# Patient Record
Sex: FEMALE | Race: BLACK OR AFRICAN AMERICAN | NOT HISPANIC OR LATINO | Employment: OTHER | URBAN - METROPOLITAN AREA
[De-identification: names, ages, dates, MRNs, and addresses within clinical notes are randomized per-mention and may not be internally consistent; named-entity substitution may affect disease eponyms.]

---

## 2017-01-28 ENCOUNTER — HOSPITAL ENCOUNTER (EMERGENCY)
Facility: HOSPITAL | Age: 57
Discharge: HOME | End: 2017-01-28
Attending: EMERGENCY MEDICINE
Payer: MEDICARE

## 2017-01-28 VITALS
WEIGHT: 170 LBS | OXYGEN SATURATION: 96 % | RESPIRATION RATE: 20 BRPM | SYSTOLIC BLOOD PRESSURE: 126 MMHG | BODY MASS INDEX: 28.32 KG/M2 | HEIGHT: 65 IN | DIASTOLIC BLOOD PRESSURE: 71 MMHG | TEMPERATURE: 98 F | HEART RATE: 108 BPM

## 2017-01-28 DIAGNOSIS — K59.00 CONSTIPATION: ICD-10-CM

## 2017-01-28 PROCEDURE — 25000003 PHARM REV CODE 250: Performed by: NURSE PRACTITIONER

## 2017-01-28 PROCEDURE — 99284 EMERGENCY DEPT VISIT MOD MDM: CPT | Mod: ,,, | Performed by: EMERGENCY MEDICINE

## 2017-01-28 PROCEDURE — 99283 EMERGENCY DEPT VISIT LOW MDM: CPT

## 2017-01-28 RX ORDER — SYRING-NEEDL,DISP,INSUL,0.3 ML 29 G X1/2"
300 SYRINGE, EMPTY DISPOSABLE MISCELLANEOUS
Status: COMPLETED | OUTPATIENT
Start: 2017-01-28 | End: 2017-01-28

## 2017-01-28 RX ORDER — PSEUDOEPHEDRINE/ACETAMINOPHEN 30MG-500MG
100 TABLET ORAL
Status: COMPLETED | OUTPATIENT
Start: 2017-01-28 | End: 2017-01-28

## 2017-01-28 RX ORDER — HYDROCODONE BITARTRATE AND ACETAMINOPHEN 5; 325 MG/1; MG/1
1 TABLET ORAL
Status: COMPLETED | OUTPATIENT
Start: 2017-01-28 | End: 2017-01-28

## 2017-01-28 RX ADMIN — MAGESIUM CITRATE 300 ML: 1.75 LIQUID ORAL at 10:01

## 2017-01-28 RX ADMIN — HYDROCODONE BITARTRATE AND ACETAMINOPHEN 1 TABLET: 5; 325 TABLET ORAL at 10:01

## 2017-01-28 RX ADMIN — SODIUM CHLORIDE 500 ML: 0.9 INJECTION, SOLUTION INTRAVENOUS at 10:01

## 2017-01-28 RX ADMIN — Medication 100 ML: at 10:01

## 2017-01-28 NOTE — ED AVS SNAPSHOT
"          [facility]  [address]  [facility] [address]               Wilfredo Vazquez   2017  9:34 PM   ED    Description:  Female : 1960   Department:  [facility]           Your Care was Coordinated By:     Provider Role From To    Efrem Mendoza MD Attending Provider 17 --    Savannah Kumar NP Nurse Practitioner 17 --      Reason for Visit     Constipation           Diagnoses this Visit        Comments    Constipation           ED Disposition     None           To Do List           Follow-up Information     Follow up with [facility]. Schedule an appointment as soon as possible for a visit in 2 days.    Specialty:  Internal Medicine    Why:  Return to the ER for any changes, worsening or concerns. Use over the counter Miralax per package instructions if needed for constipation.    Contact information:    [address] [facility] [facility] [address] [phone]    Additional information:    [facility]      [facility]     [facility] Care [facility] -  Assistance  Registered nurses in the [facility] provide clinical advisement, health education, appointment booking, and other advisory services.  Call for this free service at [phone].             Medications           Message regarding Medications     Verify the changes and/or additions to your medication regime listed below are the same as discussed with your clinician today.  If any of these changes or additions are incorrect, please notify your healthcare provider.        These medications were administered today        Dose Freq    glycerin 99.5% topical solution 100 mL 100 mL ED 1 Time    Sig: Place 100 mLs rectally ED 1 Time.    Class: Normal    Route: Rectal    Linked Group 1:  "And" Linked Group Details      magnesium citrate solution 300 mL 300 mL ED 1 Time    Sig: " "Place 300 mLs rectally ED 1 Time.    Class: Normal    Route: Rectal    Linked Group 1:  "And" Linked Group Details      sodium chloride 0.9% bolus 500 mL 500 mL ED 1 Time    Sig: Place 500 mLs rectally ED 1 Time.    Class: Normal    Route: Rectal    Linked Group 1:  "And" Linked Group Details      hydrocodone-acetaminophen 5-325mg per tablet 1 tablet 1 tablet ED 1 Time    Sig: Take 1 tablet by mouth ED 1 Time.    Class: Normal    Route: Oral    Cosign for Ordering: Accepted by Efrem Mendoza MD on 1/28/2017 10:47 PM           Verify that the below list of medications is an accurate representation of the medications you are currently taking.  If none reported, the list may be blank. If incorrect, please contact your healthcare provider. Carry this list with you in case of emergency.           Current Medications     lisinopril (PRINIVIL,ZESTRIL) 5 MG tablet Take 5 mg by mouth once daily.    acyclovir (ZOVIRAX) 800 MG Tab Take 1 tablet (800 mg total) by mouth 5 (five) times daily.    hydrocodone-acetaminophen 5-325mg (NORCO) 5-325 mg per tablet Take 1 tablet by mouth every 4 (four) hours as needed for Pain.           Clinical Reference Information           Your Vitals Were     BP Pulse Temp Resp Height Weight    126/71 (BP Location: Right arm, Patient Position: Sitting) 108 98.2 °F (36.8 °C) (Oral) 20 5' 5" (1.651 m) 77.1 kg (170 lb)    SpO2 BMI             96% 28.29 kg/m2         Allergies as of 1/28/2017     No Known Allergies      Immunizations Administered on Date of Encounter - 1/28/2017     None      ED Micro, Lab, POCT     None      ED Imaging Orders     Start Ordered       Status Ordering Provider    01/28/17 2155 01/28/17 2154  X-Ray Abdomen Flat And Erect  1 time imaging      Final result         Discharge Instructions         Constipation (Adult)  Constipation means that you have bowel movements that are less frequent than usual. Stools often become very hard and difficult to pass.  Constipation is " very common. At some point in life it affects almost everyone. Since everyone's bowel habits are different, what is constipation to one person may not be to another. Your healthcare provider may do tests to diagnose constipation. It depends on what he or she finds when evaluating you.    Symptoms of constipation include:  · Abdominal pain  · Bloating  · Vomiting  · Painful bowel movements  · Itching, swelling, bleeding, or pain around the anus  Causes  Constipation can have many causes. These include:  · Diet low in fiber  · Too much dairy  · Not drinking enough liquids  · Lack of exercise or physical activity. This is especially true for older adults.  · Changes in lifestyle or daily routine, including pregnancy, aging, work, and travel  · Frequent use or misuse of laxatives  · Ignoring the urge to have a bowel movement or delaying it until later  · Medicines, such as certain prescription pain medicines, iron supplements, antacids, certain antidepressants, and calcium supplements  · Diseases like irritable bowel syndrome, bowel obstructions, stroke, diabetes, thyroid disease, Parkinson disease, hemorrhoids, and colon cancer  Complications  Potential complications of constipation can include:  · Hemorrhoids  · Rectal bleeding from hemorrhoids or anal fissures (skin tears)  · Hernias  · Dependency on laxatives  · Chronic constipation  · Fecal impaction  · Bowel obstruction or perforation  Home care  All treatment should be done after talking with your healthcare provider. This is especially true if you have another medical problems, are taking prescription medicines, or are an older adult. Treatment most often involves lifestyle changes. You may also need medicines. Your healthcare provider will tell you which will work best for you. Follow the advice below to help avoid this problem in the future.  Lifestyle changes  These lifestyle changes can help prevent constipation:  · Diet. Eat a high-fiber diet, with fresh  fruit and vegetables, and reduce dairy intake, meats, and processed foods  · Fluids. It's important to get enough fluids each day. Drink plenty of water when you eat more fiber. If you are on diet that limits the amount of fluid you can have, talk about this with your healthcare provider.  · Regular exercise. Check with your healthcare provider first.  Medications  Take any medicines as directed. Some laxatives are safe to use only every now and then. Others can be taken on a regular basis. Talk with your doctor or pharmacist if you have questions.  Prescription pain medicines can cause constipation. If you are taking this kind of medicine, ask your healthcare provider if you should also take a stool softener.  Medicines you may take to treat constipation include:  · Fiber supplements  · Stool softeners  · Laxatives  · Enemas  · Rectal suppositories  Follow-up care  Follow up with your healthcare provider if symptoms don't get better in the next few days. You may need to have more tests or see a specialist.  Call 911  Call 911 if any of these occur:  · Trouble breathing  · Stiff, rigid abdomen that is severely painful to touch  · Confusion  · Fainting or loss of consciousness  · Rapid heart rate  · Chest pain  When to seek medical advice  Call your healthcare provider right away if any of these occur:  · Fever over 100.4°F (38°C)  · Failure to resume normal bowel movements  · Pain in your abdomen or back gets worse  · Nausea or vomiting  · Swelling in your abdomen  · Blood in the stool  · Black, tarry stool  · Involuntary weight loss  · Weakness  © 5945-4891 [employer]. [address] [place] [address], [place] [address]. All rights reserved. This information is not intended as a substitute for professional medical care. Always follow your healthcare professional's instructions.          [facility] Sign-Up     Activating your [facility] account is as easy as 1-2-3!     1) Visit [email or web address], select Sign Up Now,  enter this activation code and your date of birth, then select Next.  [email or web address]  Expires: 3/14/2017 11:34 PM      2) Create a username and password to use when you visit [facility] in the future and select a security question in case you lose your password and select Next.    3) Enter your e-mail address and click Sign Up!    Additional Information  If you have questions, please e-mail [email or web address] or call [phone] to talk to our [facility] staff. Remember, [facility] is NOT to be used for urgent needs. For medical emergencies, dial 911.         Smoking Cessation     If you would like to quit smoking:   You may be eligible for free services if you are a [place] resident and started smoking cigarettes before September 1, 1988.  Call the Smoking Cessation Trust (SCT) toll free at (786) 160-2577 or (198) 087-7028.   Call 7-916-QUIT-NOW if you do not meet the above criteria.             [facility] complies with applicable Federal civil rights laws and does not discriminate on the basis of race, color, national origin, age, disability, or sex.        Language Assistance Services     ATTENTION: Language assistance services are available, free of charge. Please call [phone].      ATENCIÓN: Si habla español, tiene a william disposición servicios gratuitos de asistencia lingüística. Llame al [phone].     CHÚ Ý: N?u b?n nói Ti?ng Vi?t, có các d?ch v? h? tr? ngôn ng? mi?n phí dành cho b?n. G?i s? [phone].

## 2017-01-29 NOTE — ED PROVIDER NOTES
Encounter Date: 1/28/2017    SCRIBE #1 NOTE: I, Toyin Kirk, am scribing for, and in the presence of,  Dr. Mendoza. I have scribed the following portions of the note - the APC attestation. Other sections scribed: Xray interpretation.       History     Chief Complaint   Patient presents with    Constipation     Patient reports that symptoms started a week ago.      Review of patient's allergies indicates:  No Known Allergies  HPI Comments: This is a 57 y.o. Female with a past medical history significant for reflux, emphysema and hypertension who presents emergency department today complaining of constipation and abdominal pain.  She reports that her last bowel movement was one week ago.  Abdominal pain began 3 days ago.  It is generalized, constant and cramping.  She states that she is passing gas.  She denies vomiting.  She has occasional nausea.  Denies diarrhea.  She has a history of chronic constipation.  She has had Nissen fundoplication, appendectomy and hysterectomy in the past. she reports that she is taking Dulcolax, stool softeners and enemas without production of a bowel movement.  No other problems or concerns voiced at this time.      The history is provided by the patient.     Past Medical History   Diagnosis Date    Emphysema lung     Hypertension     Shingles      No past medical history pertinent negatives.  Past Surgical History   Procedure Laterality Date    Hysterectomy      Appendectomy      Abdominal surgery      Laparoscopic nissen fundoplication       History reviewed. No pertinent family history.  Social History   Substance Use Topics    Smoking status: Current Some Day Smoker    Smokeless tobacco: None    Alcohol use Yes     Review of Systems   Constitutional: Negative for chills and fever.   HENT: Negative for congestion and sinus pressure.    Eyes: Negative for visual disturbance.   Respiratory: Negative for cough, chest tightness and shortness of breath.    Cardiovascular:  Negative for chest pain.   Gastrointestinal: Positive for abdominal pain.  Positive for constipation.  Negative for diarrhea.  Negative for vomiting.  Positive for nausea.  Negative for rectal bleeding.    Genitourinary: Negative for flank pain. Negative for dysuria.  Musculoskeletal: Negative for arthralgias and myalgias.   Skin: Negative for rash and wound.   Neurological: Negative for dizziness. Negative for weakness and numbness.   Psychiatric/Behavioral: Negative for confusion.       Physical Exam   Initial Vitals   BP Pulse Resp Temp SpO2   01/28/17 2052 01/28/17 2052 01/28/17 2052 01/28/17 2052 01/28/17 2052   126/71 108 20 98.2 °F (36.8 °C) 96 %     Physical Exam   Nursing note and vitals reviewed.  Constitutional: Patient appears well-developed and well-nourished. Not diaphoretic. No distress.   HENT:   Head: Normocephalic and atraumatic.   Eyes: Conjunctivae are normal. No scleral icterus.   Neck: Normal range of motion. Neck supple.   Cardiovascular: Normal rate, regular rhythm and normal heart sounds.   Pulmonary/Chest: Breath sounds normal. No respiratory distress.  Abdominal: The abdomen is distended.  Normal bowel sounds all 4 quadrants.  There is vague generalized tenderness to palpation without rebound or guarding.    Musculoskeletal: Normal range of motion. No edema or tenderness.   Neurological: Alert and oriented to person, place, and time. Normal strength. No sensory deficit.   Skin: Skin is warm and dry. No rash noted. No erythema. No pallor.   Psychiatric: Normal mood and affect. Thought content normal.     ED Course   Procedures  Labs Reviewed - No data to display       X-Rays:   Independently Interpreted Readings:   Abdomen:   Flat and Erect of Abdomen - Multiple scattered air fluid levels noted on direct image. Also with air projected in rectum consistent with constipation.      Medical Decision Making:   History:   Old Medical Records: I decided to obtain old medical records.  Initial  Assessment:   57-year-old female with a  history of chronic constipation presents with constipation for one week and generalized abdominal cramping for 3 days.  She is afebrile, nontoxic and in no acute distress.  She has generalized abdominal tenderness without rebound or guarding.  Abdominal x-rays are consistent with constipation.  Brown bomber enema produced a large bowel movement per patient.  She states that she feels much better.  On reassessment, her abdomen is benign to palpation.  Discharged home in stable condition with instructions for PCP follow up.  Return precautions at length.  She should take over-the-counter MiraLAX per package instructions if needed.    Independently Interpreted Test(s):   I have ordered and independently interpreted X-rays - see prior notes.  Clinical Tests:   Radiological Study: Ordered and Reviewed            Scribe Attestation:   Scribe #1: I performed the above scribed service and the documentation accurately describes the services I performed. I attest to the accuracy of the note.    Attending Attestation:     Physician Attestation Statement for NP/PA:   I discussed this assessment and plan of this patient with the NP/PA, but I did not personally examine the patient. The face to face encounter was performed by the NP/PA.    Other NP/PA Attestation Additions:    History of Present Illness: 58 yo woman with hx of HTN presents for evaluation of constipation for a weeks' duration.         Physician Attestation for Scribe:  Physician Attestation Statement for Scribe #1: I, Dr. Mendoza, reviewed documentation, as scribed by Toyin Kirk in my presence, and it is both accurate and complete.                 ED Course     Clinical Impression:   The encounter diagnosis was Constipation.          Savannah Kumar NP  01/29/17 0039

## 2017-01-29 NOTE — DISCHARGE INSTRUCTIONS
Constipation (Adult)  Constipation means that you have bowel movements that are less frequent than usual. Stools often become very hard and difficult to pass.  Constipation is very common. At some point in life it affects almost everyone. Since everyone's bowel habits are different, what is constipation to one person may not be to another. Your healthcare provider may do tests to diagnose constipation. It depends on what he or she finds when evaluating you.    Symptoms of constipation include:  · Abdominal pain  · Bloating  · Vomiting  · Painful bowel movements  · Itching, swelling, bleeding, or pain around the anus  Causes  Constipation can have many causes. These include:  · Diet low in fiber  · Too much dairy  · Not drinking enough liquids  · Lack of exercise or physical activity. This is especially true for older adults.  · Changes in lifestyle or daily routine, including pregnancy, aging, work, and travel  · Frequent use or misuse of laxatives  · Ignoring the urge to have a bowel movement or delaying it until later  · Medicines, such as certain prescription pain medicines, iron supplements, antacids, certain antidepressants, and calcium supplements  · Diseases like irritable bowel syndrome, bowel obstructions, stroke, diabetes, thyroid disease, Parkinson disease, hemorrhoids, and colon cancer  Complications  Potential complications of constipation can include:  · Hemorrhoids  · Rectal bleeding from hemorrhoids or anal fissures (skin tears)  · Hernias  · Dependency on laxatives  · Chronic constipation  · Fecal impaction  · Bowel obstruction or perforation  Home care  All treatment should be done after talking with your healthcare provider. This is especially true if you have another medical problems, are taking prescription medicines, or are an older adult. Treatment most often involves lifestyle changes. You may also need medicines. Your healthcare provider will tell you which will work best for you. Follow  the advice below to help avoid this problem in the future.  Lifestyle changes  These lifestyle changes can help prevent constipation:  · Diet. Eat a high-fiber diet, with fresh fruit and vegetables, and reduce dairy intake, meats, and processed foods  · Fluids. It's important to get enough fluids each day. Drink plenty of water when you eat more fiber. If you are on diet that limits the amount of fluid you can have, talk about this with your healthcare provider.  · Regular exercise. Check with your healthcare provider first.  Medications  Take any medicines as directed. Some laxatives are safe to use only every now and then. Others can be taken on a regular basis. Talk with your doctor or pharmacist if you have questions.  Prescription pain medicines can cause constipation. If you are taking this kind of medicine, ask your healthcare provider if you should also take a stool softener.  Medicines you may take to treat constipation include:  · Fiber supplements  · Stool softeners  · Laxatives  · Enemas  · Rectal suppositories  Follow-up care  Follow up with your healthcare provider if symptoms don't get better in the next few days. You may need to have more tests or see a specialist.  Call 911  Call 911 if any of these occur:  · Trouble breathing  · Stiff, rigid abdomen that is severely painful to touch  · Confusion  · Fainting or loss of consciousness  · Rapid heart rate  · Chest pain  When to seek medical advice  Call your healthcare provider right away if any of these occur:  · Fever over 100.4°F (38°C)  · Failure to resume normal bowel movements  · Pain in your abdomen or back gets worse  · Nausea or vomiting  · Swelling in your abdomen  · Blood in the stool  · Black, tarry stool  · Involuntary weight loss  · Weakness  © 6902-6937 [employer]. [address] [place] [address], [place] [address]. All rights reserved. This information is not intended as a substitute for professional medical care. Always follow  your healthcare professional's instructions.

## 2017-01-29 NOTE — ED TRIAGE NOTES
"Wilfredo Vazquez, a 57 y.o. female presents to the ED c/o constipation. Pt reports last BM was 7 days ago. Pt reports taking enema with minimal relief. Pt states she is also having abdominal pain "that goes into my legs" +nausea.      Chief Complaint   Patient presents with    Constipation     Patient reports that symptoms started a week ago.      Review of patient's allergies indicates:  No Known Allergies  Past Medical History   Diagnosis Date    Emphysema lung     Hypertension     Shingles       "

## 2018-01-24 PROBLEM — R10.9 ABDOMINAL PAIN: Status: ACTIVE | Noted: 2018-01-24

## 2018-07-13 ENCOUNTER — OFFICE VISIT (OUTPATIENT)
Dept: PRIMARY CARE CLINIC | Facility: CLINIC | Age: 58
End: 2018-07-13
Payer: MEDICARE

## 2018-07-13 VITALS
BODY MASS INDEX: 35.3 KG/M2 | RESPIRATION RATE: 18 BRPM | WEIGHT: 211.88 LBS | SYSTOLIC BLOOD PRESSURE: 111 MMHG | HEART RATE: 84 BPM | HEIGHT: 65 IN | DIASTOLIC BLOOD PRESSURE: 75 MMHG | TEMPERATURE: 98 F | OXYGEN SATURATION: 97 %

## 2018-07-13 DIAGNOSIS — I10 HYPERTENSION, UNSPECIFIED TYPE: Primary | ICD-10-CM

## 2018-07-13 DIAGNOSIS — M54.50 ACUTE BILATERAL LOW BACK PAIN WITHOUT SCIATICA: ICD-10-CM

## 2018-07-13 DIAGNOSIS — K59.00 CONSTIPATION, UNSPECIFIED CONSTIPATION TYPE: ICD-10-CM

## 2018-07-13 DIAGNOSIS — J98.01 BRONCHOSPASM: ICD-10-CM

## 2018-07-13 DIAGNOSIS — E66.9 OBESITY, UNSPECIFIED CLASSIFICATION, UNSPECIFIED OBESITY TYPE, UNSPECIFIED WHETHER SERIOUS COMORBIDITY PRESENT: ICD-10-CM

## 2018-07-13 PROCEDURE — 99214 OFFICE O/P EST MOD 30 MIN: CPT | Mod: PBBFAC,PN | Performed by: FAMILY MEDICINE

## 2018-07-13 PROCEDURE — 99213 OFFICE O/P EST LOW 20 MIN: CPT | Mod: S$GLB,,, | Performed by: FAMILY MEDICINE

## 2018-07-13 PROCEDURE — 99999 PR PBB SHADOW E&M-EST. PATIENT-LVL IV: CPT | Mod: PBBFAC,,, | Performed by: FAMILY MEDICINE

## 2018-07-13 RX ORDER — CYCLOBENZAPRINE HCL 10 MG
10 TABLET ORAL 3 TIMES DAILY PRN
Qty: 30 TABLET | Refills: 5 | Status: SHIPPED | OUTPATIENT
Start: 2018-07-13 | End: 2018-07-23

## 2018-07-13 RX ORDER — HYDROCODONE BITARTRATE AND ACETAMINOPHEN 5; 325 MG/1; MG/1
1 TABLET ORAL EVERY 6 HOURS PRN
Qty: 30 TABLET | Refills: 0 | Status: SHIPPED | OUTPATIENT
Start: 2018-07-13 | End: 2018-07-18 | Stop reason: SDUPTHER

## 2018-07-13 RX ORDER — IBUPROFEN 600 MG/1
600 TABLET ORAL 3 TIMES DAILY
Qty: 60 TABLET | Refills: 5 | Status: SHIPPED | OUTPATIENT
Start: 2018-07-13 | End: 2018-09-19 | Stop reason: SDUPTHER

## 2018-07-13 NOTE — PROGRESS NOTES
Subjective:       Patient ID: Wilfredo Vazquez is a 58 y.o. female.    Chief Complaint: Fall (pt. fell 7/4/18) and Back Pain (lower back pain since she fell)    HPI:  58-year-old female fell on July 4th--patient went to sit down as she would to set the chair was not there--patient hit the ground was outside.  Thinks may have hit her tailbone on the door frame and then landed on her butt.  Patient went to the emergency room--no x-rays were taken.  Patient came back on Saturday because nothing was done on 07/04/2018  pulled her pants down did not see any bruising so no x-rays were done told she was okay.  Given 8 Norco still to take as needed and follow up with primary care       Pain is in the upper gluteal cleft radiating across the buttocks bilaterally, hurts to sit--especially sores arises from the sitting position.  Has to walk with a limp Heart to go up and down steps--hard to squat.       History of gout in her foot wants no lupus or rheumatoid--no fracture--does get some cramping of the hands thinks possibly arthritis in the hands    ROS:  Skin: no psoriasis, eczema, skin cancer  HEENT: No headache, ocular pain, blurred vision, diplopia, epistaxis, hoarseness change in voice, thyroid trouble  Lung: No pneumonia, +asthma, no Tb, wheezing, SOB, no smoking  Heart: No chest pain, ankle edema, palpitations, MI, patti murmur,+ hypertension,no hyperlipidemia  Abdomen: No nausea, vomiting, diarrhea, +constipation,no ulcers, hepatitis, gallbladder disease, melena, hematochezia, hematemesis  : no UTI, renal disease, stones  GYN last menstrual period 1984 had a hysterectomy--due for mammogram  MS: no fractures, O/A, lupus, rheumatoid, gout  Neuro: No dizziness, LOC, seizures   No diabetes, no anemia, no anxiety, no depression     Objective:   Physical Exam:  General: Well nourished, well developed, moderate discomfort secondary to buttock pain +overweight  Skin: No lesions  HEENT: Eyes PERRLA, EOM intact, nose  patent, throat non-erythematous   NECK: Supple, no bruits, No JVD, no nodes  Lungs: Clear, no rales, rhonchi, wheezing  Heart: Regular rate and rhythm, no murmurs, gallops, or rubs  Abdomen: flat, bowel sounds positive, no tenderness, or organomegaly  MS:  Tenderness in the lumbar spine L1-S1 bilaterally in especially sore in the upper gluteal cleft area with deep palpation pain with anterior flexion 10° extension 10° lateral flexion rotation 10°, some crepitus of the knees bilaterally with flexion and extension but no pain able to squat and rise but some pain in the back in the but  Neuro: Alert, CN intact, oriented X 3  Extremities: No cyanosis, clubbing, or edema         Assessment:       1. Hypertension, unspecified type    2. Acute bilateral low back pain without sciatica    3. Obesity, unspecified classification, unspecified obesity type, unspecified whether serious comorbidity present    4. Bronchospasm    5. Constipation, unspecified constipation type        Plan:       Hypertension, unspecified type  -     CBC auto differential; Future; Expected date: 07/13/2018  -     Comprehensive metabolic panel; Future; Expected date: 07/13/2018  -     Lipid panel; Future; Expected date: 07/13/2018  -     POCT EKG 12-LEAD (NOT FOR [facility] USE)  -     POCT occult blood stool  -     X-Ray Chest PA And Lateral; Future; Expected date: 07/13/2018  -     POCT urine dipstick without microscope  -     T4, free; Future; Expected date: 07/13/2018  -     TSH; Future; Expected date: 07/13/2018    Acute bilateral low back pain without sciatica  -     X-Ray Lumbar Spine Complete 5 View; Future; Expected date: 07/13/2018  -     X-Ray Sacrum And Coccyx; Future; Expected date: 07/13/2018  -     Sedimentation rate; Future; Expected date: 07/13/2018    Obesity, unspecified classification, unspecified obesity type, unspecified whether serious comorbidity present  -     T4, free; Future; Expected date: 07/13/2018  -     TSH; Future;  Expected date: 07/13/2018    Bronchospasm    Constipation, unspecified constipation type  -     T4, free; Future; Expected date: 07/13/2018  -     TSH; Future; Expected date: 07/13/2018    Other orders  -     HYDROcodone-acetaminophen (NORCO) 5-325 mg per tablet; Take 1 tablet by mouth every 6 (six) hours as needed.  Dispense: 30 tablet; Refill: 0  -     ibuprofen (ADVIL,MOTRIN) 600 MG tablet; Take 1 tablet (600 mg total) by mouth 3 (three) times daily.  Dispense: 60 tablet; Refill: 5  -     cyclobenzaprine (FLEXERIL) 10 MG tablet; Take 1 tablet (10 mg total) by mouth 3 (three) times daily as needed.  Dispense: 30 tablet; Refill: 5      x-ray lumbar spine and coccyx due to recent fall and persistent pain  Norco/ibuprofen/Flexeril  Moist heat , theragesic , range-of-motion exercise  Needs routine lab CBCs CMP lipids T4 TSH UA chest x-ray EKG  History of hypertension be sure low-sodium diet needs exercise and decrease weight  Has asthma he has inhalers  Return in 4 weeks re-evaluate I cannot follow-up syndrome will be on vacation

## 2018-07-18 ENCOUNTER — OFFICE VISIT (OUTPATIENT)
Dept: PRIMARY CARE CLINIC | Facility: CLINIC | Age: 58
End: 2018-07-18
Payer: MEDICARE

## 2018-07-18 VITALS
SYSTOLIC BLOOD PRESSURE: 133 MMHG | HEART RATE: 67 BPM | WEIGHT: 209 LBS | TEMPERATURE: 98 F | HEIGHT: 65 IN | OXYGEN SATURATION: 99 % | BODY MASS INDEX: 34.82 KG/M2 | RESPIRATION RATE: 18 BRPM | DIASTOLIC BLOOD PRESSURE: 80 MMHG

## 2018-07-18 DIAGNOSIS — M54.50 ACUTE BILATERAL LOW BACK PAIN WITHOUT SCIATICA: Primary | ICD-10-CM

## 2018-07-18 DIAGNOSIS — E66.9 OBESITY, UNSPECIFIED CLASSIFICATION, UNSPECIFIED OBESITY TYPE, UNSPECIFIED WHETHER SERIOUS COMORBIDITY PRESENT: ICD-10-CM

## 2018-07-18 DIAGNOSIS — I10 HYPERTENSION, UNSPECIFIED TYPE: ICD-10-CM

## 2018-07-18 PROCEDURE — 3008F BODY MASS INDEX DOCD: CPT | Mod: CPTII,S$GLB,, | Performed by: FAMILY MEDICINE

## 2018-07-18 PROCEDURE — 99213 OFFICE O/P EST LOW 20 MIN: CPT | Mod: S$GLB,,, | Performed by: FAMILY MEDICINE

## 2018-07-18 PROCEDURE — 99999 PR PBB SHADOW E&M-EST. PATIENT-LVL III: CPT | Mod: PBBFAC,,, | Performed by: FAMILY MEDICINE

## 2018-07-18 RX ORDER — HYDROCODONE BITARTRATE AND ACETAMINOPHEN 5; 325 MG/1; MG/1
1 TABLET ORAL EVERY 6 HOURS PRN
Qty: 30 TABLET | Refills: 0 | Status: SHIPPED | OUTPATIENT
Start: 2018-07-18 | End: 2018-09-19

## 2018-07-18 NOTE — PROGRESS NOTES
Subjective:       Patient ID: Wilfredo Vazquez is a 58 y.o. female.    Chief Complaint: Results    HPI:  58-year-old female--fell July 4th--CC history of fall below.  Pain mainly in the upper gluteal cleft and across the upper buttocks.  X-ray show mild flexion of the coccyx but no fracture, x-ray lumbar spine shows mild to moderate lumbar spondylosis with some narrowing of the upper lumbar disc spaces chest x-ray within normal limits.  Using heating pad soaking in Epsom salt.  On pain medication, NSAIDs, muscle relaxers.      History of present illness 07/13/2018 58-year-old female fell on July 4th--patient went to sit down as she would to set the chair was not there--patient hit the ground was outside.  Thinks may have hit her tailbone on the door frame and then landed on her butt.  Patient went to the emergency room--no x-rays were taken.  Patient came back on Saturday because nothing was done on 07/04/2018  pulled her pants down did not see any bruising so no x-rays were done told she was okay.  Given 8 Norco still to take as needed and follow up with primary care       Pain is in the upper gluteal cleft radiating across the buttocks bilaterally, hurts to sit--especially sores arises from the sitting position.  Has to walk with a limp Heart to go up and down steps--hard to squat.       History of gout in her foot wants no lupus or rheumatoid--no fracture--does get some cramping of the hands thinks possibly arthritis in the hands    ROS:  Skin: no psoriasis, eczema, skin cancer  HEENT: No headache, ocular pain, blurred vision, diplopia, epistaxis, hoarseness change in voice, thyroid trouble  Lung: No pneumonia, +asthma, no Tb, wheezing, SOB, no smoking  Heart: No chest pain, ankle edema, palpitations, MI, patti murmur,+ hypertension,no hyperlipidemia  Abdomen: No nausea, vomiting, diarrhea, +constipation,no ulcers, hepatitis, gallbladder disease, melena, hematochezia, hematemesis  : no UTI, renal  disease, stones  GYN last menstrual period 1984 had a hysterectomy--due for mammogram  MS:  Slight flexion of the coccyx--mild to moderate lumbar spondylosis with slight narrowing of the disc spaces--still with significant pain in the gluteal cleft and upper buttock area.  Patient is able walk with some discomfort but main pain is as patient sits or goes to stand from a sitting position.  Neuro: No dizziness, LOC, seizures   No diabetes, no anemia, no anxiety, no depression     Objective:   Physical Exam:  General: Well nourished, well developed, moderate discomfort secondary to buttock pain +overweight  Skin: No lesions  HEENT: Eyes PERRLA, EOM intact, nose patent, throat non-erythematous   NECK: Supple, no bruits, No JVD, no nodes  Lungs: Clear, no rales, rhonchi, wheezing  Heart: Regular rate and rhythm, no murmurs, gallops, or rubs  Abdomen: flat, bowel sounds positive, no tenderness, or organomegaly  MS:  Tenderness in the lumbar spine L1-S1 bilaterally in especially sore in the upper gluteal cleft area with deep palpation pain with anterior flexion 10° extension 10° lateral flexion rotation 10°, some crepitus of the knees bilaterally with flexion and extension but no pain able to squat and rise but some pain in the back---no change in physical exam at this time from prior exam of 07/13/2018  Neuro: Alert, CN intact, oriented X 3  Extremities: No cyanosis, clubbing, or edema         Assessment:       1. Acute bilateral low back pain without sciatica    2. Obesity, unspecified classification, unspecified obesity type, unspecified whether serious comorbidity present    3. Hypertension, unspecified type        Plan:       Acute bilateral low back pain without sciatica    Obesity, unspecified classification, unspecified obesity type, unspecified whether serious comorbidity present    Hypertension, unspecified type      x-ray lumbar spine shows mild to moderate lumbar spondylosis with some narrowing of the disc  spaces, x-ray of the coccyx shows mild deviation chest x-ray normal  Patient did lab work yesterday results are pending  Norco/ibuprofen/Flexeril--given refill of Norco but will pre dated for 720 18 so able to be filled  Moist heat , theragesic , range-of-motion exercise  History of hypertension be sure low-sodium diet needs exercise and decrease weight--blood pressure now stable  Has asthma he has inhalers  Return in 3 weeks--I will be on vacation until then--usually takes 4-6 weeks for coccygeal fractures to heal--the coccyx was not fractured but was deviated presenting pain in the area of the sacrococcygeal junction should be well in 4-6 weeks if not will refer to orthopedisttf

## 2018-09-19 ENCOUNTER — OFFICE VISIT (OUTPATIENT)
Dept: PRIMARY CARE CLINIC | Facility: CLINIC | Age: 58
End: 2018-09-19
Payer: MEDICARE

## 2018-09-19 VITALS
OXYGEN SATURATION: 100 % | RESPIRATION RATE: 18 BRPM | SYSTOLIC BLOOD PRESSURE: 129 MMHG | HEART RATE: 68 BPM | WEIGHT: 203 LBS | BODY MASS INDEX: 35.97 KG/M2 | HEIGHT: 63 IN | DIASTOLIC BLOOD PRESSURE: 83 MMHG

## 2018-09-19 DIAGNOSIS — Z12.31 VISIT FOR SCREENING MAMMOGRAM: ICD-10-CM

## 2018-09-19 DIAGNOSIS — E66.9 OBESITY, UNSPECIFIED CLASSIFICATION, UNSPECIFIED OBESITY TYPE, UNSPECIFIED WHETHER SERIOUS COMORBIDITY PRESENT: ICD-10-CM

## 2018-09-19 DIAGNOSIS — M25.571 ACUTE RIGHT ANKLE PAIN: Primary | ICD-10-CM

## 2018-09-19 DIAGNOSIS — I10 HYPERTENSION, UNSPECIFIED TYPE: ICD-10-CM

## 2018-09-19 DIAGNOSIS — M54.50 ACUTE BILATERAL LOW BACK PAIN WITHOUT SCIATICA: ICD-10-CM

## 2018-09-19 DIAGNOSIS — E66.9 OBESITY (BMI 35.0-39.9 WITHOUT COMORBIDITY): ICD-10-CM

## 2018-09-19 DIAGNOSIS — K59.00 CONSTIPATION, UNSPECIFIED CONSTIPATION TYPE: ICD-10-CM

## 2018-09-19 PROCEDURE — 99213 OFFICE O/P EST LOW 20 MIN: CPT | Mod: PBBFAC,PN,25 | Performed by: FAMILY MEDICINE

## 2018-09-19 PROCEDURE — 3008F BODY MASS INDEX DOCD: CPT | Mod: CPTII,S$PBB,, | Performed by: FAMILY MEDICINE

## 2018-09-19 PROCEDURE — 99999 PR PBB SHADOW E&M-EST. PATIENT-LVL III: CPT | Mod: PBBFAC,,, | Performed by: FAMILY MEDICINE

## 2018-09-19 PROCEDURE — 96372 THER/PROPH/DIAG INJ SC/IM: CPT | Mod: PBBFAC,PN

## 2018-09-19 PROCEDURE — 99213 OFFICE O/P EST LOW 20 MIN: CPT | Mod: S$PBB,,, | Performed by: FAMILY MEDICINE

## 2018-09-19 RX ORDER — IBUPROFEN 600 MG/1
600 TABLET ORAL 3 TIMES DAILY
Qty: 60 TABLET | Refills: 5 | Status: SHIPPED | OUTPATIENT
Start: 2018-09-19 | End: 2018-11-03

## 2018-09-19 RX ORDER — HYDROCODONE BITARTRATE AND ACETAMINOPHEN 5; 325 MG/1; MG/1
1 TABLET ORAL EVERY 6 HOURS PRN
Qty: 30 TABLET | Refills: 0 | Status: SHIPPED | OUTPATIENT
Start: 2018-09-19 | End: 2018-11-03

## 2018-09-19 RX ORDER — METHYLPREDNISOLONE 4 MG/1
TABLET ORAL
Qty: 1 PACKAGE | Refills: 0 | Status: SHIPPED | OUTPATIENT
Start: 2018-09-19 | End: 2018-11-03

## 2018-09-19 RX ORDER — BETAMETHASONE SODIUM PHOSPHATE AND BETAMETHASONE ACETATE 3; 3 MG/ML; MG/ML
12 INJECTION, SUSPENSION INTRA-ARTICULAR; INTRALESIONAL; INTRAMUSCULAR; SOFT TISSUE
Status: COMPLETED | OUTPATIENT
Start: 2018-09-19 | End: 2018-09-19

## 2018-09-19 RX ADMIN — BETAMETHASONE SODIUM PHOSPHATE AND BETAMETHASONE ACETATE 12 MG: 3; 3 INJECTION, SUSPENSION INTRA-ARTICULAR; INTRALESIONAL; INTRAMUSCULAR at 09:09

## 2018-09-19 NOTE — PROGRESS NOTES
Subjective:       Patient ID: Wilfredo Vazquez is a 58 y.o. female.    Chief Complaint: Hospital Follow Up (Gout )    HPI:  58-year-old female went to the emergency room Sunday--3 days ago--for severe pain in the right foot 1st MTP anterior ankle and lateral malleolus areas--was diagnosed with gout.  Given injection and prednisone but no pain medication.  Patient states swelling went down a lot, but still having pain. X-ray was taken right foot was within normal limits.  Had lab in July no renal impairment.  Patient has not had a uric acid level.      Office visit 07/18/2018 58-year-old female--fell July 4th--CC history of fall below.  Pain mainly in the upper gluteal cleft and across the upper buttocks.  X-ray show mild flexion of the coccyx but no fracture, x-ray lumbar spine shows mild to moderate lumbar spondylosis with some narrowing of the upper lumbar disc spaces chest x-ray within normal limits.  Using heating pad soaking in Epsom salt.  On pain medication, NSAIDs, muscle relaxers.      ROS:  Skin: no psoriasis, eczema, skin cancer  HEENT: No headache, ocular pain, blurred vision, diplopia, epistaxis, hoarseness change in voice, thyroid trouble  Lung: No pneumonia, +asthma, no Tb, wheezing, SOB, no smoking  Heart: No chest pain, ankle edema, palpitations, MI, patti murmur,+ hypertension,no hyperlipidemia  Abdomen: No nausea, vomiting, diarrhea, +constipation-doing okay with dulcolx,no ulcers, hepatitis, gallbladder disease, melena, hematochezia, hematemesis  : no UTI, renal disease, stones  GYN last menstrual period 1984 had a hysterectomy--due for mammogram  MS:  Slight flexion of the coccyx--mild to moderate lumbar spondylosis with slight narrowing of the disc spaces----patient states coccyx doing much better.  See history of present illness swelling of the right foot  Neuro: No dizziness, LOC, seizures   No diabetes, no anemia, no anxiety, no depression     Objective:   Physical Exam:  General:  Well nourished, well developed, moderate discomfort secondary to buttock pain +overweight  Skin: No lesions  HEENT: Eyes PERRLA, EOM intact, nose patent, throat non-erythematous   NECK: Supple, no bruits, No JVD, no nodes  Lungs: Clear, no rales, rhonchi, wheezing  Heart: Regular rate and rhythm, no murmurs, gallops, or rubs  Abdomen: flat, bowel sounds positive, no tenderness, or organomegaly  MS:  Tenderness right foot especially anterior ankle and lateral malleolus also some pain in the right 1st MTP but mainly in the ankle area--pain with flexion extension inversion eversion but mild--minimal swelling now states was much more swollen in the emergency room before given injection and prednisone   Neuro: Alert, CN intact, oriented X 3  Extremities: No cyanosis, clubbing, or edema         Assessment:       1. Acute right ankle pain    2. Hypertension, unspecified type    3. Obesity, unspecified classification, unspecified obesity type, unspecified whether serious comorbidity present    4. Constipation, unspecified constipation type    5. Acute bilateral low back pain without sciatica    6. Obesity (BMI 35.0-39.9 without comorbidity)        Plan:       Acute right ankle pain    Hypertension, unspecified type    Obesity, unspecified classification, unspecified obesity type, unspecified whether serious comorbidity present    Constipation, unspecified constipation type    Acute bilateral low back pain without sciatica    Obesity (BMI 35.0-39.9 without comorbidity)       Patient seen in the emergency room was given probenemid with cholchicine, pred 5 pills  Celestone 2 cc now-- medrol dose pack then iburpofen 600 q 6 hrds If still sorte will try indocin   Uric acid level now if uric acid neg and pain stays ??to podiatrist   Moist heat , theragesic , range-of-motion exercise  History of hypertension be sure low-sodium diet needs exercise and decrease weight--blood pressure now stable  Has asthma she has  inhaler  Mammogram  Flu shot of patient if pt desires

## 2019-03-20 DIAGNOSIS — Z12.11 COLON CANCER SCREENING: ICD-10-CM

## 2019-05-13 PROBLEM — R13.10 DYSPHAGIA: Status: ACTIVE | Noted: 2019-05-13

## 2019-05-22 PROBLEM — K92.1 BLOOD IN THE STOOL: Status: ACTIVE | Noted: 2019-05-22

## 2020-12-10 DIAGNOSIS — K21.00 REFLUX ESOPHAGITIS: ICD-10-CM

## 2020-12-10 RX ORDER — LISINOPRIL 5 MG/1
5 TABLET ORAL DAILY
Qty: 30 TABLET | Refills: 2 | Status: SHIPPED | OUTPATIENT
Start: 2020-12-10 | End: 2021-05-03

## 2020-12-10 RX ORDER — LISINOPRIL 5 MG/1
TABLET ORAL
Qty: 30 TABLET | Refills: 2 | Status: SHIPPED | OUTPATIENT
Start: 2020-12-10 | End: 2020-12-10 | Stop reason: SDUPTHER

## 2020-12-10 NOTE — TELEPHONE ENCOUNTER
Call patient tell has not been seen since September 2018--needs to come in fasting get CBCs CMP lipids T4 TSH stool guaiac UA chest x-ray EKG is physical--needs to see me--OK 3 months refills give time to come and get seen get additional refills no more refills without being seen and getting lab

## 2020-12-10 NOTE — TELEPHONE ENCOUNTER
----- Message from Cornelia Mcnamara sent at 12/10/2020 10:34 AM CST -----  Contact: [phone]  Requesting an RX refill or new RX.  Is this a refill or new RX: New   RX name and strength: lisinopril (PRINIVIL,ZESTRIL) 5 MG tablet  Is this a 30 day or 90 day RX: 90  Pharmacy name and phone # (copy/paste from chart):  [facility] - [place], [place] - [address] [phone] (Phone)  [phone] (Fax)  Comments:

## 2020-12-21 ENCOUNTER — OFFICE VISIT (OUTPATIENT)
Dept: PRIMARY CARE CLINIC | Facility: CLINIC | Age: 60
End: 2020-12-21
Payer: MEDICARE

## 2020-12-21 VITALS
WEIGHT: 196.75 LBS | OXYGEN SATURATION: 98 % | RESPIRATION RATE: 18 BRPM | HEIGHT: 63 IN | SYSTOLIC BLOOD PRESSURE: 112 MMHG | HEART RATE: 91 BPM | DIASTOLIC BLOOD PRESSURE: 80 MMHG | BODY MASS INDEX: 34.86 KG/M2

## 2020-12-21 DIAGNOSIS — M79.671 BILATERAL FOOT PAIN: ICD-10-CM

## 2020-12-21 DIAGNOSIS — I10 HYPERTENSION, UNSPECIFIED TYPE: ICD-10-CM

## 2020-12-21 DIAGNOSIS — M79.641 BILATERAL HAND PAIN: ICD-10-CM

## 2020-12-21 DIAGNOSIS — R13.10 DYSPHAGIA, UNSPECIFIED TYPE: Primary | ICD-10-CM

## 2020-12-21 DIAGNOSIS — Z11.59 NEED FOR HEPATITIS C SCREENING TEST: ICD-10-CM

## 2020-12-21 DIAGNOSIS — M79.672 BILATERAL FOOT PAIN: ICD-10-CM

## 2020-12-21 DIAGNOSIS — Z87.19 HISTORY OF GASTROESOPHAGEAL REFLUX (GERD): ICD-10-CM

## 2020-12-21 DIAGNOSIS — Z11.4 ENCOUNTER FOR SCREENING FOR HIV: ICD-10-CM

## 2020-12-21 DIAGNOSIS — Z98.890 HISTORY OF FUNDOPLICATION: ICD-10-CM

## 2020-12-21 DIAGNOSIS — M79.642 BILATERAL HAND PAIN: ICD-10-CM

## 2020-12-21 DIAGNOSIS — Z86.010 HISTORY OF COLONIC POLYPS: ICD-10-CM

## 2020-12-21 DIAGNOSIS — E66.9 OBESITY (BMI 35.0-39.9 WITHOUT COMORBIDITY): ICD-10-CM

## 2020-12-21 DIAGNOSIS — J44.9 CHRONIC OBSTRUCTIVE PULMONARY DISEASE, UNSPECIFIED COPD TYPE: ICD-10-CM

## 2020-12-21 PROBLEM — Z86.0100 HISTORY OF COLONIC POLYPS: Status: ACTIVE | Noted: 2020-12-21

## 2020-12-21 PROCEDURE — 99999 PR PBB SHADOW E&M-EST. PATIENT-LVL III: CPT | Mod: PBBFAC,,, | Performed by: FAMILY MEDICINE

## 2020-12-21 PROCEDURE — 99999 PR PBB SHADOW E&M-EST. PATIENT-LVL III: ICD-10-PCS | Mod: PBBFAC,,, | Performed by: FAMILY MEDICINE

## 2020-12-21 PROCEDURE — 99499 UNLISTED E&M SERVICE: CPT | Mod: S$GLB,,, | Performed by: FAMILY MEDICINE

## 2020-12-21 PROCEDURE — 99214 PR OFFICE/OUTPT VISIT, EST, LEVL IV, 30-39 MIN: ICD-10-PCS | Mod: 25,S$GLB,, | Performed by: FAMILY MEDICINE

## 2020-12-21 PROCEDURE — 3074F PR MOST RECENT SYSTOLIC BLOOD PRESSURE < 130 MM HG: ICD-10-PCS | Mod: CPTII,S$GLB,, | Performed by: FAMILY MEDICINE

## 2020-12-21 PROCEDURE — 3074F SYST BP LT 130 MM HG: CPT | Mod: CPTII,S$GLB,, | Performed by: FAMILY MEDICINE

## 2020-12-21 PROCEDURE — 96372 PR INJECTION,THERAP/PROPH/DIAG2ST, IM OR SUBCUT: ICD-10-PCS | Mod: S$GLB,,, | Performed by: FAMILY MEDICINE

## 2020-12-21 PROCEDURE — 3008F PR BODY MASS INDEX (BMI) DOCUMENTED: ICD-10-PCS | Mod: CPTII,S$GLB,, | Performed by: FAMILY MEDICINE

## 2020-12-21 PROCEDURE — 99499 RISK ADDL DX/OHS AUDIT: ICD-10-PCS | Mod: S$GLB,,, | Performed by: FAMILY MEDICINE

## 2020-12-21 PROCEDURE — 99214 OFFICE O/P EST MOD 30 MIN: CPT | Mod: 25,S$GLB,, | Performed by: FAMILY MEDICINE

## 2020-12-21 PROCEDURE — 96372 THER/PROPH/DIAG INJ SC/IM: CPT | Mod: S$GLB,,, | Performed by: FAMILY MEDICINE

## 2020-12-21 PROCEDURE — 3079F PR MOST RECENT DIASTOLIC BLOOD PRESSURE 80-89 MM HG: ICD-10-PCS | Mod: CPTII,S$GLB,, | Performed by: FAMILY MEDICINE

## 2020-12-21 PROCEDURE — 3079F DIAST BP 80-89 MM HG: CPT | Mod: CPTII,S$GLB,, | Performed by: FAMILY MEDICINE

## 2020-12-21 PROCEDURE — 3008F BODY MASS INDEX DOCD: CPT | Mod: CPTII,S$GLB,, | Performed by: FAMILY MEDICINE

## 2020-12-21 RX ORDER — METHYLPREDNISOLONE 4 MG/1
TABLET ORAL
Qty: 1 PACKAGE | Refills: 0 | Status: SHIPPED | OUTPATIENT
Start: 2020-12-21 | End: 2021-04-12

## 2020-12-21 RX ORDER — TRAMADOL HYDROCHLORIDE 50 MG/1
50 TABLET ORAL EVERY 6 HOURS PRN
Qty: 30 TABLET | Refills: 0 | Status: SHIPPED | OUTPATIENT
Start: 2020-12-21 | End: 2020-12-31

## 2020-12-21 RX ORDER — TRIAMCINOLONE ACETONIDE 40 MG/ML
40 INJECTION, SUSPENSION INTRA-ARTICULAR; INTRAMUSCULAR ONCE
Status: DISCONTINUED | OUTPATIENT
Start: 2020-12-21 | End: 2020-12-21

## 2020-12-21 RX ORDER — METHYLPREDNISOLONE 4 MG/1
TABLET ORAL
Qty: 1 PACKAGE | Refills: 0 | Status: SHIPPED | OUTPATIENT
Start: 2020-12-21 | End: 2020-12-21 | Stop reason: SDUPTHER

## 2020-12-21 RX ORDER — TRIAMCINOLONE ACETONIDE 40 MG/ML
40 INJECTION, SUSPENSION INTRA-ARTICULAR; INTRAMUSCULAR ONCE
Status: COMPLETED | OUTPATIENT
Start: 2020-12-21 | End: 2020-12-21

## 2020-12-21 RX ORDER — DICLOFENAC SODIUM 75 MG/1
TABLET, DELAYED RELEASE ORAL
Qty: 60 TABLET | Refills: 5 | Status: SHIPPED | OUTPATIENT
Start: 2020-12-21 | End: 2021-04-12

## 2020-12-21 RX ORDER — QUETIAPINE FUMARATE 100 MG/1
100 TABLET, FILM COATED ORAL
COMMUNITY
End: 2021-07-22

## 2020-12-21 RX ORDER — DICLOFENAC SODIUM 75 MG/1
TABLET, DELAYED RELEASE ORAL
Qty: 60 TABLET | Refills: 5 | Status: SHIPPED | OUTPATIENT
Start: 2020-12-21 | End: 2020-12-21 | Stop reason: SDUPTHER

## 2020-12-21 RX ORDER — OMEPRAZOLE 40 MG/1
40 CAPSULE, DELAYED RELEASE ORAL DAILY
Qty: 30 CAPSULE | Refills: 5 | Status: SHIPPED | OUTPATIENT
Start: 2020-12-21 | End: 2021-07-22 | Stop reason: SDUPTHER

## 2020-12-21 RX ADMIN — TRIAMCINOLONE ACETONIDE 40 MG: 40 INJECTION, SUSPENSION INTRA-ARTICULAR; INTRAMUSCULAR at 03:12

## 2020-12-21 NOTE — PROGRESS NOTES
Subjective:       Patient ID: Wilfredo Vazquez is a 60 y.o. female.    Chief Complaint: Hand Pain, Leg Pain, and Dysphagia    HPI:  60-year-old female in for--difficulty swallowing saw Dr. Quinteros in the past for esophageal dilatation x2--hard even swallow water--has to eat small pieces of food.  Patient had surgery for acid reflux--a fundoplication 2013--problems since then.       Hand and leg pain---states hands and legs gets stiff--occasionally cramps.  Occasionally can open cold drink with her hands they hurts so much--no lupus rheumatoid gout no fractures---no trauma no excessive activities on omeprazole for stomach was on NSAIDs in the past    ROS:  Skin: no psoriasis, eczema, skin cancer  HEENT: No headache, ocular pain, blurred vision, diplopia, epistaxis, hoarseness change in voice, thyroid trouble  Lung: No pneumonia, asthma, Tb, wheezing, SOB, no smoking  Heart: No chest pain, ankle edema, palpitations, MI, patti murmur, +hypertension,no  hyperlipidemia no stent bypass arrhythmia  Abdomen: No nausea, vomiting, diarrhea, constipation, ulcers, hepatitis, gallbladder disease, melena, hematochezia, hematemesis +dysphagia history of fundoplication for GERD history of esophageal dilatation x2Dr Aldair  : no UTI, renal disease, stones  MS: no fractures, O/A, lupus, rheumatoid, gout  Neuro: No dizziness, LOC, seizures   No diabetes, no anemia, no anxiety, no depression   Single--2 children--disable secondary to a stomach--lives with daughter and grandson    Objective:   Physical Exam:  General: Well nourished, well developed, no acute distress  Skin: No lesions  HEENT: Eyes PERRLA, EOM intact, nose patent, throat non-erythematous   NECK: Supple, no bruits, No JVD, no nodes  Lungs: Clear, no rales, rhonchi, wheezing  Heart: Regular rate and rhythm, no murmurs, gallops, or rubs  Abdomen: flat, bowel sounds positive, no tenderness, or organomegaly  MS: Range of motion and muscle strength intact--complaining of pain in  the hands feet but overall range of motion muscle in computer in be intact  Neuro: Alert, CN intact, oriented X 3  Extremities: No cyanosis, clubbing, or edema         Assessment:       1. Dysphagia, unspecified type    2. Bilateral hand pain    3. Bilateral foot pain    4. Obesity (BMI 35.0-39.9 without comorbidity)    5. Hypertension, unspecified type    6. Chronic obstructive pulmonary disease, unspecified COPD type    7. History of fundoplication    8. History of gastroesophageal reflux (GERD)    9. History of colonic polyps    10. Encounter for screening for HIV    11. Need for hepatitis C screening test        Plan:       Dysphagia, unspecified type  -     Ambulatory referral/consult to Gastroenterology; Future; Expected date: 12/28/2020    Bilateral hand pain    Bilateral foot pain    Obesity (BMI 35.0-39.9 without comorbidity)    Hypertension, unspecified type  -     CBC Auto Differential; Future; Expected date: 12/21/2020  -     Comprehensive Metabolic Panel; Future; Expected date: 12/21/2020  -     Fecal Immunochemical Test (iFOBT); Future; Expected date: 12/21/2020  -     Lipid Panel; Future; Expected date: 12/21/2020    Chronic obstructive pulmonary disease, unspecified COPD type    History of fundoplication    History of gastroesophageal reflux (GERD)    History of colonic polyps    Encounter for screening for HIV  -     HIV 1/2 Ag/Ab (4th Gen); Future; Expected date: 12/21/2020    Need for hepatitis C screening test  -     Hepatitis C Antibody; Future; Expected date: 12/21/2020    Other orders  -     triamcinolone acetonide injection 40 mg  -     Discontinue: methylPREDNISolone (MEDROL DOSEPACK) 4 mg tablet; use as directed  Dispense: 1 Package; Refill: 0  -     Discontinue: diclofenac (VOLTAREN) 75 MG EC tablet; One p.o. b.i.d. take only with omeprazole do not take any other NSAIDs okay to take with Tylenol  Dispense: 60 tablet; Refill: 5  -     triamcinolone acetonide injection 40 mg  -      methylPREDNISolone (MEDROL DOSEPACK) 4 mg tablet; use as directed  Dispense: 1 Package; Refill: 0  -     diclofenac (VOLTAREN) 75 MG EC tablet; One p.o. b.i.d. take only with omeprazole do not take any other NSAIDs okay to take with Tylenol  Dispense: 60 tablet; Refill: 5  -     omeprazole (PRILOSEC) 40 MG capsule; Take 1 capsule (40 mg total) by mouth once daily.  Dispense: 30 capsule; Refill: 5  -     traMADoL (ULTRAM) 50 mg tablet; Take 1 tablet (50 mg total) by mouth every 6 (six) hours as needed.  Dispense: 30 tablet; Refill: 0        History dysphagia--difficulty swallowing water has to eat small amounts of food--history esophageal dilatationD x2 genoveva Quinteros in the pastii had GERD with a subsequent fundoplication now with dysphagia--take omeprazole  Hand pain bilaterally foot pain bilaterally---Kenalog/Medrol/Ultram---can take Voltaren 75 b.i.d. as long as stays on omeprazole and no other NSAIDs taken at the same time should have cleared with Dr. Chong prior to taking Voltaren  Lab CBCs CMP lipid stool guaiac due in January had lab in July  Maintenance hepatitis C HIV shingles mammogram flu

## 2021-02-09 PROBLEM — Z01.818 PRE-OP TESTING: Status: ACTIVE | Noted: 2021-02-09

## 2021-02-22 ENCOUNTER — PATIENT OUTREACH (OUTPATIENT)
Dept: ADMINISTRATIVE | Facility: HOSPITAL | Age: 61
End: 2021-02-22

## 2021-07-22 ENCOUNTER — OFFICE VISIT (OUTPATIENT)
Dept: PRIMARY CARE CLINIC | Facility: CLINIC | Age: 61
End: 2021-07-22
Payer: MEDICARE

## 2021-07-22 VITALS
BODY MASS INDEX: 37.39 KG/M2 | HEIGHT: 63 IN | SYSTOLIC BLOOD PRESSURE: 136 MMHG | WEIGHT: 211 LBS | HEART RATE: 98 BPM | RESPIRATION RATE: 18 BRPM | TEMPERATURE: 99 F | OXYGEN SATURATION: 98 % | DIASTOLIC BLOOD PRESSURE: 80 MMHG

## 2021-07-22 DIAGNOSIS — Z12.31 ENCOUNTER FOR SCREENING MAMMOGRAM FOR MALIGNANT NEOPLASM OF BREAST: ICD-10-CM

## 2021-07-22 DIAGNOSIS — Z87.19 HISTORY OF GASTROESOPHAGEAL REFLUX (GERD): ICD-10-CM

## 2021-07-22 DIAGNOSIS — J40 BRONCHITIS: Primary | ICD-10-CM

## 2021-07-22 DIAGNOSIS — J32.9 SINUSITIS, UNSPECIFIED CHRONICITY, UNSPECIFIED LOCATION: ICD-10-CM

## 2021-07-22 DIAGNOSIS — I10 HYPERTENSION, UNSPECIFIED TYPE: ICD-10-CM

## 2021-07-22 DIAGNOSIS — Z11.4 ENCOUNTER FOR SCREENING FOR HIV: ICD-10-CM

## 2021-07-22 DIAGNOSIS — J44.9 CHRONIC OBSTRUCTIVE PULMONARY DISEASE, UNSPECIFIED COPD TYPE: ICD-10-CM

## 2021-07-22 DIAGNOSIS — Z12.31 VISIT FOR SCREENING MAMMOGRAM: ICD-10-CM

## 2021-07-22 DIAGNOSIS — E66.9 OBESITY (BMI 35.0-39.9 WITHOUT COMORBIDITY): ICD-10-CM

## 2021-07-22 DIAGNOSIS — J98.01 BRONCHOSPASM: ICD-10-CM

## 2021-07-22 DIAGNOSIS — R13.10 DYSPHAGIA, UNSPECIFIED TYPE: ICD-10-CM

## 2021-07-22 DIAGNOSIS — Z11.59 NEED FOR HEPATITIS C SCREENING TEST: ICD-10-CM

## 2021-07-22 PROCEDURE — 99999 PR PBB SHADOW E&M-EST. PATIENT-LVL IV: CPT | Mod: PBBFAC,,, | Performed by: FAMILY MEDICINE

## 2021-07-22 PROCEDURE — 3075F SYST BP GE 130 - 139MM HG: CPT | Mod: CPTII,S$GLB,, | Performed by: FAMILY MEDICINE

## 2021-07-22 PROCEDURE — 3079F PR MOST RECENT DIASTOLIC BLOOD PRESSURE 80-89 MM HG: ICD-10-PCS | Mod: CPTII,S$GLB,, | Performed by: FAMILY MEDICINE

## 2021-07-22 PROCEDURE — 1126F PR PAIN SEVERITY QUANTIFIED, NO PAIN PRESENT: ICD-10-PCS | Mod: CPTII,S$GLB,, | Performed by: FAMILY MEDICINE

## 2021-07-22 PROCEDURE — 99214 PR OFFICE/OUTPT VISIT, EST, LEVL IV, 30-39 MIN: ICD-10-PCS | Mod: S$GLB,,, | Performed by: FAMILY MEDICINE

## 2021-07-22 PROCEDURE — 3075F PR MOST RECENT SYSTOLIC BLOOD PRESS GE 130-139MM HG: ICD-10-PCS | Mod: CPTII,S$GLB,, | Performed by: FAMILY MEDICINE

## 2021-07-22 PROCEDURE — 1126F AMNT PAIN NOTED NONE PRSNT: CPT | Mod: CPTII,S$GLB,, | Performed by: FAMILY MEDICINE

## 2021-07-22 PROCEDURE — 99214 OFFICE O/P EST MOD 30 MIN: CPT | Mod: S$GLB,,, | Performed by: FAMILY MEDICINE

## 2021-07-22 PROCEDURE — 99499 RISK ADDL DX/OHS AUDIT: ICD-10-PCS | Mod: S$GLB,,, | Performed by: FAMILY MEDICINE

## 2021-07-22 PROCEDURE — 3008F BODY MASS INDEX DOCD: CPT | Mod: CPTII,S$GLB,, | Performed by: FAMILY MEDICINE

## 2021-07-22 PROCEDURE — 99999 PR PBB SHADOW E&M-EST. PATIENT-LVL IV: ICD-10-PCS | Mod: PBBFAC,,, | Performed by: FAMILY MEDICINE

## 2021-07-22 PROCEDURE — 99499 UNLISTED E&M SERVICE: CPT | Mod: S$GLB,,, | Performed by: FAMILY MEDICINE

## 2021-07-22 PROCEDURE — 3008F PR BODY MASS INDEX (BMI) DOCUMENTED: ICD-10-PCS | Mod: CPTII,S$GLB,, | Performed by: FAMILY MEDICINE

## 2021-07-22 PROCEDURE — 3079F DIAST BP 80-89 MM HG: CPT | Mod: CPTII,S$GLB,, | Performed by: FAMILY MEDICINE

## 2021-07-22 RX ORDER — CEFDINIR 300 MG/1
300 CAPSULE ORAL 2 TIMES DAILY
Qty: 20 CAPSULE | Refills: 0 | Status: SHIPPED | OUTPATIENT
Start: 2021-07-22 | End: 2021-08-01

## 2021-07-22 RX ORDER — TRAMADOL HYDROCHLORIDE 50 MG/1
50 TABLET ORAL EVERY 6 HOURS PRN
Qty: 30 TABLET | Refills: 0 | Status: SHIPPED | OUTPATIENT
Start: 2021-07-22 | End: 2021-08-01

## 2021-07-22 RX ORDER — OMEPRAZOLE 40 MG/1
40 CAPSULE, DELAYED RELEASE ORAL DAILY
Qty: 30 CAPSULE | Refills: 5 | Status: SHIPPED | OUTPATIENT
Start: 2021-07-22 | End: 2021-11-15

## 2021-07-22 RX ORDER — OXYCODONE AND ACETAMINOPHEN 10; 325 MG/1; MG/1
1 TABLET ORAL 3 TIMES DAILY PRN
COMMUNITY
Start: 2021-07-20 | End: 2022-03-03

## 2021-07-22 RX ORDER — METHYLPREDNISOLONE 4 MG/1
TABLET ORAL
Qty: 1 PACKAGE | Refills: 0 | Status: SHIPPED | OUTPATIENT
Start: 2021-07-22 | End: 2021-08-17 | Stop reason: SDUPTHER

## 2021-07-22 RX ORDER — DICLOFENAC SODIUM 75 MG/1
TABLET, DELAYED RELEASE ORAL
Qty: 60 TABLET | Refills: 2 | Status: SHIPPED | OUTPATIENT
Start: 2021-07-22 | End: 2021-10-20

## 2021-07-22 RX ORDER — ALBUTEROL SULFATE 90 UG/1
1-2 AEROSOL, METERED RESPIRATORY (INHALATION) EVERY 6 HOURS PRN
Qty: 18 G | Refills: 0 | Status: SHIPPED | OUTPATIENT
Start: 2021-07-22 | End: 2021-11-15 | Stop reason: SDUPTHER

## 2021-07-22 RX ORDER — QUETIAPINE FUMARATE 200 MG/1
200 TABLET, FILM COATED ORAL NIGHTLY
COMMUNITY
Start: 2021-05-26 | End: 2023-11-14

## 2021-07-22 RX ORDER — PROMETHAZINE HYDROCHLORIDE AND CODEINE PHOSPHATE 6.25; 1 MG/5ML; MG/5ML
5 SOLUTION ORAL EVERY 6 HOURS PRN
Qty: 180 ML | Refills: 0 | Status: SHIPPED | OUTPATIENT
Start: 2021-07-22 | End: 2021-08-17 | Stop reason: SDUPTHER

## 2021-07-28 ENCOUNTER — HOSPITAL ENCOUNTER (OUTPATIENT)
Dept: RADIOLOGY | Facility: HOSPITAL | Age: 61
Discharge: HOME | End: 2021-07-28
Attending: FAMILY MEDICINE
Payer: MEDICARE

## 2021-07-28 VITALS — BODY MASS INDEX: 37.21 KG/M2 | WEIGHT: 210 LBS | HEIGHT: 63 IN

## 2021-07-28 DIAGNOSIS — Z12.31 ENCOUNTER FOR SCREENING MAMMOGRAM FOR MALIGNANT NEOPLASM OF BREAST: ICD-10-CM

## 2021-07-28 PROCEDURE — 77067 SCR MAMMO BI INCL CAD: CPT | Mod: 26,,, | Performed by: RADIOLOGY

## 2021-07-28 PROCEDURE — 77067 MAMMO DIGITAL SCREENING BILAT WITH TOMO: ICD-10-PCS | Mod: 26,,, | Performed by: RADIOLOGY

## 2021-07-28 PROCEDURE — 77067 SCR MAMMO BI INCL CAD: CPT | Mod: TC

## 2021-07-28 PROCEDURE — 77063 BREAST TOMOSYNTHESIS BI: CPT | Mod: 26,,, | Performed by: RADIOLOGY

## 2021-07-28 PROCEDURE — 77063 MAMMO DIGITAL SCREENING BILAT WITH TOMO: ICD-10-PCS | Mod: 26,,, | Performed by: RADIOLOGY

## 2021-08-17 ENCOUNTER — OFFICE VISIT (OUTPATIENT)
Dept: PRIMARY CARE CLINIC | Facility: CLINIC | Age: 61
End: 2021-08-17
Payer: MEDICARE

## 2021-08-17 VITALS
OXYGEN SATURATION: 97 % | TEMPERATURE: 98 F | SYSTOLIC BLOOD PRESSURE: 138 MMHG | WEIGHT: 210.19 LBS | HEIGHT: 63 IN | BODY MASS INDEX: 37.24 KG/M2 | HEART RATE: 87 BPM | DIASTOLIC BLOOD PRESSURE: 84 MMHG

## 2021-08-17 DIAGNOSIS — Z87.19 HISTORY OF GASTROESOPHAGEAL REFLUX (GERD): ICD-10-CM

## 2021-08-17 DIAGNOSIS — J32.9 SINUSITIS, UNSPECIFIED CHRONICITY, UNSPECIFIED LOCATION: ICD-10-CM

## 2021-08-17 DIAGNOSIS — R13.10 DYSPHAGIA, UNSPECIFIED TYPE: ICD-10-CM

## 2021-08-17 DIAGNOSIS — Z86.010 HISTORY OF COLONIC POLYPS: ICD-10-CM

## 2021-08-17 DIAGNOSIS — E66.9 OBESITY (BMI 35.0-39.9 WITHOUT COMORBIDITY): Primary | ICD-10-CM

## 2021-08-17 DIAGNOSIS — Z98.890 HISTORY OF FUNDOPLICATION: ICD-10-CM

## 2021-08-17 DIAGNOSIS — M25.562 ACUTE PAIN OF LEFT KNEE: ICD-10-CM

## 2021-08-17 DIAGNOSIS — K59.00 CONSTIPATION, UNSPECIFIED CONSTIPATION TYPE: ICD-10-CM

## 2021-08-17 PROBLEM — Z01.818 PRE-OP TESTING: Status: RESOLVED | Noted: 2021-02-09 | Resolved: 2021-08-17

## 2021-08-17 PROCEDURE — 99999 PR PBB SHADOW E&M-EST. PATIENT-LVL III: ICD-10-PCS | Mod: PBBFAC,,, | Performed by: FAMILY MEDICINE

## 2021-08-17 PROCEDURE — 96372 THER/PROPH/DIAG INJ SC/IM: CPT | Mod: S$GLB,,, | Performed by: FAMILY MEDICINE

## 2021-08-17 PROCEDURE — 1125F PR PAIN SEVERITY QUANTIFIED, PAIN PRESENT: ICD-10-PCS | Mod: CPTII,S$GLB,, | Performed by: FAMILY MEDICINE

## 2021-08-17 PROCEDURE — 1125F AMNT PAIN NOTED PAIN PRSNT: CPT | Mod: CPTII,S$GLB,, | Performed by: FAMILY MEDICINE

## 2021-08-17 PROCEDURE — 99214 PR OFFICE/OUTPT VISIT, EST, LEVL IV, 30-39 MIN: ICD-10-PCS | Mod: 25,S$GLB,, | Performed by: FAMILY MEDICINE

## 2021-08-17 PROCEDURE — 99214 OFFICE O/P EST MOD 30 MIN: CPT | Mod: 25,S$GLB,, | Performed by: FAMILY MEDICINE

## 2021-08-17 PROCEDURE — 3075F SYST BP GE 130 - 139MM HG: CPT | Mod: CPTII,S$GLB,, | Performed by: FAMILY MEDICINE

## 2021-08-17 PROCEDURE — 3008F BODY MASS INDEX DOCD: CPT | Mod: CPTII,S$GLB,, | Performed by: FAMILY MEDICINE

## 2021-08-17 PROCEDURE — 99999 PR PBB SHADOW E&M-EST. PATIENT-LVL III: CPT | Mod: PBBFAC,,, | Performed by: FAMILY MEDICINE

## 2021-08-17 PROCEDURE — 3008F PR BODY MASS INDEX (BMI) DOCUMENTED: ICD-10-PCS | Mod: CPTII,S$GLB,, | Performed by: FAMILY MEDICINE

## 2021-08-17 PROCEDURE — 96372 PR INJECTION,THERAP/PROPH/DIAG2ST, IM OR SUBCUT: ICD-10-PCS | Mod: S$GLB,,, | Performed by: FAMILY MEDICINE

## 2021-08-17 PROCEDURE — 3079F PR MOST RECENT DIASTOLIC BLOOD PRESSURE 80-89 MM HG: ICD-10-PCS | Mod: CPTII,S$GLB,, | Performed by: FAMILY MEDICINE

## 2021-08-17 PROCEDURE — 3075F PR MOST RECENT SYSTOLIC BLOOD PRESS GE 130-139MM HG: ICD-10-PCS | Mod: CPTII,S$GLB,, | Performed by: FAMILY MEDICINE

## 2021-08-17 PROCEDURE — 3079F DIAST BP 80-89 MM HG: CPT | Mod: CPTII,S$GLB,, | Performed by: FAMILY MEDICINE

## 2021-08-17 RX ORDER — TRAMADOL HYDROCHLORIDE 50 MG/1
50 TABLET ORAL EVERY 6 HOURS PRN
Qty: 30 TABLET | Refills: 0 | Status: SHIPPED | OUTPATIENT
Start: 2021-08-17 | End: 2021-08-27

## 2021-08-17 RX ORDER — METHYLPREDNISOLONE 4 MG/1
TABLET ORAL
Qty: 1 PACKAGE | Refills: 0 | Status: SHIPPED | OUTPATIENT
Start: 2021-08-17 | End: 2021-11-15

## 2021-08-17 RX ORDER — VARENICLINE TARTRATE 1 MG/1
TABLET, FILM COATED ORAL
COMMUNITY
Start: 2021-06-25 | End: 2022-05-10

## 2021-08-17 RX ORDER — PROMETHAZINE HYDROCHLORIDE AND CODEINE PHOSPHATE 6.25; 1 MG/5ML; MG/5ML
5 SOLUTION ORAL EVERY 6 HOURS PRN
Qty: 180 ML | Refills: 0 | Status: SHIPPED | OUTPATIENT
Start: 2021-08-17 | End: 2021-11-15

## 2021-08-17 RX ORDER — AZITHROMYCIN 250 MG/1
TABLET, FILM COATED ORAL
Qty: 6 TABLET | Refills: 0 | Status: SHIPPED | OUTPATIENT
Start: 2021-08-17 | End: 2021-08-21

## 2021-08-17 RX ORDER — TRIAMCINOLONE ACETONIDE 40 MG/ML
40 INJECTION, SUSPENSION INTRA-ARTICULAR; INTRAMUSCULAR ONCE
Status: COMPLETED | OUTPATIENT
Start: 2021-08-17 | End: 2021-08-17

## 2021-08-17 RX ADMIN — TRIAMCINOLONE ACETONIDE 40 MG: 40 INJECTION, SUSPENSION INTRA-ARTICULAR; INTRAMUSCULAR at 03:08

## 2021-08-24 ENCOUNTER — TELEPHONE (OUTPATIENT)
Dept: PRIMARY CARE CLINIC | Facility: CLINIC | Age: 61
End: 2021-08-24

## 2021-11-15 ENCOUNTER — OFFICE VISIT (OUTPATIENT)
Dept: PRIMARY CARE CLINIC | Facility: CLINIC | Age: 61
End: 2021-11-15
Payer: MEDICARE

## 2021-11-15 VITALS
RESPIRATION RATE: 18 BRPM | SYSTOLIC BLOOD PRESSURE: 130 MMHG | HEIGHT: 63 IN | DIASTOLIC BLOOD PRESSURE: 80 MMHG | BODY MASS INDEX: 35.91 KG/M2 | OXYGEN SATURATION: 95 % | HEART RATE: 104 BPM | WEIGHT: 202.69 LBS

## 2021-11-15 DIAGNOSIS — J44.9 CHRONIC OBSTRUCTIVE PULMONARY DISEASE, UNSPECIFIED COPD TYPE: ICD-10-CM

## 2021-11-15 DIAGNOSIS — Z87.19 HISTORY OF GASTROESOPHAGEAL REFLUX (GERD): ICD-10-CM

## 2021-11-15 DIAGNOSIS — I10 HYPERTENSION, UNSPECIFIED TYPE: ICD-10-CM

## 2021-11-15 DIAGNOSIS — E66.9 OBESITY (BMI 35.0-39.9 WITHOUT COMORBIDITY): ICD-10-CM

## 2021-11-15 DIAGNOSIS — K59.00 CONSTIPATION, UNSPECIFIED CONSTIPATION TYPE: ICD-10-CM

## 2021-11-15 DIAGNOSIS — Z98.890 HISTORY OF FUNDOPLICATION: ICD-10-CM

## 2021-11-15 DIAGNOSIS — J98.01 BRONCHOSPASM: Primary | ICD-10-CM

## 2021-11-15 DIAGNOSIS — R13.10 DYSPHAGIA, UNSPECIFIED TYPE: ICD-10-CM

## 2021-11-15 PROCEDURE — 1159F MED LIST DOCD IN RCRD: CPT | Mod: CPTII,S$GLB,, | Performed by: FAMILY MEDICINE

## 2021-11-15 PROCEDURE — 4010F ACE/ARB THERAPY RXD/TAKEN: CPT | Mod: CPTII,S$GLB,, | Performed by: FAMILY MEDICINE

## 2021-11-15 PROCEDURE — 3079F PR MOST RECENT DIASTOLIC BLOOD PRESSURE 80-89 MM HG: ICD-10-PCS | Mod: CPTII,S$GLB,, | Performed by: FAMILY MEDICINE

## 2021-11-15 PROCEDURE — 3075F PR MOST RECENT SYSTOLIC BLOOD PRESS GE 130-139MM HG: ICD-10-PCS | Mod: CPTII,S$GLB,, | Performed by: FAMILY MEDICINE

## 2021-11-15 PROCEDURE — 4010F PR ACE/ARB THEARPY RXD/TAKEN: ICD-10-PCS | Mod: CPTII,S$GLB,, | Performed by: FAMILY MEDICINE

## 2021-11-15 PROCEDURE — 3079F DIAST BP 80-89 MM HG: CPT | Mod: CPTII,S$GLB,, | Performed by: FAMILY MEDICINE

## 2021-11-15 PROCEDURE — 99999 PR PBB SHADOW E&M-EST. PATIENT-LVL IV: ICD-10-PCS | Mod: PBBFAC,,, | Performed by: FAMILY MEDICINE

## 2021-11-15 PROCEDURE — 3075F SYST BP GE 130 - 139MM HG: CPT | Mod: CPTII,S$GLB,, | Performed by: FAMILY MEDICINE

## 2021-11-15 PROCEDURE — 1159F PR MEDICATION LIST DOCUMENTED IN MEDICAL RECORD: ICD-10-PCS | Mod: CPTII,S$GLB,, | Performed by: FAMILY MEDICINE

## 2021-11-15 PROCEDURE — 96372 PR INJECTION,THERAP/PROPH/DIAG2ST, IM OR SUBCUT: ICD-10-PCS | Mod: S$GLB,,, | Performed by: FAMILY MEDICINE

## 2021-11-15 PROCEDURE — 99999 PR PBB SHADOW E&M-EST. PATIENT-LVL IV: CPT | Mod: PBBFAC,,, | Performed by: FAMILY MEDICINE

## 2021-11-15 PROCEDURE — 96372 THER/PROPH/DIAG INJ SC/IM: CPT | Mod: S$GLB,,, | Performed by: FAMILY MEDICINE

## 2021-11-15 PROCEDURE — 3008F BODY MASS INDEX DOCD: CPT | Mod: CPTII,S$GLB,, | Performed by: FAMILY MEDICINE

## 2021-11-15 PROCEDURE — 99214 PR OFFICE/OUTPT VISIT, EST, LEVL IV, 30-39 MIN: ICD-10-PCS | Mod: 25,S$GLB,, | Performed by: FAMILY MEDICINE

## 2021-11-15 PROCEDURE — 3008F PR BODY MASS INDEX (BMI) DOCUMENTED: ICD-10-PCS | Mod: CPTII,S$GLB,, | Performed by: FAMILY MEDICINE

## 2021-11-15 PROCEDURE — 99214 OFFICE O/P EST MOD 30 MIN: CPT | Mod: 25,S$GLB,, | Performed by: FAMILY MEDICINE

## 2021-11-15 RX ORDER — BUDESONIDE AND FORMOTEROL FUMARATE DIHYDRATE 160; 4.5 UG/1; UG/1
2 AEROSOL RESPIRATORY (INHALATION) EVERY 12 HOURS
Qty: 10.2 G | Refills: 5 | Status: SHIPPED | OUTPATIENT
Start: 2021-11-15 | End: 2021-12-01 | Stop reason: ALTCHOICE

## 2021-11-15 RX ORDER — METHYLPREDNISOLONE 4 MG/1
TABLET ORAL
Qty: 1 EACH | Refills: 0 | Status: SHIPPED | OUTPATIENT
Start: 2021-11-15 | End: 2021-12-03 | Stop reason: ALTCHOICE

## 2021-11-15 RX ORDER — ALBUTEROL SULFATE 90 UG/1
1-2 AEROSOL, METERED RESPIRATORY (INHALATION) EVERY 6 HOURS PRN
Qty: 18 G | Refills: 0 | Status: ON HOLD | OUTPATIENT
Start: 2021-11-15 | End: 2022-04-26 | Stop reason: SDUPTHER

## 2021-11-15 RX ORDER — TRIAMCINOLONE ACETONIDE 40 MG/ML
40 INJECTION, SUSPENSION INTRA-ARTICULAR; INTRAMUSCULAR ONCE
Status: COMPLETED | OUTPATIENT
Start: 2021-11-15 | End: 2021-11-15

## 2021-11-15 RX ORDER — CEFDINIR 300 MG/1
300 CAPSULE ORAL 2 TIMES DAILY
Qty: 20 CAPSULE | Refills: 0 | Status: SHIPPED | OUTPATIENT
Start: 2021-11-15 | End: 2021-11-25

## 2021-11-15 RX ADMIN — TRIAMCINOLONE ACETONIDE 40 MG: 40 INJECTION, SUSPENSION INTRA-ARTICULAR; INTRAMUSCULAR at 04:11

## 2021-11-16 ENCOUNTER — TELEPHONE (OUTPATIENT)
Dept: PRIMARY CARE CLINIC | Facility: CLINIC | Age: 61
End: 2021-11-16
Payer: MEDICAID

## 2021-11-17 ENCOUNTER — TELEPHONE (OUTPATIENT)
Dept: PRIMARY CARE CLINIC | Facility: CLINIC | Age: 61
End: 2021-11-17
Payer: MEDICAID

## 2021-11-17 RX ORDER — BENZONATATE 200 MG/1
200 CAPSULE ORAL 3 TIMES DAILY PRN
Qty: 30 CAPSULE | Refills: 5 | Status: SHIPPED | OUTPATIENT
Start: 2021-11-17 | End: 2021-11-27

## 2021-11-30 DIAGNOSIS — K21.00 REFLUX ESOPHAGITIS: ICD-10-CM

## 2021-11-30 RX ORDER — LISINOPRIL 5 MG/1
TABLET ORAL
Qty: 90 TABLET | Refills: 3 | Status: ON HOLD | OUTPATIENT
Start: 2021-11-30 | End: 2022-04-26 | Stop reason: SDUPTHER

## 2021-12-01 ENCOUNTER — OFFICE VISIT (OUTPATIENT)
Dept: PULMONOLOGY | Facility: CLINIC | Age: 61
End: 2021-12-01
Payer: MEDICARE

## 2021-12-01 VITALS
HEIGHT: 63 IN | HEART RATE: 101 BPM | BODY MASS INDEX: 36.23 KG/M2 | WEIGHT: 204.5 LBS | DIASTOLIC BLOOD PRESSURE: 82 MMHG | OXYGEN SATURATION: 97 % | SYSTOLIC BLOOD PRESSURE: 139 MMHG

## 2021-12-01 DIAGNOSIS — J44.9 CHRONIC OBSTRUCTIVE PULMONARY DISEASE, UNSPECIFIED COPD TYPE: ICD-10-CM

## 2021-12-01 DIAGNOSIS — E66.9 OBESITY (BMI 35.0-39.9 WITHOUT COMORBIDITY): ICD-10-CM

## 2021-12-01 DIAGNOSIS — F17.210 CIGARETTE NICOTINE DEPENDENCE WITHOUT COMPLICATION: Primary | ICD-10-CM

## 2021-12-01 PROCEDURE — 99204 OFFICE O/P NEW MOD 45 MIN: CPT | Mod: S$GLB,,, | Performed by: NURSE PRACTITIONER

## 2021-12-01 PROCEDURE — 4010F ACE/ARB THERAPY RXD/TAKEN: CPT | Mod: CPTII,S$GLB,, | Performed by: NURSE PRACTITIONER

## 2021-12-01 PROCEDURE — 99999 PR PBB SHADOW E&M-EST. PATIENT-LVL IV: CPT | Mod: PBBFAC,,, | Performed by: NURSE PRACTITIONER

## 2021-12-01 PROCEDURE — 4010F PR ACE/ARB THEARPY RXD/TAKEN: ICD-10-PCS | Mod: CPTII,S$GLB,, | Performed by: NURSE PRACTITIONER

## 2021-12-01 PROCEDURE — 99204 PR OFFICE/OUTPT VISIT, NEW, LEVL IV, 45-59 MIN: ICD-10-PCS | Mod: S$GLB,,, | Performed by: NURSE PRACTITIONER

## 2021-12-01 PROCEDURE — 99499 RISK ADDL DX/OHS AUDIT: ICD-10-PCS | Mod: S$GLB,,, | Performed by: NURSE PRACTITIONER

## 2021-12-01 PROCEDURE — 99499 UNLISTED E&M SERVICE: CPT | Mod: S$GLB,,, | Performed by: NURSE PRACTITIONER

## 2021-12-01 PROCEDURE — 99999 PR PBB SHADOW E&M-EST. PATIENT-LVL IV: ICD-10-PCS | Mod: PBBFAC,,, | Performed by: NURSE PRACTITIONER

## 2021-12-01 RX ORDER — OMEPRAZOLE 40 MG/1
40 CAPSULE, DELAYED RELEASE ORAL DAILY
COMMUNITY
Start: 2021-11-19 | End: 2022-01-24

## 2021-12-01 RX ORDER — PREDNISONE 20 MG/1
20-40 TABLET ORAL DAILY PRN
COMMUNITY
Start: 2021-11-22 | End: 2022-01-03

## 2021-12-01 RX ORDER — DICLOFENAC SODIUM 75 MG/1
75 TABLET, DELAYED RELEASE ORAL 2 TIMES DAILY
COMMUNITY
Start: 2021-11-19 | End: 2022-03-28 | Stop reason: ALTCHOICE

## 2021-12-01 RX ORDER — BUDESONIDE, GLYCOPYRROLATE, AND FORMOTEROL FUMARATE 160; 9; 4.8 UG/1; UG/1; UG/1
2 AEROSOL, METERED RESPIRATORY (INHALATION) 2 TIMES DAILY
Qty: 10.7 G | Refills: 11 | Status: SHIPPED | OUTPATIENT
Start: 2021-12-01 | End: 2022-06-24 | Stop reason: SDUPTHER

## 2021-12-30 ENCOUNTER — TELEPHONE (OUTPATIENT)
Dept: PRIMARY CARE CLINIC | Facility: CLINIC | Age: 61
End: 2021-12-30
Payer: MEDICAID

## 2022-01-03 ENCOUNTER — OFFICE VISIT (OUTPATIENT)
Dept: PRIMARY CARE CLINIC | Facility: CLINIC | Age: 62
End: 2022-01-03
Payer: MEDICARE

## 2022-01-03 VITALS
SYSTOLIC BLOOD PRESSURE: 142 MMHG | HEIGHT: 63 IN | OXYGEN SATURATION: 97 % | RESPIRATION RATE: 20 BRPM | WEIGHT: 206 LBS | BODY MASS INDEX: 36.5 KG/M2 | DIASTOLIC BLOOD PRESSURE: 96 MMHG | HEART RATE: 81 BPM

## 2022-01-03 DIAGNOSIS — J44.9 CHRONIC OBSTRUCTIVE PULMONARY DISEASE, UNSPECIFIED COPD TYPE: ICD-10-CM

## 2022-01-03 DIAGNOSIS — J98.01 BRONCHOSPASM: ICD-10-CM

## 2022-01-03 DIAGNOSIS — E66.9 OBESITY, UNSPECIFIED CLASSIFICATION, UNSPECIFIED OBESITY TYPE, UNSPECIFIED WHETHER SERIOUS COMORBIDITY PRESENT: ICD-10-CM

## 2022-01-03 DIAGNOSIS — J32.9 SINUSITIS, UNSPECIFIED CHRONICITY, UNSPECIFIED LOCATION: Primary | ICD-10-CM

## 2022-01-03 DIAGNOSIS — I10 HYPERTENSION, UNSPECIFIED TYPE: ICD-10-CM

## 2022-01-03 DIAGNOSIS — Z87.19 HISTORY OF GASTROESOPHAGEAL REFLUX (GERD): ICD-10-CM

## 2022-01-03 PROCEDURE — 3080F DIAST BP >= 90 MM HG: CPT | Mod: CPTII,S$GLB,, | Performed by: FAMILY MEDICINE

## 2022-01-03 PROCEDURE — 3008F PR BODY MASS INDEX (BMI) DOCUMENTED: ICD-10-PCS | Mod: CPTII,S$GLB,, | Performed by: FAMILY MEDICINE

## 2022-01-03 PROCEDURE — 99214 OFFICE O/P EST MOD 30 MIN: CPT | Mod: S$GLB,,, | Performed by: FAMILY MEDICINE

## 2022-01-03 PROCEDURE — 3080F PR MOST RECENT DIASTOLIC BLOOD PRESSURE >= 90 MM HG: ICD-10-PCS | Mod: CPTII,S$GLB,, | Performed by: FAMILY MEDICINE

## 2022-01-03 PROCEDURE — 3008F BODY MASS INDEX DOCD: CPT | Mod: CPTII,S$GLB,, | Performed by: FAMILY MEDICINE

## 2022-01-03 PROCEDURE — 3077F SYST BP >= 140 MM HG: CPT | Mod: CPTII,S$GLB,, | Performed by: FAMILY MEDICINE

## 2022-01-03 PROCEDURE — 99214 PR OFFICE/OUTPT VISIT, EST, LEVL IV, 30-39 MIN: ICD-10-PCS | Mod: S$GLB,,, | Performed by: FAMILY MEDICINE

## 2022-01-03 PROCEDURE — 1159F PR MEDICATION LIST DOCUMENTED IN MEDICAL RECORD: ICD-10-PCS | Mod: CPTII,S$GLB,, | Performed by: FAMILY MEDICINE

## 2022-01-03 PROCEDURE — U0002: ICD-10-PCS | Mod: QW,S$GLB,, | Performed by: FAMILY MEDICINE

## 2022-01-03 PROCEDURE — 3077F PR MOST RECENT SYSTOLIC BLOOD PRESSURE >= 140 MM HG: ICD-10-PCS | Mod: CPTII,S$GLB,, | Performed by: FAMILY MEDICINE

## 2022-01-03 PROCEDURE — 1159F MED LIST DOCD IN RCRD: CPT | Mod: CPTII,S$GLB,, | Performed by: FAMILY MEDICINE

## 2022-01-03 PROCEDURE — 99999 PR PBB SHADOW E&M-EST. PATIENT-LVL III: CPT | Mod: PBBFAC,,, | Performed by: FAMILY MEDICINE

## 2022-01-03 PROCEDURE — U0002 COVID-19 LAB TEST NON-CDC: HCPCS | Mod: QW,S$GLB,, | Performed by: FAMILY MEDICINE

## 2022-01-03 PROCEDURE — 99999 PR PBB SHADOW E&M-EST. PATIENT-LVL III: ICD-10-PCS | Mod: PBBFAC,,, | Performed by: FAMILY MEDICINE

## 2022-01-03 RX ORDER — PREDNISONE 5 MG/1
TABLET ORAL
Qty: 20 TABLET | Refills: 0 | Status: SHIPPED | OUTPATIENT
Start: 2022-01-03 | End: 2022-03-03

## 2022-01-03 RX ORDER — DIPHENOXYLATE HYDROCHLORIDE AND ATROPINE SULFATE 2.5; .025 MG/1; MG/1
TABLET ORAL
Qty: 20 TABLET | Refills: 0 | Status: SHIPPED | OUTPATIENT
Start: 2022-01-03 | End: 2022-03-03

## 2022-01-03 RX ORDER — PROMETHAZINE HYDROCHLORIDE AND CODEINE PHOSPHATE 6.25; 1 MG/5ML; MG/5ML
5 SOLUTION ORAL EVERY 6 HOURS PRN
Qty: 180 ML | Refills: 0 | Status: SHIPPED | OUTPATIENT
Start: 2022-01-03 | End: 2022-05-02

## 2022-01-03 RX ORDER — AZITHROMYCIN 250 MG/1
TABLET, FILM COATED ORAL
Qty: 6 TABLET | Refills: 0 | Status: SHIPPED | OUTPATIENT
Start: 2022-01-03 | End: 2022-01-07

## 2022-01-03 NOTE — PROGRESS NOTES
Subjective:       Patient ID: Wilfredo Vazquez is a 61 y.o. female.    Chief Complaint: Cough, Generalized Body Aches, Diarrhea, and Shortness of Breath  HPI--61-year-old BF-- cold x 2 weeks-- + fever on and off --+ runny stuffy nose--+ sore th--HA -frontal area --+ cough --+ phlegm. No P,A,TB + COPD  wheezing has inhalers andnneb machine . + nausea, + dirrhea no vomiting -- 3 loose BM todayb     ROS:  Skin: no psoriasis, eczema, skin cancer-  HEENT: no headache,  No ocular pain, blurred vision, diplopia, epistaxis, hoarseness change in voice, thyroid trouble  Lung: No pneumonia, +asthma, no Tb, +_wheezing,+ SOB, no smoking + COPD   Heart: No chest pain, ankle edema, palpitations, MI, patti murmur, +hypertension,no  hyperlipidemia no stent bypass arrhythmia  Abdomen: + nausea,no  vomiting, +diarrhea, no  constipation, ulcers, hepatitis, gallbladder disease, melena, hematochezia, hematemesis +dysphagia history of fundoplication for GERD history of esophageal dilatation x2Dr Beary doing better  : no UTI, renal disease, stones  GYN hyst   MS: no fractures, O/A, lupus, rheumatoid, gout--left knee pain patient thinks may be gout in her knee  Neuro: No dizziness, LOC, seizures   No diabetes, no anemia, no anxiety, no depression   Single--2 children--disable secondary to a stomach--lives with daughter and grandson    Objective:   Physical Exam:  General: Well nourished, well developed, no acute distress +obesity  Skin: No lesions  HEENT: Eyes PERRLA, EOM intact, nose patent, throat 1/4 erythematous ears TMs clear--c/o fullnes right ear but looks OK   NECK: Supple, no bruits, No JVD, no nodes  Lungs: Clear, no rales, rhonchi, wheezing +coarse cough  Heart: Regular rate and rhythm, no murmur s, gallops, or rubs  Abdomen: flat, bowel sounds positive, no tenderness, or organomegaly  MS muscle strength range of motion intact at this time  Neuro: Alert, CN intact, oriented X 3  Extremities: No cyanosis, clubbing, or edema          Assessment:       1. Sinusitis, unspecified chronicity, unspecified location    2. Bronchospasm    3. Chronic obstructive pulmonary disease, unspecified COPD type    4. Hypertension, unspecified type    5. Obesity, unspecified classification, unspecified obesity type, unspecified whether serious comorbidity present    6. History of gastroesophageal reflux (GERD)        Plan:       Sinusitis, unspecified chronicity, unspecified location  -     POCT COVID-19 Rapid Screening    Bronchospasm  -     POCT COVID-19 Rapid Screening    Chronic obstructive pulmonary disease, unspecified COPD type  -     POCT COVID-19 Rapid Screening    Hypertension, unspecified type    Obesity, unspecified classification, unspecified obesity type, unspecified whether serious comorbidity present    History of gastroesophageal reflux (GERD)    Other orders  -     azithromycin (Z-CELESTE) 250 MG tablet; 2 tabs by mouth day 1, then 1 tab by mouth daily x 4 days  Dispense: 6 tablet; Refill: 0  -     promethazine-codeine 6.25-10 mg/5 ml (PHENERGAN WITH CODEINE) 6.25-10 mg/5 mL syrup; Take 5 mLs by mouth every 6 (six) hours as needed for Cough.  Dispense: 180 mL; Refill: 0  -     predniSONE (DELTASONE) 5 MG tablet; 4 po qd x 2, 3 po qd x2, 2 po qd x2, 1 po qd x2  Dispense: 20 tablet; Refill: 0  -     diphenoxylate-atropine 2.5-0.025 mg (LOMOTIL) 2.5-0.025 mg per tablet; 1 after each loose BM up to 4 per day  Dispense: 20 tablet; Refill: 0        Asthma--bronchospasm---Zithromax/pred taper/phenergan with codiene/ albuterol symbicort   Diarrhea liquid diet Lomotil  Other medical issues  History dysphagia--difficulty swallowing water has to eat small amounts of food--history esophageal dilatationD x2 byr Aldair in the past--- had GERD with a subsequent fundoplication now with dysphagia--take omeprazole  Lab CBCs CMP lipid stool guaiac T4 TSH--needs lab 2 -3 months seen ER lab done will wait 6 mo redo lab   Maintenance flu shot

## 2022-01-03 NOTE — LETTER
January 3, 2022      [facility] [address], [facility] [address]  [place] [address]  Phone: [phone]  Fax: [phone]       Patient: Wilfredo Vazquez   YOB: 1960  Date of Visit: 01/03/2022    To Whom It May Concern:    Abhinav Vazquez  was at [facility] on 01/03/2022. The patient may return to work/school on 1/10/2022 with no restrictions. If you have any questions or concerns, or if I can be of further assistance, please do not hesitate to contact me.    Sincerely,    Magy Jon MA

## 2022-01-04 LAB
CTP QC/QA: YES
SARS-COV-2 RDRP RESP QL NAA+PROBE: POSITIVE

## 2022-01-07 NOTE — PROGRESS NOTES
Patient, Wilfredo Vazquez (MRN #[id number]), presented with a recorded BMI of 36.49 kg/m^2 and a documented comorbidity(s):  - Hypertension  to which the severe obesity is a contributing factor. This is consistent with the definition of severe obesity (BMI 35.0-39.9) with comorbidity (ICD-10 E66.01, Z68.35). The patient's severe obesity was monitored, evaluated, addressed and/or treated. This addendum to the medical record is made on 01/06/2022.

## 2022-02-23 ENCOUNTER — TELEPHONE (OUTPATIENT)
Dept: PRIMARY CARE CLINIC | Facility: CLINIC | Age: 62
End: 2022-02-23
Payer: MEDICAID

## 2022-02-24 ENCOUNTER — PATIENT OUTREACH (OUTPATIENT)
Dept: ADMINISTRATIVE | Facility: OTHER | Age: 62
End: 2022-02-24
Payer: MEDICAID

## 2022-03-02 ENCOUNTER — OFFICE VISIT (OUTPATIENT)
Dept: PULMONOLOGY | Facility: CLINIC | Age: 62
End: 2022-03-02
Payer: MEDICARE

## 2022-03-02 VITALS
HEART RATE: 69 BPM | DIASTOLIC BLOOD PRESSURE: 79 MMHG | BODY MASS INDEX: 36.78 KG/M2 | SYSTOLIC BLOOD PRESSURE: 165 MMHG | WEIGHT: 207.56 LBS | HEIGHT: 63 IN | OXYGEN SATURATION: 99 %

## 2022-03-02 DIAGNOSIS — J44.9 CHRONIC OBSTRUCTIVE PULMONARY DISEASE, UNSPECIFIED COPD TYPE: Primary | ICD-10-CM

## 2022-03-02 DIAGNOSIS — E66.9 OBESITY (BMI 35.0-39.9 WITHOUT COMORBIDITY): ICD-10-CM

## 2022-03-02 DIAGNOSIS — Z87.891 PERSONAL HISTORY OF NICOTINE DEPENDENCE: ICD-10-CM

## 2022-03-02 DIAGNOSIS — F17.210 CIGARETTE NICOTINE DEPENDENCE WITHOUT COMPLICATION: ICD-10-CM

## 2022-03-02 PROCEDURE — 4010F PR ACE/ARB THEARPY RXD/TAKEN: ICD-10-PCS | Mod: CPTII,S$GLB,, | Performed by: NURSE PRACTITIONER

## 2022-03-02 PROCEDURE — 3008F PR BODY MASS INDEX (BMI) DOCUMENTED: ICD-10-PCS | Mod: CPTII,S$GLB,, | Performed by: NURSE PRACTITIONER

## 2022-03-02 PROCEDURE — 1159F MED LIST DOCD IN RCRD: CPT | Mod: CPTII,S$GLB,, | Performed by: NURSE PRACTITIONER

## 2022-03-02 PROCEDURE — 4010F ACE/ARB THERAPY RXD/TAKEN: CPT | Mod: CPTII,S$GLB,, | Performed by: NURSE PRACTITIONER

## 2022-03-02 PROCEDURE — 3078F DIAST BP <80 MM HG: CPT | Mod: CPTII,S$GLB,, | Performed by: NURSE PRACTITIONER

## 2022-03-02 PROCEDURE — 99499 RISK ADDL DX/OHS AUDIT: ICD-10-PCS | Mod: S$GLB,,, | Performed by: NURSE PRACTITIONER

## 2022-03-02 PROCEDURE — 1160F RVW MEDS BY RX/DR IN RCRD: CPT | Mod: CPTII,S$GLB,, | Performed by: NURSE PRACTITIONER

## 2022-03-02 PROCEDURE — 99999 PR PBB SHADOW E&M-EST. PATIENT-LVL V: ICD-10-PCS | Mod: PBBFAC,,, | Performed by: NURSE PRACTITIONER

## 2022-03-02 PROCEDURE — 3008F BODY MASS INDEX DOCD: CPT | Mod: CPTII,S$GLB,, | Performed by: NURSE PRACTITIONER

## 2022-03-02 PROCEDURE — 99214 OFFICE O/P EST MOD 30 MIN: CPT | Mod: S$GLB,,, | Performed by: NURSE PRACTITIONER

## 2022-03-02 PROCEDURE — 1159F PR MEDICATION LIST DOCUMENTED IN MEDICAL RECORD: ICD-10-PCS | Mod: CPTII,S$GLB,, | Performed by: NURSE PRACTITIONER

## 2022-03-02 PROCEDURE — 1160F PR REVIEW ALL MEDS BY PRESCRIBER/CLIN PHARMACIST DOCUMENTED: ICD-10-PCS | Mod: CPTII,S$GLB,, | Performed by: NURSE PRACTITIONER

## 2022-03-02 PROCEDURE — 3078F PR MOST RECENT DIASTOLIC BLOOD PRESSURE < 80 MM HG: ICD-10-PCS | Mod: CPTII,S$GLB,, | Performed by: NURSE PRACTITIONER

## 2022-03-02 PROCEDURE — 99999 PR PBB SHADOW E&M-EST. PATIENT-LVL V: CPT | Mod: PBBFAC,,, | Performed by: NURSE PRACTITIONER

## 2022-03-02 PROCEDURE — 99499 UNLISTED E&M SERVICE: CPT | Mod: S$GLB,,, | Performed by: NURSE PRACTITIONER

## 2022-03-02 PROCEDURE — 99214 PR OFFICE/OUTPT VISIT, EST, LEVL IV, 30-39 MIN: ICD-10-PCS | Mod: S$GLB,,, | Performed by: NURSE PRACTITIONER

## 2022-03-02 PROCEDURE — 3077F SYST BP >= 140 MM HG: CPT | Mod: CPTII,S$GLB,, | Performed by: NURSE PRACTITIONER

## 2022-03-02 PROCEDURE — 3077F PR MOST RECENT SYSTOLIC BLOOD PRESSURE >= 140 MM HG: ICD-10-PCS | Mod: CPTII,S$GLB,, | Performed by: NURSE PRACTITIONER

## 2022-03-02 RX ORDER — INFLUENZA A VIRUS A/VICTORIA/2570/2019 IVR-215 (H1N1) ANTIGEN (PROPIOLACTONE INACTIVATED), INFLUENZA A VIRUS A/CAMBODIA/E0826360/2020 IVR-224 (H3N2) ANTIGEN (PROPIOLACTONE INACTIVATED), INFLUENZA B VIRUS B/VICTORIA/705/2018 BVR-11 ANTIGEN (PROPIOLACTONE INACTIVATED), INFLUENZA B VIRUS B/PHUKET/3073/2013 BVR-1B ANTIGEN (PROPIOLACTONE INACTIVATED) 15; 15; 15; 15 UG/.5ML; UG/.5ML; UG/.5ML; UG/.5ML
INJECTION, SUSPENSION INTRAMUSCULAR
COMMUNITY
Start: 2021-12-13 | End: 2022-03-03

## 2022-03-02 RX ORDER — PREDNISONE 20 MG/1
20-40 TABLET ORAL DAILY PRN
Status: ON HOLD | COMMUNITY
Start: 2022-02-28 | End: 2022-04-26 | Stop reason: HOSPADM

## 2022-03-02 RX ORDER — ZOSTER VACCINE RECOMBINANT, ADJUVANTED 50 MCG/0.5
KIT INTRAMUSCULAR
COMMUNITY
Start: 2021-12-13 | End: 2022-03-03

## 2022-03-02 NOTE — PROGRESS NOTES
Subjective:       Patient ID: Wilfredo Vazquez is a 62 y.o. female.    Chief Complaint: COPD    COPD  Associated symptoms include coughing. Pertinent negatives include no chest pain, chills, congestion, fever, joint swelling or rash.      Ms. Vazquez is a 62 y/o F presenting to pulmonary clinic for COPD and shortness of breath.     Patient  experiences chronic mMRC 4 symptoms of dyspnea. Notes to have mild chronic cough.  Explains had 7 COPD exacerbations in the last one year; 2 hospitalizations for respiratory problems.   Tells has taken ICS/LABA in the past and albuterol. Finding with is using albuterol multiple times a day.     She is a current smoker. Reports has been smoking since her early 20's.   At most, she reports was smoking 1 pack a day. Now smoking 3 cigarettes a day.   Discloses was able to quit smoking for 1 yr in 1998.   She is interested in the smoking cessation program.     Interval Hx 3/2/2022-     Ms. Vazquez presents to pulmonary clinic for COPD follow up.  Since last visitation, patient obtain pulmonary function test.  Reviewed pulmonary function test showing severe obstruction.  Patient discloses she remains smoking several cigarettes a day.     Patient  experiences chronic mMRC 4 symptoms of dyspnea. Notes to have mild chronic cough.  Since starting Breztri -  she reports 0 exacerbations or hospitalizations.    Today, she is interested in obtaining low-dose CT screening.  This was a shared discussion.  Additionally, patient is interested in pulmonary rehab.      Additional Pulmonary History:   Occupational/Environmental Exposures: Unaware.   Exposure to Animals/Pets: Denies   Travel History: Denies   History of exposures to TB: Denies   Family History of Lung Cancer: Denies   Childhood history of Lung Disease: Denies     Reviewed allergies and medications.  Reviewed medical and surgical history.  Reviewed social and family history.     Review of Systems   Constitutional: Negative for fever, chills,  "weight loss and night sweats.   HENT: Negative for postnasal drip, rhinorrhea, trouble swallowing and congestion.    Respiratory: Positive for cough, sputum production (Reports occassional thick white mucus. ), dyspnea on extertion and use of rescue inhaler. Negative for hemoptysis, choking, chest tightness, wheezing and asthma nighttime symptoms.    Cardiovascular: Negative for chest pain and leg swelling.   Musculoskeletal: Negative for joint swelling.   Skin: Negative for rash.   Gastrointestinal: Negative for acid reflux.   Neurological: Negative for dizziness and light-headedness.   Hematological: Negative for adenopathy.   Psychiatric/Behavioral: Negative for sleep disturbance.         Objective:      Vitals:    03/02/22 1449   BP: (!) 165/79   BP Location: Right arm   Patient Position: Sitting   BP Method: Large (Automatic)   Pulse: 69   SpO2: 99%   Weight: 94.2 kg (207 lb 9 oz)   Height: 5' 3" (1.6 m)      Physical Exam   Constitutional: She is oriented to person, place, and time. She appears well-developed and well-nourished. No distress. She is obese.   HENT:   Head: Normocephalic.   Cardiovascular: Normal rate, regular rhythm and normal heart sounds.   Pulmonary/Chest: Normal expansion and effort normal. No respiratory distress. She has decreased breath sounds. She has no wheezes. She has no rhonchi.   Abdominal: Soft. Bowel sounds are normal. There is no abdominal tenderness.   Musculoskeletal:         General: No edema. Normal range of motion.      Cervical back: Normal range of motion and neck supple.   Lymphadenopathy: No supraclavicular adenopathy is present.     She has no cervical adenopathy.   Neurological: She is alert and oriented to person, place, and time. Gait normal.   Skin: Skin is warm and dry. Nails show no clubbing.   Psychiatric: She has a normal mood and affect. Her behavior is normal.   Vitals reviewed.    Personal Diagnostic Review  PFTs        X-Ray Chest 1 View  Narrative: " "EXAMINATION:  XR CHEST 1 VIEW    CLINICAL HISTORY:  Provided history is "sob;  ".    TECHNIQUE:  One view of the chest.    COMPARISON:  04/12/2021.    FINDINGS:  Cardiac wires overlie the chest.  Cardiomediastinal silhouette is not enlarged.  Pulmonary emphysema, most pronounced in the upper lung zones.  Coarsened bibasilar interstitial markings similar to prior study.  No large focal consolidation.  No sizable pleural effusion.  No pneumothorax.  Impression: Pulmonary emphysema.  No acute process identified on this single view.    Electronically signed by: Kenrick Zuniga MD  Date:    10/04/2021  Time:    08:15       CTA 7/1/2020-  FINDINGS:  Pulmonary arteries are well enhanced.  No filling defects to indicate pulmonary embolism.     Thoracic soft tissues: Unremarkable.     Aorta: Left-sided aortic arch.  No aneurysm and no significant atherosclerosis     Heart: Normal size. No effusion.     Moderate diffuse emphysematous changes of the lungs most prominent in the upper lobes.     Mary/Mediastinum: No pathologic nadya enlargement.     Airways: Patent.     Lungs/Pleura: Clear lungs. No pleural effusion or thickening.     Esophagus: Unremarkable.     Upper Abdomen: No abnormality of the partially imaged upper abdomen.     Bones: No acute fracture. No suspicious lytic or sclerotic lesions.     Impression:     1. No evidence of pulmonary embolism.  2. Emphysematous changes of the lungs.  Assessment:       1. Chronic obstructive pulmonary disease, unspecified COPD type    2. Personal history of nicotine dependence    3. Cigarette nicotine dependence without complication    4. Obesity (BMI 35.0-39.9 without comorbidity)        Outpatient Encounter Medications as of 3/2/2022   Medication Sig Dispense Refill    AFLURIA QD 2021-22,3YR UP,,PF, 60 mcg (15 mcg x 4)/0.5 mL Syrg       albuterol (ACCUNEB) 0.63 mg/3 mL Nebu Take 0.63 mg by nebulization every 6 (six) hours as needed. Rescue      albuterol (PROVENTIL/VENTOLIN " HFA) 90 mcg/actuation inhaler Inhale 1-2 puffs into the lungs every 6 (six) hours as needed for Wheezing or Shortness of Breath. Rescue 18 g 0    budesonide-glycopyr-formoterol (BREZTRI AEROSPHERE) 160-9-4.8 mcg/actuation HFAA Inhale 2 puffs into the lungs 2 (two) times daily. 10.7 g 11    diclofenac (VOLTAREN) 75 MG EC tablet Take 75 mg by mouth 2 (two) times daily. Takes PRN      lisinopriL (PRINIVIL,ZESTRIL) 5 MG tablet TAKE ONE TABLET BY MOUTH EVERY DAY 90 tablet 3    omeprazole (PRILOSEC) 40 MG capsule TAKE ONE CAPSULE BY MOUTH EVERY DAY 30 capsule 5    predniSONE (DELTASONE) 20 MG tablet Take 20-40 mg by mouth daily as needed.      promethazine-codeine 6.25-10 mg/5 ml (PHENERGAN WITH CODEINE) 6.25-10 mg/5 mL syrup Take 5 mLs by mouth every 6 (six) hours as needed for Cough. 180 mL 0    QUEtiapine (SEROQUEL) 200 MG Tab Take 200 mg by mouth nightly.      SHINGRIX, PF, 50 mcg/0.5 mL injection       CHANTIX CONTINUING MONTH BOX 1 mg Tab       diphenoxylate-atropine 2.5-0.025 mg (LOMOTIL) 2.5-0.025 mg per tablet 1 after each loose BM up to 4 per day (Patient not taking: Reported on 3/2/2022) 20 tablet 0    oxyCODONE-acetaminophen (PERCOCET)  mg per tablet Take 1 tablet by mouth 3 (three) times daily as needed.      predniSONE (DELTASONE) 5 MG tablet 4 po qd x 2, 3 po qd x2, 2 po qd x2, 1 po qd x2 (Patient not taking: Reported on 3/2/2022) 20 tablet 0     Facility-Administered Encounter Medications as of 3/2/2022   Medication Dose Route Frequency Provider Last Rate Last Admin    lactated ringers infusion   Intravenous Continuous Sim Quinteros MD   Stopped at 12/14/21 0846    sodium chloride 0.9% flush 10 mL  10 mL Intravenous PRN Sim Quinteros MD         Orders Placed This Encounter   Procedures    CT Chest Lung Screening Low Dose     Standing Status:   Future     Standing Expiration Date:   3/2/2023     Order Specific Question:   Is there documentation of shared decision making for this  lung screening exam?     Answer:   Yes     Order Specific Question:   Is the patient a current smoker?     Answer:   Yes     Order Specific Question:   Is the patient a current or former smoker who quit within the past 15 years?     Answer:   Yes     Order Specific Question:   Is the patient between the age 50-80 years old?     Answer:   Yes     Order Specific Question:   Has the patient smoked 20 or more packs of cigarettes/year?     Answer:   Yes     Order Specific Question:   Does the patient show any signs or symptoms of lung cancer?     Answer:   No     Order Specific Question:   Is this the first (baseline) CT or an annual exam?     Answer:   Baseline [1]     Order Specific Question:   May the Radiologist modify the order per protocol to meet the clinical needs of the patient?     Answer:   Yes     Order Specific Question:   Is this a low dose screening chest CT?     Answer:   Yes     Order Specific Question:   Is this a low dose screening chest CT?     Answer:   Yes    Ambulatory referral/consult to Pulmonary Rehab     Standing Status:   Future     Standing Expiration Date:   4/2/2023     Referral Priority:   Routine     Referral Type:   Consultation     Referral Reason:   Specialty Services Required     Requested Specialty:   Pulmonary Disease     Number of Visits Requested:   1       Plan:         Problem List Items Addressed This Visit        Pulmonary    COPD (chronic obstructive pulmonary disease) - Primary    Current Assessment & Plan     Stable  Personally reviewed pulmonary function test with patient in office.  Discussed PFT suggestive of severe obstruction and moderately decreased DLCO.   Emphysema noted on CT.   Has significant smoking hx.         Plan:  - Continue Breztri . Discussed to rinse mouth after use.   - Continue albuterol as needed.   - Encouraged smoking cessation.   - patient interested in low-dose CT screening.  This was share discussion  -patient interested in pulmonary rehab.      "      Relevant Orders    Ambulatory referral/consult to Pulmonary Rehab       Endocrine    Obesity (BMI 35.0-39.9 without comorbidity)    Current Assessment & Plan     Body mass index is 36.77 kg/m².    Discussed with pt at length about the need to work on diet and weight loss.  Have offered suggestions on approaches for this problem.  Also discussed the need to start a regular exercise program.  We discussed at length the importance of regular exercise and working at getting to a healthier weight.  All questions answered.  Pt voices understanding of these principles and hopefully will take action.                Other    Cigarette nicotine dependence without complication    Current Assessment & Plan     Cigarette Counseling     Currently smoking      I have counseled pt for 3-5 minutes regarding cigarette cessation.  This has included the need to stop smoking as well as strategies, including but not limited to "cold turkey", CHANTIX (including risks and benefits of elizabet drug), nicotine replacement and WELLBUTRIN.             Personal history of nicotine dependence    Current Assessment & Plan     Low Dose Screening CT Chest    Cigarettes - > 20 pack /yr   Still smoking - YES     I have discussed with pt about using a screening CT of the chest due to history of cigarette smoking.  We have discussed the possible findings and possible actions as a result of these findings.  The pt would like to proceed.             Relevant Orders    CT Chest Lung Screening Low Dose         Follow up in 6 months or sooner if needed.     This note is dictated on M*Modal word recognition program.  There are word recognition mistakes that are occasionally missed on review.       "

## 2022-03-02 NOTE — PATIENT INSTRUCTIONS
Please stop smoking.   Continue with Breztri. Rinse mouth after use.   Continue with albuterol as rescue inhaler  Please obtain low-dose CT screening  Referral to pulmonary rehab has been placed  Follow-up in 6 months or sooner needed

## 2022-03-02 NOTE — ASSESSMENT & PLAN NOTE
"Cigarette Counseling     Currently smoking      I have counseled pt for 3-5 minutes regarding cigarette cessation.  This has included the need to stop smoking as well as strategies, including but not limited to "cold turkey", CHANTIX (including risks and benefits of elizabet drug), nicotine replacement and WELLBUTRIN.    "

## 2022-03-02 NOTE — ASSESSMENT & PLAN NOTE
Body mass index is 36.77 kg/m².    Discussed with pt at length about the need to work on diet and weight loss.  Have offered suggestions on approaches for this problem.  Also discussed the need to start a regular exercise program.  We discussed at length the importance of regular exercise and working at getting to a healthier weight.  All questions answered.  Pt voices understanding of these principles and hopefully will take action.

## 2022-03-02 NOTE — ASSESSMENT & PLAN NOTE
Stable  Personally reviewed pulmonary function test with patient in office.  Discussed PFT suggestive of severe obstruction and moderately decreased DLCO.   Emphysema noted on CT.   Has significant smoking hx.         Plan:  - Continue Breztri . Discussed to rinse mouth after use.   - Continue albuterol as needed.   - Encouraged smoking cessation.   - patient interested in low-dose CT screening.  This was share discussion  -patient interested in pulmonary rehab.

## 2022-03-02 NOTE — ASSESSMENT & PLAN NOTE
Low Dose Screening CT Chest    Cigarettes - > 20 pack /yr   Still smoking - YES     I have discussed with pt about using a screening CT of the chest due to history of cigarette smoking.  We have discussed the possible findings and possible actions as a result of these findings.  The pt would like to proceed.

## 2022-03-03 ENCOUNTER — OFFICE VISIT (OUTPATIENT)
Dept: PRIMARY CARE CLINIC | Facility: CLINIC | Age: 62
End: 2022-03-03
Payer: MEDICARE

## 2022-03-03 ENCOUNTER — TELEPHONE (OUTPATIENT)
Dept: PRIMARY CARE CLINIC | Facility: CLINIC | Age: 62
End: 2022-03-03
Payer: MEDICAID

## 2022-03-03 VITALS
OXYGEN SATURATION: 95 % | HEART RATE: 79 BPM | SYSTOLIC BLOOD PRESSURE: 126 MMHG | HEIGHT: 63 IN | DIASTOLIC BLOOD PRESSURE: 84 MMHG | RESPIRATION RATE: 18 BRPM | WEIGHT: 209.44 LBS | BODY MASS INDEX: 37.11 KG/M2

## 2022-03-03 DIAGNOSIS — G62.9 NEUROPATHY: ICD-10-CM

## 2022-03-03 DIAGNOSIS — R25.2 HAND CRAMPS: ICD-10-CM

## 2022-03-03 DIAGNOSIS — T14.8XXA BRUISING: ICD-10-CM

## 2022-03-03 DIAGNOSIS — J44.9 CHRONIC OBSTRUCTIVE PULMONARY DISEASE, UNSPECIFIED COPD TYPE: ICD-10-CM

## 2022-03-03 DIAGNOSIS — J98.01 BRONCHOSPASM: ICD-10-CM

## 2022-03-03 DIAGNOSIS — E66.01 MORBID (SEVERE) OBESITY DUE TO EXCESS CALORIES: ICD-10-CM

## 2022-03-03 DIAGNOSIS — I10 HYPERTENSION, UNSPECIFIED TYPE: ICD-10-CM

## 2022-03-03 DIAGNOSIS — R13.10 DYSPHAGIA, UNSPECIFIED TYPE: ICD-10-CM

## 2022-03-03 DIAGNOSIS — F31.9 BIPOLAR AFFECTIVE DISORDER, REMISSION STATUS UNSPECIFIED: Primary | ICD-10-CM

## 2022-03-03 PROCEDURE — 99214 OFFICE O/P EST MOD 30 MIN: CPT | Mod: S$GLB,,, | Performed by: FAMILY MEDICINE

## 2022-03-03 PROCEDURE — 3074F PR MOST RECENT SYSTOLIC BLOOD PRESSURE < 130 MM HG: ICD-10-PCS | Mod: CPTII,S$GLB,, | Performed by: FAMILY MEDICINE

## 2022-03-03 PROCEDURE — 4010F PR ACE/ARB THEARPY RXD/TAKEN: ICD-10-PCS | Mod: CPTII,S$GLB,, | Performed by: FAMILY MEDICINE

## 2022-03-03 PROCEDURE — 99499 RISK ADDL DX/OHS AUDIT: ICD-10-PCS | Mod: S$GLB,,, | Performed by: FAMILY MEDICINE

## 2022-03-03 PROCEDURE — 3008F BODY MASS INDEX DOCD: CPT | Mod: CPTII,S$GLB,, | Performed by: FAMILY MEDICINE

## 2022-03-03 PROCEDURE — 99999 PR PBB SHADOW E&M-EST. PATIENT-LVL III: ICD-10-PCS | Mod: PBBFAC,,, | Performed by: FAMILY MEDICINE

## 2022-03-03 PROCEDURE — 3079F PR MOST RECENT DIASTOLIC BLOOD PRESSURE 80-89 MM HG: ICD-10-PCS | Mod: CPTII,S$GLB,, | Performed by: FAMILY MEDICINE

## 2022-03-03 PROCEDURE — 1159F PR MEDICATION LIST DOCUMENTED IN MEDICAL RECORD: ICD-10-PCS | Mod: CPTII,S$GLB,, | Performed by: FAMILY MEDICINE

## 2022-03-03 PROCEDURE — 3079F DIAST BP 80-89 MM HG: CPT | Mod: CPTII,S$GLB,, | Performed by: FAMILY MEDICINE

## 2022-03-03 PROCEDURE — 99999 PR PBB SHADOW E&M-EST. PATIENT-LVL III: CPT | Mod: PBBFAC,,, | Performed by: FAMILY MEDICINE

## 2022-03-03 PROCEDURE — 99214 PR OFFICE/OUTPT VISIT, EST, LEVL IV, 30-39 MIN: ICD-10-PCS | Mod: S$GLB,,, | Performed by: FAMILY MEDICINE

## 2022-03-03 PROCEDURE — 4010F ACE/ARB THERAPY RXD/TAKEN: CPT | Mod: CPTII,S$GLB,, | Performed by: FAMILY MEDICINE

## 2022-03-03 PROCEDURE — 3008F PR BODY MASS INDEX (BMI) DOCUMENTED: ICD-10-PCS | Mod: CPTII,S$GLB,, | Performed by: FAMILY MEDICINE

## 2022-03-03 PROCEDURE — 3074F SYST BP LT 130 MM HG: CPT | Mod: CPTII,S$GLB,, | Performed by: FAMILY MEDICINE

## 2022-03-03 PROCEDURE — 99499 UNLISTED E&M SERVICE: CPT | Mod: S$GLB,,, | Performed by: FAMILY MEDICINE

## 2022-03-03 PROCEDURE — 1159F MED LIST DOCD IN RCRD: CPT | Mod: CPTII,S$GLB,, | Performed by: FAMILY MEDICINE

## 2022-03-03 RX ORDER — PROMETHAZINE HYDROCHLORIDE AND CODEINE PHOSPHATE 6.25; 1 MG/5ML; MG/5ML
5 SOLUTION ORAL EVERY 6 HOURS PRN
Qty: 180 ML | Refills: 0 | Status: CANCELLED | OUTPATIENT
Start: 2022-03-03

## 2022-03-03 RX ORDER — CYCLOBENZAPRINE HCL 10 MG
10 TABLET ORAL 3 TIMES DAILY PRN
Qty: 30 TABLET | Refills: 5 | Status: SHIPPED | OUTPATIENT
Start: 2022-03-03 | End: 2022-03-13

## 2022-03-03 RX ORDER — PREGABALIN 50 MG/1
CAPSULE ORAL
Qty: 90 CAPSULE | Refills: 5 | Status: SHIPPED | OUTPATIENT
Start: 2022-03-03 | End: 2022-05-10

## 2022-03-03 RX ORDER — TRAMADOL HYDROCHLORIDE 50 MG/1
50 TABLET ORAL EVERY 6 HOURS PRN
Qty: 30 TABLET | Refills: 0 | Status: SHIPPED | OUTPATIENT
Start: 2022-03-03 | End: 2022-03-13

## 2022-03-03 RX ORDER — CODEINE PHOSPHATE AND GUAIFENESIN 10; 100 MG/5ML; MG/5ML
5 SOLUTION ORAL EVERY 6 HOURS PRN
Qty: 180 ML | Refills: 0 | Status: SHIPPED | OUTPATIENT
Start: 2022-03-03 | End: 2022-06-07

## 2022-03-03 NOTE — PROGRESS NOTES
Subjective:       Patient ID: Wilfredo Vazquez is a 62 y.o. female.    Chief Complaint: hand cramps  HPI--61 yo BF-- patient complaining hand cramping--started yesterday--hurt from hands to the shoulder--pain arms and hands--hold on arthritis medication--was on gabapentin with no relief--  Complaining of a cold states needs cough medicines refill  Bruising different parts of the body not on blood thinners or prednisone 20 mg -- on as needed     ROS:  Skin: no psoriasis, eczema, skin cancer-  HEENT: no headache,  No ocular pain, blurred vision, diplopia, epistaxis, hoarseness change in voice, thyroid trouble  Lung: No pneumonia, +asthma, no Tb, +_wheezing,+ SOB, no smoking + COPD   Heart: No chest pain, ankle edema, palpitations, MI, patti murmur, +hypertension,no  hyperlipidemia no stent bypass arrhythmia  Abdomen: no nausea,no  Vomiting,no diarrhea, no  constipation, ulcers, hepatitis, gallbladder disease, melena, hematochezia, hematemesis +dysphagia history of fundoplication for GERD history of esophageal dilatation x2Dr Beary doing better--swallowing much better  : no UTI, renal disease, stones  GYN hyst   MS: no fractures, O/A, lupus, rheumatoid, gout--pain in hands an arm  Neuro: No dizziness, LOC, seizures   No diabetes, no anemia, no anxiety, no depression   Single--2 children--disable secondary to a stomach--lives with daughter and grandson    Objective:   Physical Exam:  General: Well nourished, well developed, no acute distress +obesity  Skin: No lesions  HEENT: Eyes PERRLA, EOM intact, nose patent, throat 1/4 erythematous ears TMs clear--c/o fullnes right ear but looks OK   NECK: Supple, no bruits, No JVD, no nodes  Lungs: Clear, no rales, rhonchi, wheezing +coarse cough  Heart: Regular rate and rhythm, no murmur s, gallops, or rubs  Abdomen: flat, bowel sounds positive, no tenderness, or organomegaly  MS muscle strength range of motion intact at this time  Neuro: Alert, CN intact, oriented X  3  Extremities: No cyanosis, clubbing, or edema         Assessment:       1. Bipolar affective disorder, remission status unspecified    2. Bruising    3. Morbid (severe) obesity due to excess calories    4. Hand cramps    5. Neuropathy    6. Chronic obstructive pulmonary disease, unspecified COPD type    7. Bronchospasm    8. Hypertension, unspecified type    9. Dysphagia, unspecified type        Plan:       Bipolar affective disorder, remission status unspecified  -     CBC Auto Differential; Future; Expected date: 03/03/2022  -     Comprehensive Metabolic Panel; Future; Expected date: 03/03/2022    Bruising  -     Protime-INR; Future; Expected date: 03/03/2022    Morbid (severe) obesity due to excess calories    Hand cramps    Neuropathy    Chronic obstructive pulmonary disease, unspecified COPD type    Bronchospasm    Hypertension, unspecified type    Dysphagia, unspecified type    Other orders  -     traMADoL (ULTRAM) 50 mg tablet; Take 1 tablet (50 mg total) by mouth every 6 (six) hours as needed.  Dispense: 30 tablet; Refill: 0  -     guaiFENesin-codeine 100-10 mg/5 ml (TUSSI-ORGANIDIN NR)  mg/5 mL syrup; Take 5 mLs by mouth every 6 (six) hours as needed.  Dispense: 180 mL; Refill: 0  -     cyclobenzaprine (FLEXERIL) 10 MG tablet; Take 1 tablet (10 mg total) by mouth 3 (three) times daily as needed.  Dispense: 30 tablet; Refill: 5  -     pregabalin (LYRICA) 50 MG capsule; Start with 1 p.o. b.i.d. for neuropathy if no relief can go to t.i.d.  Dispense: 90 capsule; Refill: 5        Multiple bruises--CBCs CMP--INR Pt  Hand cramps--neuropathy--Ultram--Lyrica--Flexeril--NSAID  Other medical issues  Asthma--bronchospasm---Zithromax/pred taper/phenergan with codiene/ albuterol symbicort   Diarrhea liquid diet Lomotil  History dysphagia--difficulty swallowing water has to eat small amounts of food--history esophageal dilatationD x2 genoveva Quinteros in the past--- had GERD with a subsequent fundoplication now with  dysphagia--take omeprazole  Lab CBCs CMP lipid stool guaiac T4 TSH-done in October redo in May  Maintenance shingles flu COVID

## 2022-03-03 NOTE — TELEPHONE ENCOUNTER
----- Message from Leandro Zaidi sent at 3/3/2022 12:34 PM CST -----  Contact: self [phone]  Medicaid pt requesting a call back for same day appt stated breaking out all over body and have arm cramps.    Please call and advise

## 2022-03-03 NOTE — TELEPHONE ENCOUNTER
Called and spoke with patient and notified her that she can come around 2:30 3:00 for appointment.  Patient verbalized understanding

## 2022-04-04 ENCOUNTER — TELEPHONE (OUTPATIENT)
Dept: PRIMARY CARE CLINIC | Facility: CLINIC | Age: 62
End: 2022-04-04
Payer: MEDICAID

## 2022-04-04 ENCOUNTER — OFFICE VISIT (OUTPATIENT)
Dept: PRIMARY CARE CLINIC | Facility: CLINIC | Age: 62
End: 2022-04-04
Payer: MEDICARE

## 2022-04-04 VITALS
HEIGHT: 63 IN | DIASTOLIC BLOOD PRESSURE: 81 MMHG | BODY MASS INDEX: 35.76 KG/M2 | WEIGHT: 201.81 LBS | TEMPERATURE: 99 F | SYSTOLIC BLOOD PRESSURE: 121 MMHG | RESPIRATION RATE: 18 BRPM | HEART RATE: 102 BPM | OXYGEN SATURATION: 96 %

## 2022-04-04 DIAGNOSIS — M20.012 MALLET FINGER OF LEFT HAND: Primary | ICD-10-CM

## 2022-04-04 PROCEDURE — 1159F PR MEDICATION LIST DOCUMENTED IN MEDICAL RECORD: ICD-10-PCS | Mod: CPTII,S$GLB,, | Performed by: FAMILY MEDICINE

## 2022-04-04 PROCEDURE — 99999 PR PBB SHADOW E&M-EST. PATIENT-LVL IV: CPT | Mod: PBBFAC,,, | Performed by: FAMILY MEDICINE

## 2022-04-04 PROCEDURE — 3008F BODY MASS INDEX DOCD: CPT | Mod: CPTII,S$GLB,, | Performed by: FAMILY MEDICINE

## 2022-04-04 PROCEDURE — 99214 PR OFFICE/OUTPT VISIT, EST, LEVL IV, 30-39 MIN: ICD-10-PCS | Mod: S$GLB,,, | Performed by: FAMILY MEDICINE

## 2022-04-04 PROCEDURE — 3074F PR MOST RECENT SYSTOLIC BLOOD PRESSURE < 130 MM HG: ICD-10-PCS | Mod: CPTII,S$GLB,, | Performed by: FAMILY MEDICINE

## 2022-04-04 PROCEDURE — 4010F PR ACE/ARB THEARPY RXD/TAKEN: ICD-10-PCS | Mod: CPTII,S$GLB,, | Performed by: FAMILY MEDICINE

## 2022-04-04 PROCEDURE — 1159F MED LIST DOCD IN RCRD: CPT | Mod: CPTII,S$GLB,, | Performed by: FAMILY MEDICINE

## 2022-04-04 PROCEDURE — 3008F PR BODY MASS INDEX (BMI) DOCUMENTED: ICD-10-PCS | Mod: CPTII,S$GLB,, | Performed by: FAMILY MEDICINE

## 2022-04-04 PROCEDURE — 3074F SYST BP LT 130 MM HG: CPT | Mod: CPTII,S$GLB,, | Performed by: FAMILY MEDICINE

## 2022-04-04 PROCEDURE — 3079F DIAST BP 80-89 MM HG: CPT | Mod: CPTII,S$GLB,, | Performed by: FAMILY MEDICINE

## 2022-04-04 PROCEDURE — 3079F PR MOST RECENT DIASTOLIC BLOOD PRESSURE 80-89 MM HG: ICD-10-PCS | Mod: CPTII,S$GLB,, | Performed by: FAMILY MEDICINE

## 2022-04-04 PROCEDURE — 99214 OFFICE O/P EST MOD 30 MIN: CPT | Mod: S$GLB,,, | Performed by: FAMILY MEDICINE

## 2022-04-04 PROCEDURE — 4010F ACE/ARB THERAPY RXD/TAKEN: CPT | Mod: CPTII,S$GLB,, | Performed by: FAMILY MEDICINE

## 2022-04-04 PROCEDURE — 99999 PR PBB SHADOW E&M-EST. PATIENT-LVL IV: ICD-10-PCS | Mod: PBBFAC,,, | Performed by: FAMILY MEDICINE

## 2022-04-04 NOTE — TELEPHONE ENCOUNTER
Returned patients call. Patient received stitches 7 days ago and called to day requesting for an appt to get them removed. I informed patient that Dr. Calderon did not have any appts but Dr. Benson did. Patient took appt at 2:30. Dr. Benson approved for the same day appt to be schedule don his schedule.

## 2022-04-04 NOTE — PROGRESS NOTES
"Subjective:       Patient ID: Wilfredo Vazquez is a 62 y.o. female.    Chief Complaint: Suture / Staple Removal    Here for follow up from ER for left thumb laceration sharpening a knife at home. Given Td in the ER. 7 stitches made and needing removal. Was given a thumb splint but not using. Pain from bumping her thumb into things.     Review of Systems    Objective:      Vitals:    04/04/22 1443   BP: 121/81   BP Location: Left arm   Patient Position: Sitting   BP Method: Medium (Manual)   Pulse: 102   Resp: 18   Temp: 98.6 °F (37 °C)   TempSrc: Oral   SpO2: 96%   Weight: 91.6 kg (201 lb 13.3 oz)   Height: 5' 3" (1.6 m)     Physical Exam  Vitals and nursing note reviewed.   Constitutional:       Appearance: She is well-developed. She is obese.   HENT:      Head: Normocephalic and atraumatic.      Nose: Nose normal.   Eyes:      Conjunctiva/sclera: Conjunctivae normal.   Pulmonary:      Effort: Pulmonary effort is normal. No respiratory distress.   Abdominal:      General: There is no distension.   Skin:     Comments: Left thumb lacteration at DIP. 7 sutures in place. No drainage. Minimal swelling. No erythema.   Inability to extend thumb at DIP.   Neurological:      Mental Status: She is alert.      Cranial Nerves: No cranial nerve deficit.             Lab Results   Component Value Date     10/04/2021    K 3.9 10/04/2021     10/04/2021    CO2 24 10/04/2021    BUN 15 10/04/2021    CREATININE 1.0 10/04/2021    ANIONGAP 10 10/04/2021     No results found for: HGBA1C  Lab Results   Component Value Date    BNP 16 10/04/2021    BNP 25 04/12/2021    BNP 23 02/10/2020       Lab Results   Component Value Date    WBC 9.60 10/04/2021    HGB 12.4 10/04/2021    HCT 37.8 10/04/2021     10/04/2021    GRAN 5.9 10/04/2021    GRAN 61.7 10/04/2021     Lab Results   Component Value Date    CHOL 191 08/17/2021    HDL 58 08/17/2021    LDLCALC 114.0 08/17/2021    TRIG 95 08/17/2021          Current Outpatient Medications: "     albuterol (ACCUNEB) 0.63 mg/3 mL Nebu, Take 0.63 mg by nebulization every 6 (six) hours as needed. Rescue, Disp: , Rfl:     albuterol (PROVENTIL/VENTOLIN HFA) 90 mcg/actuation inhaler, Inhale 1-2 puffs into the lungs every 6 (six) hours as needed for Wheezing or Shortness of Breath. Rescue, Disp: 18 g, Rfl: 0    budesonide-glycopyr-formoterol (BREZTRI AEROSPHERE) 160-9-4.8 mcg/actuation HFAA, Inhale 2 puffs into the lungs 2 (two) times daily., Disp: 10.7 g, Rfl: 11    guaiFENesin-codeine 100-10 mg/5 ml (TUSSI-ORGANIDIN NR)  mg/5 mL syrup, Take 5 mLs by mouth every 6 (six) hours as needed., Disp: 180 mL, Rfl: 0    ibuprofen (ADVIL,MOTRIN) 800 MG tablet, Take 1 tablet (800 mg total) by mouth 3 (three) times daily. for 10 days, Disp: 30 tablet, Rfl: 0    lisinopriL (PRINIVIL,ZESTRIL) 5 MG tablet, TAKE ONE TABLET BY MOUTH EVERY DAY, Disp: 90 tablet, Rfl: 3    mupirocin (BACTROBAN) 2 % ointment, Apply topically 3 (three) times daily., Disp: 30 g, Rfl: 0    omeprazole (PRILOSEC) 40 MG capsule, TAKE ONE CAPSULE BY MOUTH EVERY DAY, Disp: 30 capsule, Rfl: 5    predniSONE (DELTASONE) 20 MG tablet, Take 20-40 mg by mouth daily as needed., Disp: , Rfl:     pregabalin (LYRICA) 50 MG capsule, Start with 1 p.o. b.i.d. for neuropathy if no relief can go to t.i.d., Disp: 90 capsule, Rfl: 5    promethazine-codeine 6.25-10 mg/5 ml (PHENERGAN WITH CODEINE) 6.25-10 mg/5 mL syrup, Take 5 mLs by mouth every 6 (six) hours as needed for Cough., Disp: 180 mL, Rfl: 0    CHANTIX CONTINUING MONTH BOX 1 mg Tab, , Disp: , Rfl:     QUEtiapine (SEROQUEL) 200 MG Tab, Take 200 mg by mouth nightly., Disp: , Rfl:   No current facility-administered medications for this visit.    Facility-Administered Medications Ordered in Other Visits:     lactated ringers infusion, , Intravenous, Continuous, Sim Quinteros MD, Stopped at 12/14/21 0846    sodium chloride 0.9% flush 10 mL, 10 mL, Intravenous, PRN, Sim Quinteros MD         Assessment:       1. Mallet finger of left hand           Plan:       Mallet finger of left hand  -     Ambulatory referral/consult to Hand Surgery; Future; Expected date: 04/11/2022     Suspect left thumb tendon injury s/p laceration. Splint placed after suture removal. Inability to extend at DIP. Referral to hand specialist for review.

## 2022-04-04 NOTE — TELEPHONE ENCOUNTER
----- Message from Jessica Reyna sent at 4/4/2022 11:33 AM CDT -----  Regarding: Stitches removal  Contact: patient [phone]  Patient had stiches put in 7 days ago and is requesting an appointment today to have them removed.  No other physician is available.  Patients insurance is medicaid.

## 2022-04-21 NOTE — ED NOTES
Adult Physical Assessment  LOC: Wilfredo Vazquez, 57 y.o. female verified via two identifiers.  The patient is awake, alert, oriented and speaking appropriately at this time.  APPEARANCE: Patient resting comfortably and appears to be in no acute distress at this time. Patient is clean and well groomed, patient's clothing is properly fastened.  SKIN:The skin is warm and dry, color consistent with ethnicity, patient has normal skin turgor and moist mucus membranes, skin intact, no breakdown or brusing noted.  MUSCULOSKELETAL: Patient moving all extremities well, no obvious swelling or deformities noted.  RESPIRATORY: Airway is open and patent, respirations are spontaneous, patient has a normal effort and rate, no accessory muscle use noted.  CARDIAC: Patient has a normal rate and rhythm, no periphreal edema noted in any extremity, capillary refill < 3 seconds in all extremities  ABDOMEN: Soft, tender to palpation, abdominal distention noted. Bowel sounds present in all four quadrants. Pt reports constipation x one week. Pt reports passing gas frequently.  NEUROLOGIC: Eyes open spontaneously, behavior appropriate to situation, follows commands, facial expression symmetrical, bilateral hand grasp equal and even, purposeful motor response noted, normal sensation in all extremities when touched with a finger.    96.8

## 2022-04-25 PROBLEM — J96.01 ACUTE HYPOXEMIC RESPIRATORY FAILURE: Status: ACTIVE | Noted: 2022-04-25

## 2022-04-26 ENCOUNTER — TELEPHONE (OUTPATIENT)
Dept: PULMONOLOGY | Facility: CLINIC | Age: 62
End: 2022-04-26

## 2022-04-26 ENCOUNTER — HOSPITAL ENCOUNTER (INPATIENT)
Facility: HOSPITAL | Age: 62
LOS: 1 days | Discharge: HOME | DRG: 190 | End: 2022-04-29
Attending: EMERGENCY MEDICINE | Admitting: INTERNAL MEDICINE
Payer: MEDICARE

## 2022-04-26 DIAGNOSIS — J44.1 COPD EXACERBATION: Primary | ICD-10-CM

## 2022-04-26 DIAGNOSIS — R06.02 SOB (SHORTNESS OF BREATH): ICD-10-CM

## 2022-04-26 DIAGNOSIS — R07.9 CHEST PAIN: ICD-10-CM

## 2022-04-26 DIAGNOSIS — R06.02 SHORTNESS OF BREATH: ICD-10-CM

## 2022-04-26 DIAGNOSIS — R79.89 ELEVATED TROPONIN: ICD-10-CM

## 2022-04-26 DIAGNOSIS — R07.89 CHEST DISCOMFORT: ICD-10-CM

## 2022-04-26 DIAGNOSIS — Z03.89 RULED OUT FOR MYOCARDIAL INFARCTION: ICD-10-CM

## 2022-04-26 PROBLEM — J96.21 ACUTE ON CHRONIC RESPIRATORY FAILURE WITH HYPOXIA AND HYPERCAPNIA: Status: ACTIVE | Noted: 2022-04-26

## 2022-04-26 PROBLEM — J96.01 ACUTE HYPOXEMIC RESPIRATORY FAILURE: Status: RESOLVED | Noted: 2022-04-25 | Resolved: 2022-04-26

## 2022-04-26 PROBLEM — J96.22 ACUTE ON CHRONIC RESPIRATORY FAILURE WITH HYPERCAPNIA: Status: ACTIVE | Noted: 2022-04-26

## 2022-04-26 PROBLEM — R10.9 ABDOMINAL PAIN: Status: RESOLVED | Noted: 2018-01-24 | Resolved: 2022-04-26

## 2022-04-26 LAB
ALBUMIN SERPL BCP-MCNC: 3.5 G/DL (ref 3.5–5.2)
ALLENS TEST: ABNORMAL
ALP SERPL-CCNC: 78 U/L (ref 55–135)
ALT SERPL W/O P-5'-P-CCNC: 19 U/L (ref 10–44)
ANION GAP SERPL CALC-SCNC: 14 MMOL/L (ref 8–16)
AST SERPL-CCNC: 13 U/L (ref 10–40)
BASOPHILS # BLD AUTO: 0.02 K/UL (ref 0–0.2)
BASOPHILS NFR BLD: 0.2 % (ref 0–1.9)
BILIRUB SERPL-MCNC: 0.2 MG/DL (ref 0.1–1)
BNP SERPL-MCNC: 28 PG/ML (ref 0–99)
BUN SERPL-MCNC: 24 MG/DL (ref 6–20)
CALCIUM SERPL-MCNC: 9.5 MG/DL (ref 8.7–10.5)
CHLORIDE SERPL-SCNC: 102 MMOL/L (ref 95–110)
CO2 SERPL-SCNC: 26 MMOL/L (ref 23–29)
CREAT SERPL-MCNC: 1 MG/DL (ref 0.5–1.4)
DELSYS: ABNORMAL
DIFFERENTIAL METHOD: ABNORMAL
EOSINOPHIL # BLD AUTO: 0 K/UL (ref 0–0.5)
EOSINOPHIL NFR BLD: 0 % (ref 0–8)
ERYTHROCYTE [DISTWIDTH] IN BLOOD BY AUTOMATED COUNT: 18.6 % (ref 11.5–14.5)
EST. GFR  (AFRICAN AMERICAN): >60 ML/MIN/1.73 M^2
EST. GFR  (NON AFRICAN AMERICAN): >60 ML/MIN/1.73 M^2
GLUCOSE SERPL-MCNC: 123 MG/DL (ref 70–110)
HCO3 UR-SCNC: 35.2 MMOL/L (ref 24–28)
HCT VFR BLD AUTO: 37.9 % (ref 37–48.5)
HGB BLD-MCNC: 11.8 G/DL (ref 12–16)
IMM GRANULOCYTES # BLD AUTO: 0.21 K/UL (ref 0–0.04)
IMM GRANULOCYTES NFR BLD AUTO: 1.6 % (ref 0–0.5)
LYMPHOCYTES # BLD AUTO: 0.4 K/UL (ref 1–4.8)
LYMPHOCYTES NFR BLD: 3.3 % (ref 18–48)
MCH RBC QN AUTO: 28.7 PG (ref 27–31)
MCHC RBC AUTO-ENTMCNC: 31.1 G/DL (ref 32–36)
MCV RBC AUTO: 92 FL (ref 82–98)
MONOCYTES # BLD AUTO: 1.1 K/UL (ref 0.3–1)
MONOCYTES NFR BLD: 8 % (ref 4–15)
NEUTROPHILS # BLD AUTO: 11.5 K/UL (ref 1.8–7.7)
NEUTROPHILS NFR BLD: 86.9 % (ref 38–73)
NRBC BLD-RTO: 0 /100 WBC
PCO2 BLDA: 53.3 MMHG (ref 35–45)
PH SMN: 7.43 [PH] (ref 7.35–7.45)
PLATELET # BLD AUTO: 315 K/UL (ref 150–450)
PMV BLD AUTO: 11.7 FL (ref 9.2–12.9)
PO2 BLDA: 42 MMHG (ref 40–60)
POC BE: 11 MMOL/L
POC SATURATED O2: 77 % (ref 95–100)
POC TCO2: 37 MMOL/L (ref 24–29)
POTASSIUM SERPL-SCNC: 3.7 MMOL/L (ref 3.5–5.1)
PROT SERPL-MCNC: 6.6 G/DL (ref 6–8.4)
RBC # BLD AUTO: 4.11 M/UL (ref 4–5.4)
SAMPLE: ABNORMAL
SITE: ABNORMAL
SODIUM SERPL-SCNC: 142 MMOL/L (ref 136–145)
TROPONIN I SERPL DL<=0.01 NG/ML-MCNC: <0.006 NG/ML (ref 0–0.03)
WBC # BLD AUTO: 13.16 K/UL (ref 3.9–12.7)

## 2022-04-26 PROCEDURE — 93010 EKG 12-LEAD: ICD-10-PCS | Mod: ,,, | Performed by: INTERNAL MEDICINE

## 2022-04-26 PROCEDURE — 93005 ELECTROCARDIOGRAM TRACING: CPT

## 2022-04-26 PROCEDURE — 99220 PR INITIAL OBSERVATION CARE,LEVL III: ICD-10-PCS | Mod: ,,, | Performed by: HOSPITALIST

## 2022-04-26 PROCEDURE — 94640 AIRWAY INHALATION TREATMENT: CPT | Mod: XB

## 2022-04-26 PROCEDURE — 85025 COMPLETE CBC W/AUTO DIFF WBC: CPT | Mod: 91 | Performed by: EMERGENCY MEDICINE

## 2022-04-26 PROCEDURE — 96374 THER/PROPH/DIAG INJ IV PUSH: CPT

## 2022-04-26 PROCEDURE — 96375 TX/PRO/DX INJ NEW DRUG ADDON: CPT

## 2022-04-26 PROCEDURE — 99900035 HC TECH TIME PER 15 MIN (STAT)

## 2022-04-26 PROCEDURE — 84484 ASSAY OF TROPONIN QUANT: CPT | Performed by: EMERGENCY MEDICINE

## 2022-04-26 PROCEDURE — 99220 PR INITIAL OBSERVATION CARE,LEVL III: CPT | Mod: ,,, | Performed by: HOSPITALIST

## 2022-04-26 PROCEDURE — G0378 HOSPITAL OBSERVATION PER HR: HCPCS

## 2022-04-26 PROCEDURE — 86803 HEPATITIS C AB TEST: CPT | Performed by: EMERGENCY MEDICINE

## 2022-04-26 PROCEDURE — 83880 ASSAY OF NATRIURETIC PEPTIDE: CPT | Performed by: EMERGENCY MEDICINE

## 2022-04-26 PROCEDURE — 80053 COMPREHEN METABOLIC PANEL: CPT | Mod: 91 | Performed by: EMERGENCY MEDICINE

## 2022-04-26 PROCEDURE — 99284 PR EMERGENCY DEPT VISIT,LEVEL IV: ICD-10-PCS | Mod: ,,, | Performed by: EMERGENCY MEDICINE

## 2022-04-26 PROCEDURE — 94761 N-INVAS EAR/PLS OXIMETRY MLT: CPT

## 2022-04-26 PROCEDURE — 25000242 PHARM REV CODE 250 ALT 637 W/ HCPCS: Performed by: EMERGENCY MEDICINE

## 2022-04-26 PROCEDURE — 82803 BLOOD GASES ANY COMBINATION: CPT

## 2022-04-26 PROCEDURE — 99285 EMERGENCY DEPT VISIT HI MDM: CPT | Mod: 25

## 2022-04-26 PROCEDURE — 99284 EMERGENCY DEPT VISIT MOD MDM: CPT | Mod: ,,, | Performed by: EMERGENCY MEDICINE

## 2022-04-26 PROCEDURE — 93010 ELECTROCARDIOGRAM REPORT: CPT | Mod: ,,, | Performed by: INTERNAL MEDICINE

## 2022-04-26 PROCEDURE — 87389 HIV-1 AG W/HIV-1&-2 AB AG IA: CPT | Performed by: EMERGENCY MEDICINE

## 2022-04-26 PROCEDURE — 63600175 PHARM REV CODE 636 W HCPCS: Performed by: EMERGENCY MEDICINE

## 2022-04-26 RX ORDER — ONDANSETRON 2 MG/ML
8 INJECTION INTRAMUSCULAR; INTRAVENOUS EVERY 6 HOURS PRN
Status: DISCONTINUED | OUTPATIENT
Start: 2022-04-26 | End: 2022-04-29 | Stop reason: HOSPADM

## 2022-04-26 RX ORDER — PREGABALIN 75 MG/1
75 CAPSULE ORAL 2 TIMES DAILY
Status: DISCONTINUED | OUTPATIENT
Start: 2022-04-26 | End: 2022-04-26

## 2022-04-26 RX ORDER — FLUTICASONE FUROATE AND VILANTEROL 100; 25 UG/1; UG/1
1 POWDER RESPIRATORY (INHALATION)
Status: COMPLETED | OUTPATIENT
Start: 2022-04-26 | End: 2022-04-27

## 2022-04-26 RX ORDER — TALC
6 POWDER (GRAM) TOPICAL NIGHTLY PRN
Status: DISCONTINUED | OUTPATIENT
Start: 2022-04-26 | End: 2022-04-29 | Stop reason: HOSPADM

## 2022-04-26 RX ORDER — METHYLPREDNISOLONE SOD SUCC 125 MG
125 VIAL (EA) INJECTION
Status: COMPLETED | OUTPATIENT
Start: 2022-04-26 | End: 2022-04-26

## 2022-04-26 RX ORDER — SODIUM CHLORIDE 0.9 % (FLUSH) 0.9 %
5 SYRINGE (ML) INJECTION
Status: DISCONTINUED | OUTPATIENT
Start: 2022-04-26 | End: 2022-04-29 | Stop reason: HOSPADM

## 2022-04-26 RX ORDER — QUETIAPINE FUMARATE 200 MG/1
200 TABLET, FILM COATED ORAL NIGHTLY
Status: DISCONTINUED | OUTPATIENT
Start: 2022-04-26 | End: 2022-04-29 | Stop reason: HOSPADM

## 2022-04-26 RX ORDER — LISINOPRIL 5 MG/1
5 TABLET ORAL DAILY
Status: DISCONTINUED | OUTPATIENT
Start: 2022-04-27 | End: 2022-04-29 | Stop reason: HOSPADM

## 2022-04-26 RX ORDER — ACETAMINOPHEN 325 MG/1
650 TABLET ORAL EVERY 4 HOURS PRN
Status: DISCONTINUED | OUTPATIENT
Start: 2022-04-26 | End: 2022-04-29 | Stop reason: HOSPADM

## 2022-04-26 RX ORDER — POLYETHYLENE GLYCOL 3350 17 G/17G
17 POWDER, FOR SOLUTION ORAL DAILY PRN
Status: DISCONTINUED | OUTPATIENT
Start: 2022-04-26 | End: 2022-04-29 | Stop reason: HOSPADM

## 2022-04-26 RX ORDER — FLUTICASONE FUROATE AND VILANTEROL 100; 25 UG/1; UG/1
1 POWDER RESPIRATORY (INHALATION) DAILY
Status: DISCONTINUED | OUTPATIENT
Start: 2022-04-27 | End: 2022-04-29 | Stop reason: HOSPADM

## 2022-04-26 RX ORDER — PANTOPRAZOLE SODIUM 40 MG/1
40 TABLET, DELAYED RELEASE ORAL DAILY
Status: DISCONTINUED | OUTPATIENT
Start: 2022-04-27 | End: 2022-04-29 | Stop reason: HOSPADM

## 2022-04-26 RX ORDER — IPRATROPIUM BROMIDE AND ALBUTEROL SULFATE 2.5; .5 MG/3ML; MG/3ML
3 SOLUTION RESPIRATORY (INHALATION)
Status: DISCONTINUED | OUTPATIENT
Start: 2022-04-27 | End: 2022-04-29 | Stop reason: HOSPADM

## 2022-04-26 RX ORDER — PREDNISONE 20 MG/1
40 TABLET ORAL DAILY
Status: DISCONTINUED | OUTPATIENT
Start: 2022-04-27 | End: 2022-04-27

## 2022-04-26 RX ORDER — ENOXAPARIN SODIUM 100 MG/ML
40 INJECTION SUBCUTANEOUS EVERY 24 HOURS
Status: DISCONTINUED | OUTPATIENT
Start: 2022-04-26 | End: 2022-04-29 | Stop reason: HOSPADM

## 2022-04-26 RX ORDER — ACETAMINOPHEN 500 MG
1000 TABLET ORAL EVERY 8 HOURS PRN
Status: DISCONTINUED | OUTPATIENT
Start: 2022-04-26 | End: 2022-04-29 | Stop reason: HOSPADM

## 2022-04-26 RX ORDER — IPRATROPIUM BROMIDE AND ALBUTEROL SULFATE 2.5; .5 MG/3ML; MG/3ML
3 SOLUTION RESPIRATORY (INHALATION)
Status: COMPLETED | OUTPATIENT
Start: 2022-04-26 | End: 2022-04-26

## 2022-04-26 RX ADMIN — IPRATROPIUM BROMIDE AND ALBUTEROL SULFATE 3 ML: 2.5; .5 SOLUTION RESPIRATORY (INHALATION) at 09:04

## 2022-04-26 RX ADMIN — METHYLPREDNISOLONE SODIUM SUCCINATE 125 MG: 125 INJECTION, POWDER, FOR SOLUTION INTRAMUSCULAR; INTRAVENOUS at 08:04

## 2022-04-26 NOTE — TELEPHONE ENCOUNTER
----- Message from Melonie Rollins sent at 4/26/2022  1:13 PM CDT -----  Regarding: Appointment  Contact: [phone]  Calling in regards to scheduling an appointment for COPD as soon as possible in the afternoon. Please call to schedule.

## 2022-04-26 NOTE — TELEPHONE ENCOUNTER
Informed patient Demetria Kruger NP is no longer with [facility].  Scheduled appointment in pulmonary clinic for COPD with Dr. ESTEBAN Parrish on 8/17/22 @ 3:00, also placed on cancel list.  Patient would also like me to reach out to [facility] Pulmonary department to ask if there is a sooner appointment there, will forward this message to CALI Whaley.

## 2022-04-27 ENCOUNTER — PATIENT OUTREACH (OUTPATIENT)
Dept: ADMINISTRATIVE | Facility: CLINIC | Age: 62
End: 2022-04-27
Payer: MEDICAID

## 2022-04-27 PROBLEM — D64.9 ANEMIA: Status: ACTIVE | Noted: 2022-04-27

## 2022-04-27 PROBLEM — R73.9 HYPERGLYCEMIA: Status: ACTIVE | Noted: 2022-04-27

## 2022-04-27 LAB
ALBUMIN SERPL BCP-MCNC: 3.1 G/DL (ref 3.5–5.2)
ALP SERPL-CCNC: 63 U/L (ref 55–135)
ALT SERPL W/O P-5'-P-CCNC: 21 U/L (ref 10–44)
ANION GAP SERPL CALC-SCNC: 11 MMOL/L (ref 8–16)
AST SERPL-CCNC: 32 U/L (ref 10–40)
BASOPHILS # BLD AUTO: 0.01 K/UL (ref 0–0.2)
BASOPHILS NFR BLD: 0.1 % (ref 0–1.9)
BILIRUB SERPL-MCNC: 0.3 MG/DL (ref 0.1–1)
BUN SERPL-MCNC: 30 MG/DL (ref 6–20)
CALCIUM SERPL-MCNC: 9.1 MG/DL (ref 8.7–10.5)
CHLORIDE SERPL-SCNC: 103 MMOL/L (ref 95–110)
CO2 SERPL-SCNC: 25 MMOL/L (ref 23–29)
CREAT SERPL-MCNC: 0.8 MG/DL (ref 0.5–1.4)
D DIMER PPP IA.FEU-MCNC: 0.78 MG/L FEU
DIFFERENTIAL METHOD: ABNORMAL
EOSINOPHIL # BLD AUTO: 0 K/UL (ref 0–0.5)
EOSINOPHIL NFR BLD: 0 % (ref 0–8)
ERYTHROCYTE [DISTWIDTH] IN BLOOD BY AUTOMATED COUNT: 16.8 % (ref 11.5–14.5)
EST. GFR  (AFRICAN AMERICAN): >60 ML/MIN/1.73 M^2
EST. GFR  (NON AFRICAN AMERICAN): >60 ML/MIN/1.73 M^2
GLUCOSE SERPL-MCNC: 119 MG/DL (ref 70–110)
HCT VFR BLD AUTO: 35.5 % (ref 37–48.5)
HGB BLD-MCNC: 11.4 G/DL (ref 12–16)
IMM GRANULOCYTES # BLD AUTO: 0.22 K/UL (ref 0–0.04)
IMM GRANULOCYTES NFR BLD AUTO: 2.2 % (ref 0–0.5)
LYMPHOCYTES # BLD AUTO: 0.4 K/UL (ref 1–4.8)
LYMPHOCYTES NFR BLD: 3.6 % (ref 18–48)
MAGNESIUM SERPL-MCNC: 2.3 MG/DL (ref 1.6–2.6)
MCH RBC QN AUTO: 28.7 PG (ref 27–31)
MCHC RBC AUTO-ENTMCNC: 32.1 G/DL (ref 32–36)
MCV RBC AUTO: 89 FL (ref 82–98)
MONOCYTES # BLD AUTO: 0.3 K/UL (ref 0.3–1)
MONOCYTES NFR BLD: 2.8 % (ref 4–15)
NEUTROPHILS # BLD AUTO: 9.3 K/UL (ref 1.8–7.7)
NEUTROPHILS NFR BLD: 91.3 % (ref 38–73)
NRBC BLD-RTO: 0 /100 WBC
PHOSPHATE SERPL-MCNC: 2.6 MG/DL (ref 2.7–4.5)
PLATELET # BLD AUTO: 262 K/UL (ref 150–450)
PMV BLD AUTO: 10.4 FL (ref 9.2–12.9)
POTASSIUM SERPL-SCNC: 4.7 MMOL/L (ref 3.5–5.1)
PROT SERPL-MCNC: 6 G/DL (ref 6–8.4)
RBC # BLD AUTO: 3.97 M/UL (ref 4–5.4)
SODIUM SERPL-SCNC: 139 MMOL/L (ref 136–145)
TROPONIN I SERPL DL<=0.01 NG/ML-MCNC: 0.01 NG/ML (ref 0–0.03)
TROPONIN I SERPL DL<=0.01 NG/ML-MCNC: 0.77 NG/ML (ref 0–0.03)
WBC # BLD AUTO: 10.18 K/UL (ref 3.9–12.7)

## 2022-04-27 PROCEDURE — 93010 ELECTROCARDIOGRAM REPORT: CPT | Mod: ,,, | Performed by: INTERNAL MEDICINE

## 2022-04-27 PROCEDURE — 94761 N-INVAS EAR/PLS OXIMETRY MLT: CPT

## 2022-04-27 PROCEDURE — 36415 COLL VENOUS BLD VENIPUNCTURE: CPT | Performed by: HOSPITALIST

## 2022-04-27 PROCEDURE — 84100 ASSAY OF PHOSPHORUS: CPT | Performed by: HOSPITALIST

## 2022-04-27 PROCEDURE — G0378 HOSPITAL OBSERVATION PER HR: HCPCS

## 2022-04-27 PROCEDURE — 83735 ASSAY OF MAGNESIUM: CPT | Performed by: HOSPITALIST

## 2022-04-27 PROCEDURE — 85379 FIBRIN DEGRADATION QUANT: CPT | Performed by: HOSPITALIST

## 2022-04-27 PROCEDURE — 94640 AIRWAY INHALATION TREATMENT: CPT | Mod: XB

## 2022-04-27 PROCEDURE — 84484 ASSAY OF TROPONIN QUANT: CPT | Performed by: HOSPITALIST

## 2022-04-27 PROCEDURE — 25000003 PHARM REV CODE 250: Performed by: HOSPITALIST

## 2022-04-27 PROCEDURE — 85025 COMPLETE CBC W/AUTO DIFF WBC: CPT | Performed by: INTERNAL MEDICINE

## 2022-04-27 PROCEDURE — 96365 THER/PROPH/DIAG IV INF INIT: CPT

## 2022-04-27 PROCEDURE — 80053 COMPREHEN METABOLIC PANEL: CPT | Performed by: HOSPITALIST

## 2022-04-27 PROCEDURE — 93005 ELECTROCARDIOGRAM TRACING: CPT

## 2022-04-27 PROCEDURE — 63600175 PHARM REV CODE 636 W HCPCS: Performed by: PHYSICIAN ASSISTANT

## 2022-04-27 PROCEDURE — 36415 COLL VENOUS BLD VENIPUNCTURE: CPT | Performed by: INTERNAL MEDICINE

## 2022-04-27 PROCEDURE — 27000221 HC OXYGEN, UP TO 24 HOURS

## 2022-04-27 PROCEDURE — 93010 EKG 12-LEAD: ICD-10-PCS | Mod: ,,, | Performed by: INTERNAL MEDICINE

## 2022-04-27 PROCEDURE — 94799 UNLISTED PULMONARY SVC/PX: CPT

## 2022-04-27 PROCEDURE — 94760 N-INVAS EAR/PLS OXIMETRY 1: CPT

## 2022-04-27 PROCEDURE — 96372 THER/PROPH/DIAG INJ SC/IM: CPT | Performed by: HOSPITALIST

## 2022-04-27 PROCEDURE — 96366 THER/PROPH/DIAG IV INF ADDON: CPT

## 2022-04-27 PROCEDURE — 99226 PR SUBSEQUENT OBSERVATION CARE,LEVEL III: CPT | Mod: ,,, | Performed by: PHYSICIAN ASSISTANT

## 2022-04-27 PROCEDURE — 25000242 PHARM REV CODE 250 ALT 637 W/ HCPCS: Performed by: PHYSICIAN ASSISTANT

## 2022-04-27 PROCEDURE — 25000003 PHARM REV CODE 250: Performed by: PHYSICIAN ASSISTANT

## 2022-04-27 PROCEDURE — 84484 ASSAY OF TROPONIN QUANT: CPT | Mod: 91 | Performed by: PHYSICIAN ASSISTANT

## 2022-04-27 PROCEDURE — 63600175 PHARM REV CODE 636 W HCPCS: Performed by: HOSPITALIST

## 2022-04-27 PROCEDURE — 25000242 PHARM REV CODE 250 ALT 637 W/ HCPCS: Performed by: HOSPITALIST

## 2022-04-27 PROCEDURE — 99226 PR SUBSEQUENT OBSERVATION CARE,LEVEL III: ICD-10-PCS | Mod: ,,, | Performed by: PHYSICIAN ASSISTANT

## 2022-04-27 PROCEDURE — 36415 COLL VENOUS BLD VENIPUNCTURE: CPT | Performed by: PHYSICIAN ASSISTANT

## 2022-04-27 PROCEDURE — 99900035 HC TECH TIME PER 15 MIN (STAT)

## 2022-04-27 RX ORDER — KETOROLAC TROMETHAMINE 10 MG/1
10 TABLET, FILM COATED ORAL ONCE
Status: COMPLETED | OUTPATIENT
Start: 2022-04-27 | End: 2022-04-27

## 2022-04-27 RX ORDER — MAGNESIUM SULFATE HEPTAHYDRATE 40 MG/ML
2 INJECTION, SOLUTION INTRAVENOUS ONCE
Status: COMPLETED | OUTPATIENT
Start: 2022-04-27 | End: 2022-04-27

## 2022-04-27 RX ORDER — ATORVASTATIN CALCIUM 40 MG/1
40 TABLET, FILM COATED ORAL DAILY
Status: DISCONTINUED | OUTPATIENT
Start: 2022-04-27 | End: 2022-04-29 | Stop reason: HOSPADM

## 2022-04-27 RX ORDER — BUTALBITAL, ACETAMINOPHEN AND CAFFEINE 50; 325; 40 MG/1; MG/1; MG/1
1 TABLET ORAL EVERY 4 HOURS PRN
Status: DISCONTINUED | OUTPATIENT
Start: 2022-04-27 | End: 2022-04-29 | Stop reason: HOSPADM

## 2022-04-27 RX ORDER — ASPIRIN 81 MG/1
81 TABLET ORAL DAILY
Status: DISCONTINUED | OUTPATIENT
Start: 2022-04-27 | End: 2022-04-29 | Stop reason: HOSPADM

## 2022-04-27 RX ORDER — FLUTICASONE PROPIONATE 50 MCG
2 SPRAY, SUSPENSION (ML) NASAL DAILY
Status: DISCONTINUED | OUTPATIENT
Start: 2022-04-27 | End: 2022-04-29 | Stop reason: HOSPADM

## 2022-04-27 RX ADMIN — FLUTICASONE PROPIONATE 100 MCG: 50 SPRAY, METERED NASAL at 05:04

## 2022-04-27 RX ADMIN — MAGNESIUM SULFATE 2 G: 2 INJECTION INTRAVENOUS at 10:04

## 2022-04-27 RX ADMIN — PREDNISONE 60 MG: 50 TABLET ORAL at 08:04

## 2022-04-27 RX ADMIN — FLUTICASONE FUROATE AND VILANTEROL TRIFENATATE 1 PUFF: 100; 25 POWDER RESPIRATORY (INHALATION) at 01:04

## 2022-04-27 RX ADMIN — ENOXAPARIN SODIUM 40 MG: 100 INJECTION SUBCUTANEOUS at 01:04

## 2022-04-27 RX ADMIN — ENOXAPARIN SODIUM 40 MG: 100 INJECTION SUBCUTANEOUS at 05:04

## 2022-04-27 RX ADMIN — BUTALBITAL, ACETAMINOPHEN, AND CAFFEINE 1 TABLET: 50; 325; 40 TABLET ORAL at 09:04

## 2022-04-27 RX ADMIN — QUETIAPINE FUMARATE 200 MG: 200 TABLET ORAL at 09:04

## 2022-04-27 RX ADMIN — ACETAMINOPHEN 1000 MG: 500 TABLET ORAL at 10:04

## 2022-04-27 RX ADMIN — IPRATROPIUM BROMIDE AND ALBUTEROL SULFATE 3 ML: 2.5; .5 SOLUTION RESPIRATORY (INHALATION) at 08:04

## 2022-04-27 RX ADMIN — KETOROLAC TROMETHAMINE 10 MG: 10 TABLET, FILM COATED ORAL at 10:04

## 2022-04-27 RX ADMIN — ATORVASTATIN CALCIUM 40 MG: 40 TABLET, FILM COATED ORAL at 07:04

## 2022-04-27 RX ADMIN — QUETIAPINE FUMARATE 200 MG: 200 TABLET ORAL at 01:04

## 2022-04-27 RX ADMIN — PANTOPRAZOLE SODIUM 40 MG: 40 TABLET, DELAYED RELEASE ORAL at 08:04

## 2022-04-27 RX ADMIN — ASPIRIN 81 MG: 81 TABLET, COATED ORAL at 07:04

## 2022-04-27 RX ADMIN — BUTALBITAL, ACETAMINOPHEN, AND CAFFEINE 1 TABLET: 50; 325; 40 TABLET ORAL at 03:04

## 2022-04-27 RX ADMIN — TIOTROPIUM BROMIDE INHALATION SPRAY 2 PUFF: 3.12 SPRAY, METERED RESPIRATORY (INHALATION) at 04:04

## 2022-04-27 RX ADMIN — IPRATROPIUM BROMIDE AND ALBUTEROL SULFATE 3 ML: 2.5; .5 SOLUTION RESPIRATORY (INHALATION) at 03:04

## 2022-04-27 RX ADMIN — FLUTICASONE FUROATE AND VILANTEROL TRIFENATATE 1 PUFF: 100; 25 POWDER RESPIRATORY (INHALATION) at 08:04

## 2022-04-27 RX ADMIN — ACETAMINOPHEN 1000 MG: 500 TABLET ORAL at 01:04

## 2022-04-27 RX ADMIN — LISINOPRIL 5 MG: 5 TABLET ORAL at 08:04

## 2022-04-27 NOTE — SUBJECTIVE & OBJECTIVE
"Interval History: Patient maintaining sats ORA. This morning she says she still does not feel any better, she has mild accessory muscle usage. Adding IS and increasing prednisone to 60mg daily. She also complains of a HA, toradol x1 and IV mg ordered    Review of Systems   Constitutional:  Negative for chills, fatigue and fever.   Eyes:  Negative for photophobia and visual disturbance.   Respiratory:  Positive for cough, chest tightness, shortness of breath and wheezing.    Cardiovascular:  Positive for chest pain ("tight" "heavy"). Negative for palpitations and leg swelling.   Gastrointestinal:  Negative for nausea.   Endocrine: Negative for cold intolerance and heat intolerance.   Genitourinary:  Negative for dysuria and frequency.   Skin:  Negative for color change and wound.   Neurological:  Positive for headaches. Negative for syncope, weakness and light-headedness.   Psychiatric/Behavioral:  Negative for agitation and confusion.    Objective:     Vital Signs (Most Recent):  Temp: 97 °F (36.1 °C) (04/27/22 0824)  Pulse: 101 (04/27/22 0824)  Resp: 18 (04/27/22 0824)  BP: (!) 155/80 (04/27/22 0824)  SpO2: 97 % (04/27/22 0824)   Vital Signs (24h Range):  Temp:  [97 °F (36.1 °C)-98.1 °F (36.7 °C)] 97 °F (36.1 °C)  Pulse:  [] 101  Resp:  [16-25] 18  SpO2:  [94 %-100 %] 97 %  BP: (155-193)/(75-91) 155/80     Weight: 91.2 kg (201 lb 1 oz)  Body mass index is 35.62 kg/m².  No intake or output data in the 24 hours ending 04/27/22 1023   Physical Exam  Vitals reviewed.   Constitutional:       Appearance: Normal appearance. She is well-developed. She is obese. She is not toxic-appearing.   HENT:      Head: Normocephalic and atraumatic.   Cardiovascular:      Rate and Rhythm: Normal rate and regular rhythm.      Heart sounds: No murmur heard.  Pulmonary:      Effort: Pulmonary effort is normal. No respiratory distress (Increased work of breathing while speaking).      Breath sounds: Rhonchi present. No wheezing. "   Abdominal:      General: Abdomen is flat. There is no distension.      Palpations: Abdomen is soft.   Musculoskeletal:      Cervical back: Normal range of motion and neck supple.      Right lower leg: No edema.      Left lower leg: No edema.   Skin:     General: Skin is warm and dry.   Neurological:      General: No focal deficit present.      Mental Status: She is alert.   Psychiatric:         Mood and Affect: Mood normal.         Behavior: Behavior normal.       Significant Labs: All pertinent labs within the past 24 hours have been reviewed.    Significant Imaging: I have reviewed all pertinent imaging results/findings within the past 24 hours.

## 2022-04-27 NOTE — HOSPITAL COURSE
Patient admitted for COPD exacerbation. Maintaining sats ORA, but dyspneic. CXR without acute abnormalities. VBG reassuring. Given IV solumedrol, started on prednisone and Spiriva added to regimen. Troponin 0.765, repeat x2 flat. No complaints of CP or concerning signs of ACS. Likely due to reversible cardiac ischemia caused by severe COPD exacerbation. D-dimer 0.78, CTA from recent hospitalization negative for PE. Bilateral lower extremity US negative for DVT. Echo without evidence of CHF or WMA. Shortness of breath significantly improving, but not resolved. Continues to maintain sats on room air. Educated patient that she will continue to experience SOB due to her condition. Stable for discharge. Referral sent to COPD clinic and [facility] due to risk of readmission. Prescriptions delivered to beside. Return precautions given and patient verbalized understanding.

## 2022-04-27 NOTE — H&P
"[facility] - Emergency Dept  [facility] Medicine  History & Physical    Patient Name: Wilfredo Vazquez  MRN: [id number]  Patient Class: OP- Observation  Admission Date: 4/26/2022  Attending Physician: Jocelyne Carrillo MD   Primary Care Provider: Marcio Calderon MD         Patient information was obtained from patient, past medical records and ER records.     Subjective:     Principal Problem:Acute on chronic respiratory failure with hypoxia and hypercapnia    Chief Complaint:   Chief Complaint   Patient presents with    Shortness of Breath     Pt reports SOB x1 week with chest pain and dizziness. Pt d/c couple days ago from [facility] for same sx. Pt states they gave her "some oxygen and breathing tx."         HPI: Ms. Wilfredo Vazquez is a 62 y.o. female with chronic respiratory failure 2/2 COPD, who presents to the ER for evaluation of shortness of breath.  She was just admitted to [facility] from 4/23-4/26 for a COPD exacerbation.  She was started on antibiotics, Prednisone, and scheduled breathing treatments.  CTA was negative for a PE, pleural effusion, or consolidation.  During her admission, her condition improved.  She was able to be weaned off oxygen, and didn't qualify for home oxygen after passing a 6 minute walk test.  She was discharged home with 3 days of Prednisone.  Once she arrived home, she felt like her breathing worsened.  She endorses a persistent cough productive of green sputum.  She feels short winded even at rest.  She reports she has a home breathing machine, but does not know if its CPAP or BIPAP.  She has an Albuterol inhaler, but no controller medications.  She has had some chest pain, but denies any fever or chills, leg swelling.    Upon arrival to the ER, vitals were temp 98F, , /79.  CXR was negative.  VBG showed pH 7.42, CO2 53.  She was continue a breathing treatment and IV Solumedrol.  As she was still visibly dyspneic when speaking, she was admitted to Hospital " Medicine for further management.      Past Medical History:   Diagnosis Date    Abdominal pain 2018    Emphysema lung     GERD (gastroesophageal reflux disease) 2018    Hypertension     Shingles        Past Surgical History:   Procedure Laterality Date    APPENDECTOMY      COLONOSCOPY N/A 5/22/2019    Procedure: COLONOSCOPY;  Surgeon: Sim Quinteros MD;  Location: [facility];  Service: Endoscopy;  Laterality: N/A;    COLONOSCOPY W/ POLYPECTOMY      ESOPHAGEAL DILATION N/A 5/13/2019    Procedure: DILATION, ESOPHAGUS;  Surgeon: Sim Quinteros MD;  Location: [facility] ENDO;  Service: Endoscopy;  Laterality: N/A;    ESOPHAGEAL DILATION N/A 2/9/2021    Procedure: DILATION, ESOPHAGUS;  Surgeon: Sim Quinteros MD;  Location: [facility] ENDO;  Service: Endoscopy;  Laterality: N/A;    ESOPHAGEAL DILATION N/A 12/14/2021    Procedure: DILATION, ESOPHAGUS;  Surgeon: Sim Quinteros MD;  Location: [facility];  Service: Endoscopy;  Laterality: N/A;    ESOPHAGOGASTRODUODENOSCOPY N/A 5/13/2019    Procedure: EGD (ESOPHAGOGASTRODUODENOSCOPY);  Surgeon: Sim Quinteros MD;  Location: [facility];  Service: Endoscopy;  Laterality: N/A;    ESOPHAGOGASTRODUODENOSCOPY N/A 2/9/2021    Procedure: EGD (ESOPHAGOGASTRODUODENOSCOPY);  Surgeon: Sim Quinteros MD;  Location: [facility];  Service: Endoscopy;  Laterality: N/A;    ESOPHAGOGASTRODUODENOSCOPY N/A 12/14/2021    Procedure: EGD (ESOPHAGOGASTRODUODENOSCOPY);  Surgeon: Sim Quinteros MD;  Location: [facility];  Service: Endoscopy;  Laterality: N/A;    HYSTERECTOMY      LAPAROSCOPIC NISSEN FUNDOPLICATION      UPPER GASTROINTESTINAL ENDOSCOPY N/A 01/24/2018       Review of patient's allergies indicates:  No Known Allergies    Current Facility-Administered Medications on File Prior to Encounter   Medication    lactated ringers infusion    sodium chloride 0.9% flush 10 mL    [DISCONTINUED] acetaminophen tablet 650 mg    [DISCONTINUED] acetaminophen tablet 650 mg    [DISCONTINUED]  albuterol-ipratropium 2.5 mg-0.5 mg/3 mL nebulizer solution 3 mL    [DISCONTINUED] albuterol-ipratropium 2.5 mg-0.5 mg/3 mL nebulizer solution 3 mL    [DISCONTINUED] azithromycin 500 mg in dextrose 5 % 250 mL IVPB (ready to mix system)    [DISCONTINUED] bisacodyL suppository 10 mg    [DISCONTINUED] enoxaparin injection 40 mg    [DISCONTINUED] fluticasone propionate 50 mcg/actuation nasal spray 100 mcg    [DISCONTINUED] guaiFENesin 100 mg/5 ml syrup 200 mg    [DISCONTINUED] HYDROcodone-acetaminophen  mg per tablet 1 tablet    [DISCONTINUED] lisinopriL tablet 5 mg    [DISCONTINUED] loperamide capsule 2 mg    [DISCONTINUED] melatonin tablet 6 mg    [DISCONTINUED] methylPREDNISolone sodium succinate injection 80 mg    [DISCONTINUED] mineral oil enema 1 enema    [DISCONTINUED] morphine injection 2 mg    [DISCONTINUED] ondansetron injection 4 mg    [DISCONTINUED] pantoprazole EC tablet 40 mg    [DISCONTINUED] pregabalin capsule 75 mg    [DISCONTINUED] QUEtiapine tablet 200 mg    [DISCONTINUED] sodium chloride 0.9% flush 10 mL    [DISCONTINUED] sodium chloride 0.9% flush 10 mL     Current Outpatient Medications on File Prior to Encounter   Medication Sig    albuterol (ACCUNEB) 0.63 mg/3 mL Nebu Take 3 mLs (0.63 mg total) by nebulization every 6 (six) hours as needed. Rescue    albuterol (PROVENTIL/VENTOLIN HFA) 90 mcg/actuation inhaler Inhale 1-2 puffs into the lungs every 6 (six) hours as needed for Wheezing or Shortness of Breath. Rescue    budesonide-glycopyr-formoterol (BREZTRI AEROSPHERE) 160-9-4.8 mcg/actuation HFAA Inhale 2 puffs into the lungs 2 (two) times daily.    CHANTIX CONTINUING MONTH BOX 1 mg Tab     guaiFENesin-codeine 100-10 mg/5 ml (TUSSI-ORGANIDIN NR)  mg/5 mL syrup Take 5 mLs by mouth every 6 (six) hours as needed.    HYDROcodone-acetaminophen (NORCO) 5-325 mg per tablet Take 1 tablet by mouth every 8 (eight) hours as needed for Pain.    lisinopriL  (PRINIVIL,ZESTRIL) 5 MG tablet Take 1 tablet (5 mg total) by mouth once daily.    mupirocin (BACTROBAN) 2 % ointment Apply topically 3 (three) times daily.    omeprazole (PRILOSEC) 40 MG capsule TAKE ONE CAPSULE BY MOUTH EVERY DAY    predniSONE (DELTASONE) 20 MG tablet Take 2 tablets (40 mg total) by mouth once daily. for 3 days    pregabalin (LYRICA) 50 MG capsule Start with 1 p.o. b.i.d. for neuropathy if no relief can go to t.i.d.    promethazine-codeine 6.25-10 mg/5 ml (PHENERGAN WITH CODEINE) 6.25-10 mg/5 mL syrup Take 5 mLs by mouth every 6 (six) hours as needed for Cough.    QUEtiapine (SEROQUEL) 200 MG Tab Take 200 mg by mouth nightly.    [DISCONTINUED] albuterol (ACCUNEB) 0.63 mg/3 mL Nebu Take 0.63 mg by nebulization every 6 (six) hours as needed. Rescue    [DISCONTINUED] albuterol (PROVENTIL/VENTOLIN HFA) 90 mcg/actuation inhaler Inhale 1-2 puffs into the lungs every 6 (six) hours as needed for Wheezing or Shortness of Breath. Rescue    [DISCONTINUED] lisinopriL (PRINIVIL,ZESTRIL) 5 MG tablet TAKE ONE TABLET BY MOUTH EVERY DAY    [DISCONTINUED] predniSONE (DELTASONE) 20 MG tablet Take 20-40 mg by mouth daily as needed.     Family History       Family history is unknown by patient.          Tobacco Use    Smoking status: Former Smoker     Types: Cigarettes     Quit date: 2017     Years since quittin.3    Smokeless tobacco: Never Used   Substance and Sexual Activity    Alcohol use: No    Drug use: No    Sexual activity: Not on file     Review of Systems   Constitutional:  Negative for chills, fatigue and fever.   HENT:  Negative for sore throat and trouble swallowing.    Eyes:  Negative for photophobia and visual disturbance.   Respiratory:  Positive for cough, shortness of breath and wheezing.    Cardiovascular:  Positive for chest pain. Negative for palpitations and leg swelling.   Gastrointestinal:  Negative for abdominal pain, constipation, diarrhea, nausea and vomiting.   Endocrine:  Negative for cold intolerance and heat intolerance.   Genitourinary:  Negative for dysuria and frequency.   Musculoskeletal:  Negative for arthralgias and myalgias.   Skin:  Negative for rash and wound.   Neurological:  Negative for dizziness, syncope, weakness and light-headedness.   Psychiatric/Behavioral:  Negative for confusion and hallucinations.    All other systems reviewed and are negative.  Objective:     Vital Signs (Most Recent):  Temp: 98 °F (36.7 °C) (04/26/22 1942)  Pulse: 87 (04/26/22 2130)  Resp: 20 (04/26/22 2130)  BP: (!) 161/86 (04/26/22 2130)  SpO2: 100 % (04/26/22 2130)   Vital Signs (24h Range):  Temp:  [95.8 °F (35.4 °C)-98.4 °F (36.9 °C)] 98 °F (36.7 °C)  Pulse:  [] 87  Resp:  [17-25] 20  SpO2:  [95 %-100 %] 100 %  BP: (127-193)/(74-91) 161/86     Weight: 91.2 kg (201 lb)  Body mass index is 35.61 kg/m².    Physical Exam  Vitals reviewed.   Constitutional:       Appearance: Normal appearance. She is well-developed. She is obese.   HENT:      Head: Normocephalic and atraumatic.   Eyes:      General: No scleral icterus.     Pupils: Pupils are equal, round, and reactive to light.   Cardiovascular:      Rate and Rhythm: Normal rate and regular rhythm.      Heart sounds: No murmur heard.  Pulmonary:      Effort: Pulmonary effort is normal. No respiratory distress (Increased work of breathing while speaking).      Breath sounds: Rhonchi present. No wheezing.   Abdominal:      General: Bowel sounds are normal. There is no distension.      Palpations: Abdomen is soft.      Tenderness: There is no abdominal tenderness.   Musculoskeletal:         General: Normal range of motion.      Cervical back: Normal range of motion and neck supple.   Skin:     General: Skin is warm and dry.   Neurological:      Mental Status: She is alert.   Psychiatric:         Behavior: Behavior normal.         CRANIAL NERVES     CN III, IV, VI   Pupils are equal, round, and reactive to light.     Significant Labs: All  pertinent labs within the past 24 hours have been reviewed.  ABGs:   Recent Labs   Lab 04/26/22  2100   PH 7.428   PCO2 53.3*   HCO3 35.2*   POCSATURATED 77*   BE 11   PO2 42     CBC:   Recent Labs   Lab 04/25/22  0303 04/26/22  0318 04/26/22  2055   WBC 10.28 12.66 13.16*   HGB 11.3* 10.9* 11.8*   HCT 35.9* 35.1* 37.9    262 315     CMP:   Recent Labs   Lab 04/25/22 0303 04/26/22 0318 04/26/22 2055    141 142   K 3.7 3.8 3.7    105 102   CO2 25 25 26   * 84 123*   BUN 24* 28* 24*   CREATININE 0.9 0.8 1.0   CALCIUM 8.7 8.6* 9.5   PROT 6.0 6.1 6.6   ALBUMIN 3.2* 3.3* 3.5   BILITOT 0.3 0.2 0.2   ALKPHOS 61 72 78   AST 8* 10 13   ALT 16 18 19   ANIONGAP 13 11 14   EGFRNONAA >60.0 >60.0 >60.0       Significant Imaging: I have reviewed all pertinent imaging results/findings within the past 24 hours.  CXR: I have reviewed all pertinent results/findings within the past 24 hours and my personal findings are:  No edema or consolidation    Assessment/Plan:     * Acute on chronic respiratory failure with hypoxia and hypercapnia  · Satting well on room air, but visibly dyspneic when speaking  · Just had a 3 day admission at [facility] for a COPD exacerbation, treated with scheduled breathing treatments, Prednisone, and antibiotics x 3 days  · Solumedrol 125mg IV x 1 with Prednisone 40mg PO daily to complete a 5 day course  · Duonebs q6h while awake and prn with IS  · Guaifenesin and Codeine cough syrup prn cough  · Start Breo for controller medication  · Start BIPAP 15/5 qHS  · Check D dimer with morning labs to evaluate for PE given persistent dyspnea.  CTA 4/23 was negative for PE, but she has been admitted to the hospital since that time.  Can consider LE US as well    Recent Labs   Lab 04/26/22  2100   PH 7.428   PCO2 53.3*   PO2 42   HCO3 35.2*   POCSATURATED 77*   BE 11           COPD (chronic obstructive pulmonary disease)  · As above      Bipolar affective disorder, remission status  unspecified  · Chronic and stable  · Continue Seroquel 200mg PO qHS      History of gastroesophageal reflux (GERD)  · Chronic and stable  · Continue PPI      Obesity (BMI 35.0-39.9 without comorbidity)  · Body mass index is 35.61 kg/m².   · Morbid obesity complicates all aspects of disease management from diagnostic modalities to treatment.   · Weight loss encouraged and health benefits explained to patient.         Hypertension  · Chronic and stable  · Continue Lisinopril 5mg PO daily        VTE Risk Mitigation (From admission, onward)         Ordered     enoxaparin injection 40 mg  Daily         04/26/22 2204     IP VTE HIGH RISK PATIENT  Once         04/26/22 2204                   Rubi Feliciano MD  Department of Hospital Medicine   [facility] - Emergency Dept

## 2022-04-27 NOTE — PROGRESS NOTES
[facility] - Telemetry Stepdown ([place] [address])  [facility] Medicine  Progress Note    Patient Name: Wilfredo Vazquez  MRN: [id number]  Patient Class: OP- Observation   Admission Date: 4/26/2022  Length of Stay: 0 days  Attending Physician: Jocelyne Carrillo MD  Primary Care Provider: Marcio Calderon MD        Subjective:     Principal Problem:Acute on chronic respiratory failure with hypoxia and hypercapnia        HPI:  Ms. Wilfredo Vazquez is a 62 y.o. female with chronic respiratory failure 2/2 COPD, who presents to the ER for evaluation of shortness of breath.  She was just admitted to [facility] from 4/23-4/26 for a COPD exacerbation.  She was started on antibiotics, Prednisone, and scheduled breathing treatments.  CTA was negative for a PE, pleural effusion, or consolidation.  During her admission, her condition improved.  She was able to be weaned off oxygen, and didn't qualify for home oxygen after passing a 6 minute walk test.  She was discharged home with 3 days of Prednisone.  Once she arrived home, she felt like her breathing worsened.  She endorses a persistent cough productive of green sputum.  She feels short winded even at rest.  She reports she has a home breathing machine, but does not know if its CPAP or BIPAP.  She has an Albuterol inhaler, but no controller medications.  She has had some chest pain, but denies any fever or chills, leg swelling.    Upon arrival to the ER, vitals were temp 98F, , /79.  CXR was negative.  VBG showed pH 7.42, CO2 53.  She was continue a breathing treatment and IV Solumedrol.  As she was still visibly dyspneic when speaking, she was admitted to Hospital Medicine for further management.      Overview/Hospital Course:  Patient admitted for COPD exacerbation. Maintaining sats ORA, but dyspneic. CXR without acute abnormalities. VBG reassuring. Given IV solumedrol, started on prednisone and Breo added to regimen. Continue to monitor, COPD clinic at  "dc      Interval History: Patient maintaining sats ORA. This morning she says she still does not feel any better, she has mild accessory muscle usage. Adding IS and increasing prednisone to 60mg daily. She also complains of a HA, toradol x1 and IV mg ordered    Review of Systems   Constitutional:  Negative for chills, fatigue and fever.   Eyes:  Negative for photophobia and visual disturbance.   Respiratory:  Positive for cough, chest tightness, shortness of breath and wheezing.    Cardiovascular:  Positive for chest pain ("tight" "heavy"). Negative for palpitations and leg swelling.   Gastrointestinal:  Negative for nausea.   Endocrine: Negative for cold intolerance and heat intolerance.   Genitourinary:  Negative for dysuria and frequency.   Skin:  Negative for color change and wound.   Neurological:  Positive for headaches. Negative for syncope, weakness and light-headedness.   Psychiatric/Behavioral:  Negative for agitation and confusion.    Objective:     Vital Signs (Most Recent):  Temp: 97 °F (36.1 °C) (04/27/22 0824)  Pulse: 101 (04/27/22 0824)  Resp: 18 (04/27/22 0824)  BP: (!) 155/80 (04/27/22 0824)  SpO2: 97 % (04/27/22 0824)   Vital Signs (24h Range):  Temp:  [97 °F (36.1 °C)-98.1 °F (36.7 °C)] 97 °F (36.1 °C)  Pulse:  [] 101  Resp:  [16-25] 18  SpO2:  [94 %-100 %] 97 %  BP: (155-193)/(75-91) 155/80     Weight: 91.2 kg (201 lb 1 oz)  Body mass index is 35.62 kg/m².  No intake or output data in the 24 hours ending 04/27/22 1023   Physical Exam  Vitals reviewed.   Constitutional:       Appearance: Normal appearance. She is well-developed. She is obese. She is not toxic-appearing.   HENT:      Head: Normocephalic and atraumatic.   Cardiovascular:      Rate and Rhythm: Normal rate and regular rhythm.      Heart sounds: No murmur heard.  Pulmonary:      Effort: Pulmonary effort is normal. No respiratory distress (Increased work of breathing while speaking).      Breath sounds: Rhonchi present. No " wheezing.   Abdominal:      General: Abdomen is flat. There is no distension.      Palpations: Abdomen is soft.   Musculoskeletal:      Cervical back: Normal range of motion and neck supple.      Right lower leg: No edema.      Left lower leg: No edema.   Skin:     General: Skin is warm and dry.   Neurological:      General: No focal deficit present.      Mental Status: She is alert.   Psychiatric:         Mood and Affect: Mood normal.         Behavior: Behavior normal.       Significant Labs: All pertinent labs within the past 24 hours have been reviewed.    Significant Imaging: I have reviewed all pertinent imaging results/findings within the past 24 hours.      Assessment/Plan:      * Acute on chronic respiratory failure with hypoxia and hypercapnia  · Satting well on room air, but visibly dyspneic when speaking  · Just had a 3 day admission at [facility] for a COPD exacerbation, treated with scheduled breathing treatments, Prednisone, and antibiotics x 3 days  · Solumedrol 125mg IV x 1 with Prednisone 60mg PO daily to complete a 5 day course  · Duonebs q6h while awake and prn with IS  · Guaifenesin and Codeine cough syrup prn cough  · Start Breo for controller medication  · Start BIPAP 15/5 qHS  · Lower suspicion for PE given CTA 4/23 was negative for PE and she is maintaining sats ORA    Recent Labs   Lab 04/26/22  2100   PH 7.428   PCO2 53.3*   PO2 42   HCO3 35.2*   POCSATURATED 77*   BE 11           Bipolar affective disorder, remission status unspecified  · Chronic and stable  · Continue Seroquel 200mg PO qHS      History of gastroesophageal reflux (GERD)  · Chronic and stable  · Continue PPI      COPD (chronic obstructive pulmonary disease)  · As above      Obesity (BMI 35.0-39.9 without comorbidity)  · Body mass index is 35.61 kg/m².   · Morbid obesity complicates all aspects of disease management from diagnostic modalities to treatment.   · Weight loss encouraged and health benefits explained to  patient.         Hypertension  · Chronic and stable  · Continue Lisinopril 5mg PO daily        VTE Risk Mitigation (From admission, onward)         Ordered     enoxaparin injection 40 mg  Daily         04/26/22 2204     IP VTE HIGH RISK PATIENT  Once         04/26/22 2204                Discharge Planning   NETTIE: 4/28/2022     Code Status: Full Code   Is the patient medically ready for discharge?: No    Reason for patient still in hospital (select all that apply): Patient trending condition                     Farhana Parrish PA-C  Department of Hospital Medicine   [facility] - Telemetry Stepdown ([place]-)

## 2022-04-27 NOTE — SUBJECTIVE & OBJECTIVE
Past Medical History:   Diagnosis Date    Abdominal pain 2018    Emphysema lung     GERD (gastroesophageal reflux disease) 2018    Hypertension     Shingles        Past Surgical History:   Procedure Laterality Date    APPENDECTOMY      COLONOSCOPY N/A 5/22/2019    Procedure: COLONOSCOPY;  Surgeon: Sim Quinteros MD;  Location: [facility];  Service: Endoscopy;  Laterality: N/A;    COLONOSCOPY W/ POLYPECTOMY      ESOPHAGEAL DILATION N/A 5/13/2019    Procedure: DILATION, ESOPHAGUS;  Surgeon: Sim Quinteros MD;  Location: [facility]note 3] ENDO;  Service: Endoscopy;  Laterality: N/A;    ESOPHAGEAL DILATION N/A 2/9/2021    Procedure: DILATION, ESOPHAGUS;  Surgeon: Sim Quinteros MD;  Location: [facility][note 3] ENDO;  Service: Endoscopy;  Laterality: N/A;    ESOPHAGEAL DILATION N/A 12/14/2021    Procedure: DILATION, ESOPHAGUS;  Surgeon: Sim Quinteros MD;  Location: [facility][note 3] ENDO;  Service: Endoscopy;  Laterality: N/A;    ESOPHAGOGASTRODUODENOSCOPY N/A 5/13/2019    Procedure: EGD (ESOPHAGOGASTRODUODENOSCOPY);  Surgeon: Sim Quinteros MD;  Location: [facility];  Service: Endoscopy;  Laterality: N/A;    ESOPHAGOGASTRODUODENOSCOPY N/A 2/9/2021    Procedure: EGD (ESOPHAGOGASTRODUODENOSCOPY);  Surgeon: Sim Quinteros MD;  Location: [facility];  Service: Endoscopy;  Laterality: N/A;    ESOPHAGOGASTRODUODENOSCOPY N/A 12/14/2021    Procedure: EGD (ESOPHAGOGASTRODUODENOSCOPY);  Surgeon: Sim Quinteros MD;  Location: [facility];  Service: Endoscopy;  Laterality: N/A;    HYSTERECTOMY      LAPAROSCOPIC NISSEN FUNDOPLICATION      UPPER GASTROINTESTINAL ENDOSCOPY N/A 01/24/2018       Review of patient's allergies indicates:  No Known Allergies    Current Facility-Administered Medications on File Prior to Encounter   Medication    lactated ringers infusion    sodium chloride 0.9% flush 10 mL    [DISCONTINUED] acetaminophen tablet 650 mg    [DISCONTINUED] acetaminophen tablet 650 mg    [DISCONTINUED] albuterol-ipratropium 2.5 mg-0.5 mg/3 mL nebulizer solution 3  mL    [DISCONTINUED] albuterol-ipratropium 2.5 mg-0.5 mg/3 mL nebulizer solution 3 mL    [DISCONTINUED] azithromycin 500 mg in dextrose 5 % 250 mL IVPB (ready to mix system)    [DISCONTINUED] bisacodyL suppository 10 mg    [DISCONTINUED] enoxaparin injection 40 mg    [DISCONTINUED] fluticasone propionate 50 mcg/actuation nasal spray 100 mcg    [DISCONTINUED] guaiFENesin 100 mg/5 ml syrup 200 mg    [DISCONTINUED] HYDROcodone-acetaminophen  mg per tablet 1 tablet    [DISCONTINUED] lisinopriL tablet 5 mg    [DISCONTINUED] loperamide capsule 2 mg    [DISCONTINUED] melatonin tablet 6 mg    [DISCONTINUED] methylPREDNISolone sodium succinate injection 80 mg    [DISCONTINUED] mineral oil enema 1 enema    [DISCONTINUED] morphine injection 2 mg    [DISCONTINUED] ondansetron injection 4 mg    [DISCONTINUED] pantoprazole EC tablet 40 mg    [DISCONTINUED] pregabalin capsule 75 mg    [DISCONTINUED] QUEtiapine tablet 200 mg    [DISCONTINUED] sodium chloride 0.9% flush 10 mL    [DISCONTINUED] sodium chloride 0.9% flush 10 mL     Current Outpatient Medications on File Prior to Encounter   Medication Sig    albuterol (ACCUNEB) 0.63 mg/3 mL Nebu Take 3 mLs (0.63 mg total) by nebulization every 6 (six) hours as needed. Rescue    albuterol (PROVENTIL/VENTOLIN HFA) 90 mcg/actuation inhaler Inhale 1-2 puffs into the lungs every 6 (six) hours as needed for Wheezing or Shortness of Breath. Rescue    budesonide-glycopyr-formoterol (BREZTRI AEROSPHERE) 160-9-4.8 mcg/actuation HFAA Inhale 2 puffs into the lungs 2 (two) times daily.    CHANTIX CONTINUING MONTH BOX 1 mg Tab     guaiFENesin-codeine 100-10 mg/5 ml (TUSSI-ORGANIDIN NR)  mg/5 mL syrup Take 5 mLs by mouth every 6 (six) hours as needed.    HYDROcodone-acetaminophen (NORCO) 5-325 mg per tablet Take 1 tablet by mouth every 8 (eight) hours as needed for Pain.    lisinopriL (PRINIVIL,ZESTRIL) 5 MG tablet Take 1 tablet (5 mg total) by mouth once daily.    mupirocin (BACTROBAN)  2 % ointment Apply topically 3 (three) times daily.    omeprazole (PRILOSEC) 40 MG capsule TAKE ONE CAPSULE BY MOUTH EVERY DAY    predniSONE (DELTASONE) 20 MG tablet Take 2 tablets (40 mg total) by mouth once daily. for 3 days    pregabalin (LYRICA) 50 MG capsule Start with 1 p.o. b.i.d. for neuropathy if no relief can go to t.i.d.    promethazine-codeine 6.25-10 mg/5 ml (PHENERGAN WITH CODEINE) 6.25-10 mg/5 mL syrup Take 5 mLs by mouth every 6 (six) hours as needed for Cough.    QUEtiapine (SEROQUEL) 200 MG Tab Take 200 mg by mouth nightly.    [DISCONTINUED] albuterol (ACCUNEB) 0.63 mg/3 mL Nebu Take 0.63 mg by nebulization every 6 (six) hours as needed. Rescue    [DISCONTINUED] albuterol (PROVENTIL/VENTOLIN HFA) 90 mcg/actuation inhaler Inhale 1-2 puffs into the lungs every 6 (six) hours as needed for Wheezing or Shortness of Breath. Rescue    [DISCONTINUED] lisinopriL (PRINIVIL,ZESTRIL) 5 MG tablet TAKE ONE TABLET BY MOUTH EVERY DAY    [DISCONTINUED] predniSONE (DELTASONE) 20 MG tablet Take 20-40 mg by mouth daily as needed.     Family History       Family history is unknown by patient.          Tobacco Use    Smoking status: Former Smoker     Types: Cigarettes     Quit date: 2017     Years since quittin.3    Smokeless tobacco: Never Used   Substance and Sexual Activity    Alcohol use: No    Drug use: No    Sexual activity: Not on file     Review of Systems   Constitutional:  Negative for chills, fatigue and fever.   HENT:  Negative for sore throat and trouble swallowing.    Eyes:  Negative for photophobia and visual disturbance.   Respiratory:  Positive for cough, shortness of breath and wheezing.    Cardiovascular:  Positive for chest pain. Negative for palpitations and leg swelling.   Gastrointestinal:  Negative for abdominal pain, constipation, diarrhea, nausea and vomiting.   Endocrine: Negative for cold intolerance and heat intolerance.   Genitourinary:  Negative for dysuria and frequency.    Musculoskeletal:  Negative for arthralgias and myalgias.   Skin:  Negative for rash and wound.   Neurological:  Negative for dizziness, syncope, weakness and light-headedness.   Psychiatric/Behavioral:  Negative for confusion and hallucinations.    All other systems reviewed and are negative.  Objective:     Vital Signs (Most Recent):  Temp: 98 °F (36.7 °C) (04/26/22 1942)  Pulse: 87 (04/26/22 2130)  Resp: 20 (04/26/22 2130)  BP: (!) 161/86 (04/26/22 2130)  SpO2: 100 % (04/26/22 2130)   Vital Signs (24h Range):  Temp:  [95.8 °F (35.4 °C)-98.4 °F (36.9 °C)] 98 °F (36.7 °C)  Pulse:  [] 87  Resp:  [17-25] 20  SpO2:  [95 %-100 %] 100 %  BP: (127-193)/(74-91) 161/86     Weight: 91.2 kg (201 lb)  Body mass index is 35.61 kg/m².    Physical Exam  Vitals reviewed.   Constitutional:       Appearance: Normal appearance. She is well-developed. She is obese.   HENT:      Head: Normocephalic and atraumatic.   Eyes:      General: No scleral icterus.     Pupils: Pupils are equal, round, and reactive to light.   Cardiovascular:      Rate and Rhythm: Normal rate and regular rhythm.      Heart sounds: No murmur heard.  Pulmonary:      Effort: Pulmonary effort is normal. No respiratory distress (Increased work of breathing while speaking).      Breath sounds: Rhonchi present. No wheezing.   Abdominal:      General: Bowel sounds are normal. There is no distension.      Palpations: Abdomen is soft.      Tenderness: There is no abdominal tenderness.   Musculoskeletal:         General: Normal range of motion.      Cervical back: Normal range of motion and neck supple.   Skin:     General: Skin is warm and dry.   Neurological:      Mental Status: She is alert.   Psychiatric:         Behavior: Behavior normal.         CRANIAL NERVES     CN III, IV, VI   Pupils are equal, round, and reactive to light.     Significant Labs: All pertinent labs within the past 24 hours have been reviewed.  ABGs:   Recent Labs   Lab 04/26/22  2100   PH  7.428   PCO2 53.3*   HCO3 35.2*   POCSATURATED 77*   BE 11   PO2 42     CBC:   Recent Labs   Lab 04/25/22  0303 04/26/22 0318 04/26/22 2055   WBC 10.28 12.66 13.16*   HGB 11.3* 10.9* 11.8*   HCT 35.9* 35.1* 37.9    262 315     CMP:   Recent Labs   Lab 04/25/22 0303 04/26/22 0318 04/26/22 2055    141 142   K 3.7 3.8 3.7    105 102   CO2 25 25 26   * 84 123*   BUN 24* 28* 24*   CREATININE 0.9 0.8 1.0   CALCIUM 8.7 8.6* 9.5   PROT 6.0 6.1 6.6   ALBUMIN 3.2* 3.3* 3.5   BILITOT 0.3 0.2 0.2   ALKPHOS 61 72 78   AST 8* 10 13   ALT 16 18 19   ANIONGAP 13 11 14   EGFRNONAA >60.0 >60.0 >60.0       Significant Imaging: I have reviewed all pertinent imaging results/findings within the past 24 hours.  CXR: I have reviewed all pertinent results/findings within the past 24 hours and my personal findings are:  No edema or consolidation

## 2022-04-27 NOTE — PLAN OF CARE
[facility] - Telemetry Stepdown ([place]-)  Initial Discharge Assessment       Primary Care Provider: Marcio Calderon MD    Admission Diagnosis: COPD exacerbation [J44.1]  Chest pain [R07.9]    Admission Date: 4/26/2022  Expected Discharge Date: 4/28/2022    Discharge Barriers Identified: None    Payor: [employer]ICARE / Plan: [employer] CHOICES 65 / Product Type: Medicare Advantage /     Extended Emergency Contact Information  Primary Emergency Contact: Adriana Ross   [facility]  Home Phone: [phone]  Mobile Phone: [phone]  Relation: Daughter    Discharge Plan A: Home       [facility] - [address]  [address]  [place] [address]  Phone: [phone] Fax: [phone]    SW met with pt to complete dc assessment. Pt lives with her dtg in [facility], she drives and reports being independent with ADLs and mobility prior to admit. Pt denies HH/DME/HD/coumadin. Pts dtg will be able to provide transport home upon d/c.    Initial Assessment (most recent)     Adult Discharge Assessment - 04/27/22 1242        Discharge Assessment    Assessment Type Discharge Planning Assessment     Confirmed/corrected address, phone number and insurance Yes     Confirmed Demographics Correct on Facesheet     Source of Information patient     Lives With child(joey), adult     Do you expect to return to your current living situation? Yes     Do you have help at home or someone to help you manage your care at home? Yes     Who are your caregiver(s) and their phone number(s)? pt is independent but her dtg is able to assist as needed     Prior to hospitilization cognitive status: Alert/Oriented     Current cognitive status: Alert/Oriented     Walking or Climbing Stairs Difficulty none     Dressing/Bathing Difficulty none     Home Accessibility wheelchair accessible     Home Layout Able to live on 1st floor     Equipment Currently Used at Home none      Readmission within 30 days? Yes     Patient currently being followed by outpatient case management? No     Do you currently have service(s) that help you manage your care at home? No     Do you take prescription medications? Yes     Do you have prescription coverage? Yes     Do you have any problems affording any of your prescribed medications? No     Is the patient taking medications as prescribed? yes     Who is going to help you get home at discharge? padmini Wright     How do you get to doctors appointments? car, drives self;family or friend will provide     Are you on dialysis? No     Do you take coumadin? No     Discharge Plan A Home     DME Needed Upon Discharge  other (see comments)   TBD    Discharge Plan discussed with: Patient     Discharge Barriers Identified None        Relationship/Environment    Name([facility]) [phone]               Dona Mcgarry LCSW  PRFELICIA

## 2022-04-27 NOTE — CARE UPDATE
Troponin from this AM 0.765. Repeat EKG and troponin pending. TTE from this AM pending. Patient denies CP this AM on rounds and reports increased green sputum. DORI 1. No known hx of MI or CAD. Highest suspicion for demand ischemia, however plaque rupture, PE on differential. Start ASA and statin. If troponin increases or patient developed chest pain, would call cardiology to discuss ischemic eval and order CTA to r/o PE. Will order BLE US.      Discussed with staff    Farhana Parrish PA-C  [facility] Medicine

## 2022-04-27 NOTE — ED NOTES
"Wilfredo Vazquez, a 62 y.o. female presents to the ED w/ complaint of Sob x 1 week pt d/c today from other facility for COPD exacerbation,  . Pt reports green sputum, chest pain, headache x2 days, weakness, abdominal pain, bilateral leg pain  SOB on ambulation. Pt aaox4  Triage note:  Chief Complaint   Patient presents with    Shortness of Breath     Pt reports SOB x1 week with chest pain and dizziness. Pt d/c couple days ago from [facility] for same sx. Pt states they gave her "some oxygen and breathing tx."      Review of patient's allergies indicates:  No Known Allergies  Past Medical History:   Diagnosis Date    Abdominal pain 2018    Emphysema lung     GERD (gastroesophageal reflux disease) 2018    Hypertension     Shingles      LOC: The patient is awake, alert and aware of environment with an appropriate affect, the patient is oriented x 3 and speaking appropriately.   APPEARANCE: Patient appears comfortable and, patient is clean and well groomed.  SKIN: The skin is warm and dry, color consistent with ethnicity, patient has normal skin turgor and moist mucus membranes, skin intact, no breakdown or bruising noted.   MUSCULOSKELETAL: Patient moving all extremities spontaneously, no swelling noted.  RESPIRATORY: pt reports increased SOB on ambulation, pt 97% rrom air  CARDIAC: Pt placed on cardiac monitor. Patient has a increased rate and regular rhythm, no edema noted, capillary refill < 3 seconds.   GASTRO: Soft and non tender to palpation, no distention noted, normoactive bowel sounds present in all four quadrants. Pt states bowel movements have been regular.  : Pt denies any pain or frequency with urination.  NEURO: Pt opens eyes spontaneously, behavior appropriate to situation, follows commands, facial expression symmetrical, bilateral hand grasp equal and even, purposeful motor response noted, normal sensation in all extremities when touched with a finger.    "

## 2022-04-27 NOTE — ASSESSMENT & PLAN NOTE
· Satting well on room air, but visibly dyspneic when speaking  · Just had a 3 day admission at [facility] for a COPD exacerbation, treated with scheduled breathing treatments, Prednisone, and antibiotics x 3 days  · Solumedrol 125mg IV x 1 with Prednisone 60mg PO daily to complete a 5 day course  · Duonebs q6h while awake and prn with IS  · Guaifenesin and Codeine cough syrup prn cough  · Start Breo for controller medication  · Start BIPAP 15/5 qHS  · Lower suspicion for PE given CTA 4/23 was negative for PE and she is maintaining sats ORA    Recent Labs   Lab 04/26/22  2100   PH 7.428   PCO2 53.3*   PO2 42   HCO3 35.2*   POCSATURATED 77*   BE 11

## 2022-04-27 NOTE — ASSESSMENT & PLAN NOTE
· Body mass index is 35.61 kg/m².   · Morbid obesity complicates all aspects of disease management from diagnostic modalities to treatment.   · Weight loss encouraged and health benefits explained to patient.

## 2022-04-27 NOTE — ED PROVIDER NOTES
"Encounter Date: 4/26/2022       History     Chief Complaint   Patient presents with    Shortness of Breath     Pt reports SOB x1 week with chest pain and dizziness. Pt d/c couple days ago from [place] for same sx. Pt states they gave her "some oxygen and breathing tx."      62-year-old female past medical history of COPD, GERD, hypertension presenting today for shortness of breath.  She was discharged earlier today from [facility] for COPD exacerbation where she received breathing treatments, prednisone, antibiotics.  Earlier today she passed a 6 minute walk test and was discharged in stable condition.  She did not receive any steroids today and when she returned home she found herself becoming more short of breath.  She is only able to speak a few words at a time with her shortness of breath. She states that she is having new 9/10 crushing chest pain but no associated symptoms.  She also is having abdominal cramps and lower extremity cramps.  She denies fever, syncope, sense of impending doom, diaphoresis.        Review of patient's allergies indicates:  No Known Allergies  Past Medical History:   Diagnosis Date    Abdominal pain 2018    Emphysema lung     GERD (gastroesophageal reflux disease) 2018    Hypertension     Shingles      Past Surgical History:   Procedure Laterality Date    APPENDECTOMY      COLONOSCOPY N/A 5/22/2019    Procedure: COLONOSCOPY;  Surgeon: Sim Quinteros MD;  Location: [facility];  Service: Endoscopy;  Laterality: N/A;    COLONOSCOPY W/ POLYPECTOMY      ESOPHAGEAL DILATION N/A 5/13/2019    Procedure: DILATION, ESOPHAGUS;  Surgeon: Sim Quinteros MD;  Location: [facility];  Service: Endoscopy;  Laterality: N/A;    ESOPHAGEAL DILATION N/A 2/9/2021    Procedure: DILATION, ESOPHAGUS;  Surgeon: Sim Quinteros MD;  Location: [facility];  Service: Endoscopy;  Laterality: N/A;    ESOPHAGEAL DILATION N/A 12/14/2021    Procedure: DILATION, ESOPHAGUS;  Surgeon: Sim ORELLANA" MD [place];  [facility];  Service: Endoscopy;  Laterality: N/A;    ESOPHAGOGASTRODUODENOSCOPY N/A 2019    Procedure: EGD (ESOPHAGOGASTRODUODENOSCOPY);  Surgeon: Sim Quinteros MD;  [facility];  Service: Endoscopy;  Laterality: N/A;    ESOPHAGOGASTRODUODENOSCOPY N/A 2021    Procedure: EGD (ESOPHAGOGASTRODUODENOSCOPY);  Surgeon: Sim Quinteros MD;  Location: [facility];  Service: Endoscopy;  Laterality: N/A;    ESOPHAGOGASTRODUODENOSCOPY N/A 2021    Procedure: EGD (ESOPHAGOGASTRODUODENOSCOPY);  Surgeon: Sim Quinteros MD;  [facility];  Service: Endoscopy;  Laterality: N/A;    HYSTERECTOMY      LAPAROSCOPIC NISSEN FUNDOPLICATION      UPPER GASTROINTESTINAL ENDOSCOPY N/A 2018     Family History   Family history unknown: Yes     Social History     Tobacco Use    Smoking status: Former Smoker     Types: Cigarettes     Quit date:      Years since quittin.3    Smokeless tobacco: Never Used   Substance Use Topics    Alcohol use: No    Drug use: No     Review of Systems   Constitutional: Positive for chills. Negative for fever.   HENT: Negative for trouble swallowing.    Eyes: Negative for visual disturbance.   Respiratory: Positive for cough and shortness of breath.    Cardiovascular: Positive for chest pain. Negative for leg swelling.   Gastrointestinal: Positive for abdominal pain and nausea. Negative for constipation, diarrhea and vomiting.   Genitourinary: Negative for difficulty urinating and dysuria.   Musculoskeletal: Positive for myalgias.   Skin: Negative for rash and wound.   Neurological: Positive for headaches. Negative for syncope.   Psychiatric/Behavioral: Negative for confusion.       Physical Exam     Initial Vitals [22]   BP Pulse Resp Temp SpO2   (!) 169/79 (!) 121 (!) 25 98 °F (36.7 °C) 97 %      MAP       --         Physical Exam    Constitutional: She appears well-developed and well-nourished. She appears distressed.   HENT:    Head: Normocephalic and atraumatic.   Eyes: Conjunctivae and EOM are normal. Pupils are equal, round, and reactive to light.   Neck: Neck supple.   Normal range of motion.  Cardiovascular: Regular rhythm and intact distal pulses. Tachycardia present.    Pulmonary/Chest:   Breath sounds difficult to auscultate  Patient saturating 95% on room air  She is only able to speak a few words at a time  She appears uncomfortable   Abdominal: Abdomen is soft. Bowel sounds are normal. There is abdominal tenderness.   Musculoskeletal:         General: Tenderness (b/l calves. R thigh ) present. Normal range of motion.      Cervical back: Normal range of motion and neck supple.     Neurological: She is alert and oriented to person, place, and time. She has normal strength.   Skin: Skin is warm and dry.   Psychiatric: She has a normal mood and affect. Thought content normal.         ED Course   Procedures  Labs Reviewed   COMPREHENSIVE METABOLIC PANEL - Abnormal; Notable for the following components:       Result Value    Glucose 123 (*)     BUN 24 (*)     All other components within normal limits   CBC W/ AUTO DIFFERENTIAL - Abnormal; Notable for the following components:    WBC 13.16 (*)     Hemoglobin 11.8 (*)     MCHC 31.1 (*)     RDW 18.6 (*)     Immature Granulocytes 1.6 (*)     Gran # (ANC) 11.5 (*)     Immature Grans (Abs) 0.21 (*)     Lymph # 0.4 (*)     Mono # 1.1 (*)     Gran % 86.9 (*)     Lymph % 3.3 (*)     All other components within normal limits   ISTAT PROCEDURE - Abnormal; Notable for the following components:    POC PCO2 53.3 (*)     POC HCO3 35.2 (*)     POC SATURATED O2 77 (*)     POC TCO2 37 (*)     All other components within normal limits   B-TYPE NATRIURETIC PEPTIDE   HIV 1 / 2 ANTIBODY   HEPATITIS C ANTIBODY   TROPONIN I          Imaging Results          X-Ray Chest AP Portable (Final result)  Result time 04/26/22 21:17:52    Final result by Hi Greene MD (04/26/22 21:17:52)                  Impression:      No acute process.      Electronically signed by: Hi Greeen MD  Date:    04/26/2022  Time:    21:17             Narrative:    EXAMINATION:  XR CHEST AP PORTABLE    CLINICAL HISTORY:  Asthma;    TECHNIQUE:  Single frontal view of the chest was performed.    COMPARISON:  04/23/2022.    FINDINGS:  Monitoring EKG leads are present.  The cardiomediastinal silhouette is unchanged.  There is no evidence of free air beneath the hemidiaphragms.  There are no pleural effusions.  There is no evidence of a pneumothorax.  There is no evidence of pneumomediastinum.  No airspace opacity is present.  The osseous structures demonstrate degenerative changes.                                 Medications   tiotropium bromide 2.5 mcg/actuation inhaler 2 puff (has no administration in time range)   lisinopriL tablet 5 mg (has no administration in time range)   albuterol-ipratropium 2.5 mg-0.5 mg/3 mL nebulizer solution 3 mL (has no administration in time range)   pantoprazole EC tablet 40 mg (has no administration in time range)   QUEtiapine tablet 200 mg (has no administration in time range)   predniSONE tablet 40 mg (has no administration in time range)   sodium chloride 0.9% flush 5 mL (has no administration in time range)   melatonin tablet 6 mg (has no administration in time range)   ondansetron injection 8 mg (has no administration in time range)   polyethylene glycol packet 17 g (has no administration in time range)   acetaminophen tablet 650 mg (has no administration in time range)   acetaminophen tablet 1,000 mg (has no administration in time range)   enoxaparin injection 40 mg (has no administration in time range)   tiotropium bromide 2.5 mcg/actuation inhaler 2 puff (has no administration in time range)   albuterol-ipratropium 2.5 mg-0.5 mg/3 mL nebulizer solution 3 mL (3 mLs Nebulization Given 4/26/22 2122)   methylPREDNISolone sodium succinate injection 125 mg (125 mg Intravenous Given 4/26/22 2057)      Medical Decision Making:   Initial Assessment:   62 year old with a past medical history COPD, hypertension, GERD who [facility] who is now presenting dyspnea at rest, chest pain, cough, leg cramps.  She did not receive any steroids today, and was discharged back home in stable condition.  She began to feel more short of breath at home with new onset chest pain and presented to [facility] ED.  she is only able to speak few words at a time, and described her chest pain as 9/10 sharp/crushing pain that comes and goes.   Differential Diagnosis:   COPD exacerbation, ACS, CHF, PE  Clinical Tests:   Lab Tests: Ordered and Reviewed  Radiological Study: Ordered  ED Management:  Patient placed on supplemental oxygen.  Breathing treatment administered.  Solu-Medrol administered  EKG obtained.  Chest x-ray shows no acute process.  Patient being admitted for continued COPD exacerbation.                      Clinical Impression:   Final diagnoses:  [R07.9] Chest pain  [J44.1] COPD exacerbation (Primary)          ED Disposition Condition    Observation               Jean Desir MD  Resident  04/26/22 8762

## 2022-04-27 NOTE — PLAN OF CARE
Problem: Respiratory Compromise COPD (Chronic Obstructive Pulmonary Disease)  Goal: Effective Oxygenation and Ventilation  Outcome: Ongoing, Progressing     Problem: Infection COPD (Chronic Obstructive Pulmonary Disease)  Goal: Absence of Infection Signs and Symptoms  Outcome: Ongoing, Progressing     Problem: Functional Ability Impaired COPD (Chronic Obstructive Pulmonary Disease)  Goal: Optimal Level of Functional [place]  Outcome: Ongoing, Progressing     Problem: Oral Intake Inadequate COPD (Chronic Obstructive Pulmonary Disease)  Goal: Improved Nutrition Intake  Outcome: Ongoing, Progressing     Problem: Adjustment to Illness COPD (Chronic Obstructive Pulmonary Disease)  Goal: Optimal Chronic Illness Coping  Outcome: Ongoing, Progressing     Problem: Pain Acute  Goal: Acceptable Pain Control and Functional Ability  Outcome: Ongoing, Progressing     Problem: Adult Inpatient Plan of Care  Goal: Absence of Hospital-Acquired Illness or Injury  Outcome: Ongoing, Progressing     Problem: Adult Inpatient Plan of Care  Goal: Optimal Comfort and Wellbeing  Outcome: Ongoing, Progressing     Problem: Adult Inpatient Plan of Care  Goal: Readiness for Transition of Care  Outcome: Ongoing, Progressing

## 2022-04-27 NOTE — HPI
Ms. Wilfredo Vazquez is a 62 y.o. female with chronic respiratory failure 2/2 COPD, who presents to the ER for evaluation of shortness of breath.  She was just admitted to [facility] from 4/23-4/26 for a COPD exacerbation.  She was started on antibiotics, Prednisone, and scheduled breathing treatments.  CTA was negative for a PE, pleural effusion, or consolidation.  During her admission, her condition improved.  She was able to be weaned off oxygen, and didn't qualify for home oxygen after passing a 6 minute walk test.  She was discharged home with 3 days of Prednisone.  Once she arrived home, she felt like her breathing worsened.  She endorses a persistent cough productive of green sputum.  She feels short winded even at rest.  She reports she has a home breathing machine, but does not know if its CPAP or BIPAP.  She has an Albuterol inhaler, but no controller medications.  She has had some chest pain, but denies any fever or chills, leg swelling.    Upon arrival to the ER, vitals were temp 98F, , /79.  CXR was negative.  VBG showed pH 7.42, CO2 53.  She was continue a breathing treatment and IV Solumedrol.  As she was still visibly dyspneic when speaking, she was admitted to Hospital Medicine for further management.

## 2022-04-27 NOTE — ASSESSMENT & PLAN NOTE
· Satting well on room air, but visibly dyspneic when speaking  · Just had a 3 day admission at [facility] for a COPD exacerbation, treated with scheduled breathing treatments, Prednisone, and antibiotics x 3 days  · Solumedrol 125mg IV x 1 with Prednisone 40mg PO daily to complete a 5 day course  · Duonebs q6h while awake and prn with IS  · Guaifenesin and Codeine cough syrup prn cough  · Start Breo for controller medication  · Start BIPAP 15/5 qHS  · Check D dimer with morning labs to evaluate for PE given persistent dyspnea.  CTA 4/23 was negative for PE, but she has been admitted to the hospital since that time.  Can consider LE US as well    Recent Labs   Lab 04/26/22  2100   PH 7.428   PCO2 53.3*   PO2 42   HCO3 35.2*   POCSATURATED 77*   BE 11

## 2022-04-27 NOTE — NURSING
Patient transported via stretcher to room. Able to ambulate from stretcher to bed. Oriented patient to call light, room and unit. Call light within reach.

## 2022-04-28 LAB
ALBUMIN SERPL BCP-MCNC: 3.2 G/DL (ref 3.5–5.2)
ALBUMIN SERPL BCP-MCNC: 3.2 G/DL (ref 3.5–5.2)
ALP SERPL-CCNC: 57 U/L (ref 55–135)
ALP SERPL-CCNC: 58 U/L (ref 55–135)
ALT SERPL W/O P-5'-P-CCNC: 18 U/L (ref 10–44)
ALT SERPL W/O P-5'-P-CCNC: 19 U/L (ref 10–44)
ANION GAP SERPL CALC-SCNC: 8 MMOL/L (ref 8–16)
ANION GAP SERPL CALC-SCNC: 8 MMOL/L (ref 8–16)
ASCENDING AORTA: 3.48 CM
AST SERPL-CCNC: 10 U/L (ref 10–40)
AST SERPL-CCNC: 10 U/L (ref 10–40)
AV INDEX (PROSTH): 1.02
AV MEAN GRADIENT: 4 MMHG
AV PEAK GRADIENT: 11 MMHG
AV VALVE AREA: 3.89 CM2
AV VELOCITY RATIO: 0.96
BASOPHILS # BLD AUTO: 0.02 K/UL (ref 0–0.2)
BASOPHILS NFR BLD: 0.2 % (ref 0–1.9)
BILIRUB SERPL-MCNC: 0.5 MG/DL (ref 0.1–1)
BILIRUB SERPL-MCNC: 0.5 MG/DL (ref 0.1–1)
BSA FOR ECHO PROCEDURE: 2.01 M2
BUN SERPL-MCNC: 28 MG/DL (ref 6–20)
BUN SERPL-MCNC: 29 MG/DL (ref 6–20)
CALCIUM SERPL-MCNC: 8.9 MG/DL (ref 8.7–10.5)
CALCIUM SERPL-MCNC: 9 MG/DL (ref 8.7–10.5)
CHLORIDE SERPL-SCNC: 101 MMOL/L (ref 95–110)
CHLORIDE SERPL-SCNC: 101 MMOL/L (ref 95–110)
CO2 SERPL-SCNC: 32 MMOL/L (ref 23–29)
CO2 SERPL-SCNC: 32 MMOL/L (ref 23–29)
CREAT SERPL-MCNC: 0.8 MG/DL (ref 0.5–1.4)
CREAT SERPL-MCNC: 0.9 MG/DL (ref 0.5–1.4)
CV ECHO LV RWT: 0.45 CM
DIFFERENTIAL METHOD: ABNORMAL
DOP CALC AO PEAK VEL: 1.67 M/S
DOP CALC AO VTI: 27.16 CM
DOP CALC LVOT AREA: 3.8 CM2
DOP CALC LVOT DIAMETER: 2.21 CM
DOP CALC LVOT PEAK VEL: 1.61 M/S
DOP CALC LVOT STROKE VOLUME: 105.7 CM3
DOP CALC MV VTI: 26.59 CM
DOP CALCLVOT PEAK VEL VTI: 27.57 CM
ECHO LV POSTERIOR WALL: 1.15 CM (ref 0.6–1.1)
EJECTION FRACTION: 55 %
EOSINOPHIL # BLD AUTO: 0 K/UL (ref 0–0.5)
EOSINOPHIL NFR BLD: 0 % (ref 0–8)
ERYTHROCYTE [DISTWIDTH] IN BLOOD BY AUTOMATED COUNT: 17.2 % (ref 11.5–14.5)
EST. GFR  (AFRICAN AMERICAN): >60 ML/MIN/1.73 M^2
EST. GFR  (AFRICAN AMERICAN): >60 ML/MIN/1.73 M^2
EST. GFR  (NON AFRICAN AMERICAN): >60 ML/MIN/1.73 M^2
EST. GFR  (NON AFRICAN AMERICAN): >60 ML/MIN/1.73 M^2
GLUCOSE SERPL-MCNC: 123 MG/DL (ref 70–110)
GLUCOSE SERPL-MCNC: 135 MG/DL (ref 70–110)
HCT VFR BLD AUTO: 35.7 % (ref 37–48.5)
HGB BLD-MCNC: 11.4 G/DL (ref 12–16)
IMM GRANULOCYTES # BLD AUTO: 0.14 K/UL (ref 0–0.04)
IMM GRANULOCYTES NFR BLD AUTO: 1.6 % (ref 0–0.5)
INTERVENTRICULAR SEPTUM: 1.02 CM (ref 0.6–1.1)
LA MAJOR: 4.53 CM
LA MINOR: 5.68 CM
LA WIDTH: 3.15 CM
LEFT ATRIUM SIZE: 3.5 CM
LEFT ATRIUM VOLUME INDEX: 24.3 ML/M2
LEFT ATRIUM VOLUME: 47.23 CM3
LEFT VENTRICLE DIASTOLIC VOLUME INDEX: 65.35 ML/M2
LEFT VENTRICLE DIASTOLIC VOLUME: 126.77 ML
LEFT VENTRICLE MASS INDEX: 110 G/M2
LEFT VENTRICULAR INTERNAL DIMENSION IN DIASTOLE: 5.15 CM (ref 3.5–6)
LEFT VENTRICULAR MASS: 213.29 G
LYMPHOCYTES # BLD AUTO: 1.2 K/UL (ref 1–4.8)
LYMPHOCYTES NFR BLD: 14.4 % (ref 18–48)
MCH RBC QN AUTO: 29 PG (ref 27–31)
MCHC RBC AUTO-ENTMCNC: 31.9 G/DL (ref 32–36)
MCV RBC AUTO: 91 FL (ref 82–98)
MONOCYTES # BLD AUTO: 0.6 K/UL (ref 0.3–1)
MONOCYTES NFR BLD: 7.2 % (ref 4–15)
MV MEAN GRADIENT: 1 MMHG
MV PEAK GRADIENT: 8 MMHG
MV VALVE AREA BY CONTINUITY EQUATION: 3.98 CM2
NEUTROPHILS # BLD AUTO: 6.5 K/UL (ref 1.8–7.7)
NEUTROPHILS NFR BLD: 76.6 % (ref 38–73)
NRBC BLD-RTO: 0 /100 WBC
PISA TR MAX VEL: 1.36 M/S
PLATELET # BLD AUTO: 256 K/UL (ref 150–450)
PMV BLD AUTO: 10.5 FL (ref 9.2–12.9)
POTASSIUM SERPL-SCNC: 3.8 MMOL/L (ref 3.5–5.1)
POTASSIUM SERPL-SCNC: 3.8 MMOL/L (ref 3.5–5.1)
PROT SERPL-MCNC: 5.8 G/DL (ref 6–8.4)
PROT SERPL-MCNC: 6 G/DL (ref 6–8.4)
RA MAJOR: 3.86 CM
RA PRESSURE: 3 MMHG
RA WIDTH: 3.13 CM
RBC # BLD AUTO: 3.93 M/UL (ref 4–5.4)
RV TISSUE DOPPLER FREE WALL SYSTOLIC VELOCITY 1 (APICAL 4 CHAMBER VIEW): 14.01 CM/S
SINUS: 3.17 CM
SODIUM SERPL-SCNC: 141 MMOL/L (ref 136–145)
SODIUM SERPL-SCNC: 141 MMOL/L (ref 136–145)
STJ: 2.83 CM
TDI LATERAL: 0.11 M/S
TDI SEPTAL: 0.08 M/S
TDI: 0.1 M/S
TR MAX PG: 7 MMHG
TRICUSPID ANNULAR PLANE SYSTOLIC EXCURSION: 1.16 CM
TROPONIN I SERPL DL<=0.01 NG/ML-MCNC: <0.006 NG/ML (ref 0–0.03)
TV REST PULMONARY ARTERY PRESSURE: 10 MMHG
WBC # BLD AUTO: 8.52 K/UL (ref 3.9–12.7)

## 2022-04-28 PROCEDURE — 94761 N-INVAS EAR/PLS OXIMETRY MLT: CPT

## 2022-04-28 PROCEDURE — 99226 PR SUBSEQUENT OBSERVATION CARE,LEVEL III: ICD-10-PCS | Mod: ,,,

## 2022-04-28 PROCEDURE — 85025 COMPLETE CBC W/AUTO DIFF WBC: CPT

## 2022-04-28 PROCEDURE — 94640 AIRWAY INHALATION TREATMENT: CPT | Mod: XB

## 2022-04-28 PROCEDURE — 63600175 PHARM REV CODE 636 W HCPCS: Performed by: PHYSICIAN ASSISTANT

## 2022-04-28 PROCEDURE — 25000242 PHARM REV CODE 250 ALT 637 W/ HCPCS: Performed by: HOSPITALIST

## 2022-04-28 PROCEDURE — 80053 COMPREHEN METABOLIC PANEL: CPT | Mod: 91

## 2022-04-28 PROCEDURE — 93005 ELECTROCARDIOGRAM TRACING: CPT

## 2022-04-28 PROCEDURE — 96372 THER/PROPH/DIAG INJ SC/IM: CPT | Performed by: HOSPITALIST

## 2022-04-28 PROCEDURE — 63600175 PHARM REV CODE 636 W HCPCS: Performed by: HOSPITALIST

## 2022-04-28 PROCEDURE — 99900035 HC TECH TIME PER 15 MIN (STAT)

## 2022-04-28 PROCEDURE — 93010 ELECTROCARDIOGRAM REPORT: CPT | Mod: ,,, | Performed by: INTERNAL MEDICINE

## 2022-04-28 PROCEDURE — 93010 EKG 12-LEAD: ICD-10-PCS | Mod: ,,, | Performed by: INTERNAL MEDICINE

## 2022-04-28 PROCEDURE — 99900031 HC PATIENT EDUCATION (STAT)

## 2022-04-28 PROCEDURE — 25000003 PHARM REV CODE 250: Performed by: PHYSICIAN ASSISTANT

## 2022-04-28 PROCEDURE — 99226 PR SUBSEQUENT OBSERVATION CARE,LEVEL III: CPT | Mod: ,,,

## 2022-04-28 PROCEDURE — 84484 ASSAY OF TROPONIN QUANT: CPT

## 2022-04-28 PROCEDURE — 80053 COMPREHEN METABOLIC PANEL: CPT | Performed by: HOSPITALIST

## 2022-04-28 PROCEDURE — G0378 HOSPITAL OBSERVATION PER HR: HCPCS

## 2022-04-28 PROCEDURE — 27000190 HC CPAP FULL FACE MASK W/VALVE

## 2022-04-28 PROCEDURE — 94799 UNLISTED PULMONARY SVC/PX: CPT

## 2022-04-28 PROCEDURE — 27000221 HC OXYGEN, UP TO 24 HOURS

## 2022-04-28 PROCEDURE — 25000003 PHARM REV CODE 250: Performed by: HOSPITALIST

## 2022-04-28 PROCEDURE — 36415 COLL VENOUS BLD VENIPUNCTURE: CPT

## 2022-04-28 RX ADMIN — PANTOPRAZOLE SODIUM 40 MG: 40 TABLET, DELAYED RELEASE ORAL at 08:04

## 2022-04-28 RX ADMIN — FLUTICASONE PROPIONATE 100 MCG: 50 SPRAY, METERED NASAL at 08:04

## 2022-04-28 RX ADMIN — BUTALBITAL, ACETAMINOPHEN, AND CAFFEINE 1 TABLET: 50; 325; 40 TABLET ORAL at 02:04

## 2022-04-28 RX ADMIN — ENOXAPARIN SODIUM 40 MG: 100 INJECTION SUBCUTANEOUS at 05:04

## 2022-04-28 RX ADMIN — ATORVASTATIN CALCIUM 40 MG: 40 TABLET, FILM COATED ORAL at 08:04

## 2022-04-28 RX ADMIN — IPRATROPIUM BROMIDE AND ALBUTEROL SULFATE 3 ML: 2.5; .5 SOLUTION RESPIRATORY (INHALATION) at 07:04

## 2022-04-28 RX ADMIN — IPRATROPIUM BROMIDE AND ALBUTEROL SULFATE 3 ML: 2.5; .5 SOLUTION RESPIRATORY (INHALATION) at 08:04

## 2022-04-28 RX ADMIN — BUTALBITAL, ACETAMINOPHEN, AND CAFFEINE 1 TABLET: 50; 325; 40 TABLET ORAL at 08:04

## 2022-04-28 RX ADMIN — BUTALBITAL, ACETAMINOPHEN, AND CAFFEINE 1 TABLET: 50; 325; 40 TABLET ORAL at 12:04

## 2022-04-28 RX ADMIN — BUTALBITAL, ACETAMINOPHEN, AND CAFFEINE 1 TABLET: 50; 325; 40 TABLET ORAL at 05:04

## 2022-04-28 RX ADMIN — QUETIAPINE FUMARATE 200 MG: 200 TABLET ORAL at 08:04

## 2022-04-28 RX ADMIN — ASPIRIN 81 MG: 81 TABLET, COATED ORAL at 08:04

## 2022-04-28 RX ADMIN — LISINOPRIL 5 MG: 5 TABLET ORAL at 08:04

## 2022-04-28 RX ADMIN — PREDNISONE 60 MG: 50 TABLET ORAL at 12:04

## 2022-04-28 RX ADMIN — TIOTROPIUM BROMIDE INHALATION SPRAY 2 PUFF: 3.12 SPRAY, METERED RESPIRATORY (INHALATION) at 08:04

## 2022-04-28 RX ADMIN — BUTALBITAL, ACETAMINOPHEN, AND CAFFEINE 1 TABLET: 50; 325; 40 TABLET ORAL at 07:04

## 2022-04-28 RX ADMIN — ACETAMINOPHEN 1000 MG: 500 TABLET ORAL at 08:04

## 2022-04-28 RX ADMIN — IPRATROPIUM BROMIDE AND ALBUTEROL SULFATE 3 ML: 2.5; .5 SOLUTION RESPIRATORY (INHALATION) at 01:04

## 2022-04-28 RX ADMIN — FLUTICASONE FUROATE AND VILANTEROL TRIFENATATE 1 PUFF: 100; 25 POWDER RESPIRATORY (INHALATION) at 08:04

## 2022-04-28 NOTE — ASSESSMENT & PLAN NOTE
· Satting well on room air, no longer visibly dyspneic when speaking  · Just had a 3 day admission at [facility] for a COPD exacerbation, treated with scheduled breathing treatments, Prednisone, and antibiotics x 3 days  · Solumedrol 125mg IV x 1 with Prednisone 60mg PO daily to complete a 5 day course  · Duonebs q6h while awake and prn with IS  · Guaifenesin and Codeine cough syrup prn cough  · Start Breo for controller medication  · Start BIPAP 15/5 qHS  · Lower suspicion for PE given CTA 4/23 was negative for PE and she is maintaining sats ORA  · Bilateral LE US negative for DVT  · Echo without evidence of CHF or WMA

## 2022-04-28 NOTE — PLAN OF CARE
Awake, alert and oriented x 4.  Patient had episode of chest discomfort with nausea that resolved on own PA notified and aware. Patient continues to have headache getting some relief with Fioricet. PA notified and aware of headache.  Patient appears anxious denies feeling anxious or anxiety history.  Patient had no other complaints see flow sheets for further details.

## 2022-04-28 NOTE — PLAN OF CARE
A/O x 4.  Patient reports having headache rating 9/10 x 3 to 7 days.  Patient given Tylenol and Toradol with no relief.  Patient received Fioricet and had mild relief.  PA aware of headache and pain management. Denies chest pain, nausea and vomiting.  Patient reports feeling chest congestion PA made aware.  Patient had no other complaints or unusual discomforts.  See flow sheets for further details.

## 2022-04-28 NOTE — PROGRESS NOTES
[facility] - Telemetry Stepdown ([place] [address])  [facility] Medicine  Progress Note    Patient Name: Wilfredo Vazquez  MRN: [id number]  Patient Class: OP- Observation   Admission Date: 4/26/2022  Length of Stay: 0 days  Attending Physician: Dutch Atkins MD  Primary Care Provider: Marcio Calderon MD        Subjective:     Principal Problem:Acute on chronic respiratory failure with hypoxia and hypercapnia        HPI:  Ms. Wilfredo Vazquez is a 62 y.o. female with chronic respiratory failure 2/2 COPD, who presents to the ER for evaluation of shortness of breath.  She was just admitted to [facility] from 4/23-4/26 for a COPD exacerbation.  She was started on antibiotics, Prednisone, and scheduled breathing treatments.  CTA was negative for a PE, pleural effusion, or consolidation.  During her admission, her condition improved.  She was able to be weaned off oxygen, and didn't qualify for home oxygen after passing a 6 minute walk test.  She was discharged home with 3 days of Prednisone.  Once she arrived home, she felt like her breathing worsened.  She endorses a persistent cough productive of green sputum.  She feels short winded even at rest.  She reports she has a home breathing machine, but does not know if its CPAP or BIPAP.  She has an Albuterol inhaler, but no controller medications.  She has had some chest pain, but denies any fever or chills, leg swelling.    Upon arrival to the ER, vitals were temp 98F, , /79.  CXR was negative.  VBG showed pH 7.42, CO2 53.  She was continue a breathing treatment and IV Solumedrol.  As she was still visibly dyspneic when speaking, she was admitted to Hospital Medicine for further management.      Overview/Hospital Course:  Patient admitted for COPD exacerbation. Maintaining sats ORA, but dyspneic. CXR without acute abnormalities. VBG reassuring. Given IV solumedrol, started on prednisone and Breo added to regimen. Troponin 0.765, repeat x2 flat. D-dimer 0.78, CTA  "from recent hospitalization negative for PE. Bilateral lower extremity US negative for DVT. Echo without evidence of CHF or WMA. Continue to monitor, COPD clinic at discharge.      Interval History: Patient seen and examined today. Hypertensive and tachypenic overnight which is improving. Episode of chest discomfort and nausea this morning, EKG with NSR and troponin negative. Tele ordered. Will monitor for any signs of ACS. Reports slight improvement in SOB, no longer using accessory muscles but still complaining of feeling dyspenic. Still complaining of headache, prn medications in place. Repeat troponin yesterday afternoon was negative. Isolated bump of 0.7 likely due to demand ischemia caused by the COPD exacerbation. Echo without abnormalities. Bilateral LE US without evidence of DVT.     Review of Systems   Constitutional:  Negative for chills, fatigue and fever.   Eyes:  Negative for photophobia and visual disturbance.   Respiratory:  Positive for cough, chest tightness, shortness of breath and wheezing.    Cardiovascular:  Positive for chest pain ("tight" "heavy"). Negative for palpitations and leg swelling.   Gastrointestinal:  Negative for nausea.   Endocrine: Negative for cold intolerance and heat intolerance.   Genitourinary:  Negative for dysuria and frequency.   Skin:  Negative for color change and wound.   Neurological:  Positive for headaches. Negative for syncope, weakness and light-headedness.   Psychiatric/Behavioral:  Negative for agitation and confusion.    Objective:     Vital Signs (Most Recent):  Temp: 97.9 °F (36.6 °C) (04/28/22 1201)  Pulse: 90 (04/28/22 1336)  Resp: 20 (04/28/22 1336)  BP: (!) 147/86 (04/28/22 1201)  SpO2: 100 % (04/28/22 1336)   Vital Signs (24h Range):  Temp:  [97.3 °F (36.3 °C)-98.2 °F (36.8 °C)] 97.9 °F (36.6 °C)  Pulse:  [] 90  Resp:  [16-24] 20  SpO2:  [94 %-100 %] 100 %  BP: (130-188)/() 147/86     Weight: 91.2 kg (201 lb 1 oz)  Body mass index is 35.63 " kg/m².  No intake or output data in the 24 hours ending 04/28/22 1453   Physical Exam  Vitals reviewed.   Constitutional:       Appearance: Normal appearance. She is well-developed. She is obese. She is not toxic-appearing.   HENT:      Head: Normocephalic and atraumatic.   Cardiovascular:      Rate and Rhythm: Normal rate and regular rhythm.      Heart sounds: No murmur heard.  Pulmonary:      Effort: Pulmonary effort is normal. No respiratory distress (still slight work of breathing while speaking, but seems to be improving).      Breath sounds: Rhonchi present. No wheezing.   Abdominal:      General: Abdomen is flat. There is no distension.      Palpations: Abdomen is soft.   Musculoskeletal:      Cervical back: Normal range of motion and neck supple.      Right lower leg: No edema.      Left lower leg: No edema.   Skin:     General: Skin is warm and dry.   Neurological:      General: No focal deficit present.      Mental Status: She is alert.   Psychiatric:         Mood and Affect: Mood normal.         Behavior: Behavior normal.       Significant Labs: All pertinent labs within the past 24 hours have been reviewed.  BMP:   Recent Labs   Lab 04/27/22  0508 04/28/22  0850 04/28/22  0907   *   < > 135*      < > 141   K 4.7   < > 3.8      < > 101   CO2 25   < > 32*   BUN 30*   < > 28*   CREATININE 0.8   < > 0.8   CALCIUM 9.1   < > 9.0   MG 2.3  --   --     < > = values in this interval not displayed.     CBC:   Recent Labs   Lab 04/26/22  2055 04/27/22  0916 04/28/22  0856   WBC 13.16* 10.18 8.52   HGB 11.8* 11.4* 11.4*   HCT 37.9 35.5* 35.7*    262 256     Troponin:   Recent Labs   Lab 04/27/22  0508 04/27/22  1920 04/28/22  0850   TROPONINI 0.765* 0.006 <0.006       Significant Imaging: I have reviewed all pertinent imaging results/findings within the past 24 hours.      Assessment/Plan:      * Acute on chronic respiratory failure with hypoxia and hypercapnia  · Satting well on room air,  slightly dyspneic when speaking  · Just had a 3 day admission at [facility] for a COPD exacerbation, treated with scheduled breathing treatments, Prednisone, and antibiotics x 3 days  · Solumedrol 125mg IV x 1 with Prednisone 60mg PO daily to complete a 5 day course  · Duonebs q6h while awake and prn with IS  · Guaifenesin and Codeine cough syrup prn cough  · Start Breo for controller medication  · Start BIPAP 15/5 qHS  · Lower suspicion for PE given CTA 4/23 was negative for PE and she is maintaining sats ORA  · Bilateral LE US negative for DVT  · Echo without evidence of CHF or WMA        Bipolar affective disorder, remission status unspecified  · Chronic and stable  · Continue Seroquel 200mg PO qHS      History of gastroesophageal reflux (GERD)  · Chronic and stable  · Continue PPI      COPD (chronic obstructive pulmonary disease)  · As above      Obesity (BMI 35.0-39.9 without comorbidity)  · Body mass index is 35.63 kg/m².   · Morbid obesity complicates all aspects of disease management from diagnostic modalities to treatment.   · Weight loss encouraged and health benefits explained to patient.         Hypertension  · Chronic and stable  · Continue Lisinopril 5mg PO daily        VTE Risk Mitigation (From admission, onward)         Ordered     enoxaparin injection 40 mg  Daily         04/26/22 2204     IP VTE HIGH RISK PATIENT  Once         04/26/22 2204                Discharge Planning   NETTIE: 4/30/2022     Code Status: Full Code   Is the patient medically ready for discharge?: No    Reason for patient still in hospital (select all that apply): Patient trending condition and Treatment  Discharge Plan A: Home                  Allie Hartley PA-C  Department of Hospital Medicine   [facility] - Telemetry Stepdown ([place]

## 2022-04-28 NOTE — NURSING
Lab called about sputum  specimen Resp collected and sent.  Lab states had to discard specimen and new order and collection needed.  Resp notified and new order placed.

## 2022-04-28 NOTE — RESPIRATORY THERAPY
RAPID RESPONSE RESPIRATORY THERAPY PROACTIVE NOTE           Time of visit: 1448     Code Status: Full Code   : 1960  Bed: 7072/7072 A:   MRN: [id number]  Time spent at the bedside: < 15 min    SITUATION    Evaluated patient for: L.O.S./respiratory issues    BACKGROUND    Why is the patient in the hospital?: Acute on chronic respiratory failure with hypoxia and hypercapnia    Patient has a past medical history of Abdominal pain, Emphysema lung, GERD (gastroesophageal reflux disease), Hypertension, and Shingles.    24 Hours Vitals Range:  Temp:  [97.3 °F (36.3 °C)-98.2 °F (36.8 °C)]   Pulse:  []   Resp:  [16-24]   BP: (130-188)/()   SpO2:  [94 %-100 %]     Labs:    Recent Labs     22  0508 22  0850 22  0907    141 141   K 4.7 3.8 3.8    101 101   CO2 25 32* 32*   CREATININE 0.8 0.9 0.8   * 123* 135*   PHOS 2.6*  --   --    MG 2.3  --   --         Recent Labs     22  2100   PH 7.428   PCO2 53.3*   PO2 42   HCO3 35.2*   POCSATURATED 77*   BE 11       ASSESSMENT/INTERVENTIONS    Upon arrival in room resting comfortably with no respiratory needs at this time.    Last VS   Temp: 97.9 °F (36.6 °C) ( 1201)  Pulse: 86 ( 1513)  Resp: 20 ( 1336)  BP: 147/86 ( 1201)  SpO2: 100 % ( 1336)    Level of Consciousness: Level of Consciousness (AVPU): alert  Respiratory Effort: Respiratory Effort: Normal, Unlabored Expansion/Accessory Muscle Usage: Expansion/Accessory Muscles/Retractions: expansion symmetric, no retractions, no use of accessory muscles  All Lung Field Breath Sounds: All Lung Fields Breath Sounds: Anterior:, Lateral:, coarse, diminished  DORENE Breath Sounds: wheezes, expiratory  LLL Breath Sounds: diminished  RUL Breath Sounds: wheezes, expiratory  RLL Breath Sounds: diminished  O2 Device/Concentration:  ,  , O2 Device (Oxygen Therapy): room air  NIPPV: No Surgical airway: No  ETCO2 monitored:    Ambu at bedside: Ambu bag with the  patient?: Yes, Adult Ambu    Active Orders   Respiratory Care    Bipap QHS     Frequency: QHS     Number of Occurrences: Until Specified     Order Questions:      Mode Bilevel      FiO2% 20      Inspiratory pressure: 15      Expiratory pressure: 5    Incentive spirometry     Frequency: Q4H     Number of Occurrences: Until Specified       RECOMMENDATIONS    We recommend: RRT Recs: Continue POC per primary team.    ESCALATION        FOLLOW-UP    Please call back the Rapid Response RT, Jamar Espinoza, RRT at x [phone] for any questions or concerns.

## 2022-04-28 NOTE — PLAN OF CARE
Problem: Respiratory Compromise COPD (Chronic Obstructive Pulmonary Disease)  Goal: Effective Oxygenation and Ventilation  Outcome: Ongoing, Progressing     Problem: Infection COPD (Chronic Obstructive Pulmonary Disease)  Goal: Absence of Infection Signs and Symptoms  Outcome: Ongoing, Progressing     Problem: Functional Ability Impaired COPD (Chronic Obstructive Pulmonary Disease)  Goal: Optimal Level of Functional [place]  Outcome: Ongoing, Progressing     Problem: Adjustment to Illness COPD (Chronic Obstructive Pulmonary Disease)  Goal: Optimal Chronic Illness Coping  Outcome: Ongoing, Progressing     Problem: Pain Acute  Goal: Acceptable Pain Control and Functional Ability  Outcome: Ongoing, Progressing     Problem: Adult Inpatient Plan of Care  Goal: Absence of Hospital-Acquired Illness or Injury  Outcome: Ongoing, Progressing     Problem: Adult Inpatient Plan of Care  Goal: Optimal Comfort and Wellbeing  Outcome: Ongoing, Progressing     Problem: Adult Inpatient Plan of Care  Goal: Readiness for Transition of Care  Outcome: Ongoing, Progressing

## 2022-04-28 NOTE — NURSING
Patient is awake,alert and oriented x 4.  Patient continues to have headache 9/10.  Went to give pain reports sudden feeling of squeezing/tightness to right side of chest and epigastric region with nausea.  Rates pain 9/10,  PA notified see vitals in flow sheets. 12 lead EKG ordered and done and placed patient on bedside monitored PA notified and aware. .

## 2022-04-28 NOTE — ASSESSMENT & PLAN NOTE
· Body mass index is 35.63 kg/m².   · Morbid obesity complicates all aspects of disease management from diagnostic modalities to treatment.   · Weight loss encouraged and health benefits explained to patient.

## 2022-04-28 NOTE — SUBJECTIVE & OBJECTIVE
"Interval History: Patient seen and examined today. Episode of chest discomfort and nausea this morning, EKG with NSR and troponin negative. Tele ordered. Will monitor for any signs of ACS. Reports some improvement in SOB, no longer using accessory muscles. Still complaining of headache, prn medications in place. Repeat troponin negative. Echo without abnormalities. Bilateral LE US without evidence of DVT.     Review of Systems   Constitutional:  Negative for chills, fatigue and fever.   Eyes:  Negative for photophobia and visual disturbance.   Respiratory:  Positive for cough, chest tightness, shortness of breath and wheezing.    Cardiovascular:  Positive for chest pain ("tight" "heavy"). Negative for palpitations and leg swelling.   Gastrointestinal:  Negative for nausea.   Endocrine: Negative for cold intolerance and heat intolerance.   Genitourinary:  Negative for dysuria and frequency.   Skin:  Negative for color change and wound.   Neurological:  Positive for headaches. Negative for syncope, weakness and light-headedness.   Psychiatric/Behavioral:  Negative for agitation and confusion.    Objective:     Vital Signs (Most Recent):  Temp: 97.9 °F (36.6 °C) (04/28/22 1201)  Pulse: 90 (04/28/22 1336)  Resp: 20 (04/28/22 1336)  BP: (!) 147/86 (04/28/22 1201)  SpO2: 100 % (04/28/22 1336)   Vital Signs (24h Range):  Temp:  [97.3 °F (36.3 °C)-98.2 °F (36.8 °C)] 97.9 °F (36.6 °C)  Pulse:  [] 90  Resp:  [16-24] 20  SpO2:  [94 %-100 %] 100 %  BP: (130-188)/() 147/86     Weight: 91.2 kg (201 lb 1 oz)  Body mass index is 35.63 kg/m².  No intake or output data in the 24 hours ending 04/28/22 1453   Physical Exam  Vitals reviewed.   Constitutional:       Appearance: Normal appearance. She is well-developed. She is obese. She is not toxic-appearing.   HENT:      Head: Normocephalic and atraumatic.   Cardiovascular:      Rate and Rhythm: Normal rate and regular rhythm.      Heart sounds: No murmur heard.  Pulmonary: "      Effort: Pulmonary effort is normal. No respiratory distress (Increased work of breathing while speaking).      Breath sounds: Rhonchi present. No wheezing.   Abdominal:      General: Abdomen is flat. There is no distension.      Palpations: Abdomen is soft.   Musculoskeletal:      Cervical back: Normal range of motion and neck supple.      Right lower leg: No edema.      Left lower leg: No edema.   Skin:     General: Skin is warm and dry.   Neurological:      General: No focal deficit present.      Mental Status: She is alert.   Psychiatric:         Mood and Affect: Mood normal.         Behavior: Behavior normal.       Significant Labs: All pertinent labs within the past 24 hours have been reviewed.  BMP:   Recent Labs   Lab 04/27/22  0508 04/28/22  0850 04/28/22  0907   *   < > 135*      < > 141   K 4.7   < > 3.8      < > 101   CO2 25   < > 32*   BUN 30*   < > 28*   CREATININE 0.8   < > 0.8   CALCIUM 9.1   < > 9.0   MG 2.3  --   --     < > = values in this interval not displayed.     CBC:   Recent Labs   Lab 04/26/22  2055 04/27/22  0916 04/28/22  0856   WBC 13.16* 10.18 8.52   HGB 11.8* 11.4* 11.4*   HCT 37.9 35.5* 35.7*    262 256     Troponin:   Recent Labs   Lab 04/27/22  0508 04/27/22  1920 04/28/22  0850   TROPONINI 0.765* 0.006 <0.006       Significant Imaging: I have reviewed all pertinent imaging results/findings within the past 24 hours.

## 2022-04-29 VITALS
HEIGHT: 63 IN | RESPIRATION RATE: 18 BRPM | TEMPERATURE: 98 F | OXYGEN SATURATION: 98 % | SYSTOLIC BLOOD PRESSURE: 161 MMHG | WEIGHT: 201.06 LBS | HEART RATE: 106 BPM | DIASTOLIC BLOOD PRESSURE: 88 MMHG | BODY MASS INDEX: 35.62 KG/M2

## 2022-04-29 PROCEDURE — 11000001 HC ACUTE MED/SURG PRIVATE ROOM

## 2022-04-29 PROCEDURE — 25000003 PHARM REV CODE 250: Performed by: PHYSICIAN ASSISTANT

## 2022-04-29 PROCEDURE — 94640 AIRWAY INHALATION TREATMENT: CPT

## 2022-04-29 PROCEDURE — 25000003 PHARM REV CODE 250: Performed by: HOSPITALIST

## 2022-04-29 PROCEDURE — 94799 UNLISTED PULMONARY SVC/PX: CPT

## 2022-04-29 PROCEDURE — 63600175 PHARM REV CODE 636 W HCPCS: Performed by: PHYSICIAN ASSISTANT

## 2022-04-29 PROCEDURE — 25000242 PHARM REV CODE 250 ALT 637 W/ HCPCS: Performed by: HOSPITALIST

## 2022-04-29 PROCEDURE — 94761 N-INVAS EAR/PLS OXIMETRY MLT: CPT

## 2022-04-29 PROCEDURE — 99239 HOSP IP/OBS DSCHRG MGMT >30: CPT | Mod: ,,,

## 2022-04-29 PROCEDURE — 99239 PR HOSPITAL DISCHARGE DAY,>30 MIN: ICD-10-PCS | Mod: ,,,

## 2022-04-29 RX ORDER — PREDNISONE 20 MG/1
60 TABLET ORAL DAILY
Qty: 9 TABLET | Refills: 0 | Status: SHIPPED | OUTPATIENT
Start: 2022-04-30 | End: 2022-05-03

## 2022-04-29 RX ADMIN — IPRATROPIUM BROMIDE AND ALBUTEROL SULFATE 3 ML: 2.5; .5 SOLUTION RESPIRATORY (INHALATION) at 02:04

## 2022-04-29 RX ADMIN — ASPIRIN 81 MG: 81 TABLET, COATED ORAL at 09:04

## 2022-04-29 RX ADMIN — BUTALBITAL, ACETAMINOPHEN, AND CAFFEINE 1 TABLET: 50; 325; 40 TABLET ORAL at 02:04

## 2022-04-29 RX ADMIN — ATORVASTATIN CALCIUM 40 MG: 40 TABLET, FILM COATED ORAL at 09:04

## 2022-04-29 RX ADMIN — TIOTROPIUM BROMIDE INHALATION SPRAY 2 PUFF: 3.12 SPRAY, METERED RESPIRATORY (INHALATION) at 10:04

## 2022-04-29 RX ADMIN — FLUTICASONE FUROATE AND VILANTEROL TRIFENATATE 1 PUFF: 100; 25 POWDER RESPIRATORY (INHALATION) at 10:04

## 2022-04-29 RX ADMIN — Medication 6 MG: at 02:04

## 2022-04-29 RX ADMIN — PANTOPRAZOLE SODIUM 40 MG: 40 TABLET, DELAYED RELEASE ORAL at 09:04

## 2022-04-29 RX ADMIN — LISINOPRIL 5 MG: 5 TABLET ORAL at 09:04

## 2022-04-29 RX ADMIN — PREDNISONE 60 MG: 50 TABLET ORAL at 09:04

## 2022-04-29 RX ADMIN — IPRATROPIUM BROMIDE AND ALBUTEROL SULFATE 3 ML: 2.5; .5 SOLUTION RESPIRATORY (INHALATION) at 07:04

## 2022-04-29 RX ADMIN — FLUTICASONE PROPIONATE 100 MCG: 50 SPRAY, METERED NASAL at 09:04

## 2022-04-29 RX ADMIN — BUTALBITAL, ACETAMINOPHEN, AND CAFFEINE 1 TABLET: 50; 325; 40 TABLET ORAL at 06:04

## 2022-04-29 NOTE — PLAN OF CARE
[facility] - Tele[facility] Stepdown ([place]-[facility])  Discharge Final Note    Primary Care Provider: Marcio Calderon MD    Expected Discharge Date: 4/29/2022    Final Discharge Note (most recent)     Final Note - 04/29/22 1518        Final Note    Assessment Type Final Discharge Note     Anticipated Discharge Disposition Home or Self Care     What phone number can be called within the next 1-3 days to see how you are doing after discharge? [phone]     Hospital Resources/Appts/Education Provided Appointments scheduled and added to AVS        Post-Acute Status    Post-Acute Authorization Other     Other Status No Post-Acute Service Needs     Discharge Delays None known at this time                 Important Message from Medicare                Patient medically ready for discharge to Home. Any necessary transport setup by . This SW scheduled or requested necessary follow-up appointments. Family/patient aware of discharge.    Future Appointments   Date Time Provider Department Center   5/2/2022  2:40 PM Marcio Calderon MD [facility]   5/16/2022 11:30 AM Steve Weller MD [facility]   5/26/2022 10:30 AM Grace Portillo MD [facility]   6/7/2022  3:40 PM Marcio Calderon MD [facility]   7/6/2022  3:00 PM Christopher Harmon MD [facility]   8/17/2022  3:00 PM Gerald Parrish MD [facility]         [facility] - [facility]  [phone]

## 2022-04-29 NOTE — PLAN OF CARE
Problem: Respiratory Compromise COPD (Chronic Obstructive Pulmonary Disease)  Goal: Effective Oxygenation and Ventilation  Outcome: Ongoing, Progressing     Problem: Infection COPD (Chronic Obstructive Pulmonary Disease)  Goal: Absence of Infection Signs and Symptoms  Outcome: Ongoing, Progressing     Problem: Functional Ability Impaired COPD (Chronic Obstructive Pulmonary Disease)  Goal: Optimal Level of Functional [place]  Outcome: Ongoing, Progressing     Problem: Adjustment to Illness COPD (Chronic Obstructive Pulmonary Disease)  Goal: Optimal Chronic Illness Coping  Outcome: Ongoing, Progressing     Problem: Pain Acute  Goal: Acceptable Pain Control and Functional Ability  Outcome: Ongoing, Progressing     Problem: Adult Inpatient Plan of Care  Goal: Absence of Hospital-Acquired Illness or Injury  Outcome: Ongoing, Progressing     Problem: Adult Inpatient Plan of Care  Goal: Optimal Comfort and Wellbeing  Outcome: Ongoing, Progressing     Problem: Adult Inpatient Plan of Care  Goal: Readiness for Transition of Care  Outcome: Ongoing, Progressing

## 2022-04-29 NOTE — NURSING
Pt discharged to home.  She is awake, alert and oriented X4.  RR regular and even w/ pulse ox reading of 97% on RA.  Pt requested and received PRN esgic for headache at 14:38 w/ moderately effective results.  Discharge instructions, prescription and med admin info and follow up appt info given to Pt.  Periph IV removed w/ pressure dressing applied.  Heart monitor box removed and returned to box in soiled utility room.  Pt waiting for w/c per transport.

## 2022-04-29 NOTE — PLAN OF CARE
"   04/29/22 1505   Post-Acute Status   Post-Acute Authorization Other   Other Status No Post-Acute Service Needs       Patient is being D/C today with no Social Service needs identified at this time.       JOHANA scheduled Pulmonary appointment May 26, 2022 @ 10:30 am and put on a waiting list.    May26 New Patient  with Grace Portillo MD  Thursday May 26, 2022 10:30 AM  Please arrive approximately 15 minutes before your scheduled appointment time and ensure that you have a valid government issued ID and your insurance card. ePre-Check is available and completion prior to your arrival will assist with a quicker registration process.     Three Options to Check-In for Your Appointment     With [facility] Mobile Check-In simply complete ePre-Check before your appointment and click "I'm Here" in the chrystal when you park.  Don't see the Mobile Check-In option? In some locations you can call from the parking area to let us know you've arrived. Just look for the banners with the phone number to call.  Or Visit the registration desk to check-in for your appointment.     Masks are required for all patients and visitors.  Main Building, 9th Floor   Please park in [facility] and use Clinic elevator [facility] - Pul[facility] 9th Fl  [address] [facility] [address]  [phone]     JOHANA scheduled COPD-Pulumonary July 6, 2022 at 3:00 pm and put on waiting list.    Jul6 New Patient  with Christopher Harmon MD  Wednesday Jul 6, 2022 3:00 PM  Please arrive approximately 15 minutes before your scheduled appointment time and ensure that you have a valid government issued ID and your insurance card. ePre-Check is available and completion prior to your arrival will assist with a quicker registration process.     Three Options to Check-In for Your Appointment     With [facility] Mobile Check-In simply complete ePre-Check before your appointment and click "I'm Here" in the chrystal when you park.  Don't see the Mobile Check-In option? In " some locations you can call from the parking area to let us know you've arrived. Just look for the banners with the phone number to call.  Or Visit the registration desk to check-in for your appointment.     Masks are required for all patients and visitors.  Main Building, 9th Floor   Please park in [facility] and use Clinic elevator [facility] 9th Fl  [address]   [facility] [address]  [phone]                         Annalee Shaikh LMSW  PRN - [facility]  [phone]

## 2022-04-29 NOTE — NURSING
Pt called desk stating she needs a break from BiPAP. She has been unable to sleep despite taking PRN melatonin and fioricet. BiPAP removed as requested by pt. Placed on con't pulse ox to monitor O2 sats while sleeping. 100% on RA. NAD noted. WCTM.

## 2022-04-29 NOTE — PLAN OF CARE
"Pt in bed, awake, alert and oriented to self and place.  No confusion noted.  RR regular and even w/ pulse ox reading of 95% on RA.  Pt states she still has SOB and she said she wants "to talk to the pulmonologist."  ALLEN Kelley was present and said that an outpatient referral would be made for Pt at discharge.      "

## 2022-05-01 ENCOUNTER — HOSPITAL ENCOUNTER (EMERGENCY)
Facility: HOSPITAL | Age: 62
Discharge: HOME | End: 2022-05-01
Attending: EMERGENCY MEDICINE
Payer: MEDICARE

## 2022-05-01 VITALS
DIASTOLIC BLOOD PRESSURE: 90 MMHG | SYSTOLIC BLOOD PRESSURE: 155 MMHG | BODY MASS INDEX: 35.61 KG/M2 | TEMPERATURE: 98 F | WEIGHT: 201 LBS | RESPIRATION RATE: 22 BRPM | OXYGEN SATURATION: 97 % | HEART RATE: 86 BPM

## 2022-05-01 DIAGNOSIS — R06.02 SHORTNESS OF BREATH: ICD-10-CM

## 2022-05-01 DIAGNOSIS — R10.9 ABDOMINAL PAIN, UNSPECIFIED ABDOMINAL LOCATION: Primary | ICD-10-CM

## 2022-05-01 LAB
ALBUMIN SERPL BCP-MCNC: 3.1 G/DL (ref 3.5–5.2)
ALP SERPL-CCNC: 65 U/L (ref 55–135)
ALT SERPL W/O P-5'-P-CCNC: 41 U/L (ref 10–44)
ANION GAP SERPL CALC-SCNC: 10 MMOL/L (ref 8–16)
AST SERPL-CCNC: 24 U/L (ref 10–40)
BASOPHILS # BLD AUTO: 0.02 K/UL (ref 0–0.2)
BASOPHILS NFR BLD: 0.1 % (ref 0–1.9)
BILIRUB SERPL-MCNC: 0.5 MG/DL (ref 0.1–1)
BILIRUB UR QL STRIP: NEGATIVE
BUN SERPL-MCNC: 19 MG/DL (ref 6–20)
CALCIUM SERPL-MCNC: 9 MG/DL (ref 8.7–10.5)
CHLORIDE SERPL-SCNC: 102 MMOL/L (ref 95–110)
CLARITY UR REFRACT.AUTO: CLEAR
CO2 SERPL-SCNC: 30 MMOL/L (ref 23–29)
COLOR UR AUTO: ABNORMAL
CREAT SERPL-MCNC: 0.8 MG/DL (ref 0.5–1.4)
DIFFERENTIAL METHOD: ABNORMAL
EOSINOPHIL # BLD AUTO: 0 K/UL (ref 0–0.5)
EOSINOPHIL NFR BLD: 0.1 % (ref 0–8)
ERYTHROCYTE [DISTWIDTH] IN BLOOD BY AUTOMATED COUNT: 16.8 % (ref 11.5–14.5)
EST. GFR  (AFRICAN AMERICAN): >60 ML/MIN/1.73 M^2
EST. GFR  (NON AFRICAN AMERICAN): >60 ML/MIN/1.73 M^2
GLUCOSE SERPL-MCNC: 84 MG/DL (ref 70–110)
GLUCOSE UR QL STRIP: NEGATIVE
HCT VFR BLD AUTO: 34 % (ref 37–48.5)
HGB BLD-MCNC: 10.7 G/DL (ref 12–16)
HGB UR QL STRIP: NEGATIVE
IMM GRANULOCYTES # BLD AUTO: 0.18 K/UL (ref 0–0.04)
IMM GRANULOCYTES NFR BLD AUTO: 1.1 % (ref 0–0.5)
KETONES UR QL STRIP: NEGATIVE
LEUKOCYTE ESTERASE UR QL STRIP: NEGATIVE
LIPASE SERPL-CCNC: 28 U/L (ref 4–60)
LYMPHOCYTES # BLD AUTO: 1.5 K/UL (ref 1–4.8)
LYMPHOCYTES NFR BLD: 9.2 % (ref 18–48)
MCH RBC QN AUTO: 27.9 PG (ref 27–31)
MCHC RBC AUTO-ENTMCNC: 31.5 G/DL (ref 32–36)
MCV RBC AUTO: 89 FL (ref 82–98)
MONOCYTES # BLD AUTO: 0.8 K/UL (ref 0.3–1)
MONOCYTES NFR BLD: 4.7 % (ref 4–15)
NEUTROPHILS # BLD AUTO: 14 K/UL (ref 1.8–7.7)
NEUTROPHILS NFR BLD: 84.8 % (ref 38–73)
NITRITE UR QL STRIP: NEGATIVE
NRBC BLD-RTO: 0 /100 WBC
PH UR STRIP: >8 [PH] (ref 5–8)
PLATELET # BLD AUTO: 269 K/UL (ref 150–450)
PMV BLD AUTO: 10.3 FL (ref 9.2–12.9)
POTASSIUM SERPL-SCNC: 4 MMOL/L (ref 3.5–5.1)
PROT SERPL-MCNC: 5.6 G/DL (ref 6–8.4)
PROT UR QL STRIP: NEGATIVE
RBC # BLD AUTO: 3.83 M/UL (ref 4–5.4)
SODIUM SERPL-SCNC: 142 MMOL/L (ref 136–145)
SP GR UR STRIP: 1.02 (ref 1–1.03)
URN SPEC COLLECT METH UR: ABNORMAL
WBC # BLD AUTO: 16.51 K/UL (ref 3.9–12.7)

## 2022-05-01 PROCEDURE — 99285 EMERGENCY DEPT VISIT HI MDM: CPT | Mod: 25

## 2022-05-01 PROCEDURE — 99285 EMERGENCY DEPT VISIT HI MDM: CPT | Mod: ,,, | Performed by: PHYSICIAN ASSISTANT

## 2022-05-01 PROCEDURE — 93010 EKG 12-LEAD: ICD-10-PCS | Mod: ,,, | Performed by: INTERNAL MEDICINE

## 2022-05-01 PROCEDURE — 81003 URINALYSIS AUTO W/O SCOPE: CPT | Performed by: PHYSICIAN ASSISTANT

## 2022-05-01 PROCEDURE — 25500020 PHARM REV CODE 255: Performed by: EMERGENCY MEDICINE

## 2022-05-01 PROCEDURE — 25000003 PHARM REV CODE 250: Performed by: PHYSICIAN ASSISTANT

## 2022-05-01 PROCEDURE — 63600175 PHARM REV CODE 636 W HCPCS: Performed by: PHYSICIAN ASSISTANT

## 2022-05-01 PROCEDURE — 85025 COMPLETE CBC W/AUTO DIFF WBC: CPT | Performed by: PHYSICIAN ASSISTANT

## 2022-05-01 PROCEDURE — 96375 TX/PRO/DX INJ NEW DRUG ADDON: CPT

## 2022-05-01 PROCEDURE — 80053 COMPREHEN METABOLIC PANEL: CPT | Performed by: PHYSICIAN ASSISTANT

## 2022-05-01 PROCEDURE — 93010 ELECTROCARDIOGRAM REPORT: CPT | Mod: ,,, | Performed by: INTERNAL MEDICINE

## 2022-05-01 PROCEDURE — 83690 ASSAY OF LIPASE: CPT | Performed by: PHYSICIAN ASSISTANT

## 2022-05-01 PROCEDURE — 93005 ELECTROCARDIOGRAM TRACING: CPT

## 2022-05-01 PROCEDURE — 99285 PR EMERGENCY DEPT VISIT,LEVEL V: ICD-10-PCS | Mod: ,,, | Performed by: PHYSICIAN ASSISTANT

## 2022-05-01 PROCEDURE — 87389 HIV-1 AG W/HIV-1&-2 AB AG IA: CPT | Performed by: EMERGENCY MEDICINE

## 2022-05-01 PROCEDURE — 86803 HEPATITIS C AB TEST: CPT | Performed by: EMERGENCY MEDICINE

## 2022-05-01 PROCEDURE — 96374 THER/PROPH/DIAG INJ IV PUSH: CPT | Mod: 59

## 2022-05-01 RX ORDER — ONDANSETRON 2 MG/ML
4 INJECTION INTRAMUSCULAR; INTRAVENOUS
Status: COMPLETED | OUTPATIENT
Start: 2022-05-01 | End: 2022-05-01

## 2022-05-01 RX ORDER — MORPHINE SULFATE 2 MG/ML
6 INJECTION, SOLUTION INTRAMUSCULAR; INTRAVENOUS
Status: COMPLETED | OUTPATIENT
Start: 2022-05-01 | End: 2022-05-01

## 2022-05-01 RX ORDER — ONDANSETRON 4 MG/1
4 TABLET, ORALLY DISINTEGRATING ORAL EVERY 8 HOURS PRN
Qty: 12 TABLET | Refills: 0 | OUTPATIENT
Start: 2022-05-01 | End: 2022-05-13

## 2022-05-01 RX ORDER — HYOSCYAMINE SULFATE 0.12 MG/1
0.12 TABLET SUBLINGUAL EVERY 6 HOURS PRN
Qty: 12 TABLET | Refills: 0 | Status: ON HOLD | OUTPATIENT
Start: 2022-05-01 | End: 2022-06-20 | Stop reason: CLARIF

## 2022-05-01 RX ADMIN — SODIUM CHLORIDE 1000 ML: 0.9 INJECTION, SOLUTION INTRAVENOUS at 12:05

## 2022-05-01 RX ADMIN — IOHEXOL 100 ML: 350 INJECTION, SOLUTION INTRAVENOUS at 12:05

## 2022-05-01 RX ADMIN — MORPHINE SULFATE 6 MG: 2 INJECTION, SOLUTION INTRAMUSCULAR; INTRAVENOUS at 12:05

## 2022-05-01 RX ADMIN — ONDANSETRON 4 MG: 2 INJECTION INTRAMUSCULAR; INTRAVENOUS at 12:05

## 2022-05-01 NOTE — ED NOTES
Pt on continuous cardiac monitoring, continuous pulse oximetry, and automatic BP cuff cycling. Pt in hospital gown, side rails up X2, bed low and locked, and call light is placed within reach. Family at bedside at this time. Pt denies any complaints or needs.

## 2022-05-01 NOTE — ED PROVIDER NOTES
"Encounter Date: 5/1/2022       History     Chief Complaint   Patient presents with    Shortness of Breath    Abdominal Pain     Pt reports to ED w./ complaints of abdominal pain starting last night to RLQ, pt COPD, reports SOB      62 year old female with history of hypertension, COPD, GERD presents to the ED complaining of abdominal pain that started last night.  She describes the pain as a constant right lower "stabbing", 10/10 that is worse with walking.  She has not taken any over-the-counter pain medication prior to arrival.  She endorses associated nausea without vomiting, diarrhea, lightheadedness.  She was recently admitted for a COPD exacerbation and discharged on Friday, she is currently on steroids but not on any antibiotics.  She does report some shortness of breath, dry cough, sore throat.  Past surgical history significant for hysterectomy, appendectomy, Nissen fundoplication.  She denies tobacco use.  She denies fever, chills, chest pain, dysuria, bright red blood per rectum, melena.    The history is provided by the patient.     Review of patient's allergies indicates:  No Known Allergies  Past Medical History:   Diagnosis Date    Abdominal pain 2018    Emphysema lung     GERD (gastroesophageal reflux disease) 2018    Hypertension     Shingles      Past Surgical History:   Procedure Laterality Date    APPENDECTOMY      COLONOSCOPY N/A 5/22/2019    Procedure: COLONOSCOPY;  Surgeon: Sim Quinteros MD;  Location: [facility];  Service: Endoscopy;  Laterality: N/A;    COLONOSCOPY W/ POLYPECTOMY      ESOPHAGEAL DILATION N/A 5/13/2019    Procedure: DILATION, ESOPHAGUS;  Surgeon: Sim Quinteros MD;  Location: [facility] ENDO;  Service: Endoscopy;  Laterality: N/A;    ESOPHAGEAL DILATION N/A 2/9/2021    Procedure: DILATION, ESOPHAGUS;  Surgeon: Sim Quinteros MD;  Location: [facility];  Service: Endoscopy;  Laterality: N/A;    ESOPHAGEAL DILATION N/A 12/14/2021    Procedure: DILATION, ESOPHAGUS;  " Surgeon: Sim Quinteros MD;  Location: [facility];  Service: Endoscopy;  Laterality: N/A;    ESOPHAGOGASTRODUODENOSCOPY N/A 2019    Procedure: EGD (ESOPHAGOGASTRODUODENOSCOPY);  Surgeon: Sim Quinteros MD;  Location: [facility] ENDO;  Service: Endoscopy;  Laterality: N/A;    ESOPHAGOGASTRODUODENOSCOPY N/A 2021    Procedure: EGD (ESOPHAGOGASTRODUODENOSCOPY);  Surgeon: Sim Quinteros MD;  Location: [facility] ENDO;  Service: Endoscopy;  Laterality: N/A;    ESOPHAGOGASTRODUODENOSCOPY N/A 2021    Procedure: EGD (ESOPHAGOGASTRODUODENOSCOPY);  Surgeon: Sim Quinteros MD;  Location: [facility];  Service: Endoscopy;  Laterality: N/A;    HYSTERECTOMY      LAPAROSCOPIC NISSEN FUNDOPLICATION      UPPER GASTROINTESTINAL ENDOSCOPY N/A 2018     Family History   Family history unknown: Yes     Social History     Tobacco Use    Smoking status: Former Smoker     Types: Cigarettes     Quit date:      Years since quittin.3    Smokeless tobacco: Never Used   Substance Use Topics    Alcohol use: No    Drug use: No     Review of Systems   Constitutional: Negative for chills and fever.   HENT: Positive for rhinorrhea. Negative for congestion.    Eyes: Negative for photophobia and visual disturbance.   Respiratory: Positive for cough (dry) and shortness of breath.    Cardiovascular: Negative for chest pain and leg swelling.   Gastrointestinal: Positive for abdominal distention, abdominal pain and diarrhea. Negative for constipation, nausea and vomiting.   Genitourinary: Negative for dysuria and hematuria.   Musculoskeletal: Negative for back pain.   Skin: Negative for rash and wound.   Neurological: Positive for light-headedness and headaches. Negative for dizziness, syncope, speech difficulty and weakness.   Psychiatric/Behavioral: Negative for confusion.       Physical Exam     Initial Vitals [22 1112]   BP Pulse Resp Temp SpO2   (!) 152/80 93 (!) 24 98.2 °F (36.8 °C) 100 %      MAP       --          Physical Exam    Nursing note and vitals reviewed.  Constitutional: She appears well-developed and well-nourished. She is not diaphoretic. No distress.   Appears uncomfortable secondary to pain   HENT:   Head: Normocephalic and atraumatic.   Neck: Neck supple.   Normal range of motion.  Cardiovascular: Normal rate, regular rhythm and normal heart sounds. Exam reveals no gallop and no friction rub.    No murmur heard.  No LE edema   Pulmonary/Chest: Breath sounds normal. She has no wheezes. She has no rhonchi. She has no rales.   Abdominal: Abdomen is soft. Bowel sounds are normal. She exhibits distension. There is abdominal tenderness. There is no rebound and no guarding.   Musculoskeletal:         General: Normal range of motion.      Cervical back: Normal range of motion and neck supple.     Neurological: She is alert and oriented to person, place, and time.   Skin: Skin is warm and dry. No rash noted. No erythema.   Psychiatric: She has a normal mood and affect.         ED Course   Procedures  Labs Reviewed   CBC W/ AUTO DIFFERENTIAL - Abnormal; Notable for the following components:       Result Value    WBC 16.51 (*)     RBC 3.83 (*)     Hemoglobin 10.7 (*)     Hematocrit 34.0 (*)     MCHC 31.5 (*)     RDW 16.8 (*)     Immature Granulocytes 1.1 (*)     Gran # (ANC) 14.0 (*)     Immature Grans (Abs) 0.18 (*)     Gran % 84.8 (*)     Lymph % 9.2 (*)     All other components within normal limits   COMPREHENSIVE METABOLIC PANEL - Abnormal; Notable for the following components:    CO2 30 (*)     Total Protein 5.6 (*)     Albumin 3.1 (*)     All other components within normal limits   URINALYSIS, REFLEX TO URINE CULTURE - Abnormal; Notable for the following components:    pH, UA >8.0 (*)     All other components within normal limits    Narrative:     Specimen Source->Urine   LIPASE   HIV 1 / 2 ANTIBODY   HEPATITIS C ANTIBODY          Imaging Results          X-Ray Chest PA And Lateral (Final result)  Result time  05/01/22 13:16:04    Final result by Reed Vasquez DO (05/01/22 13:16:04)                 Impression:      See above      Electronically signed by: Reed Vasquez DO  Date:    05/01/2022  Time:    13:16             Narrative:    EXAMINATION:  XR CHEST PA AND LATERAL    CLINICAL HISTORY:  shortness of breath;    TECHNIQUE:  PA and lateral views of the chest were performed.    COMPARISON:  04/26/2022    FINDINGS:  There is few coarse interstitial opacities within the lower lobes bilaterally may represent scarring or atelectasis similar to prior.  Trace blunting of the costophrenic angles concerning for scarring versus small effusions.  There is no pneumothorax.  Continued atherosclerotic aorta.  No large lung consolidation.  Degenerative change in the visualized spine.                                CT Abdomen Pelvis With Contrast (Final result)  Result time 05/01/22 13:05:50    Final result by Bassem Irving MD (05/01/22 13:05:50)                 Impression:      This report was flagged in Epic as abnormal.    1. Induration involving the anterior abdominal wall, correlation with any superficial cellulitis recommended.  No focal organized subcutaneous fluid collection.  2. Trace bilateral pleural effusions.  3. Findings suggesting hepatic steatosis, correlation with LFTs recommended.  4. Small focus of disorganized fluid/volume averaging along the lateral aspect of the cecum, could reflect reactive change, no adjacent bowel wall thickening.  Differential would include possible fat infarct.  5. Please see above for additional findings.      Electronically signed by: Bassem Irving MD  Date:    05/01/2022  Time:    13:05             Narrative:    EXAMINATION:  CT ABDOMEN PELVIS WITH CONTRAST    CLINICAL HISTORY:  Abdominal pain, acute, nonlocalized;    TECHNIQUE:  Low dose axial images, sagittal and coronal reformations were obtained from the lung bases to the pubic symphysis following the IV administration of  100 mL of Omnipaque 350 .  Oral contrast was not given.    COMPARISON:  CT 04/12/2021    FINDINGS:  Images of the lower thorax are remarkable for minimal bilateral pleural effusions with associated compressive atelectasis of the bilateral lower lobes.    The liver is mildly hypoattenuating, possibly reflecting steatosis, correlation with LFTs recommended.  The spleen, pancreas, gallbladder and adrenal glands are grossly unremarkable.  There is a small hiatal hernia.  There is no biliary dilation or ascites.  The portal vein, splenic vein, SMV, celiac axis and SMA all are patent.  No significant abdominal lymphadenopathy.    The kidneys enhance symmetrically without hydronephrosis or nephrolithiasis.  There is a low attenuating lesion within the interpolar region of the left kidney measuring 1.5 cm, attenuation of which suggests cyst.  The bilateral ureters are unremarkable without calculi seen.  The urinary bladder is unremarkable.  The uterus is absent the adnexa is unremarkable.    The large bowel is decompressed.  The terminal ileum is unremarkable.  The appendix is not identified, no pericecal inflammation.  There is a trace focus of fluid along the lateral aspect of the cecum, nonspecific.  The small bowel is grossly unremarkable.  There are a few scattered shotty periaortic and paracaval lymph nodes.  There is atherosclerotic calcification of the aorta and its branches.  No focal organized pelvic fluid collection.  There is diastasis of the rectus abdominus musculature, bowel loops protrude into the defect without inflammation.    There is a sclerotic focus within the anterior aspect of the right acetabulum suggesting bone island.  There is osteopenia.  Degenerative changes are noted of the spine.  No significant inguinal lymphadenopathy.    There is induration involving the anterior abdominal wall, particularly along the caudal aspect.  Correlation with any superficial cellulitis recommended.                                  Medications   ondansetron injection 4 mg (4 mg Intravenous Given 5/1/22 1227)   morphine injection 6 mg (6 mg Intravenous Given 5/1/22 1227)   sodium chloride 0.9% bolus 1,000 mL (1,000 mLs Intravenous New Bag 5/1/22 1226)   iohexoL (OMNIPAQUE 350) injection 100 mL (100 mLs Intravenous Given 5/1/22 1256)     Medical Decision Making:   History:   Old Medical Records: I decided to obtain old medical records.  Old Records Summarized: records from clinic visits and records from previous admission(s).       <> Summary of Records: Recently admitted for COPD exacerbation - discharged on Friday.   Clinical Tests:   Lab Tests: Ordered and Reviewed  Radiological Study: Ordered and Reviewed  Medical Tests: Reviewed and Ordered       APC / Resident Notes:   62 year old female with history of hypertension, COPD, GERD presents to the ED complaining of abdominal pain that started last night.  VSS. RRR. Lungs clear. Abdomen distended, soft, tender in the lower abdomen. No LE edema. DDx includes but is not limited to SBO, colitis, pancreatitis, UTI, pneumonia, gastritis. Will get labs, CT abdomen/pelvis, CXR.    UA with no signs of infection.  There is some mild leukocytosis, likely secondary to steroid use.  C Chronic anemia.  CMP with no acute abnormalities.  Lipase normal.    Chest x-ray independently reviewed, no acute abnormalities.  CT abdomen pelvis shows possible induration on the anterior abdominal wall, she has bruising from a Lovenox injections while admitted and I think this is what they were seeing on CT.  There is a small disorganized focus of fluid along the cecum, no bowel wall thickening, possible fat infarct.  This could be the cause of the patient's pain.    Symptoms have improved with treatment given in the ED.    She was discharged with prescriptions for zofran and levsin.  She will follow up with her PCP.  Strict ED return precautions given.  All of the patient's questions were answered.  I  reviewed the patient's chart, labs, and imaging and discussed the case with my supervising physician.                    Clinical Impression:   Final diagnoses:  [R06.02] Shortness of breath  [R10.9] Abdominal pain, unspecified abdominal location (Primary)          ED Disposition Condition    Discharge Stable        ED Prescriptions     Medication Sig Dispense Start Date End Date Auth. Provider    ondansetron (ZOFRAN-ODT) 4 MG TbDL Take 1 tablet (4 mg total) by mouth every 8 (eight) hours as needed (nausea). 12 tablet 5/1/2022  Doris Beach PA-C    hyoscyamine (LEVSIN/SL) 0.125 mg Subl Place 1 tablet (0.125 mg total) under the tongue every 6 (six) hours as needed (abdominal pain). 12 tablet 5/1/2022  Doris Beach PA-C        Follow-up Information     Follow up With Specialties Details Why Contact Info    Marcio Calderon MD Family Medicine Schedule an appointment as soon as possible for a visit   [address] [place] [address]  [phone]             Doris Beach PA-C  05/01/22 2038

## 2022-05-01 NOTE — ED TRIAGE NOTES
The patient is a 62-year-old female who presents to the ED via personal transportation from home. Her daughter is at bedside. The patient's chief complaint is lower quadrant pain that is radiating to her lower extremities, continuous 10/10 stabbing pain.

## 2022-05-02 ENCOUNTER — PATIENT OUTREACH (OUTPATIENT)
Dept: ADMINISTRATIVE | Facility: OTHER | Age: 62
End: 2022-05-02
Payer: MEDICAID

## 2022-05-02 ENCOUNTER — OFFICE VISIT (OUTPATIENT)
Dept: PRIMARY CARE CLINIC | Facility: CLINIC | Age: 62
End: 2022-05-02
Payer: MEDICARE

## 2022-05-02 VITALS
WEIGHT: 211.56 LBS | HEART RATE: 97 BPM | TEMPERATURE: 98 F | DIASTOLIC BLOOD PRESSURE: 82 MMHG | OXYGEN SATURATION: 99 % | SYSTOLIC BLOOD PRESSURE: 158 MMHG | BODY MASS INDEX: 38.93 KG/M2 | HEIGHT: 62 IN | RESPIRATION RATE: 16 BRPM

## 2022-05-02 DIAGNOSIS — Z11.59 NEED FOR HEPATITIS C SCREENING TEST: ICD-10-CM

## 2022-05-02 DIAGNOSIS — R31.9 HEMATURIA, UNSPECIFIED TYPE: ICD-10-CM

## 2022-05-02 DIAGNOSIS — R10.11 ABDOMINAL PAIN, RIGHT UPPER QUADRANT: ICD-10-CM

## 2022-05-02 DIAGNOSIS — Z98.890 HISTORY OF FUNDOPLICATION: ICD-10-CM

## 2022-05-02 DIAGNOSIS — R13.10 DYSPHAGIA, UNSPECIFIED TYPE: Primary | ICD-10-CM

## 2022-05-02 DIAGNOSIS — R74.8 ELEVATED LIPASE: ICD-10-CM

## 2022-05-02 DIAGNOSIS — Z12.31 SCREENING MAMMOGRAM, ENCOUNTER FOR: ICD-10-CM

## 2022-05-02 DIAGNOSIS — Z86.010 HISTORY OF COLONIC POLYPS: ICD-10-CM

## 2022-05-02 DIAGNOSIS — Z87.19 HISTORY OF GASTROESOPHAGEAL REFLUX (GERD): ICD-10-CM

## 2022-05-02 DIAGNOSIS — R10.2 SUPRAPUBIC PAIN: ICD-10-CM

## 2022-05-02 DIAGNOSIS — Z11.4 ENCOUNTER FOR SCREENING FOR HIV: ICD-10-CM

## 2022-05-02 DIAGNOSIS — R10.31 ABDOMINAL PAIN, RIGHT LOWER QUADRANT: ICD-10-CM

## 2022-05-02 PROCEDURE — 99999 PR PBB SHADOW E&M-EST. PATIENT-LVL V: CPT | Mod: PBBFAC,,, | Performed by: FAMILY MEDICINE

## 2022-05-02 PROCEDURE — 1111F DSCHRG MED/CURRENT MED MERGE: CPT | Mod: CPTII,S$GLB,, | Performed by: FAMILY MEDICINE

## 2022-05-02 PROCEDURE — 1111F PR DISCHARGE MEDS RECONCILED W/ CURRENT OUTPATIENT MED LIST: ICD-10-PCS | Mod: CPTII,S$GLB,, | Performed by: FAMILY MEDICINE

## 2022-05-02 PROCEDURE — 99214 PR OFFICE/OUTPT VISIT, EST, LEVL IV, 30-39 MIN: ICD-10-PCS | Mod: S$GLB,,, | Performed by: FAMILY MEDICINE

## 2022-05-02 PROCEDURE — 99214 OFFICE O/P EST MOD 30 MIN: CPT | Mod: S$GLB,,, | Performed by: FAMILY MEDICINE

## 2022-05-02 PROCEDURE — 99215 OFFICE O/P EST HI 40 MIN: CPT | Mod: PBBFAC,PN | Performed by: FAMILY MEDICINE

## 2022-05-02 PROCEDURE — 99999 PR PBB SHADOW E&M-EST. PATIENT-LVL V: ICD-10-PCS | Mod: PBBFAC,,, | Performed by: FAMILY MEDICINE

## 2022-05-02 PROCEDURE — 87086 URINE CULTURE/COLONY COUNT: CPT | Performed by: FAMILY MEDICINE

## 2022-05-02 RX ORDER — PROMETHAZINE HYDROCHLORIDE AND DEXTROMETHORPHAN HYDROBROMIDE 6.25; 15 MG/5ML; MG/5ML
5 SYRUP ORAL EVERY 6 HOURS PRN
Qty: 180 ML | Refills: 1 | Status: SHIPPED | OUTPATIENT
Start: 2022-05-02 | End: 2022-06-07

## 2022-05-02 RX ORDER — HYDROCODONE BITARTRATE AND ACETAMINOPHEN 5; 325 MG/1; MG/1
1 TABLET ORAL EVERY 6 HOURS PRN
Qty: 30 TABLET | Refills: 0 | Status: SHIPPED | OUTPATIENT
Start: 2022-05-02 | End: 2022-05-11 | Stop reason: SDUPTHER

## 2022-05-02 RX ORDER — CIPROFLOXACIN 500 MG/1
500 TABLET ORAL 2 TIMES DAILY
Qty: 14 TABLET | Refills: 0 | Status: SHIPPED | OUTPATIENT
Start: 2022-05-02 | End: 2022-05-02

## 2022-05-02 RX ORDER — CIPROFLOXACIN 500 MG/1
500 TABLET ORAL 2 TIMES DAILY
Qty: 20 TABLET | Refills: 0 | Status: SHIPPED | OUTPATIENT
Start: 2022-05-02 | End: 2022-05-11

## 2022-05-02 NOTE — PROGRESS NOTES
LINKS immunization registry updated  Care Everywhere updated  Health Maintenance updated  Chart reviewed for overdue Proactive [facility] Encounters ([facility]) health maintenance testing (CRS, Breast Ca, Diabetic Eye Exam)   Orders entered:N/A

## 2022-05-02 NOTE — ASSESSMENT & PLAN NOTE
· Satting well on room air, no longer visibly dyspneic when speaking  · Just had a 3 day admission at [facility] for a COPD exacerbation, treated with scheduled breathing treatments, Prednisone, and antibiotics x 3 days  · Solumedrol 125mg IV x 1 with Prednisone 60mg PO daily to complete a 5 day course  · Duonebs q6h while awake and prn with IS  · Guaifenesin and Codeine cough syrup prn cough  · Lower suspicion for PE given CTA 4/23 was negative for PE and she is maintaining sats ORA  · Bilateral LE US negative for DVT  · Echo without evidence of CHF or WMA  · Resume home COPD regimen with added spiriva

## 2022-05-02 NOTE — DISCHARGE SUMMARY
[facility] [address]  [facility] Medicine  Discharge Summary      Patient Name: Wilfredo Vazquez  MRN: [id number]  Patient Class: IP- Inpatient  Admission Date: 4/26/2022  Hospital Length of Stay: 1 days  Discharge Date and Time: 4/29/2022  4:23 PM  Attending Physician: Juanita att. providers found   Discharging Provider: Allie Hartley PA-C  Primary Care Provider: Marcio Calderon MD  [facility] Medicine Team: [facility] HOSP MED F Allie Hartley PA-C    HPI:   Ms. Wilfredo Vazquez is a 62 y.o. female with chronic respiratory failure 2/2 COPD, who presents to the ER for evaluation of shortness of breath.  She was just admitted to [facility] from 4/23-4/26 for a COPD exacerbation.  She was started on antibiotics, Prednisone, and scheduled breathing treatments.  CTA was negative for a PE, pleural effusion, or consolidation.  During her admission, her condition improved.  She was able to be weaned off oxygen, and didn't qualify for home oxygen after passing a 6 minute walk test.  She was discharged home with 3 days of Prednisone.  Once she arrived home, she felt like her breathing worsened.  She endorses a persistent cough productive of green sputum.  She feels short winded even at rest.  She reports she has a home breathing machine, but does not know if its CPAP or BIPAP.  She has an Albuterol inhaler, but no controller medications.  She has had some chest pain, but denies any fever or chills, leg swelling.    Upon arrival to the ER, vitals were temp 98F, , /79.  CXR was negative.  VBG showed pH 7.42, CO2 53.  She was continue a breathing treatment and IV Solumedrol.  As she was still visibly dyspneic when speaking, she was admitted to Hospital Medicine for further management.      * No surgery found *      Hospital Course:   Patient admitted for COPD exacerbation. Maintaining sats ORA, but dyspneic. CXR without acute abnormalities. VBG reassuring. Given IV solumedrol, started on prednisone  and Spiriva added to regimen. Troponin 0.765, repeat x2 flat. No complaints of CP or concerning signs of ACS. Likely due to reversible cardiac ischemia caused by severe COPD exacerbation. D-dimer 0.78, CTA from recent hospitalization negative for PE. Bilateral lower extremity US negative for DVT. Echo without evidence of CHF or WMA. Shortness of breath significantly improving, but not resolved. Continues to maintain sats on room air. Educated patient that she will continue to experience SOB due to her condition. Stable for discharge. Referral sent to COPD clinic and [facility] due to risk of readmission. Prescriptions delivered to beside. Return precautions given and patient verbalized understanding.        Goals of Care Treatment Preferences:  Code Status: Full Code      Consults:     * Acute on chronic respiratory failure with hypoxia and hypercapnia  · Satting well on room air, no longer visibly dyspneic when speaking  · Just had a 3 day admission at [facility] for a COPD exacerbation, treated with scheduled breathing treatments, Prednisone, and antibiotics x 3 days  · Solumedrol 125mg IV x 1 with Prednisone 60mg PO daily to complete a 5 day course  · Duonebs q6h while awake and prn with IS  · Guaifenesin and Codeine cough syrup prn cough  · Lower suspicion for PE given CTA 4/23 was negative for PE and she is maintaining sats ORA  · Bilateral LE US negative for DVT  · Echo without evidence of CHF or WMA  · Resume home COPD regimen with added spiriva    Bipolar affective disorder, remission status unspecified  · Chronic and stable  · Continue Seroquel 200mg PO qHS      History of gastroesophageal reflux (GERD)  · Chronic and stable  · Continue PPI      COPD (chronic obstructive pulmonary disease)  · As above      Obesity (BMI 35.0-39.9 without comorbidity)  · Body mass index is 35.63 kg/m².   · Morbid obesity complicates all aspects of disease management from diagnostic modalities to treatment.    · Weight loss encouraged and health benefits explained to patient.         Hypertension  · Chronic and stable  · Continue Lisinopril 5mg PO daily        Final Active Diagnoses:    Diagnosis Date Noted POA    PRINCIPAL PROBLEM:  Acute on chronic respiratory failure with hypoxia and hypercapnia [J96.21, J96.22] 04/26/2022 Yes    Bipolar affective disorder, remission status unspecified [F31.9] 03/03/2022 Yes    COPD (chronic obstructive pulmonary disease) [J44.9] 12/21/2020 Yes    History of gastroesophageal reflux (GERD) [Z87.19] 12/21/2020 Not Applicable    Obesity (BMI 35.0-39.9 without comorbidity) [E66.9] 09/19/2018 Yes    Hypertension [I10] 07/13/2018 Yes      Problems Resolved During this Admission:       Discharged Condition: fair    Disposition: Home or Self Care    Follow Up:    Patient Instructions:      Ambulatory referral/consult to Pulmonology   Standing Status: Future   Referral Priority: Urgent Referral Type: Consultation   Referral Reason: Specialty Services Required   Requested Specialty: Pulmonary Disease   Number of Visits Requested: 1     Ambulatory referral/consult to [facility]   Standing Status: Future   Referral Priority: Routine Referral Type: Consultation   Referral Reason: Specialty Services Required   Number of Visits Requested: 1     Ambulatory referral/consult to COPD Discharge Clinic   Standing Status: Future   Referral Priority: Urgent Referral Type: Consultation   Referral Reason: Specialty Services Required   Requested Specialty: Pulmonary Disease   Number of Visits Requested: 1     Diet Adult Regular     Notify your health care provider if you experience any of the following:  temperature >100.4     Notify your health care provider if you experience any of the following:  difficulty breathing or increased cough     Activity as tolerated       Significant Diagnostic Studies:   XR CHEST AP PORTABLE     CLINICAL  HISTORY:  Asthma;     TECHNIQUE:  Single frontal view of the chest was performed.     COMPARISON:  04/23/2022.     FINDINGS:  Monitoring EKG leads are present.  The cardiomediastinal silhouette is unchanged.  There is no evidence of free air beneath the hemidiaphragms.  There are no pleural effusions.  There is no evidence of a pneumothorax.  There is no evidence of pneumomediastinum.  No airspace opacity is present.  The osseous structures demonstrate degenerative changes.     Impression:     No acute process.       Pending Diagnostic Studies:     Procedure Component Value Units Date/Time    HIV 1/2 Ag/Ab (4th Gen) [[id number]] Collected: 04/26/22 2055    Order Status: Sent Lab Status: In process Updated: 04/26/22 2103    Specimen: Blood     Hepatitis C Antibody [[id number]] Collected: 04/26/22 2055    Order Status: Sent Lab Status: In process Updated: 04/26/22 2103    Specimen: Blood          Medications:  Reconciled Home Medications:      Medication List      START taking these medications    SPIRIVA RESPIMAT 2.5 mcg/actuation inhaler  Generic drug: tiotropium bromide  Inhale 2 puffs into the lungs Daily. Controller        CHANGE how you take these medications    predniSONE 20 MG tablet  Commonly known as: DELTASONE  Take 3 tablets (60 mg total) by mouth once daily. for 3 days  What changed:   · how much to take  · when to take this  · reasons to take this  · Another medication with the same name was removed. Continue taking this medication, and follow the directions you see here.        CONTINUE taking these medications    * albuterol 90 mcg/actuation inhaler  Commonly known as: PROVENTIL/VENTOLIN HFA  Inhale 1-2 puffs into the lungs every 6 (six) hours as needed for Wheezing or Shortness of Breath. Rescue     * albuterol 0.63 mg/3 mL Nebu  Commonly known as: ACCUNEB  Take 3 mLs (0.63 mg total) by nebulization every 6 (six) hours as needed. Rescue     BREZTRI AEROSPHERE 160-9-4.8 mcg/actuation Hfaa  Generic drug:  budesonide-glycopyr-formoterol  Inhale 2 puffs into the lungs 2 (two) times daily.     CHANTIX CONTINUING MONTH BOX 1 mg Tab  Generic drug: varenicline     guaiFENesin-codeine 100-10 mg/5 ml  mg/5 mL syrup  Commonly known as: TUSSI-ORGANIDIN NR  Take 5 mLs by mouth every 6 (six) hours as needed.     lisinopriL 5 MG tablet  Commonly known as: PRINIVIL,ZESTRIL  Take 1 tablet (5 mg total) by mouth once daily.     mupirocin 2 % ointment  Commonly known as: BACTROBAN  Apply topically 3 (three) times daily.     omeprazole 40 MG capsule  Commonly known as: PRILOSEC  TAKE ONE CAPSULE BY MOUTH EVERY DAY     pregabalin 50 MG capsule  Commonly known as: LYRICA  Start with 1 p.o. b.i.d. for neuropathy if no relief can go to t.i.d.     QUEtiapine 200 MG Tab  Commonly known as: SEROQUEL  Take 200 mg by mouth nightly.         * This list has 2 medication(s) that are the same as other medications prescribed for you. Read the directions carefully, and ask your doctor or other care provider to review them with you.            STOP taking these medications    promethazine-codeine 6.25-10 mg/5 ml 6.25-10 mg/5 mL syrup  Commonly known as: PHENERGAN with CODEINE        ASK your doctor about these medications    HYDROcodone-acetaminophen 5-325 mg per tablet  Commonly known as: NORCO  Take 1 tablet by mouth every 8 (eight) hours as needed for Pain.  Ask about: Should I take this medication?            Indwelling Lines/Drains at time of discharge:   Lines/Drains/Airways     None                 Time spent on the discharge of patient: 35 minutes    Discussed patient's plan of care with JOSE StraussC  Department of Hospital Medicine  [facility] - Telemetry Stepdown ([place]-)

## 2022-05-02 NOTE — PROGRESS NOTES
Subjective:       Patient ID: Wilfredo Vazquez is a 62 y.o. female.    Chief Complaint: Follow-up, COPD, and Abdominal Pain (Lower right side)  HPI--61 yo BF-patient was admitted 04/23/2022 until 04/26/2022 at same but are [facility]--COPD,HTN GERD.  Patient went back to the emergency room Saturday morning 04/30/2022--abdominal pain right lower quadrant and ran down the right leg--seen main campus.  Ran test told white cells were high may indicate some inflammation, Txed with medication--norco for pain . Sat txed morphine .       Still with pain in the right groin area radiating down the right anterior thigh.       History appendectomy hysterectomy        ROS:  Skin: no psoriasis, eczema, skin cancer-  HEENT: no headache,  No ocular pain, blurred vision, diplopia, epistaxis, hoarseness change in voice, thyroid trouble  Lung: No pneumonia, +asthma, no Tb, +_wheezing,+ SOB, no smoking + COPD   Heart: No chest pain, ankle edema, palpitations, MI, patti murmur, +hypertension,no  hyperlipidemia no stent bypass arrhythmia  Abdomen: no nausea,no  Vomiting,no diarrhea, no  constipation, ulcers, hepatitis, gallbladder disease, melena, hematochezia, hematemesis +dysphagia history of fundoplication for GERD history of esophageal dilatation x2Dr Beary doing better--swallowing much better  : no UTI, renal disease, stones  GYN hyst   MS: no fractures, O/A, lupus, rheumatoid, gout--pain in hands an arm  Neuro: No dizziness, LOC, seizures   No diabetes, no anemia, no anxiety, no depression   Single--2 children--disable secondary to a stomach--lives with daughter and grandson    Objective:   Physical Exam:  General: Well nourished, well developed, no acute distress +obesity  Skin: No lesions  HEENT: Eyes PERRLA, EOM intact, nose patent, throat non erythematous ears TMs clear   NECK: Supple, no bruits, No JVD, no nodes  Lungs: Clear, no rales, rhonchi, wheezing decreased BS but clear   Heart: Regular rate and rhythm, no murmur  s, gallops, or rubs  Abdomen: pain right lower quad--right upper quad--suprapubic area---+guarding --+rebound--no organomegaly  MS muscle strength range of motion intact at this time  Neuro: Alert, CN intact, oriented X 3  Extremities: No cyanosis, clubbing, or edema         Assessment:       1. Dysphagia, unspecified type    2. Abdominal pain, right upper quadrant    3. Abdominal pain, right lower quadrant    4. Suprapubic pain    5. Elevated lipase    6. History of fundoplication    7. History of colonic polyps    8. History of gastroesophageal reflux (GERD)    9. Need for hepatitis C screening test    10. Encounter for screening for HIV    11. Screening mammogram, encounter for    12. Hematuria, unspecified type        Plan:           Main Reason for Visit--  Abdominal pain right lower quadrant/right upper quadrant/suprapubic area--poct U/A Cipro 500 bid x 10 days --see general surgery --may need colonoscopy  Recently seen [facility]--CBC WBC 16.5 --but on steroids/ Hct 34Lipiase 86 EKG ?? Junctional rhythm but has P waves notched pt with hx COPD   Hypertension blood pressure 168/82 but patient pain--redo BP 2 weeks if still high increase BP meds--pain radiates down right leg Xray LS spine and right hip    COPD states inhalers were changed Breo Breztri spiriva --other medication diclofenac 75 b.i.d. omeprazole 40 q.day lisinopril 5 q.d.lyrica --in hospital was on [place].  bid -  History dysphagia--difficulty swallowing water has to eat small amounts of food--history esophageal dilitation   Lab CBC CMP TSH lipase --Urine blood in urine son urine culture sent  Maintenance hepatitis C HIV shingles mammogram flu  If pain gets worse go to the emergency room

## 2022-05-03 ENCOUNTER — OFFICE VISIT (OUTPATIENT)
Dept: SURGERY | Facility: CLINIC | Age: 62
End: 2022-05-03
Payer: MEDICARE

## 2022-05-03 ENCOUNTER — TELEPHONE (OUTPATIENT)
Dept: PRIMARY CARE CLINIC | Facility: CLINIC | Age: 62
End: 2022-05-03
Payer: MEDICAID

## 2022-05-03 VITALS
OXYGEN SATURATION: 98 % | DIASTOLIC BLOOD PRESSURE: 96 MMHG | HEIGHT: 62 IN | WEIGHT: 206.44 LBS | SYSTOLIC BLOOD PRESSURE: 150 MMHG | BODY MASS INDEX: 37.99 KG/M2 | HEART RATE: 84 BPM

## 2022-05-03 DIAGNOSIS — R10.11 ABDOMINAL PAIN, RIGHT UPPER QUADRANT: ICD-10-CM

## 2022-05-03 DIAGNOSIS — R10.31 ABDOMINAL PAIN, RIGHT LOWER QUADRANT: ICD-10-CM

## 2022-05-03 PROCEDURE — 3008F PR BODY MASS INDEX (BMI) DOCUMENTED: ICD-10-PCS | Mod: CPTII,S$GLB,, | Performed by: SURGERY

## 2022-05-03 PROCEDURE — 1159F PR MEDICATION LIST DOCUMENTED IN MEDICAL RECORD: ICD-10-PCS | Mod: CPTII,S$GLB,, | Performed by: SURGERY

## 2022-05-03 PROCEDURE — 1160F RVW MEDS BY RX/DR IN RCRD: CPT | Mod: CPTII,S$GLB,, | Performed by: SURGERY

## 2022-05-03 PROCEDURE — 99204 OFFICE O/P NEW MOD 45 MIN: CPT | Mod: S$GLB,,, | Performed by: SURGERY

## 2022-05-03 PROCEDURE — 3077F SYST BP >= 140 MM HG: CPT | Mod: CPTII,S$GLB,, | Performed by: SURGERY

## 2022-05-03 PROCEDURE — 99213 OFFICE O/P EST LOW 20 MIN: CPT | Mod: PBBFAC,PN | Performed by: SURGERY

## 2022-05-03 PROCEDURE — 3080F DIAST BP >= 90 MM HG: CPT | Mod: CPTII,S$GLB,, | Performed by: SURGERY

## 2022-05-03 PROCEDURE — 99204 PR OFFICE/OUTPT VISIT, NEW, LEVL IV, 45-59 MIN: ICD-10-PCS | Mod: S$GLB,,, | Performed by: SURGERY

## 2022-05-03 PROCEDURE — 1159F MED LIST DOCD IN RCRD: CPT | Mod: CPTII,S$GLB,, | Performed by: SURGERY

## 2022-05-03 PROCEDURE — 99999 PR PBB SHADOW E&M-EST. PATIENT-LVL III: ICD-10-PCS | Mod: PBBFAC,,, | Performed by: SURGERY

## 2022-05-03 PROCEDURE — 99999 PR PBB SHADOW E&M-EST. PATIENT-LVL III: CPT | Mod: PBBFAC,,, | Performed by: SURGERY

## 2022-05-03 PROCEDURE — 1111F DSCHRG MED/CURRENT MED MERGE: CPT | Mod: CPTII,S$GLB,, | Performed by: SURGERY

## 2022-05-03 PROCEDURE — 3077F PR MOST RECENT SYSTOLIC BLOOD PRESSURE >= 140 MM HG: ICD-10-PCS | Mod: CPTII,S$GLB,, | Performed by: SURGERY

## 2022-05-03 PROCEDURE — 3008F BODY MASS INDEX DOCD: CPT | Mod: CPTII,S$GLB,, | Performed by: SURGERY

## 2022-05-03 PROCEDURE — 4010F ACE/ARB THERAPY RXD/TAKEN: CPT | Mod: CPTII,S$GLB,, | Performed by: SURGERY

## 2022-05-03 PROCEDURE — 1111F PR DISCHARGE MEDS RECONCILED W/ CURRENT OUTPATIENT MED LIST: ICD-10-PCS | Mod: CPTII,S$GLB,, | Performed by: SURGERY

## 2022-05-03 PROCEDURE — 1160F PR REVIEW ALL MEDS BY PRESCRIBER/CLIN PHARMACIST DOCUMENTED: ICD-10-PCS | Mod: CPTII,S$GLB,, | Performed by: SURGERY

## 2022-05-03 PROCEDURE — 3080F PR MOST RECENT DIASTOLIC BLOOD PRESSURE >= 90 MM HG: ICD-10-PCS | Mod: CPTII,S$GLB,, | Performed by: SURGERY

## 2022-05-03 PROCEDURE — 4010F PR ACE/ARB THEARPY RXD/TAKEN: ICD-10-PCS | Mod: CPTII,S$GLB,, | Performed by: SURGERY

## 2022-05-03 NOTE — TELEPHONE ENCOUNTER
----- Message from Kay Box MA sent at 5/2/2022  5:06 PM CDT -----  Contact: [facility]    ----- Message -----  From: Kathy Del Angel  Sent: 5/2/2022   4:15 PM CDT  To: Yum[facility] Buckley Staff    [facility] called, needing clarification on ciprofloxacin HCl (CIPRO) 500 MG tablet . Please call and advise. [facility] [place] - [address]   Phone:  [phone] Fax:  [phone]  Thank you.

## 2022-05-04 ENCOUNTER — PES CALL (OUTPATIENT)
Dept: ADMINISTRATIVE | Facility: CLINIC | Age: 62
End: 2022-05-04
Payer: MEDICAID

## 2022-05-04 LAB
BACTERIA UR CULT: NORMAL
BACTERIA UR CULT: NORMAL
HCV AB SERPL QL IA: NEGATIVE
HIV 1+2 AB+HIV1 P24 AG SERPL QL IA: NEGATIVE

## 2022-05-05 ENCOUNTER — TELEPHONE (OUTPATIENT)
Dept: ORTHOPEDICS | Facility: CLINIC | Age: 62
End: 2022-05-05
Payer: MEDICAID

## 2022-05-05 ENCOUNTER — TELEPHONE (OUTPATIENT)
Dept: PRIMARY CARE CLINIC | Facility: CLINIC | Age: 62
End: 2022-05-05
Payer: MEDICAID

## 2022-05-05 ENCOUNTER — TELEPHONE (OUTPATIENT)
Dept: SURGERY | Facility: CLINIC | Age: 62
End: 2022-05-05
Payer: MEDICAID

## 2022-05-05 DIAGNOSIS — M20.012 MALLET FINGER OF LEFT HAND: Primary | ICD-10-CM

## 2022-05-05 DIAGNOSIS — Z12.11 SCREENING FOR COLON CANCER: Primary | ICD-10-CM

## 2022-05-05 RX ORDER — SODIUM, POTASSIUM,MAG SULFATES 17.5-3.13G
1 SOLUTION, RECONSTITUTED, ORAL ORAL DAILY
Qty: 1 KIT | Refills: 0 | Status: SHIPPED | OUTPATIENT
Start: 2022-05-05 | End: 2022-05-07

## 2022-05-05 RX ORDER — SODIUM CHLORIDE 0.9 % (FLUSH) 0.9 %
3 SYRINGE (ML) INJECTION
Status: CANCELLED | OUTPATIENT
Start: 2022-05-05

## 2022-05-05 NOTE — TELEPHONE ENCOUNTER
This patient called post visit from the [facility], requesting to be scheduled for a Colonoscopy. The patient's AYANA reflects the patient has never had a Colonoscopy, which was also confirmed verbally by the patient is scheduled to have a Colonoscopy on the date of the patient's request. 06/27/2023. The following information with this patienthe following information was explained,and discussed with this patient.    Patient was advised a designated  is required on the day of the Colonoscopy to drive the patient home and the  must be at least. 18 years old.Colonoscopy Prep instructions were thoroughly explained and discussed with the patient.   It was emphasized, and reiterated to the patient, not to follow the prep instructions that comes with the Prep Kit. However, to please follow the prep instructions that will be received in the mail from the [facility] L[facility]   Patient acknowledges understanding Prep instructions as explained and discussed on the phone.. Patient was advised the Colonoscopy Prep instructions discussed and explained on the phone,are being mailed out to the patient's verified address on file. Patient's address on file was verified with the patient for accuracy of mailing. Patient's medications on file was reviewed with the patient for accuracy of information. Patient denies taking  any other medications other than those listed and verified on medication profile.Patient was explained the Colonoscopy will be performed here at [facility]. Patient was further explained the Pre-Op will call one day prior to the procedure date, to discuss Pre-Op instructions;and what time to report for the Colonoscopy. The patient was given the opportunity to ask any questions about the Colonoscopy.     Bowel Prep/SUPREP instructions                                                  [facility]    [address] [place] [address]      You are scheduled for a Colonoscopy with  _______________________ on ___06/27/2022_________________   At [facility] in [place].    Check in at the hospital on 1st floor Registration area next to Emergency room.    Please call [phone] to reschedule or if you have any questions.    An adult friend/family member must come with you to drive you home.  You cannot drive, take a taxi, Uber/Lyft or bus to leave the Hospital alone. If you do not have someone with you to drive you home, your test will be cancelled.       Please follow the directions of your doctor if you take any pills that thin your blood.  If you take these meds: Aggrenox, Brilinta, Effient, Eliquis, Lovenox, Plavix, Pletal Pradaxa ticilid, Xarelto, or Coumadin, let the doctor's office know.    Don't: On the morning of the test do not take insulin or pills for diabetes.   Do: On the morning of the test, do take any pills for blood pressure, heart, anti-rejection and or seizures with a small sip of water. Bring any inhalers with you day of procedure.    To have a good prep, you must follow these instructions- please do not use the directions from the pharmacy!      The doctor will send a prescription for the SUPREP   The day Before the test:   You can only drink CLEAR LIQUIDS the whole day before your test. You can't eat any food for the whole day.    You CAN have:   Water,Coffee or decaf coffee (no milk or cream)    Tea   Soft drinks- regular and sugar free   Jell-O (green or yellow)   Apple Juice, grape juice, white cranberry juice   Gatorade, Power Aid, Crystal Light, Armen Aid   Lemonade and Limeade   Bouillon, clear soup   Snowball, popsicles   YOU CAN'T DRINK ANYTHING RED   YOU CAN'T DRINK ALCOHOL   ONLY DRINK WHAT IS ON THIS List      At 5pm the night before your test:   Pour the 1st bottle of SUPREP into the cup provided in the box.  Add water to the line on the cup and mix well. Drink the whole cup and then drink 2 more full cups of water over the 1 hour.    This  can be easier to drink if it is cold.  You can mix it 20 minutes ahead of time and place in the refrigerator before you drink it.  You must drink it within 30-45 minutes of mixing it. Do NOT pour the drink over ice. You can drink it with a straw.    The Day of the test- We will call you 2 days before your test to tell you what time to get there.    5 hours before you come to the hospital (this may be the middle of the night)   Pour the 2nd bottle of SUPREP into to the cup provided in the box. Add water to the line on the cup and mix well. Drink the whole cup and then drink 2 more full cups of water over 1 hour.    It might be easier to drink if it is cold. You can mix it 20 minutes ahead of time and place in the refrigerator before you drink it. You must drink it within 30-45 minutes of mixing it. Do NOT pour the drink over ice. You can drink it with a straw.   YOU CAN'T EAT OR DRINK ANYTHING ELSE ONCE YOU FINISH THE PREP.   Leave all valuables and jewelry at home. You will be at the hospital for 2-4 hours.    Call the Endoscopy Scheduling Department at [phone] with any questions about your procedure.    Please  your medication from your local pharmacy. If you are unable to  the SUPREP Kit please contact our office.   Thank you   Endo Scheduling Dept   [facility]

## 2022-05-05 NOTE — TELEPHONE ENCOUNTER
Spoke with pt. Advised pt that her appt would have to be canceled here at [facility] due to Dr Weller leaving [facility]. Advised pt that I would send a message to the triage nurse to get her scheduled with another provider. All questions answered. Pt verbalized understanding.

## 2022-05-05 NOTE — TELEPHONE ENCOUNTER
Called patient and notified her an appointment is needed due to not seeing provider for this issue before. Patient has an appointment on the 11th and will discuss during the visit.

## 2022-05-05 NOTE — TELEPHONE ENCOUNTER
----- Message from Alma Delia Nugent sent at 5/5/2022  2:00 PM CDT -----  Contact: Self   Pt is requesting orders for a head MRI. Please advise

## 2022-05-06 DIAGNOSIS — R52 PAIN: Primary | ICD-10-CM

## 2022-05-09 LAB — HCV AB SERPL QL IA: NEGATIVE

## 2022-05-09 NOTE — PROGRESS NOTES
History & Physical    SUBJECTIVE:     History of Present Illness:  Patient is a 62 y.o. female presents with abdominal pain especially tender to palpation on lower abdomen and right side flank.  She had a recent CT scan which showed some fat stranding and induration along the right anterior abdominal wall in lower abdomen consistent with a superficial cellulitis.  There is no obvious abscess or fluid collection on the CT scan.  Patient denies any fevers chills.  No trauma to the area.    Discussed with patient observation and antibiotics for now.    Chief Complaint   Patient presents with    Abdominal Pain     RUQ       Review of patient's allergies indicates:  No Known Allergies    Current Outpatient Medications   Medication Sig Dispense Refill    albuterol (ACCUNEB) 0.63 mg/3 mL Nebu Take 3 mLs (0.63 mg total) by nebulization every 6 (six) hours as needed. Rescue 75 mL 0    albuterol (PROVENTIL/VENTOLIN HFA) 90 mcg/actuation inhaler Inhale 1-2 puffs into the lungs every 6 (six) hours as needed for Wheezing or Shortness of Breath. Rescue 8.5 g 0    budesonide-glycopyr-formoterol (BREZTRI AEROSPHERE) 160-9-4.8 mcg/actuation HFAA Inhale 2 puffs into the lungs 2 (two) times daily. 10.7 g 11    CHANTIX CONTINUING MONTH BOX 1 mg Tab       ciprofloxacin HCl (CIPRO) 500 MG tablet Take 1 tablet (500 mg total) by mouth 2 (two) times daily. for 10 days 20 tablet 0    guaiFENesin-codeine 100-10 mg/5 ml (TUSSI-ORGANIDIN NR)  mg/5 mL syrup Take 5 mLs by mouth every 6 (six) hours as needed. 180 mL 0    HYDROcodone-acetaminophen (NORCO) 5-325 mg per tablet Take 1 tablet by mouth every 6 (six) hours as needed. 30 tablet 0    hyoscyamine (LEVSIN/SL) 0.125 mg Subl Place 1 tablet (0.125 mg total) under the tongue every 6 (six) hours as needed (abdominal pain). 12 tablet 0    lisinopriL (PRINIVIL,ZESTRIL) 5 MG tablet Take 1 tablet (5 mg total) by mouth once daily. 30 tablet 0    mupirocin (BACTROBAN) 2 % ointment  Apply topically 3 (three) times daily. 30 g 0    omeprazole (PRILOSEC) 40 MG capsule TAKE ONE CAPSULE BY MOUTH EVERY DAY 30 capsule 5    ondansetron (ZOFRAN-ODT) 4 MG TbDL Take 1 tablet (4 mg total) by mouth every 8 (eight) hours as needed (nausea). 12 tablet 0    pregabalin (LYRICA) 50 MG capsule Start with 1 p.o. b.i.d. for neuropathy if no relief can go to t.i.d. 90 capsule 5    promethazine-dextromethorphan (PROMETHAZINE-DM) 6.25-15 mg/5 mL Syrp Take 5 mLs by mouth every 6 (six) hours as needed (cough). 180 mL 1    QUEtiapine (SEROQUEL) 200 MG Tab Take 200 mg by mouth nightly.      tiotropium bromide (SPIRIVA RESPIMAT) 2.5 mcg/actuation inhaler Inhale 2 puffs into the lungs Daily. Controller 4 g 0    dicyclomine (BENTYL) 20 mg tablet Take 1 tablet (20 mg total) by mouth 2 (two) times daily as needed (Abdominal pain). 20 tablet 0     No current facility-administered medications for this visit.     Facility-Administered Medications Ordered in Other Visits   Medication Dose Route Frequency Provider Last Rate Last Admin    lactated ringers infusion   Intravenous Continuous Sim Quinteros MD   Stopped at 12/14/21 0846    sodium chloride 0.9% flush 10 mL  10 mL Intravenous PRN Sim Quinteros MD           Past Medical History:   Diagnosis Date    Abdominal pain 2018    Emphysema lung     GERD (gastroesophageal reflux disease) 2018    Hypertension     Shingles      Past Surgical History:   Procedure Laterality Date    APPENDECTOMY      COLONOSCOPY N/A 5/22/2019    Procedure: COLONOSCOPY;  Surgeon: Sim Quinteros MD;  Location[facility];  Service: Endoscopy;  Laterality: N/A;    COLONOSCOPY W/ POLYPECTOMY      ESOPHAGEAL DILATION N/A 5/13/2019    Procedure: DILATION, ESOPHAGUS;  Surgeon: Sim Quinteros MD;  Location: [facility];  Service: Endoscopy;  Laterality: N/A;    ESOPHAGEAL DILATION N/A 2/9/2021    Procedure: DILATION, ESOPHAGUS;  Surgeon: Sim Quinteros MD;  Location: [facility];  Service:  Endoscopy;  Laterality: N/A;    ESOPHAGEAL DILATION N/A 2021    Procedure: DILATION, ESOPHAGUS;  Surgeon: Sim Quinteros MD;  Location: [facility] ENDO;  Service: Endoscopy;  Laterality: N/A;    ESOPHAGOGASTRODUODENOSCOPY N/A 2019    Procedure: EGD (ESOPHAGOGASTRODUODENOSCOPY);  Surgeon: Sim Quinteros MD;  Location: [facility] ENDO;  Service: Endoscopy;  Laterality: N/A;    ESOPHAGOGASTRODUODENOSCOPY N/A 2021    Procedure: EGD (ESOPHAGOGASTRODUODENOSCOPY);  Surgeon: Sim Quinteros MD;  Location: [facility] ENDO;  Service: Endoscopy;  Laterality: N/A;    ESOPHAGOGASTRODUODENOSCOPY N/A 2021    Procedure: EGD (ESOPHAGOGASTRODUODENOSCOPY);  Surgeon: Sim Quinteros MD;  Location: [facility];  Service: Endoscopy;  Laterality: N/A;    HYSTERECTOMY      LAPAROSCOPIC NISSEN FUNDOPLICATION      UPPER GASTROINTESTINAL ENDOSCOPY N/A 2018     Family History   Family history unknown: Yes     Social History     Tobacco Use    Smoking status: Former Smoker     Types: Cigarettes     Quit date:      Years since quittin.3    Smokeless tobacco: Never Used   Substance Use Topics    Alcohol use: No    Drug use: No        Review of Systems:  Review of Systems   Constitutional: Negative for appetite change, fatigue, fever and unexpected weight change.   HENT: Negative for sore throat and trouble swallowing.    Eyes: Negative.    Respiratory: Negative for cough, shortness of breath and wheezing.    Cardiovascular: Negative for chest pain and leg swelling.   Gastrointestinal: Positive for abdominal pain (Superficial on right lower abdomen). Negative for abdominal distention, blood in stool, constipation, diarrhea, nausea and vomiting.   Endocrine: Negative.    Genitourinary: Negative.    Musculoskeletal: Negative for back pain.   Skin: Negative.  Negative for rash.   Allergic/Immunologic: Negative.    Neurological: Negative.    Hematological: Negative.    Psychiatric/Behavioral: Negative for confusion.  "      OBJECTIVE:     Vital Signs (Most Recent)  Pulse: 84 (05/03/22 1311)  BP: (!) 150/96 (05/03/22 1311)  SpO2: 98 % (05/03/22 1311)  5' 2" (1.575 m)  93.6 kg (206 lb 7.4 oz)     Physical Exam:  Physical Exam  Vitals and nursing note reviewed.   Constitutional:       Appearance: She is well-developed.   HENT:      Head: Normocephalic and atraumatic.   Cardiovascular:      Rate and Rhythm: Normal rate.      Heart sounds: Normal heart sounds.   Pulmonary:      Effort: Pulmonary effort is normal.   Abdominal:      General: Bowel sounds are normal. There is no distension.      Palpations: Abdomen is soft.      Tenderness: There is abdominal tenderness (Right lower abdominal wall).   Musculoskeletal:         General: Normal range of motion.      Cervical back: Normal range of motion.   Skin:     General: Skin is warm and dry.      Capillary Refill: Capillary refill takes less than 2 seconds.   Neurological:      Mental Status: She is alert and oriented to person, place, and time.   Psychiatric:         Behavior: Behavior normal.         Laboratory  CBC: Reviewed  CMP: Reviewed    Diagnostic Results:  US: Reviewed  CT: Reviewed  Abdominal wall cellulitis    ASSESSMENT/PLAN:     62-year-old female with abdominal wall cellulitis    PLAN:Plan     Recommend antibiotics.  NSAIDs as needed for pain.  Return to clinic in 2 weeks if pain is still not improved at all we can discuss need for further imaging.       "

## 2022-05-11 ENCOUNTER — OFFICE VISIT (OUTPATIENT)
Dept: PRIMARY CARE CLINIC | Facility: CLINIC | Age: 62
End: 2022-05-11
Payer: MEDICARE

## 2022-05-11 VITALS
WEIGHT: 204.56 LBS | HEIGHT: 62 IN | SYSTOLIC BLOOD PRESSURE: 130 MMHG | DIASTOLIC BLOOD PRESSURE: 88 MMHG | HEART RATE: 97 BPM | BODY MASS INDEX: 37.64 KG/M2 | RESPIRATION RATE: 18 BRPM | OXYGEN SATURATION: 98 %

## 2022-05-11 DIAGNOSIS — R06.02 SHORTNESS OF BREATH: ICD-10-CM

## 2022-05-11 DIAGNOSIS — G89.29 CHRONIC GROIN PAIN, RIGHT: ICD-10-CM

## 2022-05-11 DIAGNOSIS — E66.9 OBESITY (BMI 35.0-39.9 WITHOUT COMORBIDITY): ICD-10-CM

## 2022-05-11 DIAGNOSIS — G44.1 OTHER VASCULAR HEADACHE: Primary | ICD-10-CM

## 2022-05-11 DIAGNOSIS — J43.1 PANLOBULAR EMPHYSEMA: ICD-10-CM

## 2022-05-11 DIAGNOSIS — K92.1 BLOOD IN THE STOOL: ICD-10-CM

## 2022-05-11 DIAGNOSIS — I10 HYPERTENSION, UNSPECIFIED TYPE: ICD-10-CM

## 2022-05-11 DIAGNOSIS — Z87.19 HISTORY OF GASTROESOPHAGEAL REFLUX (GERD): ICD-10-CM

## 2022-05-11 DIAGNOSIS — S39.012D LUMBOSACRAL STRAIN, SUBSEQUENT ENCOUNTER: ICD-10-CM

## 2022-05-11 DIAGNOSIS — M25.551 RIGHT HIP PAIN: ICD-10-CM

## 2022-05-11 DIAGNOSIS — F31.9 BIPOLAR AFFECTIVE DISORDER, REMISSION STATUS UNSPECIFIED: ICD-10-CM

## 2022-05-11 DIAGNOSIS — Z98.890 HISTORY OF FUNDOPLICATION: ICD-10-CM

## 2022-05-11 DIAGNOSIS — R10.31 ABDOMINAL PAIN, RIGHT LOWER QUADRANT: ICD-10-CM

## 2022-05-11 DIAGNOSIS — R10.11 ABDOMINAL PAIN, RIGHT UPPER QUADRANT: ICD-10-CM

## 2022-05-11 DIAGNOSIS — R10.31 CHRONIC GROIN PAIN, RIGHT: ICD-10-CM

## 2022-05-11 PROBLEM — R51.9 CEPHALALGIA: Status: ACTIVE | Noted: 2022-05-11

## 2022-05-11 PROBLEM — S39.012A LUMBOSACRAL STRAIN: Status: ACTIVE | Noted: 2022-05-11

## 2022-05-11 LAB
BILIRUB SERPL-MCNC: NEGATIVE MG/DL
BLOOD URINE, POC: ABNORMAL
CLARITY, POC UA: CLEAR
COLOR, POC UA: YELLOW
GLUCOSE UR QL STRIP: NORMAL
KETONES UR QL STRIP: NEGATIVE
LEUKOCYTE ESTERASE URINE, POC: NEGATIVE
NITRITE, POC UA: NEGATIVE
PH, POC UA: 5
PROTEIN, POC: ABNORMAL
SPECIFIC GRAVITY, POC UA: 1.01
UROBILINOGEN, POC UA: NORMAL

## 2022-05-11 PROCEDURE — 99999 PR PBB SHADOW E&M-EST. PATIENT-LVL IV: CPT | Mod: PBBFAC,,, | Performed by: FAMILY MEDICINE

## 2022-05-11 PROCEDURE — 81002 POCT URINE DIPSTICK WITHOUT MICROSCOPE: ICD-10-PCS | Mod: S$GLB,,, | Performed by: FAMILY MEDICINE

## 2022-05-11 PROCEDURE — 3075F SYST BP GE 130 - 139MM HG: CPT | Mod: CPTII,S$GLB,, | Performed by: FAMILY MEDICINE

## 2022-05-11 PROCEDURE — 3079F DIAST BP 80-89 MM HG: CPT | Mod: CPTII,S$GLB,, | Performed by: FAMILY MEDICINE

## 2022-05-11 PROCEDURE — 99999 PR PBB SHADOW E&M-EST. PATIENT-LVL IV: ICD-10-PCS | Mod: PBBFAC,,, | Performed by: FAMILY MEDICINE

## 2022-05-11 PROCEDURE — 4010F PR ACE/ARB THEARPY RXD/TAKEN: ICD-10-PCS | Mod: CPTII,S$GLB,, | Performed by: FAMILY MEDICINE

## 2022-05-11 PROCEDURE — 81002 URINALYSIS NONAUTO W/O SCOPE: CPT | Mod: S$GLB,,, | Performed by: FAMILY MEDICINE

## 2022-05-11 PROCEDURE — 1159F PR MEDICATION LIST DOCUMENTED IN MEDICAL RECORD: ICD-10-PCS | Mod: CPTII,S$GLB,, | Performed by: FAMILY MEDICINE

## 2022-05-11 PROCEDURE — 1111F DSCHRG MED/CURRENT MED MERGE: CPT | Mod: CPTII,S$GLB,, | Performed by: FAMILY MEDICINE

## 2022-05-11 PROCEDURE — 3079F PR MOST RECENT DIASTOLIC BLOOD PRESSURE 80-89 MM HG: ICD-10-PCS | Mod: CPTII,S$GLB,, | Performed by: FAMILY MEDICINE

## 2022-05-11 PROCEDURE — 99214 PR OFFICE/OUTPT VISIT, EST, LEVL IV, 30-39 MIN: ICD-10-PCS | Mod: S$GLB,,, | Performed by: FAMILY MEDICINE

## 2022-05-11 PROCEDURE — 4010F ACE/ARB THERAPY RXD/TAKEN: CPT | Mod: CPTII,S$GLB,, | Performed by: FAMILY MEDICINE

## 2022-05-11 PROCEDURE — 1111F PR DISCHARGE MEDS RECONCILED W/ CURRENT OUTPATIENT MED LIST: ICD-10-PCS | Mod: CPTII,S$GLB,, | Performed by: FAMILY MEDICINE

## 2022-05-11 PROCEDURE — 99214 OFFICE O/P EST MOD 30 MIN: CPT | Mod: S$GLB,,, | Performed by: FAMILY MEDICINE

## 2022-05-11 PROCEDURE — 3008F PR BODY MASS INDEX (BMI) DOCUMENTED: ICD-10-PCS | Mod: CPTII,S$GLB,, | Performed by: FAMILY MEDICINE

## 2022-05-11 PROCEDURE — 3075F PR MOST RECENT SYSTOLIC BLOOD PRESS GE 130-139MM HG: ICD-10-PCS | Mod: CPTII,S$GLB,, | Performed by: FAMILY MEDICINE

## 2022-05-11 PROCEDURE — 3008F BODY MASS INDEX DOCD: CPT | Mod: CPTII,S$GLB,, | Performed by: FAMILY MEDICINE

## 2022-05-11 PROCEDURE — 1159F MED LIST DOCD IN RCRD: CPT | Mod: CPTII,S$GLB,, | Performed by: FAMILY MEDICINE

## 2022-05-11 RX ORDER — HYDROCODONE BITARTRATE AND ACETAMINOPHEN 5; 325 MG/1; MG/1
TABLET ORAL
Qty: 30 TABLET | Refills: 0 | Status: SHIPPED | OUTPATIENT
Start: 2022-05-11 | End: 2022-06-07 | Stop reason: SDUPTHER

## 2022-05-11 RX ORDER — BUTALBITAL, ACETAMINOPHEN AND CAFFEINE 50; 325; 40 MG/1; MG/1; MG/1
TABLET ORAL
Qty: 30 TABLET | Refills: 2 | OUTPATIENT
Start: 2022-05-11 | End: 2022-05-13

## 2022-05-11 RX ORDER — ZOSTER VACCINE RECOMBINANT, ADJUVANTED 50 MCG/0.5
0.5 KIT INTRAMUSCULAR ONCE
Qty: 1 EACH | Refills: 0 | Status: SHIPPED | OUTPATIENT
Start: 2022-05-11 | End: 2022-05-11

## 2022-05-11 NOTE — PROGRESS NOTES
Subjective:       Patient ID: Wilfredo Vazquez is a 62 y.o. female.    Chief Complaint: Headache, Abdominal Pain, and Shortness of Breath  HPI-61 yo BF in for multiple medical complaints headache for 3 weeks pain in the abdomen and shortness of breath       Patient was admitted 04/23--4-26 for COPD/HTN and GERD       SOB --every morning --on albuterol inhaler or nebulizer/Breztri and Spiriva--has appt see pulmonary MD appt 5-??  No fever--+runny nose--slight sore throat--+cough. Occas wheezing a little bit not much.       Headache x3 weeks  Wants MRI brain --headache in the frontal area--quality--squeezing-pressure sensation hurts to touch her head in the frontal area--severity--10/10--headache frequency constant--+blurred vision--no vomit--+slight dizziness no loss of consciousness or seizures.  No prior episodes of headache      Abdominal pain right lower abd --right groin area--pain x 3 weeks also .  +nausea--no vomiting +diarrhea--has loose BM 1-3 loose BM qod --slight constipation--no ulcers hepatitis gallbladder disease  No melena hematochezia hematemesis.  May 17th appt Dr Quinteros open up espohagus--history of dysphagia history fundoplication history colonic polyps.  Pain is in the right lower quadrant right groin area--some pain with ambulation--some pain with abduction abduction of the hip.  Gets pain mainly with walking        office visit 05/02/2022-61 yo BF-patient was admitted 04/23/2022 until 04/26/2022 at same but [facility]--COPD,HTN GERD.  Patient went back to the emergency room Saturday morning 04/30/2022--abdominal pain right lower quadrant and ran down the right leg--seen main campus.  Ran test told white cells were high may indicate some inflammation, Txed with medication--norco for pain . Sat txed morphine .       Still with pain in the right groin area radiating down the right anterior thigh.       History appendectomy hysterectomy        ROS:  No significant change except for  history of present illness  Skin: no psoriasis, eczema, skin cancer-  HEENT: no headache,  No ocular pain, blurred vision, diplopia, epistaxis, hoarseness change in voice, thyroid trouble  Lung: No pneumonia, +asthma, no Tb, +_wheezing,+ SOB, no smoking + COPD   Heart: No chest pain, ankle edema, palpitations, MI, patti murmur, +hypertension,no  hyperlipidemia no stent bypass arrhythmia  Abdomen: no nausea,no  Vomiting,no diarrhea, no  constipation, ulcers, hepatitis, gallbladder disease, melena, hematochezia, hematemesis +dysphagia history of fundoplication for GERD history of esophageal dilatation x2Dr Beary doing better--swallowing much better  : no UTI, renal disease, stones  GYN hyst   MS: no fractures, O/A, lupus, rheumatoid, gout--pain in hands an arm  Neuro: No dizziness, LOC, seizures   No diabetes, no anemia, no anxiety, no depression   Single--2 children--disable secondary to a stomach--lives with daughter and grandson    Objective:   Physical Exam:  General: Well nourished, well developed, no acute distress +obesity  Skin: No lesions  HEENT: Eyes PERRLA, EOM intact, nose patent, throat non erythematous ears TMs clear   NECK: Supple, no bruits, No JVD, no nodes  Lungs: Clear, no rales, rhonchi, wheezing decreased BS but clear   Heart: Regular rate and rhythm, no murmur s, gallops, or rubs  Abdomen: pain right lower quad--right upper quad--suprapubic area---+guarding --+rebound--no organomegaly  MS muscle strength range of motion intact at this time  Neuro: Alert, CN intact, oriented X 3  Extremities: No cyanosis, clubbing, or edema         Assessment:       1. Other vascular headache    2. Abdominal pain, right lower quadrant    3. Chronic groin pain, right    4. Right hip pain    5. Lumbosacral strain, subsequent encounter    6. Shortness of breath    7. Bipolar affective disorder, remission status unspecified    8. Panlobular emphysema    9. Hypertension, unspecified type    10. Obesity (BMI 35.0-39.9  without comorbidity)    11. Blood in the stool    12. History of fundoplication    13. History of gastroesophageal reflux (GERD)    14. Abdominal pain, right upper quadrant        Plan:           Main Reason for Visit--  Multiple medical issues  Headache--frontal area--constant x3 weeks--so should with blurred vision/nausea/intractable pain--Fioricet 1 p.o. q.6 hours p.r.n. pain MRI brain and neurology consult   Shortness of breath--COPD/emphysema recent hospitalization for chronic respiratory failure with hypoxia--has appointment with pulmonary MD--on aerosol albuterol nebulizer--Breztri--Spiriva--in the hospital was on BiPAP--patient claims had 6 minute walk and did not meet requirements for oxygen--Needs pulmonary see if feels pt would benefit from steroids/CPAP or BiPAP /home O2   Abdominal pain right lower quadrant/right upper quadrant/suprapubic area--CT scan abd and pelvis--also had pain lumbar spine and right hip area will do x-ray lumbar spine and right hip--history of dysphagia has appointment DR Quinteros for esophageal dilatation--pt told get him evaluate CT scan and pain Norco for abd and hip pain --treated for UTI with Cipro urine culture negative  Hypertension blood pressure 130/88 lisinopril 5 mg  History dysphagia--difficulty swallowing water has to eat small amounts of food--history esophageal dilitation   Lab CBCs CMP see if white count resolved  Maintenance COVID shingles

## 2022-05-11 NOTE — PATIENT INSTRUCTIONS
Headache--MRI brain--neurology consult--Fioricet 1 by mouth every 6 hours as needed for headache--ibuprofen 600 mg every 6 hours as needed for headache  Abdominal pain right lower quadrant suprapubic area right groin--x-ray lumbar spine x-ray right hip--CT scan abdomen pelvis--has appt DR Quinteros for dysphagia to have esophageal dilatation--needs to review abdominal pain right lower quadrant CT scan with Dr. Chong see if additional GI workup indicated--Norco 1 by mouth every 6 hours as needed for breakthrough pain--HA or abd   Shortness of breath--history recent admission for COPD--on albuterol nebulizer/Breztri/Spiriva--has appointment pulmonary MD 05/24/2022--needs to see if patient may benefit from steroids/home oxygen if able qualify/CPAP or BiPAP machine to sleep with at night  CBCs CMP--now evaluate increased WBC recently  Handic[employer] license plate form filled out  Return 6 weeks  If headache gets worse go to emergency room for emergency CT scan of the brain  If abdominal pain gets worse go to the emergency room for emergency CT scan of the abdomen--would go to main [place] for workup due multiple medical issues  UA today

## 2022-05-16 ENCOUNTER — TELEPHONE (OUTPATIENT)
Dept: PRIMARY CARE CLINIC | Facility: CLINIC | Age: 62
End: 2022-05-16
Payer: MEDICAID

## 2022-05-16 DIAGNOSIS — N28.9 RENAL INSUFFICIENCY: Primary | ICD-10-CM

## 2022-05-16 NOTE — TELEPHONE ENCOUNTER
----- Message from Marcio Calderon MD sent at 5/15/2022 10:37 PM CDT -----  Call tell pt chronic renal insufficiency BUN28 Cr 2.2 --GFR 23-27. Hct 36.5 Pt needs follow up with nephrologist Be sure noton glucophagebACE ARB orNSAID's

## 2022-05-18 ENCOUNTER — HOSPITAL ENCOUNTER (OUTPATIENT)
Dept: RADIOLOGY | Facility: OTHER | Age: 62
Discharge: HOME | End: 2022-05-18
Attending: PLASTIC SURGERY
Payer: MEDICARE

## 2022-05-18 ENCOUNTER — OFFICE VISIT (OUTPATIENT)
Dept: ORTHOPEDICS | Facility: CLINIC | Age: 62
End: 2022-05-18
Payer: MEDICARE

## 2022-05-18 VITALS — HEIGHT: 62 IN | WEIGHT: 202.38 LBS | BODY MASS INDEX: 37.24 KG/M2

## 2022-05-18 DIAGNOSIS — R52 PAIN: ICD-10-CM

## 2022-05-18 DIAGNOSIS — S61.019A LACERATION OF TENDON OF THUMB: ICD-10-CM

## 2022-05-18 PROCEDURE — 99203 OFFICE O/P NEW LOW 30 MIN: CPT | Mod: S$GLB,,, | Performed by: PLASTIC SURGERY

## 2022-05-18 PROCEDURE — 1159F PR MEDICATION LIST DOCUMENTED IN MEDICAL RECORD: ICD-10-PCS | Mod: CPTII,S$GLB,, | Performed by: PLASTIC SURGERY

## 2022-05-18 PROCEDURE — 1111F DSCHRG MED/CURRENT MED MERGE: CPT | Mod: CPTII,S$GLB,, | Performed by: PLASTIC SURGERY

## 2022-05-18 PROCEDURE — 99999 PR PBB SHADOW E&M-EST. PATIENT-LVL IV: ICD-10-PCS | Mod: PBBFAC,,, | Performed by: PLASTIC SURGERY

## 2022-05-18 PROCEDURE — 73130 X-RAY EXAM OF HAND: CPT | Mod: 26,LT,, | Performed by: RADIOLOGY

## 2022-05-18 PROCEDURE — 73130 XR HAND COMPLETE 3 VIEW LEFT: ICD-10-PCS | Mod: 26,LT,, | Performed by: RADIOLOGY

## 2022-05-18 PROCEDURE — 4010F ACE/ARB THERAPY RXD/TAKEN: CPT | Mod: CPTII,S$GLB,, | Performed by: PLASTIC SURGERY

## 2022-05-18 PROCEDURE — 73130 X-RAY EXAM OF HAND: CPT | Mod: TC,FY,LT

## 2022-05-18 PROCEDURE — 99999 PR PBB SHADOW E&M-EST. PATIENT-LVL IV: CPT | Mod: PBBFAC,,, | Performed by: PLASTIC SURGERY

## 2022-05-18 PROCEDURE — 99203 PR OFFICE/OUTPT VISIT, NEW, LEVL III, 30-44 MIN: ICD-10-PCS | Mod: S$GLB,,, | Performed by: PLASTIC SURGERY

## 2022-05-18 PROCEDURE — 1111F PR DISCHARGE MEDS RECONCILED W/ CURRENT OUTPATIENT MED LIST: ICD-10-PCS | Mod: CPTII,S$GLB,, | Performed by: PLASTIC SURGERY

## 2022-05-18 PROCEDURE — 3008F BODY MASS INDEX DOCD: CPT | Mod: CPTII,S$GLB,, | Performed by: PLASTIC SURGERY

## 2022-05-18 PROCEDURE — 4010F PR ACE/ARB THEARPY RXD/TAKEN: ICD-10-PCS | Mod: CPTII,S$GLB,, | Performed by: PLASTIC SURGERY

## 2022-05-18 PROCEDURE — 1159F MED LIST DOCD IN RCRD: CPT | Mod: CPTII,S$GLB,, | Performed by: PLASTIC SURGERY

## 2022-05-18 PROCEDURE — 3008F PR BODY MASS INDEX (BMI) DOCUMENTED: ICD-10-PCS | Mod: CPTII,S$GLB,, | Performed by: PLASTIC SURGERY

## 2022-05-18 PROCEDURE — 99214 OFFICE O/P EST MOD 30 MIN: CPT | Mod: PBBFAC | Performed by: PLASTIC SURGERY

## 2022-05-18 RX ORDER — SODIUM CHLORIDE 9 MG/ML
INJECTION, SOLUTION INTRAVENOUS CONTINUOUS
Status: CANCELLED | OUTPATIENT
Start: 2022-05-18
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
## 2022-05-20 DIAGNOSIS — K21.00 REFLUX ESOPHAGITIS: ICD-10-CM

## 2022-05-20 NOTE — TELEPHONE ENCOUNTER
No new care gaps identified.  [facility] Embedded Care Gaps. Reference number: [id number]. 5/20/2022   9:31:01 AM [place]

## 2022-05-20 NOTE — TELEPHONE ENCOUNTER
----- Message from Deanna Clement sent at 5/20/2022  9:19 AM CDT -----  Contact: Patient, [phone]  Requesting an RX refill or new RX.  Is this a refill or new RX: Refill  RX name and strength (copy/paste from chart):  lisinopriL (PRINIVIL,ZESTRIL) 5 MG tablet  Is this a 30 day or 90 day RX: 90  Pharmacy name and phone # (copy/paste from chart):    [facility] - [place] - [address]  [address]  [place] [address]  Phone: [phone] Fax: [phone]  The doctors have asked that we provide their patients with the following 2 reminders -- prescription refills can take up to 72 hours, and a friendly reminder that in the future you can use your [facility] account to request refills: Yes

## 2022-05-21 RX ORDER — LISINOPRIL 5 MG/1
5 TABLET ORAL DAILY
Qty: 90 TABLET | Refills: 1 | Status: SHIPPED | OUTPATIENT
Start: 2022-05-21 | End: 2022-06-07 | Stop reason: SDUPTHER

## 2022-05-22 PROBLEM — K22.2 ESOPHAGEAL STENOSIS: Status: ACTIVE | Noted: 2022-05-22

## 2022-06-02 ENCOUNTER — HOSPITAL ENCOUNTER (OUTPATIENT)
Dept: PREADMISSION TESTING | Facility: OTHER | Age: 62
Discharge: HOME | End: 2022-06-02
Attending: PLASTIC SURGERY
Payer: MEDICARE

## 2022-06-02 ENCOUNTER — ANESTHESIA EVENT (OUTPATIENT)
Dept: SURGERY | Facility: OTHER | Age: 62
End: 2022-06-02
Payer: COMMERCIAL

## 2022-06-02 VITALS
HEART RATE: 78 BPM | TEMPERATURE: 98 F | WEIGHT: 204 LBS | RESPIRATION RATE: 16 BRPM | SYSTOLIC BLOOD PRESSURE: 153 MMHG | BODY MASS INDEX: 37.54 KG/M2 | DIASTOLIC BLOOD PRESSURE: 83 MMHG | OXYGEN SATURATION: 98 % | HEIGHT: 62 IN

## 2022-06-02 RX ORDER — ALBUTEROL SULFATE 2.5 MG/.5ML
2.5 SOLUTION RESPIRATORY (INHALATION)
Status: CANCELLED | OUTPATIENT
Start: 2022-06-02 | End: 2022-06-02

## 2022-06-02 RX ORDER — LIDOCAINE HYDROCHLORIDE 10 MG/ML
0.5 INJECTION, SOLUTION EPIDURAL; INFILTRATION; INTRACAUDAL; PERINEURAL ONCE
Status: CANCELLED | OUTPATIENT
Start: 2022-06-02 | End: 2022-06-02

## 2022-06-02 RX ORDER — SODIUM CHLORIDE, SODIUM LACTATE, POTASSIUM CHLORIDE, CALCIUM CHLORIDE 600; 310; 30; 20 MG/100ML; MG/100ML; MG/100ML; MG/100ML
INJECTION, SOLUTION INTRAVENOUS CONTINUOUS
Status: CANCELLED | OUTPATIENT
Start: 2022-06-02

## 2022-06-02 NOTE — DISCHARGE INSTRUCTIONS
Information to Prepare you for your Surgery    PRE-ADMIT TESTING -  [phone]    [address] [facility]          Your surgery has been scheduled at [facility]. We are pleased to have the opportunity to serve you. For Further Information please call [phone].    On the day of surgery please report to the Information Desk on the 1st floor.    CONTACT YOUR PHYSICIAN'S OFFICE THE DAY PRIOR TO YOUR SURGERY TO OBTAIN YOUR ARRIVAL TIME.     The evening before surgery do not eat anything after 9 p.m. ( this includes hard candy, chewing gum and mints).  You may only have GATORADE, POWERADE AND WATER  from 9 p.m. until you leave your home.   DO NOT DRINK ANY LIQUIDS ON THE WAY TO THE HOSPITAL.      Why does your anesthesiologist allow you to drink Gatorade/Powerade before surgery?  Gatorade/Powerade helps to increase your comfort before surgery and to decrease your nausea after surgery. The carbohydrates in Gatorade/Powerade help reduce your body's stress response to surgery.  If you are a diabetic-drink only water prior to surgery.      Current Visitor policy(12/27/2021) - Patients may have 2 visitors pre and post procedure. Only 2 visitors will be allowed in the Surgical building with the patient.     SPECIAL MEDICATION INSTRUCTIONS: TAKE medications checked off by the Anesthesiologist on your Medication List.    Angiogram Patients: Take medications as instructed by your physician, including aspirin.     Surgery Patients:    If you take ASPIRIN - Your PHYSICIAN/SURGEON will need to inform you IF/OR when you need to stop taking aspirin prior to your surgery.     Do Not take any medications containing IBUPROFEN.    Do Not Wear any make-up (especially eye make-up) to surgery. Please remove any false eyelashes or eyelash extensions. If you arrive the day of surgery with makeup/eyelashes on you will be required to remove prior to surgery. (There is a risk of corneal  abrasions if eye makeup/eyelash extensions are not removed)      Leave all valuables at home.   Do Not wear any jewelry or watches, including any metal in body piercings. Jewelry must be removed prior to coming to the hospital.  There is a possibility that rings that are unable to be removed may be cut off if they are on the surgical extremity.    Please remove all hair extensions, wigs, clips and any other metal accessories/ ornaments from your hair.  These items may pose a flammable/fire risk in Surgery and must be removed.    Do not shave your surgical area at least 5 days prior to your surgery. The surgical prep will be performed at the hospital according to Infection Control regulations.    Contact Lens must be removed before surgery. Either do not wear the contact lens or bring a case and solution for storage.  Please bring a container for eyeglasses or dentures as required.  Bring any paperwork your physician has provided, such as consent forms,  history and physicals, doctor's orders, etc.   Bring comfortable clothes that are loose fitting to wear upon discharge. Take into consideration the type of surgery being performed.  Maintain your diet as advised per your physician the day prior to surgery.      Adequate rest the night before surgery is advised.   Park in the Parking lot behind the hospital or in the [facility] across the street from the parking lot. Parking is complimentary.  If you will be discharged the same day as your procedure, please arrange for a responsible adult to drive you home or to accompany you if traveling by taxi.   YOU WILL NOT BE PERMITTED TO DRIVE OR TO LEAVE THE HOSPITAL ALONE AFTER SURGERY.   If you are being discharged the same day, it is strongly recommended that you arrange for someone to remain with you for the first 24 hrs following your surgery.    The Surgeon will speak to your family/visitor after your surgery regarding the outcome of your surgery and post op  care.  The Surgeon may speak to you after your surgery, but there is a possibility you may not remember the details.  Please check with your family members regarding the conversation with the Surgeon.    We strongly recommend whoever is bringing you home be present for discharge instructions.  This will ensure a thorough understanding for your post op home care.    ALL CHILDREN MUST ALWAYS BE ACCOMPANIED BY AN ADULT.    Visitors-Refer to current Visitor policy handouts.    Thank you for your cooperation.  The Staff of [facility].            Bathing Instructions with Hibiclens    Shower the evening before and morning of your procedure with Hibiclens:  Wash your face with water and your regular face wash/soap  Apply Hibiclens directly on your skin or on a wet washcloth and wash gently. When showering: Move away from the shower stream when applying Hibiclens to avoid rinsing off too soon.  Rinse thoroughly with warm water  Do not dilute Hibiclens        Dry off as usual, do not use any deodorant, powder, body lotions, perfume, after shave or cologne.

## 2022-06-02 NOTE — ANESTHESIA PREPROCEDURE EVALUATION
06/02/2022  Wilfredo Vazquez is a 62 y.o., female.      Pre-op Assessment    I have reviewed the Patient Summary Reports.     I have reviewed the Nursing Notes.    I have reviewed the Medications.     Review of Systems  Anesthesia Hx:  No problems with previous Anesthesia  Denies Family Hx of Anesthesia complications.   Denies Personal Hx of Anesthesia complications.   Social:  Former Smoker    Hematology/Oncology:  Hematology Normal   Oncology Normal     EENT/Dental:EENT/Dental Normal   Cardiovascular:   Exercise tolerance: poor Hypertension    Pulmonary:   COPD, severe Shortness of breath Recently in hosp for COPD at [facility]   Renal/:  Renal/ Normal     Hepatic/GI:   GERD, well controlled    Musculoskeletal:   Arthritis     Neurological:   Headaches    Endocrine:  Endocrine Normal    Psych:   Psychiatric History Bipolar         Physical Exam  General: Cooperative, Alert and Oriented    Airway:  Mallampati: II   Mouth Opening: Normal  Neck ROM: Normal ROM    Dental:  Intact        Anesthesia Plan  Type of Anesthesia, risks & benefits discussed:    Anesthesia Type: MAC  Informed Consent: Informed consent signed with the Patient and all parties understand the risks and agree with anesthesia plan.  All questions answered.   ASA Score: 3  Anesthesia Plan Notes: Plan MAC only,not GA    Ready For Surgery From Anesthesia Perspective.     .

## 2022-06-06 ENCOUNTER — TELEPHONE (OUTPATIENT)
Dept: ORTHOPEDICS | Facility: CLINIC | Age: 62
End: 2022-06-06
Payer: COMMERCIAL

## 2022-06-07 ENCOUNTER — HOSPITAL ENCOUNTER (OUTPATIENT)
Facility: OTHER | Age: 62
Discharge: HOME | End: 2022-06-07
Attending: PLASTIC SURGERY | Admitting: PLASTIC SURGERY
Payer: COMMERCIAL

## 2022-06-07 ENCOUNTER — OFFICE VISIT (OUTPATIENT)
Dept: PRIMARY CARE CLINIC | Facility: CLINIC | Age: 62
End: 2022-06-07
Payer: COMMERCIAL

## 2022-06-07 ENCOUNTER — ANESTHESIA (OUTPATIENT)
Dept: SURGERY | Facility: OTHER | Age: 62
End: 2022-06-07
Payer: COMMERCIAL

## 2022-06-07 VITALS
DIASTOLIC BLOOD PRESSURE: 76 MMHG | WEIGHT: 207.56 LBS | RESPIRATION RATE: 18 BRPM | BODY MASS INDEX: 38.2 KG/M2 | OXYGEN SATURATION: 97 % | HEART RATE: 94 BPM | SYSTOLIC BLOOD PRESSURE: 132 MMHG | HEIGHT: 62 IN | TEMPERATURE: 97 F

## 2022-06-07 VITALS
SYSTOLIC BLOOD PRESSURE: 118 MMHG | RESPIRATION RATE: 16 BRPM | HEIGHT: 62 IN | DIASTOLIC BLOOD PRESSURE: 67 MMHG | TEMPERATURE: 98 F | OXYGEN SATURATION: 100 % | WEIGHT: 204 LBS | BODY MASS INDEX: 37.54 KG/M2 | HEART RATE: 89 BPM

## 2022-06-07 DIAGNOSIS — K21.00 GASTROESOPHAGEAL REFLUX DISEASE WITH ESOPHAGITIS, UNSPECIFIED WHETHER HEMORRHAGE: ICD-10-CM

## 2022-06-07 DIAGNOSIS — K22.2 ESOPHAGEAL STENOSIS: ICD-10-CM

## 2022-06-07 DIAGNOSIS — S39.012D LUMBOSACRAL STRAIN, SUBSEQUENT ENCOUNTER: ICD-10-CM

## 2022-06-07 DIAGNOSIS — R73.9 HYPERGLYCEMIA: ICD-10-CM

## 2022-06-07 DIAGNOSIS — Z87.19 HISTORY OF GASTROESOPHAGEAL REFLUX (GERD): ICD-10-CM

## 2022-06-07 DIAGNOSIS — E66.9 OBESITY (BMI 35.0-39.9 WITHOUT COMORBIDITY): ICD-10-CM

## 2022-06-07 DIAGNOSIS — J43.2 CENTRILOBULAR EMPHYSEMA: ICD-10-CM

## 2022-06-07 DIAGNOSIS — S61.019A LACERATION OF TENDON OF THUMB: Primary | ICD-10-CM

## 2022-06-07 DIAGNOSIS — K59.09 OTHER CONSTIPATION: ICD-10-CM

## 2022-06-07 DIAGNOSIS — F31.9 BIPOLAR AFFECTIVE DISORDER, REMISSION STATUS UNSPECIFIED: Primary | ICD-10-CM

## 2022-06-07 DIAGNOSIS — I10 HYPERTENSION, UNSPECIFIED TYPE: ICD-10-CM

## 2022-06-07 PROCEDURE — 26418 PR REPAIR EXTEN TENDON,DORSUM FINGR,EA: ICD-10-PCS | Mod: FA,,, | Performed by: PLASTIC SURGERY

## 2022-06-07 PROCEDURE — 37000009 HC ANESTHESIA EA ADD 15 MINS: Performed by: PLASTIC SURGERY

## 2022-06-07 PROCEDURE — 37000008 HC ANESTHESIA 1ST 15 MINUTES: Performed by: PLASTIC SURGERY

## 2022-06-07 PROCEDURE — 25000242 PHARM REV CODE 250 ALT 637 W/ HCPCS: Performed by: ANESTHESIOLOGY

## 2022-06-07 PROCEDURE — 71000015 HC POSTOP RECOV 1ST HR: Performed by: PLASTIC SURGERY

## 2022-06-07 PROCEDURE — 99999 PR PBB SHADOW E&M-EST. PATIENT-LVL III: ICD-10-PCS | Mod: PBBFAC,,, | Performed by: FAMILY MEDICINE

## 2022-06-07 PROCEDURE — 99214 PR OFFICE/OUTPT VISIT, EST, LEVL IV, 30-39 MIN: ICD-10-PCS | Mod: S$PBB,,, | Performed by: FAMILY MEDICINE

## 2022-06-07 PROCEDURE — 94761 N-INVAS EAR/PLS OXIMETRY MLT: CPT

## 2022-06-07 PROCEDURE — 63600175 PHARM REV CODE 636 W HCPCS: Performed by: PLASTIC SURGERY

## 2022-06-07 PROCEDURE — 26418 REPAIR FINGER TENDON: CPT | Mod: FA,,, | Performed by: PLASTIC SURGERY

## 2022-06-07 PROCEDURE — 99214 OFFICE O/P EST MOD 30 MIN: CPT | Mod: S$PBB,,, | Performed by: FAMILY MEDICINE

## 2022-06-07 PROCEDURE — 36000706: Performed by: PLASTIC SURGERY

## 2022-06-07 PROCEDURE — 36000707: Performed by: PLASTIC SURGERY

## 2022-06-07 PROCEDURE — 63600175 PHARM REV CODE 636 W HCPCS: Performed by: NURSE ANESTHETIST, CERTIFIED REGISTERED

## 2022-06-07 PROCEDURE — 94640 AIRWAY INHALATION TREATMENT: CPT

## 2022-06-07 PROCEDURE — 25000003 PHARM REV CODE 250: Performed by: NURSE ANESTHETIST, CERTIFIED REGISTERED

## 2022-06-07 PROCEDURE — 99213 OFFICE O/P EST LOW 20 MIN: CPT | Mod: PBBFAC,PN | Performed by: FAMILY MEDICINE

## 2022-06-07 PROCEDURE — 51798 US URINE CAPACITY MEASURE: CPT | Performed by: PLASTIC SURGERY

## 2022-06-07 PROCEDURE — 99999 PR PBB SHADOW E&M-EST. PATIENT-LVL III: CPT | Mod: PBBFAC,,, | Performed by: FAMILY MEDICINE

## 2022-06-07 PROCEDURE — 25000003 PHARM REV CODE 250: Performed by: PLASTIC SURGERY

## 2022-06-07 PROCEDURE — 71000016 HC POSTOP RECOV ADDL HR: Performed by: PLASTIC SURGERY

## 2022-06-07 RX ORDER — OMEPRAZOLE 40 MG/1
40 CAPSULE, DELAYED RELEASE ORAL DAILY
Qty: 30 CAPSULE | Refills: 5 | Status: SHIPPED | OUTPATIENT
Start: 2022-06-07 | End: 2022-10-19

## 2022-06-07 RX ORDER — FENTANYL CITRATE 50 UG/ML
INJECTION, SOLUTION INTRAMUSCULAR; INTRAVENOUS
Status: DISCONTINUED | OUTPATIENT
Start: 2022-06-07 | End: 2022-06-07

## 2022-06-07 RX ORDER — HYDROCODONE BITARTRATE AND ACETAMINOPHEN 5; 325 MG/1; MG/1
TABLET ORAL
Qty: 30 TABLET | Refills: 0 | Status: SHIPPED | OUTPATIENT
Start: 2022-06-07 | End: 2022-07-08 | Stop reason: SDUPTHER

## 2022-06-07 RX ORDER — MIDAZOLAM HYDROCHLORIDE 1 MG/ML
INJECTION INTRAMUSCULAR; INTRAVENOUS
Status: DISCONTINUED | OUTPATIENT
Start: 2022-06-07 | End: 2022-06-07

## 2022-06-07 RX ORDER — SODIUM CHLORIDE 9 MG/ML
INJECTION, SOLUTION INTRAVENOUS CONTINUOUS
Status: DISCONTINUED | OUTPATIENT
Start: 2022-06-07 | End: 2022-06-07 | Stop reason: HOSPADM

## 2022-06-07 RX ORDER — LIDOCAINE HYDROCHLORIDE 10 MG/ML
0.5 INJECTION, SOLUTION EPIDURAL; INFILTRATION; INTRACAUDAL; PERINEURAL ONCE
Status: DISCONTINUED | OUTPATIENT
Start: 2022-06-07 | End: 2022-06-07 | Stop reason: HOSPADM

## 2022-06-07 RX ORDER — ALBUTEROL SULFATE 2.5 MG/.5ML
2.5 SOLUTION RESPIRATORY (INHALATION)
Status: COMPLETED | OUTPATIENT
Start: 2022-06-07 | End: 2022-06-07

## 2022-06-07 RX ORDER — LIDOCAINE HYDROCHLORIDE AND EPINEPHRINE 10; 10 MG/ML; UG/ML
INJECTION, SOLUTION INFILTRATION; PERINEURAL
Status: DISCONTINUED | OUTPATIENT
Start: 2022-06-07 | End: 2022-06-07 | Stop reason: HOSPADM

## 2022-06-07 RX ORDER — SODIUM CHLORIDE, SODIUM LACTATE, POTASSIUM CHLORIDE, CALCIUM CHLORIDE 600; 310; 30; 20 MG/100ML; MG/100ML; MG/100ML; MG/100ML
INJECTION, SOLUTION INTRAVENOUS CONTINUOUS
Status: DISCONTINUED | OUTPATIENT
Start: 2022-06-07 | End: 2022-06-07 | Stop reason: HOSPADM

## 2022-06-07 RX ORDER — LISINOPRIL 5 MG/1
5 TABLET ORAL DAILY
Qty: 90 TABLET | Refills: 1 | Status: SHIPPED | OUTPATIENT
Start: 2022-06-07 | End: 2022-08-11

## 2022-06-07 RX ORDER — PROPOFOL 10 MG/ML
VIAL (ML) INTRAVENOUS
Status: DISCONTINUED | OUTPATIENT
Start: 2022-06-07 | End: 2022-06-07

## 2022-06-07 RX ORDER — LIDOCAINE HYDROCHLORIDE 20 MG/ML
INJECTION INTRAVENOUS
Status: DISCONTINUED | OUTPATIENT
Start: 2022-06-07 | End: 2022-06-07

## 2022-06-07 RX ORDER — TRAMADOL HYDROCHLORIDE 50 MG/1
50 TABLET ORAL EVERY 4 HOURS PRN
Qty: 30 TABLET | Refills: 0 | Status: SHIPPED | OUTPATIENT
Start: 2022-06-07 | End: 2022-06-22

## 2022-06-07 RX ORDER — CEFAZOLIN SODIUM 1 G/3ML
2 INJECTION, POWDER, FOR SOLUTION INTRAMUSCULAR; INTRAVENOUS
Status: COMPLETED | OUTPATIENT
Start: 2022-06-07 | End: 2022-06-07

## 2022-06-07 RX ORDER — BUPIVACAINE HYDROCHLORIDE 2.5 MG/ML
INJECTION, SOLUTION EPIDURAL; INFILTRATION; INTRACAUDAL
Status: DISCONTINUED | OUTPATIENT
Start: 2022-06-07 | End: 2022-06-07 | Stop reason: HOSPADM

## 2022-06-07 RX ADMIN — PROPOFOL 20 MG: 10 INJECTION, EMULSION INTRAVENOUS at 12:06

## 2022-06-07 RX ADMIN — MIDAZOLAM HYDROCHLORIDE 1 MG: 1 INJECTION, SOLUTION INTRAMUSCULAR; INTRAVENOUS at 12:06

## 2022-06-07 RX ADMIN — LIDOCAINE HYDROCHLORIDE 20 MG: 20 INJECTION, SOLUTION INTRAVENOUS at 12:06

## 2022-06-07 RX ADMIN — CEFAZOLIN 2 G: 330 INJECTION, POWDER, FOR SOLUTION INTRAMUSCULAR; INTRAVENOUS at 12:06

## 2022-06-07 RX ADMIN — ALBUTEROL SULFATE 2.5 MG: 2.5 SOLUTION RESPIRATORY (INHALATION) at 11:06

## 2022-06-07 RX ADMIN — SODIUM CHLORIDE, SODIUM LACTATE, POTASSIUM CHLORIDE, AND CALCIUM CHLORIDE: .6; .31; .03; .02 INJECTION, SOLUTION INTRAVENOUS at 11:06

## 2022-06-07 RX ADMIN — FENTANYL CITRATE 25 MCG: 50 INJECTION, SOLUTION INTRAMUSCULAR; INTRAVENOUS at 12:06

## 2022-06-07 NOTE — BRIEF OP NOTE
[facility] - Surgery ([place])  Brief Operative Note    Surgery Date: 6/7/2022     Surgeon(s) and Role:     * Dakota Anaya Jr., MD - Primary    Assisting Surgeon: None    Pre-op Diagnosis:  Laceration of tendon of thumb [S61.019A]    Post-op Diagnosis:  Post-Op Diagnosis Codes:     * Laceration of tendon of thumb [S61.019A]    Procedure(s) (LRB):  REPAIR, TENDON, EXTENSOR thumb  (Left)    Anesthesia: Local MAC    Operative Findings:  Complete laceration of EPL tendon in zone 1 of the left thumb    Estimated Blood Loss:  Minimal  Specimens:   Specimen (24h ago, onward)            None            Discharge Note    OUTCOME: Patient tolerated treatment/procedure well without complication and is now ready for discharge.    DISPOSITION: Home or Self Care    FINAL DIAGNOSIS:  Laceration of tendon of thumb    FOLLOWUP: In clinic    DISCHARGE INSTRUCTIONS:    Discharge Procedure Orders   Diet general     Call MD for:  temperature >100.4     Call MD for:  persistent nausea and vomiting     Call MD for:  severe uncontrolled pain     Call MD for:  difficulty breathing, headache or visual disturbances     Call MD for:  redness, tenderness, or signs of infection (pain, swelling, redness, odor or green/yellow discharge around incision site)     Call MD for:  hives     Call MD for:  persistent dizziness or light-headedness     Call MD for:  extreme fatigue     Lifting restrictions     No driving, operating heavy equipment or signing legal documents while taking pain medication.     Keep surgical extremity elevated     Leave dressing on - Keep it clean, dry, and intact until clinic visit

## 2022-06-07 NOTE — ANESTHESIA POSTPROCEDURE EVALUATION
Anesthesia Post Evaluation    Patient: Wilfredo Vazquez    Procedure(s) Performed: Procedure(s) (LRB):  REPAIR, TENDON, EXTENSOR thumb  (Left)    Final Anesthesia Type: MAC      Patient location during evaluation: [facility]  Patient participation: Yes- Able to Participate  Level of consciousness: awake and alert  Post-procedure vital signs: reviewed and stable  Pain management: adequate  Airway patency: patent    PONV status at discharge: No PONV  Anesthetic complications: no      Cardiovascular status: blood pressure returned to baseline  Respiratory status: unassisted  Hydration status: euvolemic  Follow-up not needed.          Vitals Value Taken Time   /81 06/07/22 1045   Temp 36.9 °C (98.5 °F) 06/07/22 1043   Pulse 92 06/07/22 1107   Resp 17 06/07/22 1107   SpO2 96 % 06/07/22 1107         No case tracking events are documented in the log.      Pain/Arnulfo Score: No data recorded

## 2022-06-07 NOTE — PROGRESS NOTES
Subjective:       Patient ID: Wilfredo Vazquez is a 62 y.o. female.    Chief Complaint: Medication Refill  HPI-63 yo BF in for refills--lab review--patient told was anemic--hematocrit 34 glucose 142 BUN 26 creatinine 1.6 glomerular filtration rate 34-40     Eating well---+BM--ambulating fair occasional shortness of breath with ambulation but overall due          ROS:   Skin: no psoriasis, eczema, skin cancer-  HEENT: no headache,  No ocular pain, blurred vision, diplopia, epistaxis, hoarseness change in voice, thyroid trouble  Lung: No pneumonia, +asthma, no Tb, +_wheezing,+ SOB, no smoking + COPD   Heart: No chest pain, ankle edema, palpitations, MI, patti murmur, +hypertension,no  hyperlipidemia no stent bypass arrhythmia  Abdomen: no nausea,no  Vomiting,no diarrhea, no  constipation, ulcers, hepatitis, gallbladder disease, melena, hematochezia, hematemesis +dysphagia history of fundoplication for GERD history of esophageal dilatation x2Dr Beary doing better--swallowing much better  : no UTI, renal disease, stones  GYN hyst   MS: no fractures, O/A, lupus, rheumatoid, gout--pain in hands an arm  Neuro: No dizziness, LOC, seizures   No diabetes, no anemia, no anxiety, no depression   Single--2 children--disable secondary to a stomach--lives with daughter and grandson    Objective:   Physical Exam:  General: Well nourished, well developed, no acute distress +obesity  Skin: No lesions  HEENT: Eyes PERRLA, EOM intact, nose patent, throat non erythematous ears TMs clear   NECK: Supple, no bruits, No JVD, no nodes  Lungs: Clear, no rales, rhonchi, wheezing decreased BS but clear   Heart: Regular rate and rhythm, no murmur s, gallops, or rubs  Abdomen:  No abdominal pain guarding rebound or tenderness organomegaly  Neuro: Alert, CN intact, oriented X 3  Extremities: No cyanosis, clubbing, or edema         Assessment:       1. Bipolar affective disorder, remission status unspecified    2. Reflux esophagitis     3. Centrilobular emphysema    4. Hypertension, unspecified type    5. Obesity (BMI 35.0-39.9 without comorbidity)    6. Hyperglycemia    7. Other constipation    8. History of gastroesophageal reflux (GERD)    9. Esophageal stenosis    10. Lumbosacral strain, subsequent encounter        Plan:           Main Reason for Visit--  Multiple medical issues  Main issue--review lab patient told was anemic hematocrit 34 has chronic renal insufficiency BUN 26 creatinine 1.6 glomerular filtration rate 34-40 no new treatment  Patient needs refills of medication  Other medical issues  Shortness of breath--COPD/emphysema recent hospitalization for chronic respiratory failure with hypoxia--has appointment with pulmonary MD--on aerosol albuterol nebulizer--Breztri--Spiriva--in the hospital was on BiPAP--patient claims had 6 minute walk and did not meet requirements for oxygen--Needs pulmonary see if feels pt would benefit from steroids/CPAP or BiPAP /home O2   Hypertension blood pressure 132/76 lisinopril 5 mg  History dysphagia--difficulty swallowing water has to eat small amounts of food--history esophageal dilitation   Lab CBCs CMP lipd T4 TSH 6 mo   Maintenance sign pain contract complete opioid wrist shingles COVID

## 2022-06-07 NOTE — ANESTHESIA POSTPROCEDURE EVALUATION
Anesthesia Post Evaluation    Patient: Wilfredo Moralez George    Procedure(s) Performed: Procedure(s) (LRB):  REPAIR, TENDON, EXTENSOR thumb  (Left)    Final Anesthesia Type: MAC      Patient location during evaluation: [facility]  Patient participation: Yes- Able to Participate  Level of consciousness: awake and alert  Post-procedure vital signs: reviewed and stable  Pain management: adequate  Airway patency: patent    PONV status at discharge: No PONV  Anesthetic complications: no      Cardiovascular status: stable, hemodynamically stable and blood pressure returned to baseline  Respiratory status: unassisted  Hydration status: euvolemic  Follow-up not needed.          Vitals Value Taken Time   /81 06/07/22 1045   Temp 36.9 °C (98.5 °F) 06/07/22 1043   Pulse 92 06/07/22 1107   Resp 17 06/07/22 1107   SpO2 96 % 06/07/22 1107         No case tracking events are documented in the log.      Pain/Arnulfo Score: No data recorded

## 2022-06-07 NOTE — PLAN OF CARE
Wilfredo Vazquez has met all discharge criteria from Phase II. Vital Signs are stable, ambulating  without difficulty. Discharge instructions given, patient verbalized understanding. Discharged from facility via wheelchair in stable condition.

## 2022-06-07 NOTE — OP NOTE
[facility]  Plastic Surgery  Operative Note    SUMMARY     Date of Procedure: 6/7/2022     Procedure:  Repair of left thumb EPL tendon zone T1    Surgeon(s) and Role:     * Dakota Anaya Jr., MD - Primary    Assisting Surgeon: None    Pre-Operative Diagnosis: Laceration of tendon of thumb [S61.019A]    Post-Operative Diagnosis:  Complete laceration with scarring of the left thumb EPL tendon in zone 1    Anesthesia: Local MAC    Operative Findings (including complications, if any):  Complete laceration of the left thumb EPL tendon in zone 1 over the IP joint with 1 cm gap and bridging scar tissue    Description of Technical Procedures:  Delayed primary repair of the left thumb EPL tendon in zone 1    Significant Surgical Tasks Conducted by the Assistant(s), if Applicable:       Estimated Blood Loss (EBL):  Minimal             Implants: * No implants in log *    Specimens:   Specimen (24h ago, onward)            None                  Condition: Good    Disposition: PACU - hemodynamically stable.       Attestation: I was present and scrubbed for the entire procedure.     Indications:  Wilfredo Vazquez is a 62-year-old right-hand-dominant female who sustained a laceration while using a knife to the dorsal aspect of her left thumb on March 28, 2022.  She was seen in outside facility where the wound was irrigated and closed with sutures.  after the injury the patient continued to have failure to extend the distal aspect of her left thumb.  She eventually presented to the hand clinic where she was found to have the inability to extend the distal phalanx of the left thumb.  Based on these findings it was suggested that the patient likely lacerated her EPL tendon and that a repair would be required in the operating room.  We discussed the risks benefits alternatives in detail including the possibility of stiffness or decreased range of motion, the need for tenolysis and additional surgeries or  procedures.  The patient understood this and wished to proceed informed consent was obtained the patient was then scheduled for the procedure.    Procedure in detail  After proper identification of the patient preoperative area she was wheeled to operative room on hospital stretcher.  Pressure points padded and checked this time.  A time-out was called number surgeons nurses and Anesthesia agree on the patient and procedure.  She received Ancef prophylactic antibiotics.  Padded 18 in tourniquet was placed to her left upper extremity.  She was then induced under MAC sedation.  Once the patient was well sedated 5 cc 1% lidocaine with epinephrine was injected over the dorsal aspect of her left thumb.  The left upper extremity was then prepped and draped in usual sterile fashion.  next the left hand was exsanguinated using Esmarch tourniquet and the tourniquet was inflated to 250 mmHg.  the diagonal laceration on the dorsal ulnar aspect of the left thumb near the IP joint was ellipsed and excised.  It was noted there was extensive scar tissue from the dermis to the underlying layer.  A longitudinal incision was also created to expose the EPL tendon along the dorsal aspect of the thumb.  Skin flaps were then raised both radially and ulnarly to expose the EPL tendon.  It was noted that there was a 1 cm gap of the EPL tendon in zone 1 with bridging scar tissue.  next the scar tissue was then sharply excised completely using a 15 blade scalpel.  The EPL tendon was then freed from the surrounding tissue to allow for advancement both proximally and distally.  next the distal phalanx of the thumb was then placed into hyper extension into the EPL tendon was then repaired using a 3 0 Ethibond suture in a figure-eight fashion.  the 2 ends of the tendon were completely approximated.  An epitendinous 6 0 Prolene was then run over the repair site for added strength.  I was not satisfied with the repair of the EPL tendon.  the  tourniquet was then released and hemostasis was achieved using electrocautery.  The wound was then irrigated with copious amounts normal saline.  the wound was then closed using 4 0 nylon sutures in interrupted fashion.  bacitracin was applied followed by Adaptic.  The patient was then placed into a sterile thumb spica splint.  This concluded the procedure and patient tolerated procedure well without any complications at the end of the case needle and sponge counts were correct x2 and I was present and scrubbed during all aspects of the procedure.

## 2022-06-22 ENCOUNTER — OFFICE VISIT (OUTPATIENT)
Dept: ORTHOPEDICS | Facility: CLINIC | Age: 62
End: 2022-06-22
Payer: COMMERCIAL

## 2022-06-22 VITALS
HEART RATE: 134 BPM | SYSTOLIC BLOOD PRESSURE: 148 MMHG | WEIGHT: 199.5 LBS | HEIGHT: 62 IN | DIASTOLIC BLOOD PRESSURE: 94 MMHG | BODY MASS INDEX: 36.71 KG/M2

## 2022-06-22 DIAGNOSIS — Z98.890 STATUS POST SURGERY: Primary | ICD-10-CM

## 2022-06-22 DIAGNOSIS — S61.019A LACERATION OF TENDON OF THUMB: ICD-10-CM

## 2022-06-22 PROCEDURE — 99024 POSTOP FOLLOW-UP VISIT: CPT | Mod: S$GLB,,, | Performed by: PLASTIC SURGERY

## 2022-06-22 PROCEDURE — 99999 PR PBB SHADOW E&M-EST. PATIENT-LVL III: ICD-10-PCS | Mod: PBBFAC,,, | Performed by: PLASTIC SURGERY

## 2022-06-22 PROCEDURE — 99999 PR PBB SHADOW E&M-EST. PATIENT-LVL III: CPT | Mod: PBBFAC,,, | Performed by: PLASTIC SURGERY

## 2022-06-22 PROCEDURE — 99024 PR POST-OP FOLLOW-UP VISIT: ICD-10-PCS | Mod: S$GLB,,, | Performed by: PLASTIC SURGERY

## 2022-06-22 PROCEDURE — 99213 OFFICE O/P EST LOW 20 MIN: CPT | Mod: PBBFAC | Performed by: PLASTIC SURGERY

## 2022-06-22 RX ORDER — TRAMADOL HYDROCHLORIDE 50 MG/1
50 TABLET ORAL EVERY 6 HOURS PRN
Qty: 30 TABLET | Refills: 0 | Status: SHIPPED | OUTPATIENT
Start: 2022-06-22 | End: 2022-08-11 | Stop reason: SDUPTHER

## 2022-06-22 RX ORDER — PROMETHAZINE HYDROCHLORIDE AND DEXTROMETHORPHAN HYDROBROMIDE 6.25; 15 MG/5ML; MG/5ML
5 SYRUP ORAL EVERY 6 HOURS PRN
COMMUNITY
Start: 2022-06-09 | End: 2022-07-20

## 2022-06-22 RX ORDER — IBUPROFEN 800 MG/1
800 TABLET ORAL
COMMUNITY
Start: 2022-06-12 | End: 2022-06-22

## 2022-06-22 NOTE — PROGRESS NOTES
"Ms. Vazquez is here today for a post-operative visit.she is 15 days status post  Repair of left thumb EPL tendon in zone 2.. she reports that she is  Experiencing pain today..  Pain is   Moderate.  she is  taking pain medication . she denies fever, chills, and sweats since the time of the surgery.     Physical exam:    Vitals:    06/22/22 1049   BP: (!) 148/94   Pulse: (!) 134   Weight: 90.5 kg (199 lb 8.3 oz)   Height: 5' 2" (1.575 m)   PainSc:   8     Post op dressing taken down.  Incision is clean, dry and intact.  There is no erythema or exudate.  There is no sign of any infection. she is NVI.  The thumb is held in  5° of extension     Assessment:  Status post surgery    Plan:  Wilfredo was seen today for post-op evaluation.    Diagnoses and all orders for this visit:    Status post surgery  -     Ambulatory referral/consult to Physical/Occupational Therapy; Future    Laceration of tendon of thumb  -     Ambulatory referral/consult to Physical/Occupational Therapy; Future    Other orders  -     traMADoL (ULTRAM) 50 mg tablet; Take 1 tablet (50 mg total) by mouth every 6 (six) hours as needed for Pain.         sutures removed today.  -   She was placed into a thumb spica splint with the thumb held in hyperextension.  I discussed the importance of keeping the thumb immobilized and in the extended position to avoid tearing of the repair.  In order was placed for occupational therapy to begin in 4 weeks when her cast is removed.  The patient is to follow-up  In 4 weeks for cast removal and placement of a removable brace.  All questions concerns were addressed.    "

## 2022-06-24 DIAGNOSIS — J44.9 CHRONIC OBSTRUCTIVE PULMONARY DISEASE, UNSPECIFIED COPD TYPE: ICD-10-CM

## 2022-06-24 NOTE — TELEPHONE ENCOUNTER
----- Message from Gabbi Silverio sent at 6/24/2022  1:11 PM CDT -----  Requesting an RX refill or new RX.  Is this a refill or new RX: Refill  RX name and strength budesonide-glycopyr-formoterol (BREZTRI AEROSPHERE) 160-9-4.8 mcg/actuation HFAA, albuterol (ACCUNEB) 0.63 mg/3 mL Nebu, and HYDROcodone-acetaminophen (NORCO) 5-325 mg per tablet  Is this a 30 day or 90 day RX:   Pharmacy name and phone # [facility] - [place] - [address]   Phone:  [phone] Fax:  [phone]

## 2022-06-27 RX ORDER — BUDESONIDE, GLYCOPYRROLATE, AND FORMOTEROL FUMARATE 160; 9; 4.8 UG/1; UG/1; UG/1
2 AEROSOL, METERED RESPIRATORY (INHALATION) 2 TIMES DAILY
Qty: 10.7 G | Refills: 11 | Status: SHIPPED | OUTPATIENT
Start: 2022-06-27 | End: 2023-07-25

## 2022-06-28 RX ORDER — ALBUTEROL SULFATE 0.63 MG/3ML
0.63 SOLUTION RESPIRATORY (INHALATION) EVERY 6 HOURS PRN
Qty: 75 ML | Refills: 5 | Status: SHIPPED | OUTPATIENT
Start: 2022-06-28 | End: 2022-08-10

## 2022-06-28 NOTE — TELEPHONE ENCOUNTER
----- Message from Ginger Maldonado sent at 6/28/2022  1:00 PM CDT -----  Contact: Pt [phone]  Requesting an RX refill or new RX.  Is this a refill or new RX: Refill  RX name and strength (copy/paste from chart):  albuterol (ACCUNEB) 0.63 mg/3 mL Nebu  Is this a 30 day or 90 day RX: 30  Pharmacy name and phone # (copy/paste from chart):    [facility] - [place] - [address]  [address] [place] [address]  Phone: [phone] Fax: [phone]      Requesting an RX refill or new RX.  Is this a refill or new RX: Refill  RX name and strength (copy/paste from chart):  HYDROcodone-acetaminophen (NORCO) 5-325 mg per tablet  Is this a 30 day or 90 day RX: 30  Pharmacy name and phone # (copy/paste from chart):    [facility] - [place]  [address]  [address]  [place] [address]  Phone: [phone] Fax: [phone]    Patient states she is leaving to go out of town for a week tomorrow .      Please call and advise.    Thank You

## 2022-06-28 NOTE — TELEPHONE ENCOUNTER
No new care gaps identified.  [facility] Embedded Care Gaps. Reference number: [id number]. 6/28/2022   1:16:41 PM CDT

## 2022-07-13 ENCOUNTER — TELEPHONE (OUTPATIENT)
Dept: PULMONOLOGY | Facility: CLINIC | Age: 62
End: 2022-07-13
Payer: COMMERCIAL

## 2022-07-13 NOTE — TELEPHONE ENCOUNTER
Spoke to pt. R/s for sooner appt with Dr Aguiar at 4pm tomorrow. Patient confirmed and verbalized understanding.

## 2022-07-13 NOTE — TELEPHONE ENCOUNTER
----- Message from Sydney Renee, Patient Care Assistant sent at 7/11/2022  4:18 PM CDT -----  Regarding: URGENT  Contact: Pt  Pt is requesting a call back in regards to having fluid around her lungs. Pt states she is scared and would like to be seen as soon as possible. Pt is stating she would like her appt before 8/17. Pt states she was informed to be seen before this date. Pt is requesting a call back in regards to this matter so she can give a date that she can actually come in on.      Pt @ [phone]

## 2022-07-14 ENCOUNTER — OFFICE VISIT (OUTPATIENT)
Dept: PULMONOLOGY | Facility: CLINIC | Age: 62
End: 2022-07-14
Payer: COMMERCIAL

## 2022-07-14 VITALS
HEIGHT: 62 IN | SYSTOLIC BLOOD PRESSURE: 126 MMHG | BODY MASS INDEX: 38.94 KG/M2 | DIASTOLIC BLOOD PRESSURE: 76 MMHG | WEIGHT: 211.63 LBS | OXYGEN SATURATION: 98 % | HEART RATE: 104 BPM

## 2022-07-14 DIAGNOSIS — E66.9 OBESITY (BMI 35.0-39.9 WITHOUT COMORBIDITY): ICD-10-CM

## 2022-07-14 DIAGNOSIS — K21.00 GASTROESOPHAGEAL REFLUX DISEASE WITH ESOPHAGITIS, UNSPECIFIED WHETHER HEMORRHAGE: ICD-10-CM

## 2022-07-14 DIAGNOSIS — R13.10 DYSPHAGIA, UNSPECIFIED TYPE: ICD-10-CM

## 2022-07-14 DIAGNOSIS — K22.2 ESOPHAGEAL STENOSIS: ICD-10-CM

## 2022-07-14 DIAGNOSIS — Z87.891 PERSONAL HISTORY OF NICOTINE DEPENDENCE: ICD-10-CM

## 2022-07-14 DIAGNOSIS — J44.9 CHRONIC OBSTRUCTIVE PULMONARY DISEASE, UNSPECIFIED COPD TYPE: Primary | ICD-10-CM

## 2022-07-14 DIAGNOSIS — R06.02 SHORTNESS OF BREATH: ICD-10-CM

## 2022-07-14 PROCEDURE — 99214 OFFICE O/P EST MOD 30 MIN: CPT | Mod: S$GLB,,, | Performed by: INTERNAL MEDICINE

## 2022-07-14 PROCEDURE — 99999 PR PBB SHADOW E&M-EST. PATIENT-LVL IV: CPT | Mod: PBBFAC,,, | Performed by: INTERNAL MEDICINE

## 2022-07-14 PROCEDURE — 99999 PR PBB SHADOW E&M-EST. PATIENT-LVL IV: ICD-10-PCS | Mod: PBBFAC,,, | Performed by: INTERNAL MEDICINE

## 2022-07-14 PROCEDURE — 99214 PR OFFICE/OUTPT VISIT, EST, LEVL IV, 30-39 MIN: ICD-10-PCS | Mod: S$GLB,,, | Performed by: INTERNAL MEDICINE

## 2022-07-14 RX ORDER — ARIPIPRAZOLE 5 MG/1
5 TABLET ORAL DAILY
COMMUNITY
Start: 2022-07-14 | End: 2023-04-10

## 2022-07-14 RX ORDER — VENLAFAXINE HYDROCHLORIDE 37.5 MG/1
37.5 CAPSULE, EXTENDED RELEASE ORAL EVERY MORNING
COMMUNITY
Start: 2022-07-07 | End: 2022-12-07

## 2022-07-14 RX ORDER — CLONAZEPAM 0.5 MG/1
0.5 TABLET ORAL DAILY
COMMUNITY
Start: 2022-06-29 | End: 2022-10-19

## 2022-07-14 NOTE — PROGRESS NOTES
"Subjective:       Patient ID: Wilfredo Vazquez is a 62 y.o. female.    Chief Complaint: Shortness of Breath, Pleural Effusion, and COPD    HPI   Wilfredo Vazquez has a past medical history of bipolar affective disorder, sinusitis, COPD, chronic hypercarbic/hypoxemic respiratory failure, HTN, obesity, GERD, chronic pain, esophageal stenosis, chronic back pain,  Nicotine abuse, who presents as a referral for centrilobular emphysema.    Tobacco/vape: former smoker (quit 2-3 months ago)(1/3 PPD x ~45 years)  Occupation: [employer]   Exertional capacity: can walk only a few steps without becoming shot of breath  Prior lung disease: none  Sputum production: none  Allergies/sinusitis: occasional  GERD/Aspiration: frequent issues,  Has had multiple dilations via EGD. Takes PPI  Pets:  none  Travel/TB:  none  Respiratory regimen: PRN albuterol, Breztri (160-9-4.8), nebs at home.  Prior cancer: none  Prior hospitalization/intubation: never intubated, frequent ER visits (last on 7/8/2022)  Snoring/apnea: suspects she has apneic events at night.    Today she is doing well and having a "good day".  She is frustrated with her COPD control as of late as she has had to go to the ER frequently.  She is in a wheelchair because she becomes dyspneic with minimal exertion.    Review of Systems   Constitutional: Positive for activity change and fatigue. Negative for fever, chills, appetite change and weakness.   HENT: Negative for postnasal drip, rhinorrhea, sinus pressure and congestion.    Eyes: Negative.    Respiratory: Positive for apnea, cough, choking, shortness of breath, wheezing, previous hospitalization due to pulmonary problems, dyspnea on extertion and use of rescue inhaler. Negative for snoring and sputum production.    Cardiovascular: Negative for chest pain, palpitations and leg swelling.   Genitourinary: Negative.    Endocrine: endocrine negative   Musculoskeletal: Negative.    Skin: Negative for rash. " "  Gastrointestinal: Negative for nausea, vomiting, abdominal pain, abdominal distention and acid reflux.   Neurological: Negative.    Psychiatric/Behavioral: Negative.        Objective:       Vitals:    07/14/22 1621   BP: 126/76   BP Location: Right arm   Patient Position: Sitting   Pulse: 104   SpO2: 98%   Weight: 96 kg (211 lb 10.3 oz)   Height: 5' 2" (1.575 m)       Physical Exam   Constitutional: She is oriented to person, place, and time. She appears well-developed and well-nourished. No distress. She is obese.   HENT:   Head: Normocephalic.   Neck: No JVD present.   Cardiovascular: Normal rate, regular rhythm and normal heart sounds. Exam reveals no gallop and no friction rub.   No murmur heard.  Pulmonary/Chest: Hyperinflation, symmetric chest wall expansion and effort normal.   Decreased lung sounds bilaterally.  Bedside US with slide sign and absent pleural fluid bilaterally.   Abdominal: Soft. Bowel sounds are normal. She exhibits no distension.   Musculoskeletal:         General: No edema. Normal range of motion.      Cervical back: Normal range of motion and neck supple.   Neurological: She is alert and oriented to person, place, and time.   Skin: Skin is warm and dry. She is not diaphoretic.   Psychiatric: She has a normal mood and affect. Her behavior is normal. Judgment and thought content normal.        Personal Diagnostic Review    PFTs 2/23/2022  FEV1/FVC  FEV1  FVC  TLC  DLCO Bronchodilator:    BNP 7/8/2022  35    Chest x-ray 7/8/2022  Small left sided pleural effusion.    Chest x-ray 6/9/2022  No acute process, no pleural effusion    CTA Thorax 5/2022  Emphysematous changes noted with basilar atelectasis.  Pleural unremarkable.  No PE.    TTE 4/2022  · This was a technically difficult portable bedside study.  · The left ventricle is normal in size with concentric hypertrophy and normal systolic function. The estimated ejection fraction is 55%.  · Normal left ventricular diastolic " function.  · Normal central venous pressure (3 mmHg).  · Trivial circumferential pericardial effusion.        No flowsheet data found.      Assessment:       1. Chronic obstructive pulmonary disease, unspecified COPD type    2. Obesity (BMI 35.0-39.9 without comorbidity)    3. Personal history of nicotine dependence    4. Gastroesophageal reflux disease with esophagitis, unspecified whether hemorrhage    5. Esophageal stenosis    6. Dysphagia, unspecified type    7. Shortness of breath        Outpatient Encounter Medications as of 7/14/2022   Medication Sig Dispense Refill    albuterol (ACCUNEB) 0.63 mg/3 mL Nebu Take 3 mLs (0.63 mg total) by nebulization every 6 (six) hours as needed. Rescue 75 mL 5    ARIPiprazole (ABILIFY) 5 MG Tab Take 5 mg by mouth once daily.      budesonide-glycopyr-formoterol (BREZTRI AEROSPHERE) 160-9-4.8 mcg/actuation HFAA Inhale 2 puffs into the lungs 2 (two) times daily. 10.7 g 11    clonazePAM (KLONOPIN) 0.5 MG tablet Take 0.5 mg by mouth once daily.      HYDROcodone-acetaminophen (NORCO) 5-325 mg per tablet One p.o. q.6 hours p.r.n. pain do not take with Fioricet 7 tablet 0    ibuprofen (ADVIL,MOTRIN) 600 MG tablet Take 1 tablet (600 mg total) by mouth every 6 (six) hours as needed for Pain. 20 tablet 0    meclizine (ANTIVERT) 25 mg tablet Take 1 tablet (25 mg total) by mouth 3 (three) times daily as needed. 20 tablet 0    omeprazole (PRILOSEC) 40 MG capsule Take 1 capsule (40 mg total) by mouth once daily. 30 capsule 5    potassium chloride (KLOR-CON) 10 MEQ TbSR Take 1 tablet (10 mEq total) by mouth once daily. 5 tablet 0    promethazine-dextromethorphan (PROMETHAZINE-DM) 6.25-15 mg/5 mL Syrp Take 5 mLs by mouth every 6 (six) hours as needed.      QUEtiapine (SEROQUEL) 200 MG Tab Take 200 mg by mouth nightly.      traMADoL (ULTRAM) 50 mg tablet Take 1 tablet (50 mg total) by mouth every 6 (six) hours as needed for Pain. 30 tablet 0    venlafaxine (EFFEXOR-XR) 37.5 MG 24  hr capsule Take 37.5 mg by mouth every morning.      furosemide (LASIX) 40 MG tablet Take 1 tablet (40 mg total) by mouth once daily. for 5 days 5 tablet 0    lisinopriL (PRINIVIL,ZESTRIL) 5 MG tablet Take 1 tablet (5 mg total) by mouth once daily. 90 tablet 1     Facility-Administered Encounter Medications as of 7/14/2022   Medication Dose Route Frequency Provider Last Rate Last Admin    lactated ringers infusion   Intravenous Continuous Sim Quinteros MD 0 mL/hr at 12/14/21 0846 New Bag at 06/07/22 1151    sodium chloride 0.9% flush 10 mL  10 mL Intravenous PRN Sim Quinteros MD         Orders Placed This Encounter   Procedures    Ambulatory referral/consult to Sleep Disorders     Standing Status:   Future     Standing Expiration Date:   8/14/2023     Referral Priority:   Routine     Referral Type:   Consultation     Requested Specialty:   Sleep Medicine     Number of Visits Requested:   1    Six Minute Walk Test to qualify for Home Oxygen     Standing Status:   Future     Standing Expiration Date:   7/14/2023     Order Specific Question:   Release to patient     Answer:   Immediate       Plan:       1) COPD (Gold 3D)  2) previous tobacco abuse (former, quit 2 months ago)  3) suspected left plural effusion (seen on x-ray not present on bedside US)  4) recurrent aspiration    - inhaler technique counseled extensively.  She believes there is room for improvement, and hopefully this helps her COPD control.   - aspiration precautions advised given frequent aspiration (eat and remain upright for 3-4 hours before laying down).  She will continue follow up with GI and taking her PPI  - sleep referral placed due to reported apnea.  - congratulated on tobacco cessation and encouraged maintenance of abstinence.   - 6MWT ordered to establish if there is a need for O2.  - up to date on pneumonia vaccination (needs PSV13 at 65).  UTD on flu.  COVID-19 x 2.   - bedside US revealed no pleural fluid on either side.   -  repeat CT in 5/2023    RTC 3 months or sooner PRN for re-evaluation of control following these interventions.    Chaitanya Aguiar MD  [facility]

## 2022-07-18 ENCOUNTER — HOSPITAL ENCOUNTER (OUTPATIENT)
Dept: PULMONOLOGY | Facility: CLINIC | Age: 62
Discharge: HOME | End: 2022-07-18
Payer: COMMERCIAL

## 2022-07-18 VITALS — BODY MASS INDEX: 36.07 KG/M2 | HEIGHT: 62 IN | WEIGHT: 196 LBS

## 2022-07-18 DIAGNOSIS — J44.9 CHRONIC OBSTRUCTIVE PULMONARY DISEASE, UNSPECIFIED COPD TYPE: ICD-10-CM

## 2022-07-18 PROCEDURE — 94618 PULMONARY STRESS TESTING: CPT | Mod: S$GLB,,, | Performed by: INTERNAL MEDICINE

## 2022-07-18 PROCEDURE — 94618 PULMONARY STRESS TESTING: ICD-10-PCS | Mod: S$GLB,,, | Performed by: INTERNAL MEDICINE

## 2022-07-19 NOTE — PROCEDURES
Wilfredo Vazquez is a 62 y.o.  female patient, who presents for a 6 minute walk test ordered by MD Cosme.  The diagnosis is Qualify for Oxygen.  The patient's BMI is 35.8 kg/m2.  Predicted distance (lower limit of normal) is 304.81 meters.      Test Results:    The test was completed with stops.  The patient stopped 4 times for a total of 63 seconds.  The total time walked was 297 seconds.  During walking, the patient reported:  Dyspnea, Lightheadedness, Other (Comment), Dizziness (neck pain). The patient used no assistive devices  during testing.     7/18/2022--------Distance: 182.88 meters (600 feet)     O2 Sat % Supplemental Oxygen Heart Rate Blood Pressure Deonte Scale   Pre-exercise  (Resting) 98 % Room Air 109 bpm 133/81 mmHg 4   During Exercise 97 % Room Air 119 bpm 150/78 mmHg 5-6   Post-exercise  (Recovery) 98 % Room Air  105 bpm 141/76 mmHg       Recovery Time: 120 seconds    Performing nurse/tech: Estop[employer] RRT      PREVIOUS STUDY:   The patient has not had a previous study.      CLINICAL INTERPRETATION:  Six minute walk distance is 182.88 meters (600 feet) with heavy dyspnea.  During exercise, there was no significant desaturation while breathing room air.  Both blood pressure and heart rate remained stable with walking.  Tachycardia was present prior to exercise.  The patient reported non-pulmonary symptoms during exercise.  Significant exercise impairment is likely due to cardiovascular causes and subjective symptoms.  The patient did complete the study, walking 297 seconds of the 360 second test.  No previous study performed.  Based upon age and body mass index, exercise capacity is less than predicted.

## 2022-07-20 ENCOUNTER — OFFICE VISIT (OUTPATIENT)
Dept: ORTHOPEDICS | Facility: CLINIC | Age: 62
End: 2022-07-20
Payer: COMMERCIAL

## 2022-07-20 ENCOUNTER — OFFICE VISIT (OUTPATIENT)
Dept: SLEEP MEDICINE | Facility: CLINIC | Age: 62
End: 2022-07-20
Payer: COMMERCIAL

## 2022-07-20 VITALS
DIASTOLIC BLOOD PRESSURE: 94 MMHG | WEIGHT: 207 LBS | HEIGHT: 62 IN | HEART RATE: 123 BPM | BODY MASS INDEX: 38.09 KG/M2 | SYSTOLIC BLOOD PRESSURE: 146 MMHG

## 2022-07-20 VITALS
BODY MASS INDEX: 38.09 KG/M2 | HEIGHT: 62 IN | DIASTOLIC BLOOD PRESSURE: 84 MMHG | SYSTOLIC BLOOD PRESSURE: 121 MMHG | HEART RATE: 106 BPM | WEIGHT: 207 LBS

## 2022-07-20 DIAGNOSIS — J43.2 CENTRILOBULAR EMPHYSEMA: Primary | ICD-10-CM

## 2022-07-20 DIAGNOSIS — I10 HYPERTENSION, UNSPECIFIED TYPE: ICD-10-CM

## 2022-07-20 DIAGNOSIS — G47.30 SLEEP APNEA, UNSPECIFIED TYPE: ICD-10-CM

## 2022-07-20 DIAGNOSIS — E66.9 OBESITY (BMI 35.0-39.9 WITHOUT COMORBIDITY): ICD-10-CM

## 2022-07-20 DIAGNOSIS — Z98.890 STATUS POST SURGERY: Primary | ICD-10-CM

## 2022-07-20 PROCEDURE — 99999 PR PBB SHADOW E&M-EST. PATIENT-LVL III: ICD-10-PCS | Mod: PBBFAC,,, | Performed by: NURSE PRACTITIONER

## 2022-07-20 PROCEDURE — 99999 PR PBB SHADOW E&M-EST. PATIENT-LVL III: CPT | Mod: PBBFAC,,, | Performed by: NURSE PRACTITIONER

## 2022-07-20 PROCEDURE — 99204 PR OFFICE/OUTPT VISIT, NEW, LEVL IV, 45-59 MIN: ICD-10-PCS | Mod: S$GLB,,, | Performed by: NURSE PRACTITIONER

## 2022-07-20 PROCEDURE — 99024 POSTOP FOLLOW-UP VISIT: CPT | Mod: S$GLB,,, | Performed by: PLASTIC SURGERY

## 2022-07-20 PROCEDURE — 99999 PR PBB SHADOW E&M-EST. PATIENT-LVL III: ICD-10-PCS | Mod: PBBFAC,,, | Performed by: PLASTIC SURGERY

## 2022-07-20 PROCEDURE — 99024 PR POST-OP FOLLOW-UP VISIT: ICD-10-PCS | Mod: S$GLB,,, | Performed by: PLASTIC SURGERY

## 2022-07-20 PROCEDURE — 99204 OFFICE O/P NEW MOD 45 MIN: CPT | Mod: S$GLB,,, | Performed by: NURSE PRACTITIONER

## 2022-07-20 PROCEDURE — 99999 PR PBB SHADOW E&M-EST. PATIENT-LVL III: CPT | Mod: PBBFAC,,, | Performed by: PLASTIC SURGERY

## 2022-07-20 NOTE — PROGRESS NOTES
"Wilfredo Vazquez is 6 weeks status post left thumb EPL tendon repair.  She has been compliant with the use of her fiberglass cast.  Her cast was removed today.  She states that she is not scheduled for 1st therapy appointment yet.  She denies any other complaints    Physical exam:    Vitals:    07/20/22 1011   BP: (!) 146/94   Pulse: (!) 123   Weight: 93.9 kg (207 lb 0.2 oz)   Height: 5' 2" (1.575 m)   PainSc: 0-No pain     Left hand:  Incision well healed neurovascularly intact the IP joint of the left thumb is held in full extension.    Assessment:  Status post surgery  Plan:  Wilfredo was seen today for post-op evaluation.    Diagnoses and all orders for this visit:    Status post surgery        She was placed into a thumb spica brace to wear around the clock except for hygiene.  She was advised to schedule her 1st therapy appointment to begin range of motion exercises of the left thumb.  She was advised to follow all instructions given by the therapist.  She is to follow-up in 6 weeks for reassessment.      "

## 2022-07-20 NOTE — PROGRESS NOTES
"Referred by Dr Aguiar  CHIEF COMPLAINT: Sleep evaluation    HISTORY OF PRESENT ILLNESS:She has never had a sleep study. "hard to breath" at nighttime, sometimes jumps up to catch, air, possible apneic pauses. Sleeps on 1 pillow. Denies known snoring or reported snoring. She does not drive. +disrupted sleep then hard to return to sleep and up all day, avoids napping but feels very sleepy all day. Occasional am headaches.       On todays [place] Sleepiness Scale the patient scores a 0/24.     FAMILY HISTORY: No known sleep disorders.   SOCIAL HISTORY: single      PHYSICAL EXAM:   /84 (BP Location: Left arm, Patient Position: Sitting, BP Method: Large (Automatic))   Pulse 106   Ht 5' 2" (1.575 m)   Wt 93.9 kg (207 lb)   LMP  (LMP Unknown)   BMI 37.86 kg/m²   GENERAL: Obese body habitus, well groomed       ASSESSMENT:   Unspecified Sleep Apnea, with symptoms of questionable snoring,possible self reported apneic pauses, un-refreshing disrupted sleep and daytime sleepiness, with medical comorbidities of obesity,  chronic hypercarbic/hypoxemic respiratory failure, emphysema and COPD, hypertension. Warrants further investigation for untreated sleep apnea.     PLAN:   1. Polysomnogram, discussed plan of care    2. Discussed etiology of MARISSA and potential ramifications of untreated MARISSA, including stroke, heart disease, HTN.  We discussed potential treatment options, which could include weight loss, continuous positive airway pressure (CPAP-definitive), mandibular advancement splint by dentist  3. She does not drive. Continue to see pulm as advised/continue meds and pcp re htn mgt/continue med      Thank you for allowing me the opportunity to participate in the care of your patient      "

## 2022-07-23 ENCOUNTER — TELEPHONE (OUTPATIENT)
Dept: PULMONOLOGY | Facility: HOSPITAL | Age: 62
End: 2022-07-23
Payer: COMMERCIAL

## 2022-07-23 NOTE — TELEPHONE ENCOUNTER
I called Wilfredo Vazquez to discuss her 6MWT findings.  I explained that she does not need O2 when she exerts herself based on these results.  She understands and will notify me if she has any further questions.  She told me she is pending her sleep study titration very soon.    Chaitanya Aguiar MD  [facility]

## 2022-08-11 ENCOUNTER — OFFICE VISIT (OUTPATIENT)
Dept: PRIMARY CARE CLINIC | Facility: CLINIC | Age: 62
End: 2022-08-11
Payer: COMMERCIAL

## 2022-08-11 DIAGNOSIS — J43.9 PULMONARY EMPHYSEMA, UNSPECIFIED EMPHYSEMA TYPE: ICD-10-CM

## 2022-08-11 DIAGNOSIS — I10 HYPERTENSION, UNSPECIFIED TYPE: Primary | ICD-10-CM

## 2022-08-11 DIAGNOSIS — Z87.19 HISTORY OF GASTROESOPHAGEAL REFLUX (GERD): ICD-10-CM

## 2022-08-11 DIAGNOSIS — E66.9 OBESITY (BMI 35.0-39.9 WITHOUT COMORBIDITY): ICD-10-CM

## 2022-08-11 DIAGNOSIS — R10.11 ABDOMINAL PAIN, RIGHT UPPER QUADRANT: ICD-10-CM

## 2022-08-11 PROCEDURE — 99213 OFFICE O/P EST LOW 20 MIN: CPT | Mod: 95,,, | Performed by: FAMILY MEDICINE

## 2022-08-11 PROCEDURE — 4010F PR ACE/ARB THEARPY RXD/TAKEN: ICD-10-PCS | Mod: CPTII,95,, | Performed by: FAMILY MEDICINE

## 2022-08-11 PROCEDURE — 4010F ACE/ARB THERAPY RXD/TAKEN: CPT | Mod: CPTII,95,, | Performed by: FAMILY MEDICINE

## 2022-08-11 PROCEDURE — 99213 PR OFFICE/OUTPT VISIT, EST, LEVL III, 20-29 MIN: ICD-10-PCS | Mod: 95,,, | Performed by: FAMILY MEDICINE

## 2022-08-11 RX ORDER — VARENICLINE TARTRATE 0.5 (11)-1
KIT ORAL
Qty: 1 EACH | Refills: 0 | Status: SHIPPED | OUTPATIENT
Start: 2022-08-11 | End: 2022-12-07

## 2022-08-11 RX ORDER — TRAMADOL HYDROCHLORIDE 50 MG/1
50 TABLET ORAL EVERY 6 HOURS PRN
Qty: 30 TABLET | Refills: 0 | Status: SHIPPED | OUTPATIENT
Start: 2022-08-11 | End: 2022-10-19 | Stop reason: SDUPTHER

## 2022-08-11 RX ORDER — LOSARTAN POTASSIUM 100 MG/1
100 TABLET ORAL DAILY
Qty: 90 TABLET | Refills: 3 | Status: SHIPPED | OUTPATIENT
Start: 2022-08-11 | End: 2023-05-17

## 2022-08-11 NOTE — PROGRESS NOTES
Subjective:       Patient ID: Wilfredo Vazquez is a 62 y.o. female.    Chief Complaint: No chief complaint on file.  HPI-61 yo BF -- refills--patient needs lisinopril refilled--BP is been up and down--see this high--tramadol--for pain head and stomach---patient is on omeprazole.  Takes tramadol only as needed.  Pain in the upper abdomen--had for years--seen in the emergency room yesterday--whole workup everything came out normal.  It has appointment to see the kidney doctor on [address] in August.           ROS:  No significant change other than history of present illness  Skin: no psoriasis, eczema, skin cancer-  HEENT: no headache,  No ocular pain, blurred vision, diplopia, epistaxis, hoarseness change in voice, thyroid trouble  Lung: No pneumonia, +asthma, no Tb, +_wheezing,+ SOB, no smoking + COPD   Heart: No chest pain, ankle edema, palpitations, MI, patti murmur, +hypertension,no  hyperlipidemia no stent bypass arrhythmia  Abdomen: no nausea,no  Vomiting,no diarrhea, no  constipation, ulcers, hepatitis, gallbladder disease, melena, hematochezia, hematemesis +dysphagia history of fundoplication for GERD history of esophageal dilatation x2Dr Beary doing better--swallowing much better  : no UTI, renal disease, stones  GYN hyst   MS: no fractures, O/A, lupus, rheumatoid, gout--pain in hands an arm  Neuro: No dizziness, LOC, seizures   No diabetes, no anemia, no anxiety, no depression   Single--2 children--disable secondary to a stomach--lives with daughter and grandson    Objective:   Physical Exam:  General: Well nourished, well developed, no acute distress +obesity  Skin: No lesions  HEENT: Eyes PERRLA, EOM intact, nose patent, throat non erythematous ears TMs clear   NECK: Supple, no bruits, No JVD, no nodes  Lungs: Clear, no rales, rhonchi, wheezing decreased BS but clear   Heart: Regular rate and rhythm, no murmur s, gallops, or rubs  Abdomen:  No abdominal pain guarding rebound or tenderness  organomegaly  Neuro: Alert, CN intact, oriented X 3  Extremities: No cyanosis, clubbing, or edema         Assessment:       1. Hypertension, unspecified type    2. Abdominal pain, right upper quadrant    3. History of gastroesophageal reflux (GERD)    4. Obesity (BMI 35.0-39.9 without comorbidity)    5. Pulmonary emphysema, unspecified emphysema type        Plan:           Main Reason for Visit--  Multiple medical issues  Main issue--review lab patient told was anemic hematocrit 34 has chronic renal insufficiency BUN 26 creatinine 1.6 glomerular filtration rate 34-40 no new treatment  Patient needs refills of medication  Other medical issues  Shortness of breath--COPD/emphysema recent hospitalization for chronic respiratory failure with hypoxia--has appointment with pulmonary MD--on aerosol albuterol nebulizer--Breztri--Spiriva--in the hospital was on BiPAP--patient claims had 6 minute walk and did not meet requirements for oxygen--Needs pulmonary see if feels pt would benefit from steroids/CPAP or BiPAP /home O2   Hypertension blood pressure 132/76 lisinopril 5 mg  History dysphagia--difficulty swallowing water has to eat small amounts of food--history esophageal dilitation   Lab CBCs CMP lipd T4 TSH 6 mo   Maintenance sign pain contract complete opioid wrist shingles COVID        Established Patient - Audio Only Telehealth Visit     The patient location is: home  The chief complaint leading to consultation is: htn  Visit type: Virtual visit with audio only (telephone)  Total time spent with patient: 30 min        The reason for the audio only service rather than synchronous audio and video virtual visit was related to technical difficulties or patient preference/necessity.     Each patient to whom I provide medical services by telemedicine is:  (1) informed of the relationship between the physician and patient and the respective role of any other health care provider with respect to management of the patient; and  (2) notified that they may decline to receive medical services by telemedicine and may withdraw from such care at any time. Patient verbally consented to receive this service via voice-only telephone call.       HPI:  HPIabvasquez      Assessment and plan:  See plan above                         This service was not originating from a related E/M service provided within the previous 7 days nor will  to an E/M service or procedure within the next 24 hours or my soonest available appointment.  Prevailing standard of care was able to be met in this audio-only visit.

## 2022-08-18 ENCOUNTER — TELEPHONE (OUTPATIENT)
Dept: SLEEP MEDICINE | Facility: OTHER | Age: 62
End: 2022-08-18
Payer: COMMERCIAL

## 2022-09-01 ENCOUNTER — TELEPHONE (OUTPATIENT)
Dept: SLEEP MEDICINE | Facility: OTHER | Age: 62
End: 2022-09-01
Payer: COMMERCIAL

## 2022-09-15 ENCOUNTER — TELEPHONE (OUTPATIENT)
Dept: SLEEP MEDICINE | Facility: OTHER | Age: 62
End: 2022-09-15
Payer: COMMERCIAL

## 2022-09-15 NOTE — TELEPHONE ENCOUNTER
Patients family let me know she's out of town.  Won't make the sleep study appointment for Sept 16th.

## 2022-09-26 ENCOUNTER — TELEPHONE (OUTPATIENT)
Dept: SLEEP MEDICINE | Facility: OTHER | Age: 62
End: 2022-09-26
Payer: MEDICARE

## 2022-10-03 RX ORDER — ALBUTEROL SULFATE 0.63 MG/3ML
SOLUTION RESPIRATORY (INHALATION)
Qty: 75 ML | Refills: 5 | Status: ON HOLD | OUTPATIENT
Start: 2022-10-03 | End: 2023-12-28

## 2022-10-03 RX ORDER — TRAMADOL HYDROCHLORIDE 50 MG/1
50 TABLET ORAL EVERY 6 HOURS PRN
Qty: 30 TABLET | Refills: 0 | OUTPATIENT
Start: 2022-10-03

## 2022-10-03 NOTE — TELEPHONE ENCOUNTER
----- Message from Roxana Cee sent at 10/3/2022 11:55 AM CDT -----  Contact: [phone] Patient  Requesting an RX refill or new RX.  Is this a refill or new RX: new  RX name and strength (copy/paste from chart):  albuterol (ACCUNEB) 0.63 mg/3 mL Nebu  Is this a 30 day or 90 day RX:   Pharmacy name and phone # (copy/paste from chart):    [facility] [place] - [address] [place] [address]  Phone: [phone] Fax: [phone]      Requesting an RX refill or new RX.  Is this a refill or new RX: new  RX name and strength (copy/paste from chart):  traMADoL (ULTRAM) 50 mg tablet  Is this a 30 day or 90 day RX:   Pharmacy name and phone # (copy/paste from chart):   [facility] [place] [facility] [address] [place] [address]  Phone: [phone] Fax: [phone]

## 2022-10-03 NOTE — TELEPHONE ENCOUNTER
No new care gaps identified.  [facility] Embedded Care Gaps. Reference number: [id number]. 10/03/2022   1:15:22 PM CDT

## 2022-10-04 NOTE — TELEPHONE ENCOUNTER
No new care gaps identified.  [facility] Embedded Care Gaps. Reference number: [id number]. 10/04/2022   2:29:13 PM CDT

## 2022-10-04 NOTE — TELEPHONE ENCOUNTER
----- Message from Jessica Reyna sent at 10/4/2022  1:25 PM CDT -----  Regarding: refill  Contact: patient [phone]  Requesting an RX refill or new RX.  Is this a refill or new RX: refill  RX name and strength (traMADoL (ULTRAM) 50 mg tablet  Is this a 30 day or 90 day RX:   Pharmacy name and phone #  [facility] [place] - [address]  [address]  [place] [address]  Phone: [phone] Fax: [phone]        The doctors have asked that we provide their patients with the following 2 reminders -- prescription refills can take up to 72 hours, and a friendly reminder that in the future you can use your [facility] account to request refills: yes

## 2022-10-07 ENCOUNTER — HOSPITAL ENCOUNTER (OUTPATIENT)
Dept: SLEEP MEDICINE | Facility: OTHER | Age: 62
Discharge: HOME | End: 2022-10-07
Attending: NURSE PRACTITIONER
Payer: COMMERCIAL

## 2022-10-07 ENCOUNTER — TELEPHONE (OUTPATIENT)
Dept: SLEEP MEDICINE | Facility: OTHER | Age: 62
End: 2022-10-07
Payer: MEDICARE

## 2022-10-07 DIAGNOSIS — G47.33 OSA (OBSTRUCTIVE SLEEP APNEA): Primary | ICD-10-CM

## 2022-10-07 DIAGNOSIS — G47.30 SLEEP APNEA, UNSPECIFIED TYPE: ICD-10-CM

## 2022-10-07 PROCEDURE — 95810 POLYSOM 6/> YRS 4/> PARAM: CPT | Mod: 26,,, | Performed by: INTERNAL MEDICINE

## 2022-10-07 PROCEDURE — 95810 PR POLYSOMNOGRAPHY, 4 OR MORE: ICD-10-PCS | Mod: 26,,, | Performed by: INTERNAL MEDICINE

## 2022-10-07 PROCEDURE — 95810 POLYSOM 6/> YRS 4/> PARAM: CPT

## 2022-10-07 RX ORDER — TRAMADOL HYDROCHLORIDE 50 MG/1
50 TABLET ORAL EVERY 6 HOURS PRN
Qty: 30 TABLET | Refills: 0 | OUTPATIENT
Start: 2022-10-07

## 2022-10-07 NOTE — LETTER
[facility] - Sleep Lab  [address] [address]  Phone: [phone] October 24, 2022     Eliza Champion, NP  [address]  [place] [address]    Patient: Wilfredo Vazquez   MR Number: [id number]   YOB: 1960   Date of Visit: 10/7/2022       Dear Eliza Champion:    Thank you for referring Wilfredo Vazquez to me for evaluation.    Polysomnography completed and interpreted.  Report is available under media tab.    Sincerely,    Richy Magana MD          CC    No Recipients

## 2022-10-08 PROBLEM — G47.30 SLEEP APNEA: Status: ACTIVE | Noted: 2022-10-08

## 2022-10-08 NOTE — PROGRESS NOTES
Wilfredo Vazquez to [facility] for an overnight sleep study on 10/07/2022. Pt education,setup and cpap explanation given prior to testing.  Disposable leads and sensors used where applicable.  Diagnostic study completed.  Post test followup information given in AM.

## 2022-10-10 ENCOUNTER — TELEPHONE (OUTPATIENT)
Dept: PRIMARY CARE CLINIC | Facility: CLINIC | Age: 62
End: 2022-10-10
Payer: MEDICARE

## 2022-10-10 NOTE — TELEPHONE ENCOUNTER
----- Message from Deanna Clement sent at 10/10/2022  1:42 PM CDT -----  Contact: Patient, [phone]  Patient is returning a phone call.  Who left a message for the patient: Farhana  Does patient know what this is regarding:  Refill  Would you like a call back, or a response through your [facility] portal?:   Call back  Comments:  Missed your call, please call her back. Thanks.

## 2022-10-19 ENCOUNTER — OFFICE VISIT (OUTPATIENT)
Dept: PRIMARY CARE CLINIC | Facility: CLINIC | Age: 62
End: 2022-10-19
Payer: COMMERCIAL

## 2022-10-19 VITALS
OXYGEN SATURATION: 99 % | BODY MASS INDEX: 40.37 KG/M2 | HEART RATE: 99 BPM | SYSTOLIC BLOOD PRESSURE: 136 MMHG | TEMPERATURE: 97 F | WEIGHT: 219.38 LBS | HEIGHT: 62 IN | DIASTOLIC BLOOD PRESSURE: 86 MMHG | RESPIRATION RATE: 18 BRPM

## 2022-10-19 DIAGNOSIS — Z87.19 HISTORY OF GASTROESOPHAGEAL REFLUX (GERD): ICD-10-CM

## 2022-10-19 DIAGNOSIS — J43.9 PULMONARY EMPHYSEMA, UNSPECIFIED EMPHYSEMA TYPE: ICD-10-CM

## 2022-10-19 DIAGNOSIS — J02.9 PHARYNGITIS, UNSPECIFIED ETIOLOGY: ICD-10-CM

## 2022-10-19 DIAGNOSIS — S80.02XA CONTUSION OF LEFT KNEE, INITIAL ENCOUNTER: Primary | ICD-10-CM

## 2022-10-19 DIAGNOSIS — I10 HYPERTENSION, UNSPECIFIED TYPE: ICD-10-CM

## 2022-10-19 PROCEDURE — 4010F PR ACE/ARB THEARPY RXD/TAKEN: ICD-10-PCS | Mod: CPTII,S$GLB,, | Performed by: FAMILY MEDICINE

## 2022-10-19 PROCEDURE — 3075F PR MOST RECENT SYSTOLIC BLOOD PRESS GE 130-139MM HG: ICD-10-PCS | Mod: CPTII,S$GLB,, | Performed by: FAMILY MEDICINE

## 2022-10-19 PROCEDURE — 99999 PR PBB SHADOW E&M-EST. PATIENT-LVL III: ICD-10-PCS | Mod: PBBFAC,,, | Performed by: FAMILY MEDICINE

## 2022-10-19 PROCEDURE — 99214 PR OFFICE/OUTPT VISIT, EST, LEVL IV, 30-39 MIN: ICD-10-PCS | Mod: S$GLB,,, | Performed by: FAMILY MEDICINE

## 2022-10-19 PROCEDURE — 3079F DIAST BP 80-89 MM HG: CPT | Mod: CPTII,S$GLB,, | Performed by: FAMILY MEDICINE

## 2022-10-19 PROCEDURE — 3079F PR MOST RECENT DIASTOLIC BLOOD PRESSURE 80-89 MM HG: ICD-10-PCS | Mod: CPTII,S$GLB,, | Performed by: FAMILY MEDICINE

## 2022-10-19 PROCEDURE — 99214 OFFICE O/P EST MOD 30 MIN: CPT | Mod: S$GLB,,, | Performed by: FAMILY MEDICINE

## 2022-10-19 PROCEDURE — 99999 PR PBB SHADOW E&M-EST. PATIENT-LVL III: CPT | Mod: PBBFAC,,, | Performed by: FAMILY MEDICINE

## 2022-10-19 PROCEDURE — 3075F SYST BP GE 130 - 139MM HG: CPT | Mod: CPTII,S$GLB,, | Performed by: FAMILY MEDICINE

## 2022-10-19 PROCEDURE — 4010F ACE/ARB THERAPY RXD/TAKEN: CPT | Mod: CPTII,S$GLB,, | Performed by: FAMILY MEDICINE

## 2022-10-19 RX ORDER — TRAMADOL HYDROCHLORIDE 50 MG/1
50 TABLET ORAL EVERY 6 HOURS PRN
Qty: 30 TABLET | Refills: 0 | Status: SHIPPED | OUTPATIENT
Start: 2022-10-19 | End: 2022-11-28 | Stop reason: SDUPTHER

## 2022-10-19 RX ORDER — AMOXICILLIN AND CLAVULANATE POTASSIUM 875; 125 MG/1; MG/1
1 TABLET, FILM COATED ORAL 2 TIMES DAILY
Qty: 20 TABLET | Refills: 0 | Status: SHIPPED | OUTPATIENT
Start: 2022-10-19 | End: 2022-10-29

## 2022-10-19 RX ORDER — PREDNISONE 20 MG/1
20 TABLET ORAL DAILY
Qty: 10 TABLET | Refills: 0 | Status: SHIPPED | OUTPATIENT
Start: 2022-10-19 | End: 2022-12-07

## 2022-10-19 NOTE — PROGRESS NOTES
Subjective:       Patient ID: Wilfredo Vazquez is a 62 y.o. female.    Chief Complaint: Follow-up (Follow-up appointment from hospital / (L) Knee and (L) side of face)  HPI-63 yo BF -knee pain--left ear left cheek pain  Pain from left ear to left anterior cervical chain approximately midway down the neck--patient was seen in emergency room--10/15/2022--patient was given an antibiotic but not fill it yet--Augmentin 875 b.i.d. x7 days--no fever--the nose no runny stuffy nose--+sore throat--slight cough.  no pneumonia asthma TB +smoking quarter pack per day.  No nausea vomiting diarrhea  Left knee pain--tripped on the feet missing a step--has not had an x-ray---hurts in the patella area--walking hurt--squatting hurt--hurts to go up and down steps.  No prior knee injuries           ROS:  No significant change except for history of present illness  Skin: no psoriasis, eczema, skin cancer-  HEENT: no headache,  No ocular pain, blurred vision, diplopia, epistaxis, hoarseness change in voice, thyroid trouble  Lung: No pneumonia, +asthma, no Tb, +_wheezing,+ SOB, no smoking + COPD   Heart: No chest pain, ankle edema, palpitations, MI, patti murmur, +hypertension,no  hyperlipidemia no stent bypass arrhythmia  Abdomen: no nausea,no  Vomiting,no diarrhea, no  constipation, ulcers, hepatitis, gallbladder disease, melena, hematochezia, hematemesis +dysphagia history of fundoplication for GERD history of esophageal dilatation x2Dr Beary doing better--swallowing much better  : no UTI, renal disease, stones  GYN hyst   MS: no fractures, O/A, lupus, rheumatoid, gout--pain in hands an arm  Neuro: No dizziness, LOC, seizures   No diabetes, no anemia, no anxiety, no depression   Single--2 children--disable secondary to a stomach--lives with daughter and grandson    Objective:   Physical Exam:  General: Well nourished, well developed, no acute distress +obesity  Skin: No lesions  HEENT: Eyes PERRLA, EOM intact, nose patent,  throat +1/4 erythematous ears TMs clear   NECK: Supple, no bruits, No JVD, no nodes  Lungs: Clear, no rales, rhonchi, wheezing decreased BS but clear   Heart: Regular rate and rhythm, no murmur s, gallops, or rubs  Abdomen:  No abdominal pain guarding rebound or tenderness organomegaly  MS--pain especially in the left patellar area--pain with flexion extension plan with ambulation plan with squatting arising  Neuro: Alert, CN intact, oriented X 3  Extremities: No cyanosis, clubbing, or edema         Assessment:       1. Contusion of left knee, initial encounter    2. Pharyngitis, unspecified etiology    3. Pulmonary emphysema, unspecified emphysema type    4. Hypertension, unspecified type    5. History of gastroesophageal reflux (GERD)          Plan:           Main Reason for Visit--  Multiple medical issues  Main issue--contusion to the knees bilaterally left much greater than right after a fall--treat with Ultram--prednisone--after prednisone can use OTC NSAIDs  Pharyngitis--Augmentin 875 b.i.d.--prednisolone---can use Chloraseptic Sucrets Las injures or spray  Patient needs refills of medication  Other medical issues  Shortness of breath--COPD/emphysema recent hospitalization for chronic respiratory failure with hypoxia--has appointment with pulmonary MD--on aerosol albuterol nebulizer--Breztri--Spiriva--in the hospital was on BiPAP--patient claims had 6 minute walk and did not meet requirements for oxygen--Needs pulmonary see if feels pt would benefit from steroids/CPAP or BiPAP /home O2   Hypertension blood pressure 132/76 lisinopril 5 mg  History dysphagia--difficulty swallowing water has to eat small amounts of food--history esophageal dilitation   Lab CBCs CMP lipd T4 TSH 6 mo   Maintenance sign pain contract complete opioid wrist shingles COVID

## 2022-10-24 PROBLEM — G47.33 OSA (OBSTRUCTIVE SLEEP APNEA): Status: ACTIVE | Noted: 2022-10-24

## 2022-10-25 DIAGNOSIS — G47.33 OSA (OBSTRUCTIVE SLEEP APNEA): Primary | ICD-10-CM

## 2022-10-25 NOTE — PROGRESS NOTES
Notified of mild dante on study and plan for cpap setup via [facility]. Rtc 4-5 wks after setup

## 2022-10-28 ENCOUNTER — TELEPHONE (OUTPATIENT)
Dept: PRIMARY CARE CLINIC | Facility: CLINIC | Age: 62
End: 2022-10-28
Payer: COMMERCIAL

## 2022-10-28 DIAGNOSIS — S80.02XA CONTUSION OF LEFT KNEE, INITIAL ENCOUNTER: ICD-10-CM

## 2022-10-28 DIAGNOSIS — M25.461 EFFUSION OF RIGHT KNEE: Primary | ICD-10-CM

## 2022-10-28 NOTE — TELEPHONE ENCOUNTER
----- Message from Leandro Zaidi sent at 10/28/2022 12:27 PM CDT -----  Contact: self [phone]  Per pt returning a call.    Please call and advise

## 2022-10-28 NOTE — TELEPHONE ENCOUNTER
Patient agrees to see an orthopedic doctor. Please sign referral    Patient notified with results.  Call tell ptXray Left knee moderate narrowing bilateral tibiofmeral compartments --suprapatellar joint effusion if desires needs see orthopedist ofchoice

## 2022-10-29 NOTE — TELEPHONE ENCOUNTER
Patient was previously notified with results and is needing an ortho referral. Referral is pending.

## 2022-11-25 RX ORDER — TRAMADOL HYDROCHLORIDE 50 MG/1
50 TABLET ORAL EVERY 6 HOURS PRN
Qty: 30 TABLET | Refills: 0 | Status: CANCELLED | OUTPATIENT
Start: 2022-11-25

## 2022-11-25 NOTE — TELEPHONE ENCOUNTER
No new care gaps identified.  [facility] Embedded Care Gaps. Reference number: [id number]. 11/25/2022   10:32:52 AM CST

## 2022-11-28 ENCOUNTER — TELEPHONE (OUTPATIENT)
Dept: SURGERY | Facility: CLINIC | Age: 62
End: 2022-11-28
Payer: COMMERCIAL

## 2022-11-28 NOTE — TELEPHONE ENCOUNTER
No new care gaps identified.  [facility] Embedded Care Gaps. Reference number: [id number]. 11/28/2022   1:21:54 PM CST

## 2022-11-28 NOTE — TELEPHONE ENCOUNTER
No answer. Message left to call clinic back to schedule appointment for Cyst.     Referral received from Dr Emile Heart.

## 2022-11-28 NOTE — TELEPHONE ENCOUNTER
----- Message from Alicja Chi sent at 11/28/2022  1:11 PM CST -----  Contact: HARVINDER BRADLEY [[id number]]@ [phone]  Requesting an RX refill or new RX.  Is this a refill or new RX: Refill   RX name and strength (copy/paste from chart): traMADoL (ULTRAM) 50 mg tablet   Is this a 30 day or 90 day RX:   Pharmacy name and phone # (copy/paste from chart):[facility]   Phone: [phone] Fax: [phone]  The doctors have asked that we provide their patients with the following 2 reminders -- prescription refills can take up to 72 hours, and a friendly reminder that in the future you can use your [facility] account to request refills:

## 2022-11-29 ENCOUNTER — TELEPHONE (OUTPATIENT)
Dept: SURGERY | Facility: CLINIC | Age: 62
End: 2022-11-29
Payer: COMMERCIAL

## 2022-11-29 NOTE — TELEPHONE ENCOUNTER
----- Message from Gabib Silverio sent at 11/29/2022  8:13 AM CST -----  Patient is returning a phone call.  Who left a message for the patient: Ernestina Downs LPN  Does patient know what this is regarding:  No

## 2022-11-29 NOTE — TELEPHONE ENCOUNTER
Spoke with pt. Appt scheduled for cyst. Referral received from Dr Heart. All questions answered. Pt verbalized understanding.

## 2022-12-03 RX ORDER — TRAMADOL HYDROCHLORIDE 50 MG/1
50 TABLET ORAL EVERY 6 HOURS PRN
Qty: 30 TABLET | Refills: 0 | Status: SHIPPED | OUTPATIENT
Start: 2022-12-03 | End: 2022-12-07 | Stop reason: SDUPTHER

## 2022-12-05 PROBLEM — M54.42 ACUTE BILATERAL LOW BACK PAIN WITH BILATERAL SCIATICA: Status: ACTIVE | Noted: 2022-12-05

## 2022-12-05 PROBLEM — M54.41 ACUTE BILATERAL LOW BACK PAIN WITH BILATERAL SCIATICA: Status: ACTIVE | Noted: 2022-12-05

## 2022-12-07 ENCOUNTER — OFFICE VISIT (OUTPATIENT)
Dept: PRIMARY CARE CLINIC | Facility: CLINIC | Age: 62
End: 2022-12-07
Payer: COMMERCIAL

## 2022-12-07 DIAGNOSIS — M54.50 ACUTE BILATERAL LOW BACK PAIN WITHOUT SCIATICA: ICD-10-CM

## 2022-12-07 DIAGNOSIS — F17.210 CIGARETTE NICOTINE DEPENDENCE WITHOUT COMPLICATION: ICD-10-CM

## 2022-12-07 DIAGNOSIS — F31.9 BIPOLAR AFFECTIVE DISORDER, REMISSION STATUS UNSPECIFIED: ICD-10-CM

## 2022-12-07 DIAGNOSIS — S39.012D LUMBOSACRAL STRAIN, SUBSEQUENT ENCOUNTER: Primary | ICD-10-CM

## 2022-12-07 DIAGNOSIS — M54.9 DORSALGIA, UNSPECIFIED: ICD-10-CM

## 2022-12-07 DIAGNOSIS — Z01.818 PRE-OP TESTING: ICD-10-CM

## 2022-12-07 PROBLEM — K21.00 GASTROESOPHAGEAL REFLUX DISEASE WITH ESOPHAGITIS: Status: RESOLVED | Noted: 2022-06-07 | Resolved: 2022-12-07

## 2022-12-07 PROCEDURE — 1159F PR MEDICATION LIST DOCUMENTED IN MEDICAL RECORD: ICD-10-PCS | Mod: CPTII,S$GLB,, | Performed by: FAMILY MEDICINE

## 2022-12-07 PROCEDURE — 99999 PR PBB SHADOW E&M-EST. PATIENT-LVL IV: CPT | Mod: PBBFAC,,, | Performed by: FAMILY MEDICINE

## 2022-12-07 PROCEDURE — 3078F DIAST BP <80 MM HG: CPT | Mod: CPTII,S$GLB,, | Performed by: FAMILY MEDICINE

## 2022-12-07 PROCEDURE — 3008F BODY MASS INDEX DOCD: CPT | Mod: CPTII,S$GLB,, | Performed by: FAMILY MEDICINE

## 2022-12-07 PROCEDURE — 4010F PR ACE/ARB THEARPY RXD/TAKEN: ICD-10-PCS | Mod: CPTII,S$GLB,, | Performed by: FAMILY MEDICINE

## 2022-12-07 PROCEDURE — 99214 PR OFFICE/OUTPT VISIT, EST, LEVL IV, 30-39 MIN: ICD-10-PCS | Mod: S$GLB,,, | Performed by: FAMILY MEDICINE

## 2022-12-07 PROCEDURE — 3077F SYST BP >= 140 MM HG: CPT | Mod: CPTII,S$GLB,, | Performed by: FAMILY MEDICINE

## 2022-12-07 PROCEDURE — 4010F ACE/ARB THERAPY RXD/TAKEN: CPT | Mod: CPTII,S$GLB,, | Performed by: FAMILY MEDICINE

## 2022-12-07 PROCEDURE — 3008F PR BODY MASS INDEX (BMI) DOCUMENTED: ICD-10-PCS | Mod: CPTII,S$GLB,, | Performed by: FAMILY MEDICINE

## 2022-12-07 PROCEDURE — 3078F PR MOST RECENT DIASTOLIC BLOOD PRESSURE < 80 MM HG: ICD-10-PCS | Mod: CPTII,S$GLB,, | Performed by: FAMILY MEDICINE

## 2022-12-07 PROCEDURE — 99999 PR PBB SHADOW E&M-EST. PATIENT-LVL IV: ICD-10-PCS | Mod: PBBFAC,,, | Performed by: FAMILY MEDICINE

## 2022-12-07 PROCEDURE — 1159F MED LIST DOCD IN RCRD: CPT | Mod: CPTII,S$GLB,, | Performed by: FAMILY MEDICINE

## 2022-12-07 PROCEDURE — 3077F PR MOST RECENT SYSTOLIC BLOOD PRESSURE >= 140 MM HG: ICD-10-PCS | Mod: CPTII,S$GLB,, | Performed by: FAMILY MEDICINE

## 2022-12-07 PROCEDURE — 99214 OFFICE O/P EST MOD 30 MIN: CPT | Mod: S$GLB,,, | Performed by: FAMILY MEDICINE

## 2022-12-07 RX ORDER — TRAMADOL HYDROCHLORIDE 50 MG/1
50 TABLET ORAL EVERY 6 HOURS PRN
Qty: 30 TABLET | Refills: 0 | Status: SHIPPED | OUTPATIENT
Start: 2022-12-07 | End: 2023-01-11 | Stop reason: SDUPTHER

## 2022-12-07 RX ORDER — NITROFURANTOIN 25; 75 MG/1; MG/1
100 CAPSULE ORAL 2 TIMES DAILY
Qty: 14 CAPSULE | Refills: 0 | Status: SHIPPED | OUTPATIENT
Start: 2022-12-07 | End: 2022-12-28

## 2022-12-07 RX ORDER — PREDNISONE 20 MG/1
20 TABLET ORAL DAILY
Qty: 7 TABLET | Refills: 0 | Status: SHIPPED | OUTPATIENT
Start: 2022-12-07 | End: 2022-12-14

## 2022-12-07 NOTE — PATIENT INSTRUCTIONS
Lumbosacral strain with bilateral sciatica  MRI lumbar  Neuro surgical consult for possible epidural steroid injection  Ultram 1 every 6 hours as needed for pain  Prednisone 20 mg 1 by mouth once daily for times 10 days once off of prednisone can start NSAIDs--no NSAIDs while on prednisone can take Tylenol  Ibuprofen 600 mg 1 every 6 hours as needed for pain after prednisone complete no other NSAIDs can take with Tylenol  Robaxin 500 mg 1 by mouth every 6 hours as needed for muscle spasm  Moist heat/theragesic or Tiger balm/range of motion exercise  Physical therapy patient have range of motion exercise Moist heat packs diathermy if available

## 2022-12-07 NOTE — PROGRESS NOTES
Subjective:       Patient ID: Wilfredo Vazquez is a 62 y.o. female.    Chief Complaint: Annual Exam and Back Pain  HPI-63 yo BF Annual exam and back pain --patient was seen emergency room [id number] for lumbar pain--had x-ray of the lumbar spine showing mild disc narrowing L3-4 and spurring of lumbar spine--back pain since the day after Thanksgiving---no trauma no excessive activity--patient feels needs an MRI.  Hurts to get up from a chair to standing position--hurts to walk--killing patient bends or lifts--not going up and down steps.  No lupus rheumatoid gout--has not done physical therapy--is on ibuprofen and has been on Mobic--Robaxin--in ER was given an NSAID a muscle relaxer           ROS:    Skin: no psoriasis, eczema, skin cancer-  HEENT: no headache,  No ocular pain, blurred vision, diplopia, epistaxis, hoarseness change in voice, thyroid trouble  Lung: No pneumonia, +asthma, no Tb, +_wheezing,+ SOB, no smoking + COPD   Heart: No chest pain, ankle edema, palpitations, MI, patti murmur, +hypertension,no  hyperlipidemia no stent bypass arrhythmia  Abdomen: no nausea,no  Vomiting,no diarrhea, no  constipation, ulcers, hepatitis, gallbladder disease, melena, hematochezia, hematemesis +dysphagia history of fundoplication for GERD history of esophageal dilatation x2Dr Beary doing better--swallowing much better  : no UTI, renal disease, stones  GYN hyst   MS: no fractures, O/A, lupus, rheumatoid, gout--pain lower back  Neuro: No dizziness, LOC, seizures   No diabetes, no anemia, no anxiety, no depression   Single--2 children--disable secondary to a stomach--lives with daughter and grandson    Objective:   Physical Exam:  General: Well nourished, well developed, no acute distress +obesity  Skin: No lesions  HEENT: Eyes PERRLA, EOM intact, nose patent, throat nonerythematous ears TMs clear   NECK: Supple, no bruits, No JVD, no nodes  Lungs: Clear, no rales, rhonchi, wheezing decreased BS but clear   Heart:  Regular rate and rhythm, no murmur s, gallops, or rubs  Abdomen:  No abdominal pain guarding rebound or tenderness organomegaly  MS--difficulty getting out of the chair to standing position--stands leaning fall word about a 45 degree angle--pain with extension of the back--straight leg lift pulling sensation in the back no radiculopathy--able squat only a quarter the way down hard to arise  Neuro: Alert, CN intact, oriented X 3  Extremities: No cyanosis, clubbing, or edema         Assessment:       1. Lumbosacral strain, subsequent encounter    2. Acute bilateral low back pain without sciatica    3. Cigarette nicotine dependence without complication    4. Pre-op testing    5. Bipolar affective disorder, remission status unspecified    6. Dorsalgia, unspecified            Plan:           Main Reason for Visit--  Multiple medical issues  Main issue--back pain --seen in the emergency room--UA?  UTI no treatment will give Macrobid 100 b.i.d.---x-ray lumbar spine showed mild disc narrowing L3-4 and spurring of the lumbar spine--Moist heat/theragesic/range of motion exercise---MRI lumbar spine--prednisone 20 mg 1 po qd x 10 days --then ibuprofen --OK robaxin -physical therapy--see neurosurgery for possible epidural steroid injection  Shortness of breath--COPD/emphysema recent hospitalization for chronic respiratory failure with hypoxia--has appointment with pulmonary MD--on aerosol albuterol nebulizer--Breztri--Spiriva--in the hospital was on BiPAP--patient claims had 6 minute walk and did not meet requirements for oxygen--Needs pulmonary see if feels pt would benefit from steroids/CPAP or BiPAP /home O2   Hypertension blood pressure 142/76 on cozaar 100 mg if stays will increase to Hyzaar 112.5  History dysphagia--difficulty swallowing water has to eat small amounts of food--history esophageal dilitation   Lab CBCs CMP lipd T4 TSH 6 mo   Maintenance sign pain contract complete opioid wrist shingles COVID

## 2022-12-12 VITALS
RESPIRATION RATE: 16 BRPM | DIASTOLIC BLOOD PRESSURE: 82 MMHG | OXYGEN SATURATION: 98 % | SYSTOLIC BLOOD PRESSURE: 146 MMHG | HEART RATE: 100 BPM | TEMPERATURE: 97 F | BODY MASS INDEX: 39.96 KG/M2 | WEIGHT: 217.13 LBS | HEIGHT: 62 IN

## 2022-12-13 ENCOUNTER — OFFICE VISIT (OUTPATIENT)
Dept: SURGERY | Facility: CLINIC | Age: 62
End: 2022-12-13
Payer: COMMERCIAL

## 2022-12-13 VITALS
RESPIRATION RATE: 18 BRPM | DIASTOLIC BLOOD PRESSURE: 90 MMHG | HEIGHT: 62 IN | HEART RATE: 101 BPM | SYSTOLIC BLOOD PRESSURE: 172 MMHG | WEIGHT: 216.25 LBS | BODY MASS INDEX: 39.79 KG/M2

## 2022-12-13 DIAGNOSIS — R22.42 MASS OF LEFT THIGH: Primary | ICD-10-CM

## 2022-12-13 PROCEDURE — 1160F PR REVIEW ALL MEDS BY PRESCRIBER/CLIN PHARMACIST DOCUMENTED: ICD-10-PCS | Mod: CPTII,S$GLB,, | Performed by: SURGERY

## 2022-12-13 PROCEDURE — 99213 OFFICE O/P EST LOW 20 MIN: CPT | Mod: S$GLB,,, | Performed by: SURGERY

## 2022-12-13 PROCEDURE — 1159F MED LIST DOCD IN RCRD: CPT | Mod: CPTII,S$GLB,, | Performed by: SURGERY

## 2022-12-13 PROCEDURE — 1159F PR MEDICATION LIST DOCUMENTED IN MEDICAL RECORD: ICD-10-PCS | Mod: CPTII,S$GLB,, | Performed by: SURGERY

## 2022-12-13 PROCEDURE — 1160F RVW MEDS BY RX/DR IN RCRD: CPT | Mod: CPTII,S$GLB,, | Performed by: SURGERY

## 2022-12-13 PROCEDURE — 4010F ACE/ARB THERAPY RXD/TAKEN: CPT | Mod: CPTII,S$GLB,, | Performed by: SURGERY

## 2022-12-13 PROCEDURE — 3008F BODY MASS INDEX DOCD: CPT | Mod: CPTII,S$GLB,, | Performed by: SURGERY

## 2022-12-13 PROCEDURE — 3080F DIAST BP >= 90 MM HG: CPT | Mod: CPTII,S$GLB,, | Performed by: SURGERY

## 2022-12-13 PROCEDURE — 4010F PR ACE/ARB THEARPY RXD/TAKEN: ICD-10-PCS | Mod: CPTII,S$GLB,, | Performed by: SURGERY

## 2022-12-13 PROCEDURE — 99213 PR OFFICE/OUTPT VISIT, EST, LEVL III, 20-29 MIN: ICD-10-PCS | Mod: S$GLB,,, | Performed by: SURGERY

## 2022-12-13 PROCEDURE — 99999 PR PBB SHADOW E&M-EST. PATIENT-LVL IV: ICD-10-PCS | Mod: PBBFAC,,, | Performed by: SURGERY

## 2022-12-13 PROCEDURE — 3077F PR MOST RECENT SYSTOLIC BLOOD PRESSURE >= 140 MM HG: ICD-10-PCS | Mod: CPTII,S$GLB,, | Performed by: SURGERY

## 2022-12-13 PROCEDURE — 99999 PR PBB SHADOW E&M-EST. PATIENT-LVL IV: CPT | Mod: PBBFAC,,, | Performed by: SURGERY

## 2022-12-13 PROCEDURE — 3080F PR MOST RECENT DIASTOLIC BLOOD PRESSURE >= 90 MM HG: ICD-10-PCS | Mod: CPTII,S$GLB,, | Performed by: SURGERY

## 2022-12-13 PROCEDURE — 3077F SYST BP >= 140 MM HG: CPT | Mod: CPTII,S$GLB,, | Performed by: SURGERY

## 2022-12-13 PROCEDURE — 3008F PR BODY MASS INDEX (BMI) DOCUMENTED: ICD-10-PCS | Mod: CPTII,S$GLB,, | Performed by: SURGERY

## 2022-12-15 NOTE — PROGRESS NOTES
History & Physical    SUBJECTIVE:     History of Present Illness:  Patient is a 62 y.o. female presents with concerns for a soft tissue cystic mass to her left posterior thigh, gluteus area.    States she is concerned as it has gotten slightly larger in his a little tender now.    More tender when she sits on that area for an extended period of time.    On examination she has a 3 cm firm tender area in 1 of her gluteal folds.    Unable to palpate a deep surface or a obvious mobile mass.    Told patient recommend CT scan to evaluate this area.      Chief Complaint   Patient presents with    Hernia       Review of patient's allergies indicates:  No Known Allergies    Current Outpatient Medications   Medication Sig Dispense Refill    budesonide-glycopyr-formoterol (BREZTRI AEROSPHERE) 160-9-4.8 mcg/actuation HFAA Inhale 2 puffs into the lungs 2 (two) times daily. 10.7 g 11    losartan (COZAAR) 100 MG tablet Take 1 tablet (100 mg total) by mouth once daily. 90 tablet 3    QUEtiapine (SEROQUEL) 200 MG Tab Take 200 mg by mouth nightly.      traMADoL (ULTRAM) 50 mg tablet Take 1 tablet (50 mg total) by mouth every 6 (six) hours as needed for Pain. 30 tablet 0    acetaminophen (TYLENOL) 500 MG tablet Take 2 tablets (1,000 mg total) by mouth every 8 (eight) hours as needed for Pain. (Patient not taking: Reported on 12/13/2022) 60 tablet 0    albuterol (ACCUNEB) 0.63 mg/3 mL Nebu TAKE 3MLS BY NEBULIZATION EVERY 6 HOURS AS NEEDED  Strength: 0.63 mg/3 mL (Patient not taking: Reported on 12/13/2022) 75 mL 5    ARIPiprazole (ABILIFY) 5 MG Tab Take 5 mg by mouth once daily.      diphenhydrAMINE-aluminum-magnesium hydroxide-simethicone-LIDOcaine HCl 2% Swish and spit 15 mLs every 6 (six) hours as needed (pain). (Patient not taking: Reported on 12/13/2022) 150 mL 0    ibuprofen (ADVIL,MOTRIN) 800 MG tablet Take 1 tablet (800 mg total) by mouth every 6 (six) hours as needed for Pain. (Patient not taking: Reported on 12/13/2022) 20  tablet 0    meclizine (ANTIVERT) 25 mg tablet Take 1 tablet (25 mg total) by mouth 3 (three) times daily as needed. (Patient not taking: Reported on 12/13/2022) 20 tablet 0    nitrofurantoin, macrocrystal-monohydrate, (MACROBID) 100 MG capsule Take 1 capsule (100 mg total) by mouth 2 (two) times daily. (Patient not taking: Reported on 12/13/2022) 14 capsule 0    nystatin (MYCOSTATIN) 100,000 unit/mL suspension Take 5 mLs (500,000 Units total) by mouth 4 (four) times daily. (Patient not taking: Reported on 12/13/2022) 60 mL 0     No current facility-administered medications for this visit.     Facility-Administered Medications Ordered in Other Visits   Medication Dose Route Frequency Provider Last Rate Last Admin    lactated ringers infusion   Intravenous Continuous Sim Quinteros MD 0 mL/hr at 12/14/21 0846 New Bag at 06/07/22 1151    sodium chloride 0.9% flush 10 mL  10 mL Intravenous PRN Sim Quinteros MD           Past Medical History:   Diagnosis Date    Abdominal pain 2018    Emphysema lung     GERD (gastroesophageal reflux disease) 2018    Hypertension     Shingles      Past Surgical History:   Procedure Laterality Date    APPENDECTOMY      COLONOSCOPY N/A 05/22/2019    Procedure: COLONOSCOPY;  Surgeon: Sim Quinteros MD;  Location: [facility];  Service: Endoscopy;  Laterality: N/A;    COLONOSCOPY N/A 6/20/2022    Procedure: COLONOSCOPY WITH POLYPECTOMY AND CLIPPING;  Surgeon: Jarrod Franco MD;  Location: [facility];  Service: Endoscopy;  Laterality: N/A;    COLONOSCOPY W/ BIOPSIES AND POLYPECTOMY  06/20/2022    COLONOSCOPY W/ POLYPECTOMY      ESOPHAGEAL DILATION N/A 05/13/2019    Procedure: DILATION, ESOPHAGUS;  Surgeon: Sim Quinteros MD;  Location: [facility];  Service: Endoscopy;  Laterality: N/A;    ESOPHAGEAL DILATION N/A 02/09/2021    Procedure: DILATION, ESOPHAGUS;  Surgeon: Sim Quinteros MD;  Location: [facility];  Service: Endoscopy;  Laterality: N/A;    ESOPHAGEAL DILATION N/A 12/14/2021     Procedure: DILATION, ESOPHAGUS;  Surgeon: Sim Quinteros MD;  Location: [facility];  Service: Endoscopy;  Laterality: N/A;    ESOPHAGEAL DILATION N/A 2022    Procedure: DILATION, ESOPHAGUS;  Surgeon: Sim Quinteros MD;  Location: [facility];  Service: Endoscopy;  Laterality: N/A;    ESOPHAGOGASTRODUODENOSCOPY N/A 2019    Procedure: EGD (ESOPHAGOGASTRODUODENOSCOPY);  Surgeon: Sim Quinteros MD;  Location: [facility];  Service: Endoscopy;  Laterality: N/A;    ESOPHAGOGASTRODUODENOSCOPY N/A 2021    Procedure: EGD (ESOPHAGOGASTRODUODENOSCOPY);  Surgeon: Sim Quinteros MD;  Location: [facility];  Service: Endoscopy;  Laterality: N/A;    ESOPHAGOGASTRODUODENOSCOPY N/A 2021    Procedure: EGD (ESOPHAGOGASTRODUODENOSCOPY);  Surgeon: Sim Quinteros MD;  Location: [facility];  Service: Endoscopy;  Laterality: N/A;    ESOPHAGOGASTRODUODENOSCOPY N/A 2022    Procedure: EGD (ESOPHAGOGASTRODUODENOSCOPY);  Surgeon: Sim Quinteros MD;  Location: [facility];  Service: Endoscopy;  Laterality: N/A;    ESOPHAGOGASTRODUODENOSCOPY (EGD) WITH DILATION  2022    HYSTERECTOMY      LAPAROSCOPIC NISSEN FUNDOPLICATION      REPAIR OF EXTENSOR TENDON Left 2022    Procedure: REPAIR, TENDON, EXTENSOR  ;  Surgeon: Dakota Anaya Jr., MD;  Location: [facility];  Service: Plastics;  Laterality: Left;  THUMB    UPPER GASTROINTESTINAL ENDOSCOPY N/A 2018     Family History   Family history unknown: Yes     Social History     Tobacco Use    Smoking status: Every Day     Types: Cigarettes     Start date: 1980     Last attempt to quit: 2022     Years since quittin.5    Smokeless tobacco: Never   Substance Use Topics    Alcohol use: No    Drug use: No        Review of Systems:  Review of Systems   Constitutional:  Negative for appetite change, fatigue, fever and unexpected weight change.   HENT:  Negative for sore throat and trouble swallowing.    Eyes: Negative.    Respiratory:  Negative for  "cough, shortness of breath and wheezing.    Cardiovascular:  Negative for chest pain and leg swelling.   Gastrointestinal:  Negative for abdominal distention, abdominal pain, blood in stool, constipation, diarrhea, nausea and vomiting.   Endocrine: Negative.    Genitourinary: Negative.    Musculoskeletal:  Negative for back pain.   Skin: Negative.  Negative for rash.        Soft tissue mass posterior left gluteal area   Allergic/Immunologic: Negative.    Neurological: Negative.    Hematological: Negative.    Psychiatric/Behavioral:  Negative for confusion.      OBJECTIVE:     Vital Signs (Most Recent)  Pulse: 101 (12/13/22 1434)  Resp: 18 (12/13/22 1434)  BP: (!) 172/90 (12/13/22 1434)  5' 2" (1.575 m)  98.1 kg (216 lb 4.3 oz)     Physical Exam:  Physical Exam  Vitals and nursing note reviewed.   Constitutional:       Appearance: She is well-developed.   HENT:      Head: Normocephalic and atraumatic.   Cardiovascular:      Rate and Rhythm: Normal rate.      Heart sounds: Normal heart sounds.   Pulmonary:      Effort: Pulmonary effort is normal.   Abdominal:      General: Bowel sounds are normal. There is no distension.      Palpations: Abdomen is soft.      Tenderness: There is no abdominal tenderness.   Musculoskeletal:         General: Normal range of motion.      Cervical back: Normal range of motion.   Skin:     General: Skin is warm and dry.      Capillary Refill: Capillary refill takes less than 2 seconds.             Comments: 3 cm firm soft tissue area, some concern for lipoma or cyst   Neurological:      Mental Status: She is alert and oriented to person, place, and time.   Psychiatric:         Behavior: Behavior normal.       Laboratory  CBC: Reviewed  CMP: Reviewed    Diagnostic Results:  US: Reviewed  No obvious mass    ASSESSMENT/PLAN:     62-year-old female with concern for posterior gluteal cyst versus lipoma    PLAN:Plan     Recommend CT scan to evaluate this area   Return to clinic after            "

## 2022-12-28 ENCOUNTER — CLINICAL SUPPORT (OUTPATIENT)
Dept: PRIMARY CARE CLINIC | Facility: CLINIC | Age: 62
End: 2022-12-28
Payer: COMMERCIAL

## 2022-12-28 VITALS
HEART RATE: 114 BPM | RESPIRATION RATE: 16 BRPM | DIASTOLIC BLOOD PRESSURE: 72 MMHG | SYSTOLIC BLOOD PRESSURE: 136 MMHG | OXYGEN SATURATION: 95 %

## 2022-12-28 DIAGNOSIS — I10 HYPERTENSION, UNSPECIFIED TYPE: Primary | ICD-10-CM

## 2022-12-28 PROCEDURE — 99999 PR PBB SHADOW E&M-EST. PATIENT-LVL III: CPT | Mod: PBBFAC,,,

## 2022-12-28 PROCEDURE — 99999 PR PBB SHADOW E&M-EST. PATIENT-LVL III: ICD-10-PCS | Mod: PBBFAC,,,

## 2022-12-28 NOTE — PROGRESS NOTES
Patient came in on the nurse schedule for a nurse visit. Patient stated that she took her BP meds at 6:30 am this morning. Patients vitals were checked and her BP was 136/72

## 2023-01-11 ENCOUNTER — TELEPHONE (OUTPATIENT)
Dept: PRIMARY CARE CLINIC | Facility: CLINIC | Age: 63
End: 2023-01-11
Payer: COMMERCIAL

## 2023-01-11 ENCOUNTER — OFFICE VISIT (OUTPATIENT)
Dept: PRIMARY CARE CLINIC | Facility: CLINIC | Age: 63
End: 2023-01-11
Payer: COMMERCIAL

## 2023-01-11 DIAGNOSIS — S39.012D LUMBOSACRAL STRAIN, SUBSEQUENT ENCOUNTER: Primary | ICD-10-CM

## 2023-01-11 DIAGNOSIS — R73.9 HYPERGLYCEMIA: ICD-10-CM

## 2023-01-11 DIAGNOSIS — M25.562 LEFT KNEE PAIN, UNSPECIFIED CHRONICITY: ICD-10-CM

## 2023-01-11 DIAGNOSIS — F31.9 BIPOLAR AFFECTIVE DISORDER, REMISSION STATUS UNSPECIFIED: ICD-10-CM

## 2023-01-11 DIAGNOSIS — E66.9 OBESITY (BMI 35.0-39.9 WITHOUT COMORBIDITY): ICD-10-CM

## 2023-01-11 DIAGNOSIS — I10 HYPERTENSION, UNSPECIFIED TYPE: ICD-10-CM

## 2023-01-11 DIAGNOSIS — J43.8 OTHER EMPHYSEMA: ICD-10-CM

## 2023-01-11 DIAGNOSIS — G47.33 OSA (OBSTRUCTIVE SLEEP APNEA): ICD-10-CM

## 2023-01-11 DIAGNOSIS — R25.2 MUSCLE CRAMPS: ICD-10-CM

## 2023-01-11 PROCEDURE — 4010F PR ACE/ARB THEARPY RXD/TAKEN: ICD-10-PCS | Mod: CPTII,95,, | Performed by: FAMILY MEDICINE

## 2023-01-11 PROCEDURE — 99213 PR OFFICE/OUTPT VISIT, EST, LEVL III, 20-29 MIN: ICD-10-PCS | Mod: 95,,, | Performed by: FAMILY MEDICINE

## 2023-01-11 PROCEDURE — 99213 OFFICE O/P EST LOW 20 MIN: CPT | Mod: 95,,, | Performed by: FAMILY MEDICINE

## 2023-01-11 PROCEDURE — 4010F ACE/ARB THERAPY RXD/TAKEN: CPT | Mod: CPTII,95,, | Performed by: FAMILY MEDICINE

## 2023-01-11 RX ORDER — CYCLOBENZAPRINE HCL 10 MG
TABLET ORAL
Qty: 30 TABLET | Refills: 5 | Status: SHIPPED | OUTPATIENT
Start: 2023-01-11 | End: 2023-04-10

## 2023-01-11 RX ORDER — TRAMADOL HYDROCHLORIDE 50 MG/1
50 TABLET ORAL EVERY 6 HOURS PRN
Qty: 30 TABLET | Refills: 0 | Status: SHIPPED | OUTPATIENT
Start: 2023-01-11 | End: 2023-02-14 | Stop reason: SDUPTHER

## 2023-01-11 NOTE — TELEPHONE ENCOUNTER
Called patient and notified her that controlled substances cannot be filled without an appointment.  Audio visit scheduled for this afternoon.

## 2023-01-11 NOTE — TELEPHONE ENCOUNTER
----- Message from Disha Tejada sent at 1/11/2023  1:18 PM CST -----  Contact: self/[phone]  Requesting an RX refill or new RX.  Is this a refill or new RX: refill  RX name and strength (copy/paste from chart):  traMADoL (ULTRAM) 50 mg tablet 30 tablet 0 12/7/2022  No  Sig - Route: Take 1 tablet (50 mg total) by mouth every 6 (six) hours as needed for Pain.  Is this a 30 day or 90 day RX: 30  Pharmacy name and phone # (copy/paste from chart):   [facility] [place] - [address]  [address]  [place] [address]  Phone: [phone] Fax: [phone]  The doctors have asked that we provide their patients with the following 2 reminders -- prescription refills can take up to 72 hours, and a friendly reminder that in the future you can use your [facility] account to request refills: call back    Please advise

## 2023-01-12 ENCOUNTER — OFFICE VISIT (OUTPATIENT)
Dept: SURGERY | Facility: CLINIC | Age: 63
End: 2023-01-12
Payer: COMMERCIAL

## 2023-01-12 VITALS
SYSTOLIC BLOOD PRESSURE: 135 MMHG | HEIGHT: 62 IN | BODY MASS INDEX: 38.75 KG/M2 | DIASTOLIC BLOOD PRESSURE: 86 MMHG | RESPIRATION RATE: 16 BRPM | WEIGHT: 210.56 LBS | HEART RATE: 89 BPM | TEMPERATURE: 98 F

## 2023-01-12 DIAGNOSIS — M79.18 GLUTEAL PAIN: Primary | ICD-10-CM

## 2023-01-12 PROCEDURE — 3075F SYST BP GE 130 - 139MM HG: CPT | Mod: CPTII,S$GLB,, | Performed by: SURGERY

## 2023-01-12 PROCEDURE — 1159F MED LIST DOCD IN RCRD: CPT | Mod: CPTII,S$GLB,, | Performed by: SURGERY

## 2023-01-12 PROCEDURE — 99999 PR PBB SHADOW E&M-EST. PATIENT-LVL IV: ICD-10-PCS | Mod: PBBFAC,,, | Performed by: SURGERY

## 2023-01-12 PROCEDURE — 3008F BODY MASS INDEX DOCD: CPT | Mod: CPTII,S$GLB,, | Performed by: SURGERY

## 2023-01-12 PROCEDURE — 99213 PR OFFICE/OUTPT VISIT, EST, LEVL III, 20-29 MIN: ICD-10-PCS | Mod: S$GLB,,, | Performed by: SURGERY

## 2023-01-12 PROCEDURE — 4010F ACE/ARB THERAPY RXD/TAKEN: CPT | Mod: CPTII,S$GLB,, | Performed by: SURGERY

## 2023-01-12 PROCEDURE — 3075F PR MOST RECENT SYSTOLIC BLOOD PRESS GE 130-139MM HG: ICD-10-PCS | Mod: CPTII,S$GLB,, | Performed by: SURGERY

## 2023-01-12 PROCEDURE — 3079F PR MOST RECENT DIASTOLIC BLOOD PRESSURE 80-89 MM HG: ICD-10-PCS | Mod: CPTII,S$GLB,, | Performed by: SURGERY

## 2023-01-12 PROCEDURE — 1159F PR MEDICATION LIST DOCUMENTED IN MEDICAL RECORD: ICD-10-PCS | Mod: CPTII,S$GLB,, | Performed by: SURGERY

## 2023-01-12 PROCEDURE — 4010F PR ACE/ARB THEARPY RXD/TAKEN: ICD-10-PCS | Mod: CPTII,S$GLB,, | Performed by: SURGERY

## 2023-01-12 PROCEDURE — 99999 PR PBB SHADOW E&M-EST. PATIENT-LVL IV: CPT | Mod: PBBFAC,,, | Performed by: SURGERY

## 2023-01-12 PROCEDURE — 3079F DIAST BP 80-89 MM HG: CPT | Mod: CPTII,S$GLB,, | Performed by: SURGERY

## 2023-01-12 PROCEDURE — 3008F PR BODY MASS INDEX (BMI) DOCUMENTED: ICD-10-PCS | Mod: CPTII,S$GLB,, | Performed by: SURGERY

## 2023-01-12 PROCEDURE — 99213 OFFICE O/P EST LOW 20 MIN: CPT | Mod: S$GLB,,, | Performed by: SURGERY

## 2023-01-12 PROCEDURE — 1160F RVW MEDS BY RX/DR IN RCRD: CPT | Mod: CPTII,S$GLB,, | Performed by: SURGERY

## 2023-01-12 PROCEDURE — 1160F PR REVIEW ALL MEDS BY PRESCRIBER/CLIN PHARMACIST DOCUMENTED: ICD-10-PCS | Mod: CPTII,S$GLB,, | Performed by: SURGERY

## 2023-01-12 NOTE — PROGRESS NOTES
Subjective:       Patient ID: Wilfredo Vazquez is a 62 y.o. female.    Chief Complaint: No chief complaint on file.    HPI-63 yo BF needs prescription refill back pain --needs refill needs tramadol and prednisone --complaining of cramps--hands and toes.  Eating well--+BM--ambulating better            ROS:    Skin: no psoriasis, eczema, skin cancer-  HEENT: no headache,  No ocular pain, blurred vision, diplopia, epistaxis, hoarseness change in voice, thyroid trouble  Lung: No pneumonia, +asthma, no Tb, +_wheezing,+ SOB, no smoking + COPD --doing better  Heart: No chest pain, ankle edema, palpitations, MI, patti murmur, +hypertension,no  hyperlipidemia no stent bypass arrhythmia  Abdomen: no nausea,no  Vomiting,no diarrhea, no  constipation, ulcers, hepatitis, gallbladder disease, melena, hematochezia, hematemesis +dysphagia history of fundoplication for GERD history of esophageal dilatation x2Dr Beary doing better--swallowing much better  : no UTI, renal disease, stones  GYN hyst   MS: no fractures, O/A, lupus, rheumatoid, gout--pain lower back  Neuro: No dizziness, LOC, seizures   No diabetes, no anemia, no anxiety, no depression   Single--2 children--disable secondary to a stomach--lives with daughter and grandson    Objective:   Physical Exam:  No physical exam was done this was a audio visit the visit below is from a prior office visit  General: Well nourished, well developed, no acute distress +obesity  Skin: No lesions  HEENT: Eyes PERRLA, EOM intact, nose patent, throat nonerythematous ears TMs clear   NECK: Supple, no bruits, No JVD, no nodes  Lungs: Clear, no rales, rhonchi, wheezing decreased BS but clear   Heart: Regular rate and rhythm, no murmur s, gallops, or rubs  Abdomen:  No abdominal pain guarding rebound or tenderness organomegaly  MS--difficulty getting out of the chair to standing position--stands leaning fall word about a 45 degree angle--pain with extension of the back--straight leg  lift pulling sensation in the back no radiculopathy--able squat only a quarter the way down hard to arise  Neuro: Alert, CN intact, oriented X 3  Extremities: No cyanosis, clubbing, or edema         Assessment:       1. Lumbosacral strain, subsequent encounter    2. Muscle cramps    3. Left knee pain, unspecified chronicity    4. MARISSA (obstructive sleep apnea)    5. Hyperglycemia    6. Obesity (BMI 35.0-39.9 without comorbidity)    7. Hypertension, unspecified type    8. Other emphysema    9. Bipolar affective disorder, remission status unspecified              Plan:           Main Reason for Visit--  Multiple medical issues  Needs medication refill especially for pain will give Ultram/Flexeril for muscle cramps/patient head esophageal stricture so will not give NSAIDs p.o. but will use diclofenac gel over-the-counter over the back and knee joints if needed  Moist heat/theragesic or Tiger balm/range of motion exercise   Main issue--back pain --seen in the emergency room--UA?  UTI no treatment will give Macrobid 100 b.i.d.---x-ray lumbar spine showed mild disc narrowing L3-4 and spurring of the lumbar spine--Moist heat/theragesic/range of motion exercise---MRI lumbar spine--prednisone 20 mg 1 po qd x 10 days --then ibuprofen --OK robaxin -physical therapy--see neurosurgery for possible epidural steroid injection  Other medical issues  Shortness of breath--COPD/emphysema recent hospitalization for chronic respiratory failure with hypoxia--has appointment with pulmonary MD--on aerosol albuterol nebulizer--Breztri--Spiriva--in the hospital was on BiPAP--patient claims had 6 minute walk and did not meet requirements for oxygen--Needs pulmonary see if feels pt would benefit from steroids/CPAP or BiPAP /home O2   Hypertension blood pressure 142/76 on cozaar 100 mg if stays will increase to Hyzaar 112.5  History dysphagia--difficulty swallowing water has to eat small amounts of food--history esophageal dilitation   Lab CBCs  CMP lipd T4 TSH 6 mo   Maintenance sign pain contract complete opioid wrist shingles COVID           Established Patient - Audio Only Telehealth Visit     The patient location is:  Home  The chief complaint leading to consultation is:  Muscle cramps nocturnal/multiple joint pay  Visit type: Virtual visit with audio only (telephone)  Total time spent with patient:  30 minute did       The reason for the audio only service rather than synchronous audio and video virtual visit was related to technical difficulties or patient preference/necessity.     Each patient to whom I provide medical services by telemedicine is:  (1) informed of the relationship between the physician and patient and the respective role of any other health care provider with respect to management of the patient; and (2) notified that they may decline to receive medical services by telemedicine and may withdraw from such care at any time. Patient verbally consented to receive this service via voice-only telephone call.       HPI:  See history of present illness above     Assessment and plan:  See plan above                        This service was not originating from a related E/M service provided within the previous 7 days nor will  to an E/M service or procedure within the next 24 hours or my soonest available appointment.  Prevailing standard of care was able to be met in this audio-only visit.

## 2023-01-24 NOTE — PROGRESS NOTES
History & Physical    SUBJECTIVE:     History of Present Illness:  Patient is a 62 y.o. female presents with concerns for a soft tissue cystic mass to her left posterior thigh, gluteus area.    States she is concerned as it has gotten slightly larger in his a little tender now.    More tender when she sits on that area for an extended period of time.    On examination she has a 3 cm firm tender area in 1 of her gluteal folds.    Unable to palpate a deep surface or a obvious mobile mass.      CT scan shows this abnormal tissue however no obvious mass.    Patient states she has difficulty sitting down due to the location and tenderness when she places pressure on this area.    I told her we will discuss surgical intervention with removal of this area so patient's quality of life will improve significantly.    Is medically indicated due to patient having significant tenderness and pain in that area.    Chief Complaint   Patient presents with    Results     CT       Review of patient's allergies indicates:  No Known Allergies    Current Outpatient Medications   Medication Sig Dispense Refill    albuterol (ACCUNEB) 0.63 mg/3 mL Nebu TAKE 3MLS BY NEBULIZATION EVERY 6 HOURS AS NEEDED  Strength: 0.63 mg/3 mL 75 mL 5    ARIPiprazole (ABILIFY) 5 MG Tab Take 5 mg by mouth once daily.      budesonide-glycopyr-formoterol (BREZTRI AEROSPHERE) 160-9-4.8 mcg/actuation HFAA Inhale 2 puffs into the lungs 2 (two) times daily. 10.7 g 11    cyclobenzaprine (FLEXERIL) 10 MG tablet 1 p.o. q.h.s. p.r.n. muscle spasm if needed can increase to 1 p.o. q.8 hours p.r.n. muscle spasm but will make sleepy if make sleepy take half a pill instead of a whole pill 30 tablet 5    losartan (COZAAR) 100 MG tablet Take 1 tablet (100 mg total) by mouth once daily. 90 tablet 3    meclizine (ANTIVERT) 25 mg tablet Take 1 tablet (25 mg total) by mouth 3 (three) times daily as needed. 20 tablet 0    QUEtiapine (SEROQUEL) 200 MG Tab Take 200 mg by mouth  nightly.      traMADoL (ULTRAM) 50 mg tablet Take 1 tablet (50 mg total) by mouth every 6 (six) hours as needed for Pain. 30 tablet 0    CMPD diclofenac 3%- cyclobenzaprine 2%- baclofen 2%- gabapentin 6%- LIDOcaine 2%- prilocaine 2% transdermal gel Apply 1 to 2 grams up to 4 times daily as directed for additional pain relief 240 g 5    lisinopriL 10 MG tablet Take 10 mg by mouth.      omeprazole (PRILOSEC) 40 MG capsule TAKE ONE CAPSULE BY MOUTH ONCE DAILY 90 capsule 3     No current facility-administered medications for this visit.     Facility-Administered Medications Ordered in Other Visits   Medication Dose Route Frequency Provider Last Rate Last Admin    lactated ringers infusion   Intravenous Continuous Sim Quinteros MD 0 mL/hr at 12/14/21 0846 New Bag at 06/07/22 1151    sodium chloride 0.9% flush 10 mL  10 mL Intravenous PRN iSm Quinteros MD           Past Medical History:   Diagnosis Date    Abdominal pain 2018    Emphysema lung     GERD (gastroesophageal reflux disease) 2018    Hypertension     Shingles      Past Surgical History:   Procedure Laterality Date    APPENDECTOMY      COLONOSCOPY N/A 05/22/2019    Procedure: COLONOSCOPY;  Surgeon: Sim Quinteros MD;  Location: [facility];  Service: Endoscopy;  Laterality: N/A;    COLONOSCOPY N/A 6/20/2022    Procedure: COLONOSCOPY WITH POLYPECTOMY AND CLIPPING;  Surgeon: Jarrod Franco MD;  Location: [facility];  Service: Endoscopy;  Laterality: N/A;    COLONOSCOPY W/ BIOPSIES AND POLYPECTOMY  06/20/2022    COLONOSCOPY W/ POLYPECTOMY      ESOPHAGEAL DILATION N/A 05/13/2019    Procedure: DILATION, ESOPHAGUS;  Surgeon: Sim Quinteros MD;  Location: [facility];  Service: Endoscopy;  Laterality: N/A;    ESOPHAGEAL DILATION N/A 02/09/2021    Procedure: DILATION, ESOPHAGUS;  Surgeon: Sim Quinteros MD;  Location: [facility];  Service: Endoscopy;  Laterality: N/A;    ESOPHAGEAL DILATION N/A 12/14/2021    Procedure: DILATION, ESOPHAGUS;  Surgeon: Sim Quinteros  MD;  Location: [facility];  Service: Endoscopy;  Laterality: N/A;    ESOPHAGEAL DILATION N/A 2022    Procedure: DILATION, ESOPHAGUS;  Surgeon: Sim Quinteros MD;  Location: [facility];  Service: Endoscopy;  Laterality: N/A;    ESOPHAGOGASTRODUODENOSCOPY N/A 2019    Procedure: EGD (ESOPHAGOGASTRODUODENOSCOPY);  Surgeon: Sim Quinteros MD;  Location: [facility];  Service: Endoscopy;  Laterality: N/A;    ESOPHAGOGASTRODUODENOSCOPY N/A 2021    Procedure: EGD (ESOPHAGOGASTRODUODENOSCOPY);  Surgeon: Sim Quinteros MD;  Location: [facility];  Service: Endoscopy;  Laterality: N/A;    ESOPHAGOGASTRODUODENOSCOPY N/A 2021    Procedure: EGD (ESOPHAGOGASTRODUODENOSCOPY);  Surgeon: Smi Quinteros MD;  Location: [facility];  Service: Endoscopy;  Laterality: N/A;    ESOPHAGOGASTRODUODENOSCOPY N/A 2022    Procedure: EGD (ESOPHAGOGASTRODUODENOSCOPY);  Surgeon: Sim Quinteros MD;  Location: [facility];  Service: Endoscopy;  Laterality: N/A;    ESOPHAGOGASTRODUODENOSCOPY (EGD) WITH DILATION  2022    HYSTERECTOMY      LAPAROSCOPIC NISSEN FUNDOPLICATION      REPAIR OF EXTENSOR TENDON Left 2022    Procedure: REPAIR, TENDON, EXTENSOR  ;  Surgeon: Dakota Anaya Jr., MD;  Location: [facility];  Service: Plastics;  Laterality: Left;  THUMB    UPPER GASTROINTESTINAL ENDOSCOPY N/A 2018     Family History   Family history unknown: Yes     Social History     Tobacco Use    Smoking status: Every Day     Types: Cigarettes     Start date: 1980     Last attempt to quit: 2022     Years since quittin.6    Smokeless tobacco: Never   Substance Use Topics    Alcohol use: No    Drug use: No        Review of Systems:  Review of Systems   Constitutional:  Negative for appetite change, fatigue, fever and unexpected weight change.   HENT:  Negative for sore throat and trouble swallowing.    Eyes: Negative.    Respiratory:  Negative for cough, shortness of breath and wheezing.    Cardiovascular:   "Negative for chest pain and leg swelling.   Gastrointestinal:  Negative for abdominal distention, abdominal pain, blood in stool, constipation, diarrhea, nausea and vomiting.   Endocrine: Negative.    Genitourinary: Negative.    Musculoskeletal:  Negative for back pain.        Pain in right gluteal area at spot of this abnormal growth   Skin:  Negative for rash.   Allergic/Immunologic: Negative.    Neurological: Negative.    Hematological: Negative.    Psychiatric/Behavioral:  Negative for confusion.      OBJECTIVE:     Vital Signs (Most Recent)  Temp: 98.3 °F (36.8 °C) (01/12/23 1430)  Pulse: 89 (01/12/23 1430)  Resp: 16 (01/12/23 1430)  BP: 135/86 (01/12/23 1430)  5' 2" (1.575 m)  95.5 kg (210 lb 8.6 oz)     Physical Exam:  Physical Exam  Vitals and nursing note reviewed.   Constitutional:       Appearance: She is well-developed.   HENT:      Head: Normocephalic and atraumatic.   Cardiovascular:      Rate and Rhythm: Normal rate.      Heart sounds: Normal heart sounds.   Pulmonary:      Effort: Pulmonary effort is normal.   Abdominal:      General: Bowel sounds are normal. There is no distension.      Palpations: Abdomen is soft.      Tenderness: There is no abdominal tenderness.   Musculoskeletal:         General: Normal range of motion.      Cervical back: Normal range of motion.   Skin:     General: Skin is warm and dry.      Capillary Refill: Capillary refill takes less than 2 seconds.             Comments: 5 cm area of abnormal soft tissue swelling, significantly below the level of her gluteus.       Neurological:      Mental Status: She is alert and oriented to person, place, and time.   Psychiatric:         Behavior: Behavior normal.     Laboratory  CBC: Reviewed  CMP: Reviewed    Diagnostic Results:  CT: Reviewed  No   No obvious soft tissue mass noted  ASSESSMENT/PLAN:     63-year-old female with a abnormal growth in her right gluteal area    PLAN:Plan     Recommend surgical excision of the stalk soft " tissue abnormal growth

## 2023-02-06 ENCOUNTER — TELEPHONE (OUTPATIENT)
Dept: PRIMARY CARE CLINIC | Facility: CLINIC | Age: 63
End: 2023-02-06
Payer: MEDICARE

## 2023-02-06 NOTE — TELEPHONE ENCOUNTER
Returned call to patient. Patient wanted a sooner appt but there was no sooner appt. I advised her to go to the urgent care for immediate assistance.

## 2023-02-06 NOTE — TELEPHONE ENCOUNTER
----- Message from Ginger Maldonado sent at 2/6/2023  2:40 PM CST -----  Contact: Pt [phone]  [email or web address]     Patient is calling for Medical Advice regarding: cough/mucus/runny nose/fever/sore throat/headache & short of breath - do to coughing    How long has patient had these symptoms:  3 days    Pharmacy name and phone#:   [facility] [place] - [address]  [address]  [place] [address]  Phone: [phone] Fax: [phone]    Would like response via [employer]:  Call back    Comments:    Please call and advise.    Thank You

## 2023-02-14 NOTE — TELEPHONE ENCOUNTER
----- Message from Disha Tejdaa sent at 2/14/2023  2:52 PM CST -----  Contact: self/[phone]  Requesting an RX refill or new RX.  Is this a refill or new RX: refill  RX name and strength (copy/paste from chart):  traMADoL (ULTRAM) 50 mg tablet 30 tablet 0 1/11/2023  No  Sig - Route: Take 1 tablet (50 mg total) by mouth every 6 (six) hours as needed for Pain.   Is this a 30 day or 90 day RX: 90  Pharmacy name and phone # (copy/paste from chart):   [facility] - [place] - [address]  [address]  [place] [address]  Phone: [phone] Fax: [phone]  The doctors have asked that we provide their patients with the following 2 reminders -- prescription refills can take up to 72 hours, and a friendly reminder that in the future you can use your [facility] account to request refills: call back    Please advise

## 2023-02-14 NOTE — TELEPHONE ENCOUNTER
No new care gaps identified.  [facility] Embedded Care Gaps. Reference number: [id number]. 2/14/2023   4:20:32 PM CST

## 2023-02-15 NOTE — TELEPHONE ENCOUNTER
----- Message from Nidia Grady sent at 2/15/2023  2:34 PM CST -----  Contact: [phone]  Pt is calling in regards to the status of her refill request on her traMADoL (ULTRAM) 50 mg tablet 30 tablet. Pt is using   [facility] [place] - [address]  [address]  [place] [address]  Phone: [phone] Fax: [phone]. Please call to update.            Thank you

## 2023-02-20 RX ORDER — TRAMADOL HYDROCHLORIDE 50 MG/1
50 TABLET ORAL EVERY 6 HOURS PRN
Qty: 30 TABLET | Refills: 0 | Status: SHIPPED | OUTPATIENT
Start: 2023-02-20 | End: 2023-05-10 | Stop reason: SDUPTHER

## 2023-02-20 RX ORDER — TRAMADOL HYDROCHLORIDE 50 MG/1
50 TABLET ORAL EVERY 6 HOURS PRN
Qty: 30 TABLET | Refills: 0 | OUTPATIENT
Start: 2023-02-20

## 2023-02-20 NOTE — TELEPHONE ENCOUNTER
No new care gaps identified.  [facility] Embedded Care Gaps. Reference number: [id number]. 2/20/2023   12:37:51 PM CST

## 2023-02-20 NOTE — TELEPHONE ENCOUNTER
----- Message from Makeda Sahu sent at 2/20/2023 11:41 AM CST -----  Contact: [phone]  Requesting an RX refill or new RX.  Is this a refill or new RX: refill  RX name and strength (copy/paste from chart):  traMADoL (ULTRAM) 50 mg tablet  Is this a 30 day or 90 day RX:   Pharmacy name and phone # (copy/paste from chart):    [facility] - [place] - [address]  [address]  [place] [address]  Phone: [phone] Fax: [phone]      The doctors have asked that we provide their patients with the following 2 reminders -- prescription refills can take up to 72 hours, and a friendly reminder that in the future you can use your [facility] account to request refills: call back

## 2023-04-10 ENCOUNTER — OFFICE VISIT (OUTPATIENT)
Dept: PRIMARY CARE CLINIC | Facility: CLINIC | Age: 63
End: 2023-04-10
Payer: MEDICARE

## 2023-04-10 VITALS
RESPIRATION RATE: 19 BRPM | HEIGHT: 62 IN | BODY MASS INDEX: 37.95 KG/M2 | SYSTOLIC BLOOD PRESSURE: 136 MMHG | OXYGEN SATURATION: 100 % | WEIGHT: 206.25 LBS | DIASTOLIC BLOOD PRESSURE: 80 MMHG | HEART RATE: 95 BPM

## 2023-04-10 DIAGNOSIS — S39.012D LUMBOSACRAL STRAIN, SUBSEQUENT ENCOUNTER: ICD-10-CM

## 2023-04-10 DIAGNOSIS — E66.01 SEVERE OBESITY (BMI 35.0-39.9) WITH COMORBIDITY: ICD-10-CM

## 2023-04-10 DIAGNOSIS — I70.0 AORTIC ATHEROSCLEROSIS: ICD-10-CM

## 2023-04-10 DIAGNOSIS — M25.562 LEFT KNEE PAIN, UNSPECIFIED CHRONICITY: Primary | ICD-10-CM

## 2023-04-10 DIAGNOSIS — M54.9 DORSALGIA, UNSPECIFIED: ICD-10-CM

## 2023-04-10 DIAGNOSIS — R73.9 HYPERGLYCEMIA: ICD-10-CM

## 2023-04-10 DIAGNOSIS — G47.33 OSA (OBSTRUCTIVE SLEEP APNEA): ICD-10-CM

## 2023-04-10 DIAGNOSIS — Z98.890 HISTORY OF FUNDOPLICATION: ICD-10-CM

## 2023-04-10 PROCEDURE — 4010F PR ACE/ARB THEARPY RXD/TAKEN: ICD-10-PCS | Mod: HCNC,CPTII,S$GLB, | Performed by: FAMILY MEDICINE

## 2023-04-10 PROCEDURE — 3079F PR MOST RECENT DIASTOLIC BLOOD PRESSURE 80-89 MM HG: ICD-10-PCS | Mod: HCNC,CPTII,S$GLB, | Performed by: FAMILY MEDICINE

## 2023-04-10 PROCEDURE — 3008F PR BODY MASS INDEX (BMI) DOCUMENTED: ICD-10-PCS | Mod: HCNC,CPTII,S$GLB, | Performed by: FAMILY MEDICINE

## 2023-04-10 PROCEDURE — 99999 PR PBB SHADOW E&M-EST. PATIENT-LVL IV: CPT | Mod: PBBFAC,HCNC,, | Performed by: FAMILY MEDICINE

## 2023-04-10 PROCEDURE — 3079F DIAST BP 80-89 MM HG: CPT | Mod: HCNC,CPTII,S$GLB, | Performed by: FAMILY MEDICINE

## 2023-04-10 PROCEDURE — 99214 PR OFFICE/OUTPT VISIT, EST, LEVL IV, 30-39 MIN: ICD-10-PCS | Mod: HCNC,S$GLB,, | Performed by: FAMILY MEDICINE

## 2023-04-10 PROCEDURE — 3008F BODY MASS INDEX DOCD: CPT | Mod: HCNC,CPTII,S$GLB, | Performed by: FAMILY MEDICINE

## 2023-04-10 PROCEDURE — 99999 PR PBB SHADOW E&M-EST. PATIENT-LVL IV: ICD-10-PCS | Mod: PBBFAC,HCNC,, | Performed by: FAMILY MEDICINE

## 2023-04-10 PROCEDURE — 1159F MED LIST DOCD IN RCRD: CPT | Mod: HCNC,CPTII,S$GLB, | Performed by: FAMILY MEDICINE

## 2023-04-10 PROCEDURE — 1159F PR MEDICATION LIST DOCUMENTED IN MEDICAL RECORD: ICD-10-PCS | Mod: HCNC,CPTII,S$GLB, | Performed by: FAMILY MEDICINE

## 2023-04-10 PROCEDURE — 4010F ACE/ARB THERAPY RXD/TAKEN: CPT | Mod: HCNC,CPTII,S$GLB, | Performed by: FAMILY MEDICINE

## 2023-04-10 PROCEDURE — 3075F PR MOST RECENT SYSTOLIC BLOOD PRESS GE 130-139MM HG: ICD-10-PCS | Mod: HCNC,CPTII,S$GLB, | Performed by: FAMILY MEDICINE

## 2023-04-10 PROCEDURE — 99214 OFFICE O/P EST MOD 30 MIN: CPT | Mod: HCNC,S$GLB,, | Performed by: FAMILY MEDICINE

## 2023-04-10 PROCEDURE — 3075F SYST BP GE 130 - 139MM HG: CPT | Mod: HCNC,CPTII,S$GLB, | Performed by: FAMILY MEDICINE

## 2023-04-10 RX ORDER — CETIRIZINE HYDROCHLORIDE 10 MG/1
TABLET ORAL
COMMUNITY
Start: 2023-03-28 | End: 2023-04-10

## 2023-04-10 RX ORDER — FLUTICASONE PROPIONATE 50 MCG
SPRAY, SUSPENSION (ML) NASAL
COMMUNITY
Start: 2023-03-28 | End: 2023-05-17

## 2023-04-10 RX ORDER — TRAMADOL HYDROCHLORIDE 50 MG/1
50 TABLET ORAL EVERY 6 HOURS PRN
Qty: 30 TABLET | Refills: 0 | Status: SHIPPED | OUTPATIENT
Start: 2023-04-10 | End: 2023-04-20

## 2023-04-10 RX ORDER — TRAMADOL HYDROCHLORIDE 50 MG/1
50 TABLET ORAL EVERY 6 HOURS PRN
Qty: 30 TABLET | Refills: 0 | Status: CANCELLED | OUTPATIENT
Start: 2023-04-10

## 2023-04-10 RX ORDER — PREDNISONE 20 MG/1
20 TABLET ORAL DAILY
Qty: 10 TABLET | Refills: 0 | Status: SHIPPED | OUTPATIENT
Start: 2023-04-10 | End: 2023-05-17

## 2023-04-10 NOTE — PROGRESS NOTES
Subjective:       Patient ID: Wilfredo Vazquez is a 63 y.o. female.    Chief Complaint: Medication Refill and Knee Pain    HPI-64 yo BF medication refills knee pain --patient on Ultram --on prednisone got some from ear nose and throat   Knee pain x 2 months --has to use a knee brace--hard to walk bend or squat go up and down steps          ROS:  No significant change except for history of present illness  Skin: no psoriasis, eczema, skin cancer-  HEENT: no headache,  No ocular pain, blurred vision, diplopia, epistaxis, hoarseness change in voice, thyroid trouble  Lung: No pneumonia, +asthma, no Tb, +_wheezing,+ SOB, no smoking + COPD --doing better  Heart: No chest pain, ankle edema, palpitations, MI, patti murmur, +hypertension,no  hyperlipidemia no stent bypass arrhythmia  Abdomen: no nausea,no  Vomiting,no diarrhea, no  constipation, ulcers, hepatitis, gallbladder disease, melena, hematochezia, hematemesis +dysphagia history of fundoplication for GERD history of esophageal dilatation x2Dr Beary doing better--swallowing much better  : no UTI, renal disease, stones  GYN hyst   MS: no fractures, O/A, lupus, rheumatoid, gout--pain lower back  Neuro: No dizziness, LOC, seizures   No diabetes, no anemia, no anxiety, no depression   Single--2 children--disable secondary to a stomach--lives with daughter and grandson    Objective:   Physical Exam:    General: Well nourished, well developed, no acute distress +obesity  Skin: No lesions  HEENT: Eyes PERRLA, EOM intact, nose patent, throat nonerythematous ears TMs clear   NECK: Supple, no bruits, No JVD, no nodes  Lungs: Clear, no rales, rhonchi, wheezing decreased BS but clear   Heart: Regular rate and rhythm, no murmur s, gallops, or rubs  Abdomen:  No abdominal pain guarding rebound or tenderness organomegaly  MS--difficulty getting out of the chair to standing position--stands leaning fall word about a 45 degree angle--pain with extension of the  back--straight leg lift pulling sensation in the back no radiculopathy--able squat only a quarter the way down hard to arise  Neuro: Alert, CN intact, oriented X 3  Extremities: No cyanosis, clubbing, or edema         Assessment:       1. Left knee pain, unspecified chronicity    2. Dorsalgia, unspecified    3. Lumbosacral strain, subsequent encounter    4. MARISSA (obstructive sleep apnea)    5. History of fundoplication    6. Hyperglycemia    7. Severe obesity (BMI 35.0-39.9) with comorbidity    8. Aortic atherosclerosis                Plan:           Main Reason for Visit--  Multiple medical issues  Severe left knee pain--needs refills of Ultram--gets some relief with prednisone 20 mg--last given to her January--will give 10 prednisone told can not continue take--seeing orthopedist on April 17th--hopefully can get knee injected or if necessary need surgery will order MRI of the knee now   Main issue--back pain --seen in the emergency room--UA?  UTI no treatment will give Macrobid 100 b.i.d.---x-ray lumbar spine showed mild disc narrowing L3-4 and spurring of the lumbar spine--see neurosurgery for possible epidural steroid injection  Other medical issues  Shortness of breath--COPD/emphysema recent hospitalization for chronic respiratory failure with hypoxia--has appointment with pulmonary MD--on aerosol albuterol nebulizer--Breztri--Spiriva--in the hospital was on BiPAP--patient claims had 6 minute walk and did not meet requirements for oxygen--Needs pulmonary see if feels pt would benefit from steroids/CPAP or BiPAP /home O2   Hypertension blood pressure 136/80 on cozaar 100 mg if stays will increase to Hyzaar 112.5  History dysphagia--difficulty swallowing water has to eat small amounts of food--history esophageal dilitation   Lab CBCs CMP lipd T4 TSH in 6 mo

## 2023-04-17 ENCOUNTER — TELEPHONE (OUTPATIENT)
Dept: ORTHOPEDICS | Facility: CLINIC | Age: 63
End: 2023-04-17
Payer: MEDICARE

## 2023-04-17 ENCOUNTER — OFFICE VISIT (OUTPATIENT)
Dept: ORTHOPEDICS | Facility: CLINIC | Age: 63
End: 2023-04-17
Payer: MEDICARE

## 2023-04-17 VITALS
BODY MASS INDEX: 37.91 KG/M2 | WEIGHT: 206 LBS | DIASTOLIC BLOOD PRESSURE: 82 MMHG | HEIGHT: 62 IN | SYSTOLIC BLOOD PRESSURE: 130 MMHG

## 2023-04-17 DIAGNOSIS — M17.12 ARTHRITIS OF LEFT KNEE: ICD-10-CM

## 2023-04-17 PROCEDURE — 99204 OFFICE O/P NEW MOD 45 MIN: CPT | Mod: 25,HCNC,S$GLB, | Performed by: ORTHOPAEDIC SURGERY

## 2023-04-17 PROCEDURE — 1159F MED LIST DOCD IN RCRD: CPT | Mod: HCNC,CPTII,S$GLB, | Performed by: ORTHOPAEDIC SURGERY

## 2023-04-17 PROCEDURE — 99999 PR PBB SHADOW E&M-EST. PATIENT-LVL III: ICD-10-PCS | Mod: PBBFAC,HCNC,, | Performed by: ORTHOPAEDIC SURGERY

## 2023-04-17 PROCEDURE — 20610 LARGE JOINT ASPIRATION/INJECTION: L KNEE: ICD-10-PCS | Mod: HCNC,LT,S$GLB,

## 2023-04-17 PROCEDURE — 3079F DIAST BP 80-89 MM HG: CPT | Mod: HCNC,CPTII,S$GLB, | Performed by: ORTHOPAEDIC SURGERY

## 2023-04-17 PROCEDURE — 20610 DRAIN/INJ JOINT/BURSA W/O US: CPT | Mod: HCNC,LT,S$GLB,

## 2023-04-17 PROCEDURE — 4010F PR ACE/ARB THEARPY RXD/TAKEN: ICD-10-PCS | Mod: HCNC,CPTII,S$GLB, | Performed by: ORTHOPAEDIC SURGERY

## 2023-04-17 PROCEDURE — 4010F ACE/ARB THERAPY RXD/TAKEN: CPT | Mod: HCNC,CPTII,S$GLB, | Performed by: ORTHOPAEDIC SURGERY

## 2023-04-17 PROCEDURE — 1159F PR MEDICATION LIST DOCUMENTED IN MEDICAL RECORD: ICD-10-PCS | Mod: HCNC,CPTII,S$GLB, | Performed by: ORTHOPAEDIC SURGERY

## 2023-04-17 PROCEDURE — 3075F SYST BP GE 130 - 139MM HG: CPT | Mod: HCNC,CPTII,S$GLB, | Performed by: ORTHOPAEDIC SURGERY

## 2023-04-17 PROCEDURE — 3008F PR BODY MASS INDEX (BMI) DOCUMENTED: ICD-10-PCS | Mod: HCNC,CPTII,S$GLB, | Performed by: ORTHOPAEDIC SURGERY

## 2023-04-17 PROCEDURE — 99999 PR PBB SHADOW E&M-EST. PATIENT-LVL III: CPT | Mod: PBBFAC,HCNC,, | Performed by: ORTHOPAEDIC SURGERY

## 2023-04-17 PROCEDURE — 3079F PR MOST RECENT DIASTOLIC BLOOD PRESSURE 80-89 MM HG: ICD-10-PCS | Mod: HCNC,CPTII,S$GLB, | Performed by: ORTHOPAEDIC SURGERY

## 2023-04-17 PROCEDURE — 3008F BODY MASS INDEX DOCD: CPT | Mod: HCNC,CPTII,S$GLB, | Performed by: ORTHOPAEDIC SURGERY

## 2023-04-17 PROCEDURE — 99204 PR OFFICE/OUTPT VISIT, NEW, LEVL IV, 45-59 MIN: ICD-10-PCS | Mod: 25,HCNC,S$GLB, | Performed by: ORTHOPAEDIC SURGERY

## 2023-04-17 PROCEDURE — 3075F PR MOST RECENT SYSTOLIC BLOOD PRESS GE 130-139MM HG: ICD-10-PCS | Mod: HCNC,CPTII,S$GLB, | Performed by: ORTHOPAEDIC SURGERY

## 2023-04-17 RX ORDER — TRIAMCINOLONE ACETONIDE 40 MG/ML
40 INJECTION, SUSPENSION INTRA-ARTICULAR; INTRAMUSCULAR
Status: COMPLETED | OUTPATIENT
Start: 2023-04-17 | End: 2023-04-17

## 2023-04-17 RX ADMIN — TRIAMCINOLONE ACETONIDE 40 MG: 40 INJECTION, SUSPENSION INTRA-ARTICULAR; INTRAMUSCULAR at 03:04

## 2023-04-17 RX ADMIN — TRIAMCINOLONE ACETONIDE 40 MG: 40 INJECTION, SUSPENSION INTRA-ARTICULAR; INTRAMUSCULAR at 01:04

## 2023-04-17 NOTE — PROCEDURES
Large Joint Aspiration/Injection: L knee    Date/Time: 4/17/2023 1:30 PM  Performed by: Veronica Manning PA-C  Authorized by: Veronica Manning PA-C     Consent Done?:  Yes (Verbal)  Indications:  Pain and arthritis  Site marked: the procedure site was marked    Timeout: prior to procedure the correct patient, procedure, and site was verified    Prep: patient was prepped and draped in usual sterile fashion    Local anesthetic:  Topical anesthetic and bupivacaine 0.25% without epinephrine  Anesthetic total (ml):  4      Details:  Needle Size:  22 G  Ultrasonic Guidance for needle placement?: No    Approach:  Anteromedial  Location:  Knee  Site:  L knee  Medications:  40 mg TRIAMCINOLONE ACETONIDE 40 MG/ML INJ SUSP  Patient tolerance:  Patient tolerated the procedure well with no immediate complications

## 2023-04-17 NOTE — PROGRESS NOTES
Patient ID: Wilfredo Vazquez is a 63 y.o. female    Chief Complaint:   Chief Complaint   Patient presents with    Left Knee - Pain       History of Present Illness:    Pleasant 63-year-old female here for evaluation of chronic left knee pain.  Reports swelling and pain for the past 3 or 4 months.  Has not had any previous injections or therapy.  Pain is mostly lateral in the knee.  Reports mechanical symptoms and instability subjectively.    PAST MEDICAL HISTORY:   Past Medical History:   Diagnosis Date    Abdominal pain 2018    Emphysema lung     GERD (gastroesophageal reflux disease) 2018    Hypertension     Shingles      PAST SURGICAL HISTORY:   Past Surgical History:   Procedure Laterality Date    APPENDECTOMY      COLONOSCOPY N/A 05/22/2019    Procedure: COLONOSCOPY;  Surgeon: Sim Quinteros MD;  Location: [facility];  Service: Endoscopy;  Laterality: N/A;    COLONOSCOPY N/A 6/20/2022    Procedure: COLONOSCOPY WITH POLYPECTOMY AND CLIPPING;  Surgeon: Jarrod Franco MD;  Location: [facility];  Service: Endoscopy;  Laterality: N/A;    COLONOSCOPY W/ BIOPSIES AND POLYPECTOMY  06/20/2022    COLONOSCOPY W/ POLYPECTOMY      ESOPHAGEAL DILATION N/A 05/13/2019    Procedure: DILATION, ESOPHAGUS;  Surgeon: Sim Quinteros MD;  Location: [facility];  Service: Endoscopy;  Laterality: N/A;    ESOPHAGEAL DILATION N/A 02/09/2021    Procedure: DILATION, ESOPHAGUS;  Surgeon: Sim Quinteros MD;  Location: [facility];  Service: Endoscopy;  Laterality: N/A;    ESOPHAGEAL DILATION N/A 12/14/2021    Procedure: DILATION, ESOPHAGUS;  Surgeon: iSm Quinteros MD;  Location: [facility];  Service: Endoscopy;  Laterality: N/A;    ESOPHAGEAL DILATION N/A 05/17/2022    Procedure: DILATION, ESOPHAGUS;  Surgeon: Sim Quinteros MD;  Location: [facility];  Service: Endoscopy;  Laterality: N/A;    ESOPHAGOGASTRODUODENOSCOPY N/A 05/13/2019    Procedure: EGD (ESOPHAGOGASTRODUODENOSCOPY);  Surgeon: Sim Quinteros MD;  Location: [facility];   Service: Endoscopy;  Laterality: N/A;    ESOPHAGOGASTRODUODENOSCOPY N/A 2021    Procedure: EGD (ESOPHAGOGASTRODUODENOSCOPY);  Surgeon: Sim Quinteros MD;  Location: [facility] ENDO;  Service: Endoscopy;  Laterality: N/A;    ESOPHAGOGASTRODUODENOSCOPY N/A 2021    Procedure: EGD (ESOPHAGOGASTRODUODENOSCOPY);  Surgeon: Sim Quinteros MD;  Location: [facility] ENDO;  Service: Endoscopy;  Laterality: N/A;    ESOPHAGOGASTRODUODENOSCOPY N/A 2022    Procedure: EGD (ESOPHAGOGASTRODUODENOSCOPY);  Surgeon: Sim Quinteros MD;  Location: [facility] ENDO;  Service: Endoscopy;  Laterality: N/A;    ESOPHAGOGASTRODUODENOSCOPY (EGD) WITH DILATION  2022    HYSTERECTOMY      LAPAROSCOPIC NISSEN FUNDOPLICATION      REPAIR OF EXTENSOR TENDON Left 2022    Procedure: REPAIR, TENDON, EXTENSOR  ;  Surgeon: Dakota Anaya Jr., MD;  Location: [facility];  Service: Plastics;  Laterality: Left;  THUMB    UPPER GASTROINTESTINAL ENDOSCOPY N/A 2018     FAMILY HISTORY:   Family History   Family history unknown: Yes     SOCIAL HISTORY:   Social History     Occupational History    Not on file   Tobacco Use    Smoking status: Every Day     Types: Cigarettes     Start date: 1980     Last attempt to quit: 2022     Years since quittin.8    Smokeless tobacco: Never   Substance and Sexual Activity    Alcohol use: No    Drug use: No    Sexual activity: Not on file     Comment: rare        MEDICATIONS:   Current Outpatient Medications:     albuterol (ACCUNEB) 0.63 mg/3 mL Nebu, TAKE 3MLS BY NEBULIZATION EVERY 6 HOURS AS NEEDED Strength: 0.63 mg/3 mL, Disp: 75 mL, Rfl: 5    budesonide-glycopyr-formoterol (BREZTRI AEROSPHERE) 160-9-4.8 mcg/actuation HFAA, Inhale 2 puffs into the lungs 2 (two) times daily., Disp: 10.7 g, Rfl: 11    CMPD diclofenac 3%- cyclobenzaprine 2%- baclofen 2%- gabapentin 6%- LIDOcaine 2%- prilocaine 2% transdermal gel, Apply 1 to 2 grams up to 4 times daily as directed for additional pain relief, Disp:  240 g, Rfl: 5    fluticasone propionate (FLONASE) 50 mcg/actuation nasal spray, by Each Nostril route., Disp: , Rfl:     lisinopriL 10 MG tablet, Take 10 mg by mouth., Disp: , Rfl:     losartan (COZAAR) 100 MG tablet, Take 1 tablet (100 mg total) by mouth once daily., Disp: 90 tablet, Rfl: 3    predniSONE (DELTASONE) 20 MG tablet, Take 1 tablet (20 mg total) by mouth once daily., Disp: 10 tablet, Rfl: 0    QUEtiapine (SEROQUEL) 200 MG Tab, Take 200 mg by mouth nightly., Disp: , Rfl:     traMADoL (ULTRAM) 50 mg tablet, Take 1 tablet (50 mg total) by mouth every 6 (six) hours as needed for Pain., Disp: 30 tablet, Rfl: 0    traMADoL (ULTRAM) 50 mg tablet, Take 1 tablet (50 mg total) by mouth every 6 (six) hours as needed., Disp: 30 tablet, Rfl: 0  No current facility-administered medications for this visit.    Facility-Administered Medications Ordered in Other Visits:     lactated ringers infusion, , Intravenous, Continuous, Sim Quinteros MD, Last Rate: 0 mL/hr at 12/14/21 0846, New Bag at 06/07/22 1151    sodium chloride 0.9% flush 10 mL, 10 mL, Intravenous, PRN, Sim Quinteros MD  ALLERGIES: Review of patient's allergies indicates:  No Known Allergies      Physical Exam     Vitals:    04/17/23 1448   BP: 130/82     Alert and oriented to person, place and time. No acute distress. Well-groomed, not ill appearing. Pupils round and reactive, normal respiratory effort, no audible wheezing.     GENERAL:  A well-developed, well-nourished 63 y.o. female who is alert and       oriented in no acute distress.      Gait: She  walks with a antalgic gait.                   EXTREMITIES:  Examination of lower extremities reveals there is no visible mass or deformity.    Left knee:  ROM 5-130    Ligamentously stable to varus/valgus stress.    Anterior and posterior drawers negative.    No pain over pes bursa.    No warmth    No erythema     Effusion Yes    lateral joint line tenderness    Positive Patellofemoral grind/crepitus          The skin over both lower extremities is normal and unremarkable.  She has a  painless range of motion of the hips and ankles bilaterally.   Sensation is intact in both lower extremities.    There are no motor deficits in the lower extremities bilaterally.   Pedal pulses are palpable distally bilaterally.    She has no calf tenderness to palpation nor edema.       Imaging:       X-Ray: I have reviewed all pertinent results/findings and my personal findings are:  Moderate to severe osteoarthritis of the left knee with mostly in the lateral compartment with subchondral sclerosis and osteophytes.      Assessment & Plan    Arthritis of left knee  -     Ambulatory referral/consult to Orthopedics  -     triamcinolone acetonide injection 40 mg  -     Large Joint Aspiration/Injection: L knee    Other orders  -     Cancel: Large Joint Aspiration/Injection         Treatment options discussed with her in detail.  She has left knee arthritis on x-ray.  She is never had an injection.  We discussed the prognosis of arthritis and the natural history of arthritis.  We discussed that she may require total knee replacement in the future.  We discussed the risks and benefits of surgery in detail as well as morbidity and mortality of surgery.  She would like to proceed with a steroid injection for now she is not ready for surgery which is reasonable.  Follow up 2 or 3 months.

## 2023-04-18 DIAGNOSIS — M17.12 ARTHRITIS OF LEFT KNEE: Primary | ICD-10-CM

## 2023-04-18 RX ORDER — CELECOXIB 200 MG/1
200 CAPSULE ORAL 2 TIMES DAILY
Qty: 60 CAPSULE | Refills: 0 | Status: SHIPPED | OUTPATIENT
Start: 2023-04-18 | End: 2023-05-17

## 2023-05-08 RX ORDER — TRAMADOL HYDROCHLORIDE 50 MG/1
50 TABLET ORAL EVERY 6 HOURS PRN
Qty: 30 TABLET | Refills: 0 | OUTPATIENT
Start: 2023-05-08

## 2023-05-08 NOTE — TELEPHONE ENCOUNTER
No care due was identified.  [facility] Embedded Care Due Messages. Reference number: [id number].   5/08/2023 1:14:13 PM CDT

## 2023-05-08 NOTE — TELEPHONE ENCOUNTER
----- Message from Ena Valera sent at 5/8/2023 12:41 PM CDT -----  Contact: pt [phone]  Requesting an RX refill or new RX.  Is this a refill or new RX: Refill  RX name and strength: traMADoL (ULTRAM) 50 mg tablet  Is this a 30 day or 90 day RX: 90 day with refills  Patient advised refills can take 72 hours and [facility] can be used for refills?:  no  Pharmacy name and phone #:   [facility] - [place] - [address]  [address]  [place] [address]  Phone: [phone] Fax: [phone]    Comments:     Thank You

## 2023-05-09 NOTE — TELEPHONE ENCOUNTER
Call tell pt control medication not refilled over the phone If in pain needs see specialist for tx and medication

## 2023-05-10 DIAGNOSIS — R25.2 MUSCLE CRAMPS: Primary | ICD-10-CM

## 2023-05-10 RX ORDER — TRAMADOL HYDROCHLORIDE 50 MG/1
50 TABLET ORAL EVERY 6 HOURS PRN
Qty: 30 TABLET | Refills: 0 | Status: SHIPPED | OUTPATIENT
Start: 2023-05-10 | End: 2023-06-07

## 2023-05-10 RX ORDER — TRAMADOL HYDROCHLORIDE 50 MG/1
50 TABLET ORAL EVERY 6 HOURS PRN
Qty: 30 TABLET | Refills: 0 | Status: SHIPPED | OUTPATIENT
Start: 2023-05-10 | End: 2023-05-17

## 2023-05-10 NOTE — TELEPHONE ENCOUNTER
No care due was identified.  [facility] Embedded Care Due Messages. Reference number: [id number].   5/10/2023 11:29:48 AM CDT

## 2023-05-10 NOTE — TELEPHONE ENCOUNTER
----- Message from Ginger Maldonado sent at 5/10/2023 11:18 AM CDT -----  Contact: pt [phone]  Patient is calling to get the status of her refill request for the following   RX traMADoL (ULTRAM) 50 mg tablet    Please call and advise.    Thank You

## 2023-05-11 ENCOUNTER — TELEPHONE (OUTPATIENT)
Dept: PRIMARY CARE CLINIC | Facility: CLINIC | Age: 63
End: 2023-05-11
Payer: MEDICARE

## 2023-05-11 NOTE — TELEPHONE ENCOUNTER
Called patient in regards to message. Patient was informed that Provider was out of the office. Patient verbalized understand patient schedule with MD Jung.

## 2023-05-11 NOTE — TELEPHONE ENCOUNTER
----- Message from Georgie Willis sent at 5/11/2023 10:39 AM CDT -----  Patient schedule to see pain management in July asked if she can have her meds. Refilled please call patient back

## 2023-05-11 NOTE — TELEPHONE ENCOUNTER
Call tell pt due chronic pain I will refill ultram but should be seen chronic pain clinic for additional refills

## 2023-05-17 ENCOUNTER — OFFICE VISIT (OUTPATIENT)
Dept: PRIMARY CARE CLINIC | Facility: CLINIC | Age: 63
End: 2023-05-17
Payer: MEDICARE

## 2023-05-17 VITALS
TEMPERATURE: 98 F | BODY MASS INDEX: 37.04 KG/M2 | OXYGEN SATURATION: 95 % | RESPIRATION RATE: 16 BRPM | WEIGHT: 201.25 LBS | DIASTOLIC BLOOD PRESSURE: 74 MMHG | SYSTOLIC BLOOD PRESSURE: 110 MMHG | HEIGHT: 62 IN | HEART RATE: 102 BPM

## 2023-05-17 DIAGNOSIS — M25.562 LEFT KNEE PAIN, UNSPECIFIED CHRONICITY: Primary | ICD-10-CM

## 2023-05-17 PROCEDURE — 99999 PR PBB SHADOW E&M-EST. PATIENT-LVL IV: ICD-10-PCS | Mod: PBBFAC,,, | Performed by: STUDENT IN AN ORGANIZED HEALTH CARE EDUCATION/TRAINING PROGRAM

## 2023-05-17 PROCEDURE — 4010F ACE/ARB THERAPY RXD/TAKEN: CPT | Mod: CPTII,,, | Performed by: STUDENT IN AN ORGANIZED HEALTH CARE EDUCATION/TRAINING PROGRAM

## 2023-05-17 PROCEDURE — 99213 PR OFFICE/OUTPT VISIT, EST, LEVL III, 20-29 MIN: ICD-10-PCS | Mod: ,,, | Performed by: STUDENT IN AN ORGANIZED HEALTH CARE EDUCATION/TRAINING PROGRAM

## 2023-05-17 PROCEDURE — 4010F PR ACE/ARB THEARPY RXD/TAKEN: ICD-10-PCS | Mod: CPTII,,, | Performed by: STUDENT IN AN ORGANIZED HEALTH CARE EDUCATION/TRAINING PROGRAM

## 2023-05-17 PROCEDURE — 1159F PR MEDICATION LIST DOCUMENTED IN MEDICAL RECORD: ICD-10-PCS | Mod: CPTII,,, | Performed by: STUDENT IN AN ORGANIZED HEALTH CARE EDUCATION/TRAINING PROGRAM

## 2023-05-17 PROCEDURE — 3008F BODY MASS INDEX DOCD: CPT | Mod: CPTII,,, | Performed by: STUDENT IN AN ORGANIZED HEALTH CARE EDUCATION/TRAINING PROGRAM

## 2023-05-17 PROCEDURE — 3078F PR MOST RECENT DIASTOLIC BLOOD PRESSURE < 80 MM HG: ICD-10-PCS | Mod: CPTII,,, | Performed by: STUDENT IN AN ORGANIZED HEALTH CARE EDUCATION/TRAINING PROGRAM

## 2023-05-17 PROCEDURE — 1160F RVW MEDS BY RX/DR IN RCRD: CPT | Mod: CPTII,,, | Performed by: STUDENT IN AN ORGANIZED HEALTH CARE EDUCATION/TRAINING PROGRAM

## 2023-05-17 PROCEDURE — 1160F PR REVIEW ALL MEDS BY PRESCRIBER/CLIN PHARMACIST DOCUMENTED: ICD-10-PCS | Mod: CPTII,,, | Performed by: STUDENT IN AN ORGANIZED HEALTH CARE EDUCATION/TRAINING PROGRAM

## 2023-05-17 PROCEDURE — 99213 OFFICE O/P EST LOW 20 MIN: CPT | Mod: ,,, | Performed by: STUDENT IN AN ORGANIZED HEALTH CARE EDUCATION/TRAINING PROGRAM

## 2023-05-17 PROCEDURE — 3008F PR BODY MASS INDEX (BMI) DOCUMENTED: ICD-10-PCS | Mod: CPTII,,, | Performed by: STUDENT IN AN ORGANIZED HEALTH CARE EDUCATION/TRAINING PROGRAM

## 2023-05-17 PROCEDURE — 3074F SYST BP LT 130 MM HG: CPT | Mod: CPTII,,, | Performed by: STUDENT IN AN ORGANIZED HEALTH CARE EDUCATION/TRAINING PROGRAM

## 2023-05-17 PROCEDURE — 3074F PR MOST RECENT SYSTOLIC BLOOD PRESSURE < 130 MM HG: ICD-10-PCS | Mod: CPTII,,, | Performed by: STUDENT IN AN ORGANIZED HEALTH CARE EDUCATION/TRAINING PROGRAM

## 2023-05-17 PROCEDURE — 1159F MED LIST DOCD IN RCRD: CPT | Mod: CPTII,,, | Performed by: STUDENT IN AN ORGANIZED HEALTH CARE EDUCATION/TRAINING PROGRAM

## 2023-05-17 PROCEDURE — 99999 PR PBB SHADOW E&M-EST. PATIENT-LVL IV: CPT | Mod: PBBFAC,,, | Performed by: STUDENT IN AN ORGANIZED HEALTH CARE EDUCATION/TRAINING PROGRAM

## 2023-05-17 PROCEDURE — 3078F DIAST BP <80 MM HG: CPT | Mod: CPTII,,, | Performed by: STUDENT IN AN ORGANIZED HEALTH CARE EDUCATION/TRAINING PROGRAM

## 2023-05-17 RX ORDER — LISINOPRIL 20 MG/1
20 TABLET ORAL DAILY
COMMUNITY

## 2023-05-17 RX ORDER — IBUPROFEN 800 MG/1
800 TABLET ORAL 3 TIMES DAILY PRN
Qty: 90 TABLET | Refills: 1 | Status: SHIPPED | OUTPATIENT
Start: 2023-05-17 | End: 2023-08-02 | Stop reason: SDUPTHER

## 2023-05-17 NOTE — PROGRESS NOTES
"Subjective:           Patient ID: Wilfredo Vazquez is a 63 y.o. female who presents today with a chief complaint of left knee pain.    Chief Complaint:   Follow-up (Left knee pain)      History of Present Illness:    62yo female presenting for left knee pain.    Was seen by PCP on 4/10/2023; then Ortho 4/17/2023.    States that knee was relieved from injection for a time, but has now been hurting again for the last month.  Injection was exactly 1 month ago.    Discussed with Ortho option for surgery.  Has moderate to severe arthritis and elected to get injection then see how that progressed, which has been poor.    Has had hx of clonopin use and Hydrodrocodone 10mg TID for a time in the past, one for knee and back pain, the benzo need unclear.          Review of Systems   Constitutional:  Negative for activity change, fatigue, fever and unexpected weight change.   HENT:  Negative for congestion, nosebleeds, sinus pressure and sneezing.    Respiratory:  Negative for cough, shortness of breath and wheezing.    Cardiovascular:  Negative for chest pain, palpitations and leg swelling.   Gastrointestinal:  Negative for abdominal distention, constipation, diarrhea and nausea.   Genitourinary:  Negative for difficulty urinating and dysuria.   Musculoskeletal:  Positive for arthralgias and joint swelling. Negative for back pain and gait problem.   Skin:  Negative for pallor and rash.   Neurological:  Negative for weakness, numbness and headaches.   Psychiatric/Behavioral:  Negative for agitation. The patient is not nervous/anxious.          Objective:        Vitals:    05/17/23 1321   BP: 110/74   BP Location: Right arm   Patient Position: Sitting   BP Method: Medium (Manual)   Pulse: 102   Resp: 16   Temp: 97.5 °F (36.4 °C)   TempSrc: Temporal   SpO2: 95%   Weight: 91.3 kg (201 lb 4.5 oz)   Height: 5' 2" (1.575 m)       Body mass index is 36.81 kg/m².      Physical Exam  Constitutional:       General: She is not in " acute distress.     Appearance: Normal appearance.   HENT:      Head: Normocephalic and atraumatic.      Right Ear: External ear normal.      Left Ear: External ear normal.      Nose: No rhinorrhea.      Mouth/Throat:      Mouth: Mucous membranes are moist.   Eyes:      Extraocular Movements: Extraocular movements intact.      Conjunctiva/sclera: Conjunctivae normal.   Cardiovascular:      Rate and Rhythm: Normal rate.      Pulses: Normal pulses.      Heart sounds: No murmur heard.  Pulmonary:      Effort: Pulmonary effort is normal. No respiratory distress.   Musculoskeletal:      Right lower leg: No edema.      Left lower leg: No edema.   Skin:     Capillary Refill: Capillary refill takes less than 2 seconds.      Coloration: Skin is not jaundiced.      Findings: No bruising.   Neurological:      General: No focal deficit present.      Mental Status: She is alert and oriented to person, place, and time.      Motor: No weakness.      Gait: Gait normal.   Psychiatric:         Mood and Affect: Mood normal.           Lab Results   Component Value Date     01/25/2023    K 3.9 01/25/2023     01/25/2023    CO2 22 (L) 01/25/2023    BUN 13 01/25/2023    CREATININE 1.0 01/25/2023    ANIONGAP 12 01/25/2023     No results found for: HGBA1C  Lab Results   Component Value Date    BNP 18 09/21/2022    BNP 25 08/10/2022    BNP 35 07/08/2022       Lab Results   Component Value Date    WBC 8.89 01/25/2023    HGB 11.3 (L) 01/25/2023    HCT 37.5 01/25/2023     01/25/2023    GRAN 6.0 01/25/2023    GRAN 67.3 01/25/2023     Lab Results   Component Value Date    CHOL 220 (H) 05/03/2022    HDL 92 (H) 05/03/2022    LDLCALC 108.6 05/03/2022    TRIG 97 05/03/2022          Current Outpatient Medications:     albuterol (ACCUNEB) 0.63 mg/3 mL Nebu, TAKE 3MLS BY NEBULIZATION EVERY 6 HOURS AS NEEDED Strength: 0.63 mg/3 mL, Disp: 75 mL, Rfl: 5    budesonide-glycopyr-formoterol (BREZTRI AEROSPHERE) 160-9-4.8 mcg/actuation HFAA,  Inhale 2 puffs into the lungs 2 (two) times daily., Disp: 10.7 g, Rfl: 11    lisinopriL (PRINIVIL,ZESTRIL) 20 MG tablet, Take 20 mg by mouth once daily., Disp: , Rfl:     QUEtiapine (SEROQUEL) 200 MG Tab, Take 200 mg by mouth nightly., Disp: , Rfl:     traMADoL (ULTRAM) 50 mg tablet, Take 1 tablet (50 mg total) by mouth every 6 (six) hours as needed for Pain., Disp: 30 tablet, Rfl: 0    ibuprofen (ADVIL,MOTRIN) 800 MG tablet, Take 1 tablet (800 mg total) by mouth 3 (three) times daily as needed for Pain (left knee pain)., Disp: 90 tablet, Rfl: 1  No current facility-administered medications for this visit.    Facility-Administered Medications Ordered in Other Visits:     lactated ringers infusion, , Intravenous, Continuous, Sim Quinteros MD, Last Rate: 0 mL/hr at 12/14/21 0846, New Bag at 06/07/22 1151    sodium chloride 0.9% flush 10 mL, 10 mL, Intravenous, PRN, Sim Quinteros MD     Outpatient Encounter Medications as of 5/17/2023   Medication Sig Dispense Refill    albuterol (ACCUNEB) 0.63 mg/3 mL Nebu TAKE 3MLS BY NEBULIZATION EVERY 6 HOURS AS NEEDED  Strength: 0.63 mg/3 mL 75 mL 5    budesonide-glycopyr-formoterol (BREZTRI AEROSPHERE) 160-9-4.8 mcg/actuation HFAA Inhale 2 puffs into the lungs 2 (two) times daily. 10.7 g 11    lisinopriL (PRINIVIL,ZESTRIL) 20 MG tablet Take 20 mg by mouth once daily.      QUEtiapine (SEROQUEL) 200 MG Tab Take 200 mg by mouth nightly.      traMADoL (ULTRAM) 50 mg tablet Take 1 tablet (50 mg total) by mouth every 6 (six) hours as needed for Pain. 30 tablet 0    ibuprofen (ADVIL,MOTRIN) 800 MG tablet Take 1 tablet (800 mg total) by mouth 3 (three) times daily as needed for Pain (left knee pain). 90 tablet 1    [DISCONTINUED] celecoxib (CELEBREX) 200 MG capsule Take 1 capsule (200 mg total) by mouth 2 (two) times daily. 60 capsule 0    [DISCONTINUED] CMPD diclofenac 3%- cyclobenzaprine 2%- baclofen 2%- gabapentin 6%- LIDOcaine 2%- prilocaine 2% transdermal gel Apply 1 to 2 grams  up to 4 times daily as directed for additional pain relief 240 g 5    [DISCONTINUED] fluticasone propionate (FLONASE) 50 mcg/actuation nasal spray by Each Nostril route.      [DISCONTINUED] lisinopriL 10 MG tablet Take 10 mg by mouth.      [DISCONTINUED] losartan (COZAAR) 100 MG tablet Take 1 tablet (100 mg total) by mouth once daily. 90 tablet 3    [DISCONTINUED] predniSONE (DELTASONE) 20 MG tablet Take 1 tablet (20 mg total) by mouth once daily. 10 tablet 0    [DISCONTINUED] traMADoL (ULTRAM) 50 mg tablet Take 1 tablet (50 mg total) by mouth every 6 (six) hours as needed. 30 tablet 0     Facility-Administered Encounter Medications as of 5/17/2023   Medication Dose Route Frequency Provider Last Rate Last Admin    lactated ringers infusion   Intravenous Continuous Sim Quinteros MD 0 mL/hr at 12/14/21 0846 New Bag at 06/07/22 1151    sodium chloride 0.9% flush 10 mL  10 mL Intravenous PRN Sim Quinteros MD              Assessment:       1. Left knee pain, unspecified chronicity           Plan:       Left knee pain, unspecified chronicity  -     ibuprofen (ADVIL,MOTRIN) 800 MG tablet; Take 1 tablet (800 mg total) by mouth 3 (three) times daily as needed for Pain (left knee pain).  Dispense: 90 tablet; Refill: 1               Arthritis,Left Knee:   - neck pain and arthritis in the left knee.   - was seen by PCP and given tramadol.  Finds this medication to be helpful, but needs to take doses above what has been prescribed to get relief.   - had follow-up appointment with Orthopedics, who injected her knee, but did not give significant relief, as pain return quickly.  Was advised to use NSAID by Ortho.   - PCP refilled tramadol once and telephone visit 6 days ago, gave Ultram times 30 pills, advised further refills would need to come from pain management, and a referral was placed.   - patient has appointment with pain management on 07/13/2023, would keep that appointment.  Additionally can return to ortho to discuss  the possibility of knee replacement as this was discussed at last appointment.   - at this time would continue with NSAID.  Discussed options and patient would like to use a 2 to 3 times a day medicine, thus will write for ibuprofen 800 that can use up to 3 times a day as needed for pain.

## 2023-05-17 NOTE — PATIENT INSTRUCTIONS
Arthritis,Left Knee:   - neck pain and arthritis in the left knee.   - was seen by PCP and given tramadol.  Finds this medication to be helpful, but needs to take doses above what has been prescribed to get relief.   - had follow-up appointment with Orthopedics, who injected her knee, but did not give significant relief, as pain return quickly.  Was advised to use NSAID by Ortho.   - PCP refilled tramadol once and telephone visit 6 days ago, gave Ultram times 30 pills, advised further refills would need to come from pain management, and a referral was placed.   - patient has appointment with pain management on 07/13/2023, would keep that appointment.  Additionally can return to ortho to discuss the possibility of knee replacement as this was discussed at last appointment.   - at this time would continue with NSAID.  Discussed options and patient would like to use a 2 to 3 times a day medicine, thus will write for ibuprofen 800 that can use up to 3 times a day as needed for pain.   Verified Results  US THYROID [id number] 09:10AM Silva Maki   Ordering Provider: Silva Maki. Reason For Study: thyromegaly,thyromegaly. [Mar 1, 2017 10:16PM Silva Maki  f/u endocrinolgy for biopsy endocrinology for biopsy     Test Name Result Flag Reference   US THYROID (Report)     Accession #    [id number]    US THYROID - 3/1/2017 8:45 AM    INDICATION:  thyromegaly    COMPARISON: CT chest from 10/12/2015. TECHNIQUE: Ultrasound examination of the thyroid gland was performed. FINDINGS:    The right thyroid lobe measure 4.0 cm in length, 2.3 cm ap, and 2.2 cm transverse. The left   thyroid lobe measure 4.6 cm in length, 2.9 cm ap, and 2.4 cm transverse. The thyroid isthmus   measures 0.5 cm in thickness. Right lobe: A hypoechoic heterogeneous solid nodule is seen in the upper pole measuring 0.9 x 0.9 x 0.8 cm. This is mostly solid and appears to have mild internal blood flow under Doppler investigation. A s similar-appearing nodule in the mid posterior lobe measures 1.2 x 1.0 x 1.1 cm. This does   not have internal blood flow under Doppler investigation. A 3rd smaller similar-appearing nodule in the lower pole measures 0.6 x 0.7 x 0.8 cm. Left lobe:  A small hypoechoic nodule in the anterior mid pole measures 0.7 x 0.5 x 0.7 cm. This may be   partially cystic in nature. A hyperechoic non-shadowing focus about the dependent wall could   reflect debris. In ill-defined isoechoic heterogeneous nodule in the posterior mid lobe has a small cystic   component, is mostly solid, and measures 1.8 x 1.5 x 1.3 cm. A small hypoechoic well-defined nodule in the lower pole has cystic components and measures   0.7 x 0.8 x 0.9 cm. There does not appear to be significant internal blood flow under Doppler   investigation. IMPRESSION:    Not enlarged multinodular thyroid gland. Strongly suggest ultrasound-guided FNA biopsy of 1.8   cm nodule in the left lobe.  Consider ultrasound-guided FNA biopsy of the 1.2 cm nodule in the   right lobe. Otherwise, recommend continued regular ultrasound surveillance.     **** F I N A L ****    Transcribed By: DARREN   03/01/17 2:13 pm    Dictated By:      Kehinde Amaya DO    Electronically Reviewed and Approved By:      Antoni YANEZ DO 03/01/17 2:19 pm

## 2023-06-12 ENCOUNTER — TELEPHONE (OUTPATIENT)
Dept: PRIMARY CARE CLINIC | Facility: CLINIC | Age: 63
End: 2023-06-12
Payer: MEDICARE

## 2023-06-12 NOTE — TELEPHONE ENCOUNTER
----- Message from Ginger Maldonado sent at 6/12/2023 10:46 AM CDT -----  Contact: Pt [phone]  Requesting an RX refill or new RX.  Is this a refill or new RX: Refill  RX name and strength (copy/paste from chart):  traMADoL (ULTRAM) 50 mg tablet   Is this a 30 day or 90 day RX: 30  Pharmacy name and phone # (copy/paste from chart):    [facility] - [place] - [address]  [address]  [place] [address]  Phone: [phone] Fax: [phone]      Please call and advise.    Thank You

## 2023-06-12 NOTE — TELEPHONE ENCOUNTER
Pt is requesting a refill on Tramadol. I do not see the medication in her current medication list.

## 2023-06-14 ENCOUNTER — TELEPHONE (OUTPATIENT)
Dept: PRIMARY CARE CLINIC | Facility: CLINIC | Age: 63
End: 2023-06-14

## 2023-06-14 DIAGNOSIS — S39.012D LUMBOSACRAL STRAIN, SUBSEQUENT ENCOUNTER: Primary | ICD-10-CM

## 2023-06-14 NOTE — TELEPHONE ENCOUNTER
----- Message from Priscilla Grady sent at 6/14/2023  2:50 PM CDT -----  Contact: [phone]  Requesting an RX refill or new RX.  Is this a refill or new RX: refill  RX name and strength :  traMADoL (ULTRAM) 50 mg tablet   Is this a 30 day or 90 day RX:   Pharmacy name and phone # :  [facility] - [place], [place] - [address]   Phone:  [phone]  Fax:  [phone]        Please call patient to confirm. 2 nd request

## 2023-06-17 RX ORDER — TRAMADOL HYDROCHLORIDE 50 MG/1
50 TABLET ORAL EVERY 6 HOURS PRN
Qty: 30 TABLET | Refills: 0 | Status: SHIPPED | OUTPATIENT
Start: 2023-06-17 | End: 2023-06-27

## 2023-07-14 DIAGNOSIS — G47.33 OSA (OBSTRUCTIVE SLEEP APNEA): Primary | ICD-10-CM

## 2023-07-20 DIAGNOSIS — J44.9 CHRONIC OBSTRUCTIVE PULMONARY DISEASE, UNSPECIFIED COPD TYPE: ICD-10-CM

## 2023-07-20 NOTE — TELEPHONE ENCOUNTER
No care due was identified.  [facility] Embedded Care Due Messages. Reference number: [id number].   7/20/2023 8:57:27 AM CDT

## 2023-07-25 RX ORDER — BUDESONIDE, GLYCOPYRROLATE, AND FORMOTEROL FUMARATE 160; 9; 4.8 UG/1; UG/1; UG/1
AEROSOL, METERED RESPIRATORY (INHALATION)
Qty: 10.7 G | Refills: 0 | Status: SHIPPED | OUTPATIENT
Start: 2023-07-25 | End: 2023-08-30

## 2023-08-02 PROBLEM — L25.9 CONTACT DERMATITIS: Status: ACTIVE | Noted: 2023-08-02

## 2023-08-02 RX ORDER — TRAMADOL HYDROCHLORIDE 50 MG/1
50 TABLET ORAL EVERY 6 HOURS PRN
Qty: 30 TABLET | Refills: 0 | OUTPATIENT
Start: 2023-08-02 | End: 2023-08-12

## 2023-08-02 NOTE — TELEPHONE ENCOUNTER
----- Message from Francecharu Moreno sent at 8/2/2023 10:49 AM CDT -----  Contact: Mobile [phone]  Requesting an RX refill or new RX.  Is this a refill or new RX: Refill  RX name and strength Tramadol  Is this a 30 day or 90 day RX: N/A  Pharmacy name and phone #   [facility] - [place], [place] - [address]  [address]  [place] [address]  Phone: [phone] Fax: [phone]  The doctors have asked that we provide their patients with the following 2 reminders -- prescription refills can take up to 72 hours, and a friendly reminder that in the future you can use your [facility] account to request refills:    Comment: Patient does not know how much she usually get in the Tramadol.

## 2023-08-04 ENCOUNTER — TELEPHONE (OUTPATIENT)
Dept: PRIMARY CARE CLINIC | Facility: CLINIC | Age: 63
End: 2023-08-04
Payer: MEDICARE

## 2023-08-04 NOTE — TELEPHONE ENCOUNTER
----- Message from Leandro Zaidi sent at 8/4/2023  1:02 PM CDT -----  Contact: self [phone]  Pt requesting a call for status of her tramadol refill she stated she requested.      [facility] - [place] - [address]  [address]  [place] [address]  Phone: [phone] Fax: [phone]    Please call and advise

## 2023-08-04 NOTE — TELEPHONE ENCOUNTER
Called patient in regards to message. Patient was schedule for an appointment for medication refill. Patient was notified that medication was denied without visit.

## 2023-08-04 NOTE — TELEPHONE ENCOUNTER
----- Message from Leandro Zaidi sent at 8/4/2023  1:02 PM CDT -----  Contact: self [phone]  Pt requesting a call for status of her tramadol refill she stated she requested.      [facility] - [facility] [place] - [address]  [address]  [place] [address]  Phone: [phone] Fax: [phone]    Please call and advise

## 2023-08-07 ENCOUNTER — OFFICE VISIT (OUTPATIENT)
Dept: PRIMARY CARE CLINIC | Facility: CLINIC | Age: 63
End: 2023-08-07
Payer: MEDICARE

## 2023-08-07 DIAGNOSIS — M87.9 OSTEONECROSIS OF LEFT KNEE REGION: ICD-10-CM

## 2023-08-07 DIAGNOSIS — F31.9 BIPOLAR AFFECTIVE DISORDER, REMISSION STATUS UNSPECIFIED: ICD-10-CM

## 2023-08-07 DIAGNOSIS — Z98.890 HISTORY OF FUNDOPLICATION: ICD-10-CM

## 2023-08-07 DIAGNOSIS — Z12.31 ENCOUNTER FOR SCREENING MAMMOGRAM FOR MALIGNANT NEOPLASM OF BREAST: ICD-10-CM

## 2023-08-07 DIAGNOSIS — M17.12 OSTEOARTHRITIS OF LEFT KNEE, UNSPECIFIED OSTEOARTHRITIS TYPE: Primary | ICD-10-CM

## 2023-08-07 DIAGNOSIS — S39.012D LUMBOSACRAL STRAIN, SUBSEQUENT ENCOUNTER: ICD-10-CM

## 2023-08-07 DIAGNOSIS — E66.01 SEVERE OBESITY (BMI 35.0-39.9) WITH COMORBIDITY: ICD-10-CM

## 2023-08-07 DIAGNOSIS — Z79.899 ENCOUNTER FOR LONG-TERM (CURRENT) USE OF MEDICATIONS: ICD-10-CM

## 2023-08-07 DIAGNOSIS — S32.010D COMPRESSION FRACTURE OF L1 VERTEBRA WITH ROUTINE HEALING: ICD-10-CM

## 2023-08-07 DIAGNOSIS — I10 HYPERTENSION, UNSPECIFIED TYPE: ICD-10-CM

## 2023-08-07 DIAGNOSIS — Z87.19 HISTORY OF GASTROESOPHAGEAL REFLUX (GERD): ICD-10-CM

## 2023-08-07 DIAGNOSIS — J43.2 CENTRILOBULAR EMPHYSEMA: ICD-10-CM

## 2023-08-07 DIAGNOSIS — M23.204 DEGENERATIVE TEAR OF LEFT MEDIAL MENISCUS: ICD-10-CM

## 2023-08-07 PROBLEM — R10.11 ABDOMINAL PAIN, RIGHT UPPER QUADRANT: Status: RESOLVED | Noted: 2022-05-02 | Resolved: 2023-08-07

## 2023-08-07 PROBLEM — R10.31 ABDOMINAL PAIN, RIGHT LOWER QUADRANT: Status: RESOLVED | Noted: 2022-05-02 | Resolved: 2023-08-07

## 2023-08-07 PROBLEM — R10.31 CHRONIC GROIN PAIN, RIGHT: Status: RESOLVED | Noted: 2022-05-11 | Resolved: 2023-08-07

## 2023-08-07 PROBLEM — M54.41 ACUTE BILATERAL LOW BACK PAIN WITH BILATERAL SCIATICA: Status: RESOLVED | Noted: 2022-12-05 | Resolved: 2023-08-07

## 2023-08-07 PROBLEM — G89.29 CHRONIC GROIN PAIN, RIGHT: Status: RESOLVED | Noted: 2022-05-11 | Resolved: 2023-08-07

## 2023-08-07 PROBLEM — S61.019A: Status: RESOLVED | Noted: 2022-06-07 | Resolved: 2023-08-07

## 2023-08-07 PROBLEM — R10.2 SUPRAPUBIC PAIN: Status: RESOLVED | Noted: 2022-05-02 | Resolved: 2023-08-07

## 2023-08-07 PROBLEM — M54.42 ACUTE BILATERAL LOW BACK PAIN WITH BILATERAL SCIATICA: Status: RESOLVED | Noted: 2022-12-05 | Resolved: 2023-08-07

## 2023-08-07 PROBLEM — S80.02XA CONTUSION OF LEFT KNEE: Status: RESOLVED | Noted: 2022-10-19 | Resolved: 2023-08-07

## 2023-08-07 PROCEDURE — 99999 PR PBB SHADOW E&M-EST. PATIENT-LVL II: CPT | Mod: PBBFAC,HCNC,, | Performed by: FAMILY MEDICINE

## 2023-08-07 PROCEDURE — 4010F ACE/ARB THERAPY RXD/TAKEN: CPT | Mod: HCNC,CPTII,S$GLB, | Performed by: FAMILY MEDICINE

## 2023-08-07 PROCEDURE — 4010F PR ACE/ARB THEARPY RXD/TAKEN: ICD-10-PCS | Mod: HCNC,CPTII,S$GLB, | Performed by: FAMILY MEDICINE

## 2023-08-07 PROCEDURE — 99999 PR PBB SHADOW E&M-EST. PATIENT-LVL II: ICD-10-PCS | Mod: PBBFAC,HCNC,, | Performed by: FAMILY MEDICINE

## 2023-08-07 PROCEDURE — 99214 OFFICE O/P EST MOD 30 MIN: CPT | Mod: HCNC,S$GLB,, | Performed by: FAMILY MEDICINE

## 2023-08-07 PROCEDURE — 99214 PR OFFICE/OUTPT VISIT, EST, LEVL IV, 30-39 MIN: ICD-10-PCS | Mod: HCNC,S$GLB,, | Performed by: FAMILY MEDICINE

## 2023-08-07 RX ORDER — TRAMADOL HYDROCHLORIDE 50 MG/1
50 TABLET ORAL EVERY 6 HOURS
COMMUNITY
End: 2023-08-07 | Stop reason: SDUPTHER

## 2023-08-07 RX ORDER — CYPROHEPTADINE HYDROCHLORIDE 4 MG/1
TABLET ORAL
Qty: 30 TABLET | Refills: 2 | Status: SHIPPED | OUTPATIENT
Start: 2023-08-07 | End: 2023-11-07 | Stop reason: SDUPTHER

## 2023-08-07 RX ORDER — TRAMADOL HYDROCHLORIDE 50 MG/1
50 TABLET ORAL EVERY 6 HOURS
Qty: 30 TABLET | Refills: 0 | Status: SHIPPED | OUTPATIENT
Start: 2023-08-07 | End: 2023-09-13 | Stop reason: SDUPTHER

## 2023-08-07 NOTE — PROGRESS NOTES
Subjective:       Patient ID: Wilfredo Vazquez is a 63 y.o. female.    Chief Complaint: Medication Refill    HPI-64 yo BF medication refills knee pain --in April patient had MRI of the knee showing a tri compartment high-grade chondral malacia in osteoarthritis/multifocal osteonecrosis/displaced flap tear posterior horn medial meniscus told needs to see orthopedist patient still with significant left knee pain  History of MRI lumbar spine showing mild bulging T12-L1, L1-2, L3-4, L4-5 with facet arthropathy L4-5 and L5-S1 with mild compression fracture of L1 could possibly see neurosurgeon but would see orthopedist first  Pt states lost weight 215--187 wants periactin --+ BM --ambulation not walking much due to knee pain  Wants refill ultram           ROS:    Skin: no psoriasis, eczema, skin cancer-OK now   HEENT: no headache,  No ocular pain, blurred vision, diplopia, epistaxis, hoarseness change in voice, thyroid trouble  Lung: No pneumonia, +asthma, no Tb, +_wheezing,+ SOB, no smoking + COPD --doing better  Heart: No chest pain, ankle edema, palpitations, MI, patti murmur, +hypertension,no  hyperlipidemia no stent bypass arrhythmia  Abdomen: no nausea,no  Vomiting,no diarrhea, no  constipation, ulcers, hepatitis, gallbladder disease, melena, hematochezia, hematemesis +dysphagia history of fundoplication for GERD history of esophageal dilatation x2Dr Beary doing better--swallowing much better  : no UTI, renal disease, stones  GYN hyst   MS: no fractures, O/A, lupus, rheumatoid, gout--pain lower back but annette left knee see HPI  Neuro: No dizziness, LOC, seizures   No diabetes, no anemia, no anxiety, no depression   Single--2 children--disable secondary to a stomach--lives with daughter and grandson    Objective:   Physical Exam:    General: Well nourished, well developed, no acute distress +obesity  Skin: No lesions  HEENT: Eyes PERRLA, EOM intact, nose patent, throat nonerythematous ears TMs clear   NECK:  Supple, no bruits, No JVD, no nodes  Lungs: Clear, no rales, rhonchi, wheezing coarse BS   Heart: Regular rate and rhythm, no murmur s, gallops, or rubs  Abdomen:  No abdominal pain guarding rebound or tenderness organomegaly  MS--pain left knee palpation--crepitus with flexion extension of the left knee--tenderness with ambulation--able squat group home down hard to arise--told in the past may need a knee replacement--tenderness lumbar spine L1-S1 anterior flexion 10° extension 10° lateral flexion rotation 10°  Neuro: Alert, CN intact, oriented X 3  Extremities: No cyanosis, clubbing, or edema         Assessment:       1. Osteoarthritis of left knee, unspecified osteoarthritis type    2. Osteonecrosis of left knee region    3. Degenerative tear of left medial meniscus    4. Encounter for screening mammogram for malignant neoplasm of breast    5. Compression fracture of L1 vertebra with routine healing    6. Lumbosacral strain, subsequent encounter    7. History of gastroesophageal reflux (GERD)    8. History of fundoplication    9. Severe obesity (BMI 35.0-39.9) with comorbidity    10. Hypertension, unspecified type    11. Centrilobular emphysema    12. Bipolar affective disorder, remission status unspecified    13. Encounter for long-term (current) use of medications                Plan:           Main Reason for Visit--  Multiple medical issues  Severe left knee pain--needs refills of Ultram----MRI the knee patient had tricompartment osteo malacia of the left knee with osteonecrosis and a medial meniscus tear needs knee replacement patient referred to orthopedist  Bulging disc T12-S1 and some facet arthropathy L4-5 L5-S1 history of a compression fracture of L1 is mild will monitor at this time if symptoms become worse will refer to Neuro surgery  Claims weight loss 215-187 lb given Periactin 4 mg q.h.s. to increase appetite  Shortness of breath--COPD/emphysema recent hospitalization for chronic respiratory failure  with hypoxia--has appointment with pulmonary MD--on aerosol albuterol nebulizer--Breztri--Spiriva--in the hospital was on BiPAP--patient claims had 6 minute walk and did not meet requirements for oxygen--Needs pulmonary see if feels pt would benefit from steroids/CPAP or BiPAP /home O2   Hypertension blood pressure 114/80 on cozaar 100 mg if stays will increase to Hyzaar 112.5  History dysphagia--difficulty swallowing water has to eat small amounts of food--history esophageal dilitation --OK now   Lab CBCs CMP lipd T4 TSH now

## 2023-08-17 ENCOUNTER — HOSPITAL ENCOUNTER (OUTPATIENT)
Dept: RADIOLOGY | Facility: HOSPITAL | Age: 63
Discharge: HOME | End: 2023-08-17
Attending: FAMILY MEDICINE
Payer: MEDICARE

## 2023-08-17 DIAGNOSIS — Z12.31 ENCOUNTER FOR SCREENING MAMMOGRAM FOR MALIGNANT NEOPLASM OF BREAST: ICD-10-CM

## 2023-08-17 PROCEDURE — 77067 SCR MAMMO BI INCL CAD: CPT | Mod: 26,HCNC,, | Performed by: RADIOLOGY

## 2023-08-17 PROCEDURE — 77067 MAMMO DIGITAL SCREENING BILAT WITH TOMO: ICD-10-PCS | Mod: 26,HCNC,, | Performed by: RADIOLOGY

## 2023-08-17 PROCEDURE — 77067 SCR MAMMO BI INCL CAD: CPT | Mod: TC,HCNC

## 2023-08-17 PROCEDURE — 77063 BREAST TOMOSYNTHESIS BI: CPT | Mod: 26,HCNC,, | Performed by: RADIOLOGY

## 2023-08-17 PROCEDURE — 77063 MAMMO DIGITAL SCREENING BILAT WITH TOMO: ICD-10-PCS | Mod: 26,HCNC,, | Performed by: RADIOLOGY

## 2023-08-30 DIAGNOSIS — J44.9 CHRONIC OBSTRUCTIVE PULMONARY DISEASE, UNSPECIFIED COPD TYPE: ICD-10-CM

## 2023-08-30 RX ORDER — BUDESONIDE, GLYCOPYRROLATE, AND FORMOTEROL FUMARATE 160; 9; 4.8 UG/1; UG/1; UG/1
AEROSOL, METERED RESPIRATORY (INHALATION)
Qty: 32.1 G | Refills: 3 | Status: ON HOLD | OUTPATIENT
Start: 2023-08-30 | End: 2023-12-28

## 2023-08-30 NOTE — TELEPHONE ENCOUNTER
No care due was identified.  [facility] Embedded Care Due Messages. Reference number: [id number].   8/30/2023 8:42:42 AM CDT

## 2023-09-13 ENCOUNTER — OFFICE VISIT (OUTPATIENT)
Dept: ORTHOPEDICS | Facility: CLINIC | Age: 63
End: 2023-09-13
Payer: MEDICARE

## 2023-09-13 VITALS
WEIGHT: 211.06 LBS | BODY MASS INDEX: 38.84 KG/M2 | HEIGHT: 62 IN | HEART RATE: 99 BPM | SYSTOLIC BLOOD PRESSURE: 145 MMHG | DIASTOLIC BLOOD PRESSURE: 83 MMHG

## 2023-09-13 DIAGNOSIS — M17.12 PRIMARY OSTEOARTHRITIS OF LEFT KNEE: ICD-10-CM

## 2023-09-13 DIAGNOSIS — M25.562 CHRONIC PAIN OF LEFT KNEE: ICD-10-CM

## 2023-09-13 DIAGNOSIS — G89.29 CHRONIC PAIN OF LEFT KNEE: ICD-10-CM

## 2023-09-13 DIAGNOSIS — M54.40 ACUTE RIGHT-SIDED LOW BACK PAIN WITH SCIATICA, SCIATICA LATERALITY UNSPECIFIED: Primary | ICD-10-CM

## 2023-09-13 PROCEDURE — 4010F ACE/ARB THERAPY RXD/TAKEN: CPT | Mod: HCNC,CPTII,S$GLB,

## 2023-09-13 PROCEDURE — 1159F MED LIST DOCD IN RCRD: CPT | Mod: HCNC,CPTII,S$GLB,

## 2023-09-13 PROCEDURE — 99213 PR OFFICE/OUTPT VISIT, EST, LEVL III, 20-29 MIN: ICD-10-PCS | Mod: HCNC,25,S$GLB,

## 2023-09-13 PROCEDURE — 99213 OFFICE O/P EST LOW 20 MIN: CPT | Mod: HCNC,25,S$GLB,

## 2023-09-13 PROCEDURE — 3079F DIAST BP 80-89 MM HG: CPT | Mod: HCNC,CPTII,S$GLB,

## 2023-09-13 PROCEDURE — 3079F PR MOST RECENT DIASTOLIC BLOOD PRESSURE 80-89 MM HG: ICD-10-PCS | Mod: HCNC,CPTII,S$GLB,

## 2023-09-13 PROCEDURE — 3077F SYST BP >= 140 MM HG: CPT | Mod: HCNC,CPTII,S$GLB,

## 2023-09-13 PROCEDURE — 99999 PR PBB SHADOW E&M-EST. PATIENT-LVL IV: CPT | Mod: PBBFAC,HCNC,,

## 2023-09-13 PROCEDURE — 3077F PR MOST RECENT SYSTOLIC BLOOD PRESSURE >= 140 MM HG: ICD-10-PCS | Mod: HCNC,CPTII,S$GLB,

## 2023-09-13 PROCEDURE — 3008F BODY MASS INDEX DOCD: CPT | Mod: HCNC,CPTII,S$GLB,

## 2023-09-13 PROCEDURE — 99999 PR PBB SHADOW E&M-EST. PATIENT-LVL IV: ICD-10-PCS | Mod: PBBFAC,HCNC,,

## 2023-09-13 PROCEDURE — 4010F PR ACE/ARB THEARPY RXD/TAKEN: ICD-10-PCS | Mod: HCNC,CPTII,S$GLB,

## 2023-09-13 PROCEDURE — 1159F PR MEDICATION LIST DOCUMENTED IN MEDICAL RECORD: ICD-10-PCS | Mod: HCNC,CPTII,S$GLB,

## 2023-09-13 PROCEDURE — 20610 DRAIN/INJ JOINT/BURSA W/O US: CPT | Mod: HCNC,LT,S$GLB,

## 2023-09-13 PROCEDURE — 3008F PR BODY MASS INDEX (BMI) DOCUMENTED: ICD-10-PCS | Mod: HCNC,CPTII,S$GLB,

## 2023-09-13 PROCEDURE — 20610 LARGE JOINT ASPIRATION/INJECTION: L KNEE: ICD-10-PCS | Mod: HCNC,LT,S$GLB,

## 2023-09-13 RX ORDER — TRIAMCINOLONE ACETONIDE 40 MG/ML
40 INJECTION, SUSPENSION INTRA-ARTICULAR; INTRAMUSCULAR
Status: COMPLETED | OUTPATIENT
Start: 2023-09-13 | End: 2023-09-13

## 2023-09-13 RX ADMIN — TRIAMCINOLONE ACETONIDE 40 MG: 40 INJECTION, SUSPENSION INTRA-ARTICULAR; INTRAMUSCULAR at 03:09

## 2023-09-13 RX ADMIN — TRIAMCINOLONE ACETONIDE 40 MG: 40 INJECTION, SUSPENSION INTRA-ARTICULAR; INTRAMUSCULAR at 04:09

## 2023-09-13 NOTE — TELEPHONE ENCOUNTER
----- Message from Jocelyn Mckenna sent at 9/13/2023  3:57 PM CDT -----  Pt needs a refill on her tramadol .

## 2023-09-13 NOTE — TELEPHONE ENCOUNTER
No care due was identified.  [facility] Embedded Care Due Messages. Reference number: [id number].   9/13/2023 4:43:30 PM CDT

## 2023-09-13 NOTE — PROCEDURES
Large Joint Aspiration/Injection: L knee    Date/Time: 9/13/2023 3:30 PM    Performed by: Veronica Manning PA-C  Authorized by: Veronica Manning PA-C    Consent Done?:  Yes (Verbal)  Indications:  Pain and arthritis  Site marked: the procedure site was marked    Timeout: prior to procedure the correct patient, procedure, and site was verified    Prep: patient was prepped and draped in usual sterile fashion    Local anesthetic:  Topical anesthetic and bupivacaine 0.25% without epinephrine  Anesthetic total (ml):  4      Details:  Needle Size:  22 G  Ultrasonic Guidance for needle placement?: No    Approach:  Anterolateral  Location:  Knee  Site:  L knee  Medications:  40 mg Triamcinolone acetonide 40 mg/ml (iaCSI)  Patient tolerance:  Patient tolerated the procedure well with no immediate complications

## 2023-09-13 NOTE — PROGRESS NOTES
Patient ID: Wilfredo Vazquez is a 63 y.o. female    Pain of the Left Knee      History of Present Illness:    Wilfredo Vazquez presents to clinic for left knee pain.  Patient denies injury.  Reports pain has been present for years.  It has worsened over the past 5-6 months.  Denies history of gout.  Reports pain mostly to her anterior left knee.  Pain is 10/10 today.  Reports walking worsens her pain.  Reports rest improves her pain.  She has had injections in the past with moderate relief.  She is requesting injection today.  Reports she went to the ER and was given Percocet which improved her pain as well.  She had an MRI from her primary care.  She would also like referral to pain management for low back pain.    Ambulating: unassisted  Diabetic: no  Smoking: no  Hx of DVT/PE: no    PAST MEDICAL HISTORY:   Past Medical History:   Diagnosis Date    Abdominal pain 2018    Emphysema lung     GERD (gastroesophageal reflux disease) 2018    Hypertension     Shingles      PAST SURGICAL HISTORY:   Past Surgical History:   Procedure Laterality Date    APPENDECTOMY      COLONOSCOPY N/A 05/22/2019    Procedure: COLONOSCOPY;  Surgeon: Sim Quinteros MD;  Location: [facility];  Service: Endoscopy;  Laterality: N/A;    COLONOSCOPY N/A 6/20/2022    Procedure: COLONOSCOPY WITH POLYPECTOMY AND CLIPPING;  Surgeon: Jarrod Franco MD;  Location: [facility];  Service: Endoscopy;  Laterality: N/A;    COLONOSCOPY W/ BIOPSIES AND POLYPECTOMY  06/20/2022    COLONOSCOPY W/ POLYPECTOMY      ESOPHAGEAL DILATION N/A 05/13/2019    Procedure: DILATION, ESOPHAGUS;  Surgeon: Sim Quinteros MD;  Location: [facility] ENDO;  Service: Endoscopy;  Laterality: N/A;    ESOPHAGEAL DILATION N/A 02/09/2021    Procedure: DILATION, ESOPHAGUS;  Surgeon: Sim Quinteros MD;  Location: [facility] ENDO;  Service: Endoscopy;  Laterality: N/A;    ESOPHAGEAL DILATION N/A 12/14/2021    Procedure: DILATION, ESOPHAGUS;  Surgeon: Sim Quinteros MD;  Location: [facility]  ENDO;  Service: Endoscopy;  Laterality: N/A;    ESOPHAGEAL DILATION N/A 2022    Procedure: DILATION, ESOPHAGUS;  Surgeon: Sim Quinteros MD;  Location: [facility] ENDO;  Service: Endoscopy;  Laterality: N/A;    ESOPHAGOGASTRODUODENOSCOPY N/A 2019    Procedure: EGD (ESOPHAGOGASTRODUODENOSCOPY);  Surgeon: Sim Quinteros MD;  Location: [facility] ENDO;  Service: Endoscopy;  Laterality: N/A;    ESOPHAGOGASTRODUODENOSCOPY N/A 2021    Procedure: EGD (ESOPHAGOGASTRODUODENOSCOPY);  Surgeon: Sim Quinteros MD;  Location: [facility] ENDO;  Service: Endoscopy;  Laterality: N/A;    ESOPHAGOGASTRODUODENOSCOPY N/A 2021    Procedure: EGD (ESOPHAGOGASTRODUODENOSCOPY);  Surgeon: Sim Quinteros MD;  Location: [facility] ENDO;  Service: Endoscopy;  Laterality: N/A;    ESOPHAGOGASTRODUODENOSCOPY N/A 2022    Procedure: EGD (ESOPHAGOGASTRODUODENOSCOPY);  Surgeon: Sim Quinteros MD;  Location: [facility];  Service: Endoscopy;  Laterality: N/A;    ESOPHAGOGASTRODUODENOSCOPY (EGD) WITH DILATION  2022    HYSTERECTOMY      LAPAROSCOPIC NISSEN FUNDOPLICATION      REPAIR OF EXTENSOR TENDON Left 2022    Procedure: REPAIR, TENDON, EXTENSOR  ;  Surgeon: Dakota Anaya Jr., MD;  Location: [facility];  Service: Plastics;  Laterality: Left;  THUMB    UPPER GASTROINTESTINAL ENDOSCOPY N/A 2018     FAMILY HISTORY:   Family History   Family history unknown: Yes     SOCIAL HISTORY:   Social History     Occupational History    Not on file   Tobacco Use    Smoking status: Every Day     Current packs/day: 0.00     Types: Cigarettes     Start date: 1980     Last attempt to quit: 2022     Years since quittin.2    Smokeless tobacco: Never   Substance and Sexual Activity    Alcohol use: No    Drug use: No    Sexual activity: Not on file     Comment: rare        MEDICATIONS:   Current Outpatient Medications:     albuterol (ACCUNEB) 0.63 mg/3 mL Nebu, TAKE 3MLS BY NEBULIZATION EVERY 6 HOURS AS NEEDED Strength: 0.63 mg/3  mL, Disp: 75 mL, Rfl: 5    budesonide-glycopyr-formoterol (BREZTRI AEROSPHERE) 160-9-4.8 mcg/actuation HFAA, inhale TWO puffs into THE lungs TWICE DAILY, Disp: 32.1 g, Rfl: 3    cyproheptadine (PERIACTIN) 4 mg tablet, 1 po q HS for decreased appetite, Disp: 30 tablet, Rfl: 2    lisinopriL (PRINIVIL,ZESTRIL) 20 MG tablet, Take 20 mg by mouth once daily., Disp: , Rfl:     QUEtiapine (SEROQUEL) 200 MG Tab, Take 200 mg by mouth nightly., Disp: , Rfl:     traMADoL (ULTRAM) 50 mg tablet, Take 1 tablet (50 mg total) by mouth every 6 (six) hours. Take 1 tablet by mouth every 6 hours, Disp: 30 tablet, Rfl: 0    cetirizine (ZYRTEC) 10 MG tablet, Take 1 tablet (10 mg total) by mouth once daily. for 5 days, Disp: 5 tablet, Rfl: 0    famotidine (PEPCID) 20 MG tablet, Take 1 tablet (20 mg total) by mouth 2 (two) times daily. for 5 days (Patient not taking: Reported on 8/7/2023), Disp: 10 tablet, Rfl: 0    triamcinolone acetonide 0.5% (KENALOG) 0.5 % Crea, Apply topically 2 (two) times daily. (Patient not taking: Reported on 9/13/2023), Disp: 454 g, Rfl: 0  No current facility-administered medications for this visit.    Facility-Administered Medications Ordered in Other Visits:     lactated ringers infusion, , Intravenous, Continuous, Sim Quinteros MD, Last Rate: 0 mL/hr at 12/14/21 0846, New Bag at 06/07/22 1151    sodium chloride 0.9% flush 10 mL, 10 mL, Intravenous, PRN, Sim Quinteros MD  ALLERGIES: Review of patient's allergies indicates:  No Known Allergies      Physical Exam     Vitals:    09/13/23 1518   BP: (!) 145/83   Pulse: 99     Alert and oriented to person, place and time. No acute distress. Well-groomed, not ill appearing. Pupils round and reactive, normal respiratory effort, no audible wheezing.     GENERAL:  A well-developed, well-nourished 63 y.o. female who is alert and       oriented in no acute distress.      Gait: She  walks with a normal gait.                   EXTREMITIES:  Examination of lower  extremities reveals there is no visible mass or deformity.    Left knee:  ROM 5-115 *    Ligamentously stable to varus/valgus stress.    Anterior and posterior drawers negative.    No pain over pes bursa.    No warmth    No erythema     Effusion No    medial joint line tenderness    Positive Patellofemoral grind/crepitus       The skin over both lower extremities is normal and unremarkable.  She has a  painless range of motion of the hips and ankles bilaterally.   Sensation is intact in both lower extremities.    There are no motor deficits in the lower extremities bilaterally.   Pedal pulses are palpable distally bilaterally.    She has no calf tenderness to palpation nor edema.    Imaging:     Left knee X-rays ordered/reviewed by me showing no evidence of fracture or dislocation. There is no obvious malalignment. No evidence of masses, lesions or foreign bodies.  Moderate tricompartmental osteoarthritis.  Kellgren Neftaly grade 3.    MRI left knee: Tricompartmental high-grade chondromalacia and osteoarthritis  Multifocal osteonecrosis about the knee without subchondral collapse  Displaced flap tear of the lateral meniscus body and anterior horn  Possible peripheral tear posterior horn medial meniscus    Assessment & Plan    Acute right-sided low back pain with sciatica, sciatica laterality unspecified  -     Ambulatory referral/consult to Pain Clinic; Future; Expected date: 09/20/2023    Osteoarthritis of left knee, unspecified osteoarthritis type  -     Ambulatory referral/consult to Orthopedics    Osteonecrosis of left knee region  -     Ambulatory referral/consult to Orthopedics         I made the decision to obtain old records of the patient including previous notes and imaging. New imaging was ordered today of the extremity or extremities evaluated. I independently reviewed and interpreted the radiographs and/or MRIs/CT scan today as well as prior imaging.    We discussed at length different treatment  options including conservative vs surgical management. These include anti-inflammatories, acetaminophen, rest, ice, heat, formal physical therapy including strengthening and stretching exercises, home exercise programs, injections, dry needling, and finally surgical intervention.      Discussed with patient she has full-thickness cartilage loss to her left knee with associated degenerative meniscus tear.  We discussed TKA in detail which I think is likely her only option surgically.  She would like to proceed with CSI today.  Discussed if proceeding with CSI could not have surgery within the 3 months.  Patient elected to proceed with left knee CSI.  Left knee CSI given, post-injection instructions reviewed.    We will refer her to pain management to discuss back pain.  She will follow up in 3 months and may schedule for TKA at that time.    Follow up: 3 months  X-rays next visit: none    All questions were answered and patient is agreeable to the above plan.

## 2023-09-14 ENCOUNTER — OFFICE VISIT (OUTPATIENT)
Dept: PAIN MEDICINE | Facility: CLINIC | Age: 63
End: 2023-09-14
Payer: MEDICARE

## 2023-09-14 VITALS
DIASTOLIC BLOOD PRESSURE: 89 MMHG | BODY MASS INDEX: 38.61 KG/M2 | HEIGHT: 62 IN | HEART RATE: 87 BPM | SYSTOLIC BLOOD PRESSURE: 155 MMHG

## 2023-09-14 DIAGNOSIS — M47.816 LUMBAR SPONDYLOSIS: ICD-10-CM

## 2023-09-14 DIAGNOSIS — M54.40 ACUTE RIGHT-SIDED LOW BACK PAIN WITH SCIATICA, SCIATICA LATERALITY UNSPECIFIED: ICD-10-CM

## 2023-09-14 DIAGNOSIS — M54.16 LUMBAR RADICULOPATHY: ICD-10-CM

## 2023-09-14 DIAGNOSIS — M51.36 DDD (DEGENERATIVE DISC DISEASE), LUMBAR: Primary | ICD-10-CM

## 2023-09-14 PROBLEM — M51.369 DDD (DEGENERATIVE DISC DISEASE), LUMBAR: Status: ACTIVE | Noted: 2023-09-14

## 2023-09-14 PROCEDURE — 3079F DIAST BP 80-89 MM HG: CPT | Mod: HCNC,CPTII,S$GLB, | Performed by: PAIN MEDICINE

## 2023-09-14 PROCEDURE — 3079F PR MOST RECENT DIASTOLIC BLOOD PRESSURE 80-89 MM HG: ICD-10-PCS | Mod: HCNC,CPTII,S$GLB, | Performed by: PAIN MEDICINE

## 2023-09-14 PROCEDURE — 1160F PR REVIEW ALL MEDS BY PRESCRIBER/CLIN PHARMACIST DOCUMENTED: ICD-10-PCS | Mod: HCNC,CPTII,S$GLB, | Performed by: PAIN MEDICINE

## 2023-09-14 PROCEDURE — 3077F SYST BP >= 140 MM HG: CPT | Mod: HCNC,CPTII,S$GLB, | Performed by: PAIN MEDICINE

## 2023-09-14 PROCEDURE — 99999 PR PBB SHADOW E&M-EST. PATIENT-LVL IV: CPT | Mod: PBBFAC,HCNC,, | Performed by: PAIN MEDICINE

## 2023-09-14 PROCEDURE — 99999 PR PBB SHADOW E&M-EST. PATIENT-LVL IV: ICD-10-PCS | Mod: PBBFAC,HCNC,, | Performed by: PAIN MEDICINE

## 2023-09-14 PROCEDURE — 3077F PR MOST RECENT SYSTOLIC BLOOD PRESSURE >= 140 MM HG: ICD-10-PCS | Mod: HCNC,CPTII,S$GLB, | Performed by: PAIN MEDICINE

## 2023-09-14 PROCEDURE — 99204 OFFICE O/P NEW MOD 45 MIN: CPT | Mod: HCNC,S$GLB,, | Performed by: PAIN MEDICINE

## 2023-09-14 PROCEDURE — 1160F RVW MEDS BY RX/DR IN RCRD: CPT | Mod: HCNC,CPTII,S$GLB, | Performed by: PAIN MEDICINE

## 2023-09-14 PROCEDURE — 99204 PR OFFICE/OUTPT VISIT, NEW, LEVL IV, 45-59 MIN: ICD-10-PCS | Mod: HCNC,S$GLB,, | Performed by: PAIN MEDICINE

## 2023-09-14 PROCEDURE — 1159F MED LIST DOCD IN RCRD: CPT | Mod: HCNC,CPTII,S$GLB, | Performed by: PAIN MEDICINE

## 2023-09-14 PROCEDURE — 4010F ACE/ARB THERAPY RXD/TAKEN: CPT | Mod: HCNC,CPTII,S$GLB, | Performed by: PAIN MEDICINE

## 2023-09-14 PROCEDURE — 3008F PR BODY MASS INDEX (BMI) DOCUMENTED: ICD-10-PCS | Mod: HCNC,CPTII,S$GLB, | Performed by: PAIN MEDICINE

## 2023-09-14 PROCEDURE — 3008F BODY MASS INDEX DOCD: CPT | Mod: HCNC,CPTII,S$GLB, | Performed by: PAIN MEDICINE

## 2023-09-14 PROCEDURE — 1159F PR MEDICATION LIST DOCUMENTED IN MEDICAL RECORD: ICD-10-PCS | Mod: HCNC,CPTII,S$GLB, | Performed by: PAIN MEDICINE

## 2023-09-14 PROCEDURE — 4010F PR ACE/ARB THEARPY RXD/TAKEN: ICD-10-PCS | Mod: HCNC,CPTII,S$GLB, | Performed by: PAIN MEDICINE

## 2023-09-15 RX ORDER — TRAMADOL HYDROCHLORIDE 50 MG/1
50 TABLET ORAL EVERY 6 HOURS
Qty: 30 TABLET | Refills: 0 | Status: SHIPPED | OUTPATIENT
Start: 2023-09-15 | End: 2023-11-07 | Stop reason: SDUPTHER

## 2023-09-16 NOTE — PROGRESS NOTES
Subjective:     Patient ID: Wilfredo Vazquez is a 63 y.o. female    Chief Complaint: Back Pain and Knee Pain      Referred by: Veronica Manning, *      HPI:    Initial Encounter (9/14/23):  Wilfredo Vazquez is a 63 y.o. female who presents today with chronic left sided low back and lower extremity pain. Does have similar symptoms on the right side but these are very mild. Her pain started roughly 5 months ago. No specific inciting events or injuries noted. The pain is primarily loactred in the left lower lumbar region and will radiate to the left lower extremity to her foot/ankle. The pain is constant and worsened with activity. She denies any associated numbness/tingling weakness or b/b dysfunction. .   This pain is described in detail below.    Physical Therapy: No    Non-pharmacologic Treatment: Rest helps         TENS? No    Pain Medications:         Currently taking: Tramadol    Has tried in the past:  NSAIDs, tylenol    Has not tried: Muscle relaxants, TCAs, SNRIs, anticonvulsants, topical creams    Blood thinners: None    Interventional Therapies: None    Relevant Surgeries: None    Affecting sleep? Yes    Affecting daily activities? yes    Depressive symptoms? No          SI/HI? No    Work status: Disabled    Pain Scores:    Best:       2/10  Worst:     8/10  Usually:   5/10  Today:    4/10    Pain Disability Index  Family/Home Responsibilities:: 10  Recreation:: 10  Social Activity:: 10  Occupation:: 10  Sexual Behavior:: 0  Self Care:: 10  Life-Support Activities:: 10  Pain Disability Index (PDI): 60    Review of Systems   Constitutional:  Negative for activity change, appetite change, chills, fatigue, fever and unexpected weight change.   HENT:  Negative for hearing loss.    Eyes:  Negative for visual disturbance.   Respiratory:  Negative for chest tightness and shortness of breath.    Cardiovascular:  Negative for chest pain.   Gastrointestinal:  Negative for abdominal pain, constipation,  diarrhea, nausea and vomiting.   Genitourinary:  Negative for difficulty urinating.   Musculoskeletal:  Positive for arthralgias, back pain, gait problem and myalgias. Negative for neck pain.   Skin:  Negative for rash.   Neurological:  Negative for dizziness, weakness, light-headedness, numbness and headaches.   Psychiatric/Behavioral:  Positive for sleep disturbance. Negative for hallucinations and suicidal ideas. The patient is not nervous/anxious.        Past Medical History:   Diagnosis Date    Abdominal pain 2018    Emphysema lung     GERD (gastroesophageal reflux disease) 2018    Hypertension     Shingles        Past Surgical History:   Procedure Laterality Date    APPENDECTOMY      COLONOSCOPY N/A 05/22/2019    Procedure: COLONOSCOPY;  Surgeon: Sim Quinteros MD;  Location: [facility];  Service: Endoscopy;  Laterality: N/A;    COLONOSCOPY N/A 6/20/2022    Procedure: COLONOSCOPY WITH POLYPECTOMY AND CLIPPING;  Surgeon: Jarrod Franco MD;  Location: [facility];  Service: Endoscopy;  Laterality: N/A;    COLONOSCOPY W/ BIOPSIES AND POLYPECTOMY  06/20/2022    COLONOSCOPY W/ POLYPECTOMY      ESOPHAGEAL DILATION N/A 05/13/2019    Procedure: DILATION, ESOPHAGUS;  Surgeon: Sim Quinteros MD;  Location: [facility];  Service: Endoscopy;  Laterality: N/A;    ESOPHAGEAL DILATION N/A 02/09/2021    Procedure: DILATION, ESOPHAGUS;  Surgeon: Sim Quinteros MD;  Location: [facility];  Service: Endoscopy;  Laterality: N/A;    ESOPHAGEAL DILATION N/A 12/14/2021    Procedure: DILATION, ESOPHAGUS;  Surgeon: Sim Quinteros MD;  Location: [facility];  Service: Endoscopy;  Laterality: N/A;    ESOPHAGEAL DILATION N/A 05/17/2022    Procedure: DILATION, ESOPHAGUS;  Surgeon: Sim Quinteros MD;  Location: [facility];  Service: Endoscopy;  Laterality: N/A;    ESOPHAGOGASTRODUODENOSCOPY N/A 05/13/2019    Procedure: EGD (ESOPHAGOGASTRODUODENOSCOPY);  Surgeon: Sim Quinteros MD;  Location: [facility];  Service: Endoscopy;  Laterality: N/A;     ESOPHAGOGASTRODUODENOSCOPY N/A 2021    Procedure: EGD (ESOPHAGOGASTRODUODENOSCOPY);  Surgeon: Sim Quinteros MD;  Location: [facility] ENDO;  Service: Endoscopy;  Laterality: N/A;    ESOPHAGOGASTRODUODENOSCOPY N/A 2021    Procedure: EGD (ESOPHAGOGASTRODUODENOSCOPY);  Surgeon: Sim Quinteros MD;  Location: [facility] ENDO;  Service: Endoscopy;  Laterality: N/A;    ESOPHAGOGASTRODUODENOSCOPY N/A 2022    Procedure: EGD (ESOPHAGOGASTRODUODENOSCOPY);  Surgeon: Sim Quinteros MD;  Location: [facility] ENDO;  Service: Endoscopy;  Laterality: N/A;    ESOPHAGOGASTRODUODENOSCOPY (EGD) WITH DILATION  2022    HYSTERECTOMY      LAPAROSCOPIC NISSEN FUNDOPLICATION      REPAIR OF EXTENSOR TENDON Left 2022    Procedure: REPAIR, TENDON, EXTENSOR  ;  Surgeon: Dakota Anaya Jr., MD;  Location: [facility];  Service: Plastics;  Laterality: Left;  THUMB    UPPER GASTROINTESTINAL ENDOSCOPY N/A 2018       Social History     Socioeconomic History    Marital status: Single   Tobacco Use    Smoking status: Every Day     Current packs/day: 0.00     Types: Cigarettes     Start date: 1980     Last attempt to quit: 2022     Years since quittin.2    Smokeless tobacco: Never   Substance and Sexual Activity    Alcohol use: No    Drug use: No       Review of patient's allergies indicates:  No Known Allergies    Current Outpatient Medications on File Prior to Visit   Medication Sig Dispense Refill    albuterol (ACCUNEB) 0.63 mg/3 mL Nebu TAKE 3MLS BY NEBULIZATION EVERY 6 HOURS AS NEEDED  Strength: 0.63 mg/3 mL 75 mL 5    budesonide-glycopyr-formoterol (BREZTRI AEROSPHERE) 160-9-4.8 mcg/actuation HFAA inhale TWO puffs into THE lungs TWICE DAILY 32.1 g 3    cyproheptadine (PERIACTIN) 4 mg tablet 1 po q HS for decreased appetite 30 tablet 2    lisinopriL (PRINIVIL,ZESTRIL) 20 MG tablet Take 20 mg by mouth once daily.      QUEtiapine (SEROQUEL) 200 MG Tab Take 200 mg by mouth nightly.      traMADoL (ULTRAM) 50 mg  "tablet Take 1 tablet (50 mg total) by mouth every 6 (six) hours. Take 1 tablet by mouth every 6 hours 30 tablet 0    triamcinolone acetonide 0.5% (KENALOG) 0.5 % Crea Apply topically 2 (two) times daily. 454 g 0    cetirizine (ZYRTEC) 10 MG tablet Take 1 tablet (10 mg total) by mouth once daily. for 5 days 5 tablet 0    famotidine (PEPCID) 20 MG tablet Take 1 tablet (20 mg total) by mouth 2 (two) times daily. for 5 days (Patient not taking: Reported on 8/7/2023) 10 tablet 0     Current Facility-Administered Medications on File Prior to Visit   Medication Dose Route Frequency Provider Last Rate Last Admin    lactated ringers infusion   Intravenous Continuous Sim Quinteros MD 0 mL/hr at 12/14/21 0846 New Bag at 06/07/22 1151    sodium chloride 0.9% flush 10 mL  10 mL Intravenous PRN Sim Quinteros MD           Objective:      BP (!) 155/89 (BP Location: Right arm, Patient Position: Sitting, BP Method: Medium (Automatic))   Pulse 87   Ht 5' 2" (1.575 m)   LMP  (LMP Unknown)   BMI 38.61 kg/m²     Exam:  GEN:  Well developed, well nourished.  No acute distress. Normal pain behavior.  HEENT:  No trauma.  Mucous membranes moist.  Nares patent bilaterally.  PSYCH: Normal affect. Thought content appropriate.  CHEST:  Breathing symmetric.  No audible wheezing.  ABD: Soft, non-distended.  SKIN:  Warm, pink, dry.  No rash on exposed areas.    EXT:  No cyanosis, clubbing, or edema.  No color change or changes in nail or hair growth.  NEURO/MUSCULOSKELETAL:  Fully alert, oriented, and appropriate. Speech normal magalis. No cranial nerve deficits.   Gait: Normal.  No trendelenburg sign bilaterally.   Motor Strength:  5/5 motor strength throughout lower extremities.   Sensory: No sensory deficit in the lower extremities.   Reflexes:  1+ and symmetric throughout.  Downgoing Babinski's bilaterally.  No clonus or spasticity.  L-Spine:  Limited  ROM with pain on flexion and extension. Negative pain with axial/facet loading " bilaterally.  Negative SLR bilaterally.    Positive TTP over left lower lumbar paraspinals      Imaging:    Narrative & Impression    EXAMINATION:  MRI LUMBAR SPINE WITHOUT CONTRAST     CLINICAL HISTORY:  Low back pain, symptoms persist with > 6wks conservative treatment; Dorsalgia, unspecified     TECHNIQUE:  Multiplanar, multisequence MR images were acquired from the thoracolumbar junction to the sacrum without the administration of contrast.     Examination mildly degraded by patient motion artifact.     COMPARISON:  Radiograph 12/05/2022, CT abdomen pelvis 05/01/2022, CT abdomen pelvis 04/12/2021     FINDINGS:  There is focal conchae cavity of the inferior endplate of the L1 vertebral body.  This is new from the CT of 05/01/2022.  Findings in keeping with a prominent Schmorl's node or mild compression deformity.  Trace edema like signal adjacent to the endplate.     The remainder of the vertebral bodies are normal in height and morphology with normal marrow signal.  No fracture or osseous destructive process elsewhere.     Normal sagittal alignment is preserved.  No spondylolisthesis.     Mild disc bulging at T12-L1, L1-2, L3-4 and L4-5.  No large disc herniation at any level.  The intervertebral disc heights are fairly well maintained.  No significant spinal canal stenosis.  There is mild hypertrophic facet arthropathy in the mid to the lower lumbar spine worst on the left at L4-5 and L5-S1.  No high-grade neural foraminal narrowing.     The terminal spinal cord, conus, and cauda equina demonstrate normal caliber, morphology, and signal.        No intraspinal mass or fluid collection.     No acute abnormalities in the visualized paraspinal soft tissue structures.  1.1 cm round T2 hyperintense lesion on the left kidney, previously characterized as a cyst.     Findings were relayed to the ordering provider (Yumiko) via the epic secure chat system at approximately 08:57.        Impression:     Examination mildly  degraded by patient motion artifact.        Prominent inferior endplate Schmorl's node versus mild compression fracture of the L1 vertebral body, new from prior CT of 05/01/2022, may be acute or subacute.  Clinical correlation is advised.     Mild multilevel degenerative disc disease and facet arthropathy as discussed above.  No evidence of significant spinal canal stenosis or high-grade neural foraminal narrowing.        Electronically signed by: Silviano Arboleda MD  Date:                                            06/21/2023  Time:                                           09:00       Assessment:       Encounter Diagnoses   Name Primary?    Acute right-sided low back pain with sciatica, sciatica laterality unspecified     DDD (degenerative disc disease), lumbar Yes    Lumbar spondylosis     Lumbar radiculopathy          Plan:       Wilfredo was seen today for back pain and knee pain.    Diagnoses and all orders for this visit:    DDD (degenerative disc disease), lumbar  -     Ambulatory referral/consult to Physical/Occupational Therapy; Future    Acute right-sided low back pain with sciatica, sciatica laterality unspecified  -     Ambulatory referral/consult to Pain Clinic    Lumbar spondylosis  -     Ambulatory referral/consult to Physical/Occupational Therapy; Future    Lumbar radiculopathy  -     Ambulatory referral/consult to Physical/Occupational Therapy; Future        Laquetta Naomy Vazquez is a 63 y.o. female with chronic left sided low back and lower extremity pain. Subjectively consistent with radiculopathy. No significant nerve compression on MRI. No neuro deficits on examination.     Pertinent imaging studies reviewed by me. Imaging results were discussed with patient.  Refer to PT for ROM, strengthening, stretching and HEP.  RTC in 8 weeks or sooner if needed. AT that time we will discuss efficacy of PT/HEP. May consider injections, but given limited findings on MRI, it is unclear what injections we would  consider.           This note was created by combination of typed  and M-Modal dictation. Transcription and phonetic errors may be present.  If there are any questions, please contact me.

## 2023-10-17 NOTE — TELEPHONE ENCOUNTER
No care due was identified.  [facility] Embedded Care Due Messages. Reference number: [id number].   10/17/2023 1:07:13 PM CDT

## 2023-10-17 NOTE — TELEPHONE ENCOUNTER
----- Message from Kalpana Tate sent at 10/17/2023 12:14 PM CDT -----  Contact: Pt [phone]  Requesting an RX refill or new RX.  Is this a refill or new RX: Refill   RX name and strength (copy/paste from chart): traMADoL (ULTRAM) 50 mg tablet   Is this a 30 day or 90 day RX:   Pharmacy name and phone # (copy/paste from chart): [facility] - [place], [place] - [address]   Phone:  [phone]  Fax:  [phone]         The doctors have asked that we provide their patients with the following 2 reminders -- prescription refills can take up to 72 hours, and a friendly reminder that in the future you can use your [facility] account to request refills: yes

## 2023-10-18 RX ORDER — TRAMADOL HYDROCHLORIDE 50 MG/1
50 TABLET ORAL EVERY 6 HOURS
Qty: 30 TABLET | Refills: 0 | OUTPATIENT
Start: 2023-10-18

## 2023-10-18 NOTE — TELEPHONE ENCOUNTER
Called patient regarding providers message. Patient said that she would like to get a referral to another pain management doctor who can write her something for the pain. Patient said that she is taking therapy which isn't working. Can you please sign referral to new pain doctor.

## 2023-11-07 ENCOUNTER — OFFICE VISIT (OUTPATIENT)
Dept: PRIMARY CARE CLINIC | Facility: CLINIC | Age: 63
End: 2023-11-07
Payer: MEDICARE

## 2023-11-07 VITALS
HEART RATE: 73 BPM | TEMPERATURE: 96 F | HEIGHT: 62 IN | RESPIRATION RATE: 18 BRPM | WEIGHT: 182.88 LBS | BODY MASS INDEX: 33.65 KG/M2 | OXYGEN SATURATION: 98 % | DIASTOLIC BLOOD PRESSURE: 82 MMHG | SYSTOLIC BLOOD PRESSURE: 134 MMHG

## 2023-11-07 DIAGNOSIS — Z87.19 HISTORY OF GASTROESOPHAGEAL REFLUX (GERD): ICD-10-CM

## 2023-11-07 DIAGNOSIS — M25.562 LEFT KNEE PAIN, UNSPECIFIED CHRONICITY: ICD-10-CM

## 2023-11-07 DIAGNOSIS — J44.9 CHRONIC OBSTRUCTIVE PULMONARY DISEASE, UNSPECIFIED COPD TYPE: Primary | ICD-10-CM

## 2023-11-07 DIAGNOSIS — J40 BRONCHITIS: ICD-10-CM

## 2023-11-07 DIAGNOSIS — K59.00 CONSTIPATION, UNSPECIFIED CONSTIPATION TYPE: ICD-10-CM

## 2023-11-07 DIAGNOSIS — M17.12 OSTEOARTHRITIS OF LEFT KNEE, UNSPECIFIED OSTEOARTHRITIS TYPE: ICD-10-CM

## 2023-11-07 DIAGNOSIS — J98.01 BRONCHOSPASM: ICD-10-CM

## 2023-11-07 DIAGNOSIS — F31.9 BIPOLAR AFFECTIVE DISORDER, REMISSION STATUS UNSPECIFIED: ICD-10-CM

## 2023-11-07 DIAGNOSIS — Z87.891 PERSONAL HISTORY OF NICOTINE DEPENDENCE: ICD-10-CM

## 2023-11-07 DIAGNOSIS — G47.33 OSA (OBSTRUCTIVE SLEEP APNEA): ICD-10-CM

## 2023-11-07 PROCEDURE — 3075F SYST BP GE 130 - 139MM HG: CPT | Mod: HCNC,CPTII,S$GLB, | Performed by: FAMILY MEDICINE

## 2023-11-07 PROCEDURE — 90686 IIV4 VACC NO PRSV 0.5 ML IM: CPT | Mod: HCNC,S$GLB,, | Performed by: FAMILY MEDICINE

## 2023-11-07 PROCEDURE — 3008F BODY MASS INDEX DOCD: CPT | Mod: HCNC,CPTII,S$GLB, | Performed by: FAMILY MEDICINE

## 2023-11-07 PROCEDURE — 99999 PR PBB SHADOW E&M-EST. PATIENT-LVL III: CPT | Mod: PBBFAC,HCNC,, | Performed by: FAMILY MEDICINE

## 2023-11-07 PROCEDURE — 99214 PR OFFICE/OUTPT VISIT, EST, LEVL IV, 30-39 MIN: ICD-10-PCS | Mod: HCNC,S$GLB,, | Performed by: FAMILY MEDICINE

## 2023-11-07 PROCEDURE — 99999 PR PBB SHADOW E&M-EST. PATIENT-LVL III: ICD-10-PCS | Mod: PBBFAC,HCNC,, | Performed by: FAMILY MEDICINE

## 2023-11-07 PROCEDURE — 3008F PR BODY MASS INDEX (BMI) DOCUMENTED: ICD-10-PCS | Mod: HCNC,CPTII,S$GLB, | Performed by: FAMILY MEDICINE

## 2023-11-07 PROCEDURE — 3075F PR MOST RECENT SYSTOLIC BLOOD PRESS GE 130-139MM HG: ICD-10-PCS | Mod: HCNC,CPTII,S$GLB, | Performed by: FAMILY MEDICINE

## 2023-11-07 PROCEDURE — 1159F MED LIST DOCD IN RCRD: CPT | Mod: HCNC,CPTII,S$GLB, | Performed by: FAMILY MEDICINE

## 2023-11-07 PROCEDURE — 4010F ACE/ARB THERAPY RXD/TAKEN: CPT | Mod: HCNC,CPTII,S$GLB, | Performed by: FAMILY MEDICINE

## 2023-11-07 PROCEDURE — 1159F PR MEDICATION LIST DOCUMENTED IN MEDICAL RECORD: ICD-10-PCS | Mod: HCNC,CPTII,S$GLB, | Performed by: FAMILY MEDICINE

## 2023-11-07 PROCEDURE — 90686 FLU VACCINE (QUAD) GREATER THAN OR EQUAL TO 3YO PRESERVATIVE FREE IM: ICD-10-PCS | Mod: HCNC,S$GLB,, | Performed by: FAMILY MEDICINE

## 2023-11-07 PROCEDURE — 99214 OFFICE O/P EST MOD 30 MIN: CPT | Mod: HCNC,S$GLB,, | Performed by: FAMILY MEDICINE

## 2023-11-07 PROCEDURE — 3079F DIAST BP 80-89 MM HG: CPT | Mod: HCNC,CPTII,S$GLB, | Performed by: FAMILY MEDICINE

## 2023-11-07 PROCEDURE — 3079F PR MOST RECENT DIASTOLIC BLOOD PRESSURE 80-89 MM HG: ICD-10-PCS | Mod: HCNC,CPTII,S$GLB, | Performed by: FAMILY MEDICINE

## 2023-11-07 PROCEDURE — G0008 ADMIN INFLUENZA VIRUS VAC: HCPCS | Mod: HCNC,S$GLB,, | Performed by: FAMILY MEDICINE

## 2023-11-07 PROCEDURE — 4010F PR ACE/ARB THEARPY RXD/TAKEN: ICD-10-PCS | Mod: HCNC,CPTII,S$GLB, | Performed by: FAMILY MEDICINE

## 2023-11-07 PROCEDURE — G0008 FLU VACCINE (QUAD) GREATER THAN OR EQUAL TO 3YO PRESERVATIVE FREE IM: ICD-10-PCS | Mod: HCNC,S$GLB,, | Performed by: FAMILY MEDICINE

## 2023-11-07 RX ORDER — PREDNISONE 20 MG/1
20 TABLET ORAL DAILY
Qty: 7 TABLET | Refills: 0 | Status: SHIPPED | OUTPATIENT
Start: 2023-11-07 | End: 2023-11-14

## 2023-11-07 RX ORDER — TRAMADOL HYDROCHLORIDE 50 MG/1
50 TABLET ORAL EVERY 6 HOURS
Qty: 30 TABLET | Refills: 0 | Status: SHIPPED | OUTPATIENT
Start: 2023-11-07 | End: 2023-11-14 | Stop reason: SDUPTHER

## 2023-11-07 RX ORDER — PROMETHAZINE HYDROCHLORIDE AND DEXTROMETHORPHAN HYDROBROMIDE 6.25; 15 MG/5ML; MG/5ML
5 SYRUP ORAL EVERY 6 HOURS PRN
Qty: 180 ML | Refills: 1 | Status: SHIPPED | OUTPATIENT
Start: 2023-11-07 | End: 2023-12-18 | Stop reason: CLARIF

## 2023-11-07 RX ORDER — ONDANSETRON 4 MG/1
4 TABLET, FILM COATED ORAL 2 TIMES DAILY
Qty: 30 TABLET | Refills: 2 | Status: ON HOLD | OUTPATIENT
Start: 2023-11-07 | End: 2023-12-28

## 2023-11-07 RX ORDER — CEFDINIR 300 MG/1
300 CAPSULE ORAL 2 TIMES DAILY
Qty: 20 CAPSULE | Refills: 0 | Status: SHIPPED | OUTPATIENT
Start: 2023-11-07 | End: 2023-11-17

## 2023-11-07 RX ORDER — CYPROHEPTADINE HYDROCHLORIDE 4 MG/1
TABLET ORAL
Qty: 30 TABLET | Refills: 2 | Status: SHIPPED | OUTPATIENT
Start: 2023-11-07 | End: 2023-12-18 | Stop reason: CLARIF

## 2023-11-07 NOTE — PROGRESS NOTES
Subjective:       Patient ID: Wilfredo Vazquez is a 63 y.o. female.    Chief Complaint: Bleeding/Bruising    [id number] hx cold --history slight fever with some chills--+runny stuffy nose---no sore throat-+cough--+ yellow  No pneumonia asthma TB does have COPD---+smoking 1/3 pack per day--+wheezing has inhaler.]  +nausea +loose bowel movement  History of bruising easily bruise on the left upper arm and bruise on right inner thigh  Occas stomach cramps--nausea no vomiting diarrhea--+ constipation Dulcolax and stool softener--no ulcers hepatitis gallbladder disease--no melena hematochezia hematemesis  History left knee had MRI showing chondromalacia osteonecrosis displaced flap tear of the posterior horn of the medial meniscus--saw orthopedist had injection--back in December to discuss possible knee surgery            ROS:  No significant change except  For history of present illness  Skin: no psoriasis, eczema, skin cancer-OK now   HEENT: no headache,  No ocular pain, blurred vision, diplopia, epistaxis, hoarseness change in voice, thyroid trouble  Lung: No pneumonia, +asthma, no Tb, +_wheezing,+ SOB, no smoking + COPD --doing better  Heart: No chest pain, ankle edema, palpitations, MI, patti murmur, +hypertension,no  hyperlipidemia no stent bypass arrhythmia  Abdomen: no nausea,no  Vomiting,no diarrhea, no  constipation, ulcers, hepatitis, gallbladder disease, melena, hematochezia, hematemesis +dysphagia history of fundoplication for GERD history of esophageal dilatation x2Dr Beary doing better--swallowing much better  : no UTI, renal disease, stones  GYN hyst   MS: no fractures, O/A, lupus, rheumatoid, gout--pain lower back but annette left knee see HPI  Neuro: No dizziness, LOC, seizures   No diabetes, no anemia, no anxiety, no depression   Single--2 children--disable secondary to a stomach--lives with daughter and grandson    Objective:   Physical Exam:    General: Well nourished, well developed, no  acute distress +obesity  Skin: No lesions  HEENT: Eyes PERRLA, EOM intact, nose patent, throat +1/4 erythematous ears TMs clear   NECK: Supple, no bruits, No JVD, no nodes  Lungs: Clear, no rales, rhonchi, wheezing coarse BS   Heart: Regular rate and rhythm, no murmur s, gallops, or rubs  Abdomen:  No abdominal pain guarding rebound or tenderness organomegaly  MS--no significant change pain left knee palpation--crepitus with flexion extension of the left knee--tenderness with ambulation--able squat retirement down hard to arise--told in the past may need a knee replacement--tenderness lumbar spine L1-S1 anterior flexion 10° extension 10° lateral flexion rotation 10°  Neuro: Alert, CN intact, oriented X 3  Extremities: No cyanosis, clubbing, or edema         Assessment:       1. Chronic obstructive pulmonary disease, unspecified COPD type    2. Bronchitis    3. Bronchospasm    4. Left knee pain, unspecified chronicity    5. Osteoarthritis of left knee, unspecified osteoarthritis type    6. MARISSA (obstructive sleep apnea)    7. Personal history of nicotine dependence    8. Constipation, unspecified constipation type    9. History of gastroesophageal reflux (GERD)    10. Bipolar affective disorder, remission status unspecified                  Plan:           Main Reason for Visit--  Multiple medical issues  Upper respiratory infection--sinusitis bronchitis--Omnicef 300 mg b.i.d./prednisone 20 mg q.d. x7 days/Phenergan DM 1 tsp q.6 hours p.r.n. cough  Severe left knee pain-saw orthopedist had knee injection has appointment to meet December for possible surgery-needs refills of Ultram----MRI the knee patient had tricompartment osteo malacia of the left knee with osteonecrosis and a medial meniscus tear needs knee replacement patient referred to orthopedist  Bulging disc T12-S1 and some facet arthropathy L4-5 L5-S1 history of a compression fracture of L1 is mild will monitor at this time if symptoms become worse will refer to  Neuro surgery  Abdominal pain trial of Zofran 1 q.6 hours p.r.n. nausea vomiting cramps also has some constipation takes stool softener and suppository p.r.n.  Claims weight loss 215-187 lb given Periactin 4 mg q.h.s. to increase appetite  Shortness of breath--COPD/emphysema recent hospitalization for chronic respiratory failure with hypoxia--has appointment with pulmonary MD--on aerosol albuterol nebulizer--Breztri--Spiriva--in the hospital was on BiPAP--patient claims had 6 minute walk and did not meet requirements for oxygen--Needs pulmonary see if feels pt would benefit from steroids/CPAP or BiPAP /home O2   Hypertension blood pressure 114/80 on cozaar 100 mg if stays will increase to Hyzaar 112.5  History dysphagia--difficulty swallowing water has to eat small amounts of food--history esophageal dilitation --OK now   Lab CBCs CMP lipd T4 TSH do lab as in computer when fasting

## 2023-11-14 ENCOUNTER — OFFICE VISIT (OUTPATIENT)
Dept: PAIN MEDICINE | Facility: CLINIC | Age: 63
End: 2023-11-14
Payer: MEDICARE

## 2023-11-14 VITALS
HEIGHT: 62 IN | DIASTOLIC BLOOD PRESSURE: 87 MMHG | BODY MASS INDEX: 33.43 KG/M2 | WEIGHT: 181.69 LBS | HEART RATE: 87 BPM | SYSTOLIC BLOOD PRESSURE: 150 MMHG

## 2023-11-14 DIAGNOSIS — M47.816 LUMBAR SPONDYLOSIS: ICD-10-CM

## 2023-11-14 DIAGNOSIS — M54.16 LUMBAR RADICULOPATHY: ICD-10-CM

## 2023-11-14 DIAGNOSIS — M51.36 DDD (DEGENERATIVE DISC DISEASE), LUMBAR: Primary | ICD-10-CM

## 2023-11-14 PROCEDURE — 3008F PR BODY MASS INDEX (BMI) DOCUMENTED: ICD-10-PCS | Mod: HCNC,CPTII,S$GLB,

## 2023-11-14 PROCEDURE — 3079F PR MOST RECENT DIASTOLIC BLOOD PRESSURE 80-89 MM HG: ICD-10-PCS | Mod: HCNC,CPTII,S$GLB,

## 2023-11-14 PROCEDURE — 1160F PR REVIEW ALL MEDS BY PRESCRIBER/CLIN PHARMACIST DOCUMENTED: ICD-10-PCS | Mod: HCNC,CPTII,S$GLB,

## 2023-11-14 PROCEDURE — 3008F BODY MASS INDEX DOCD: CPT | Mod: HCNC,CPTII,S$GLB,

## 2023-11-14 PROCEDURE — 1159F MED LIST DOCD IN RCRD: CPT | Mod: HCNC,CPTII,S$GLB,

## 2023-11-14 PROCEDURE — 1159F PR MEDICATION LIST DOCUMENTED IN MEDICAL RECORD: ICD-10-PCS | Mod: HCNC,CPTII,S$GLB,

## 2023-11-14 PROCEDURE — 99999 PR PBB SHADOW E&M-EST. PATIENT-LVL III: ICD-10-PCS | Mod: PBBFAC,HCNC,,

## 2023-11-14 PROCEDURE — 4010F PR ACE/ARB THEARPY RXD/TAKEN: ICD-10-PCS | Mod: HCNC,CPTII,S$GLB,

## 2023-11-14 PROCEDURE — 1160F RVW MEDS BY RX/DR IN RCRD: CPT | Mod: HCNC,CPTII,S$GLB,

## 2023-11-14 PROCEDURE — 4010F ACE/ARB THERAPY RXD/TAKEN: CPT | Mod: HCNC,CPTII,S$GLB,

## 2023-11-14 PROCEDURE — 3077F PR MOST RECENT SYSTOLIC BLOOD PRESSURE >= 140 MM HG: ICD-10-PCS | Mod: HCNC,CPTII,S$GLB,

## 2023-11-14 PROCEDURE — 3079F DIAST BP 80-89 MM HG: CPT | Mod: HCNC,CPTII,S$GLB,

## 2023-11-14 PROCEDURE — 3077F SYST BP >= 140 MM HG: CPT | Mod: HCNC,CPTII,S$GLB,

## 2023-11-14 PROCEDURE — 99999 PR PBB SHADOW E&M-EST. PATIENT-LVL III: CPT | Mod: PBBFAC,HCNC,,

## 2023-11-14 PROCEDURE — 99214 OFFICE O/P EST MOD 30 MIN: CPT | Mod: HCNC,S$GLB,,

## 2023-11-14 PROCEDURE — 99214 PR OFFICE/OUTPT VISIT, EST, LEVL IV, 30-39 MIN: ICD-10-PCS | Mod: HCNC,S$GLB,,

## 2023-11-14 RX ORDER — OMEPRAZOLE 40 MG/1
40 CAPSULE, DELAYED RELEASE ORAL
COMMUNITY
Start: 2023-10-31 | End: 2024-02-05

## 2023-11-14 RX ORDER — QUETIAPINE FUMARATE 300 MG/1
300 TABLET, FILM COATED ORAL NIGHTLY
COMMUNITY
Start: 2023-08-01

## 2023-11-14 NOTE — PROGRESS NOTES
Subjective:     Patient ID: Wilfredo Vazquez is a 63 y.o. female    Chief Complaint: Low-back Pain (8 wk fu )      Referred by: Self, Aaareferral      HPI:    Interval History PA (11/14/2023):  Patient returns to clinic for follow up of bilateral lower back and left lower extremity pain.  Patient notes since previous visit she has been attending physical therapy with minimal improvement overall.  Noting increased pain following each physical therapy session, feels like physical therapy has made her pain worse.  She has since discontinued physical therapy and continues with a home exercise program.  Pain today is at a 6/10.  States pain is located across her lower lumbar paraspinal region with radiation into her left anterior thigh, stopping at the knee.  Patient also with chronic left knee pain secondary to osteoarthritis, followed by orthopedics for this.  Notes that she is likely going to proceed with a total left knee replacement in the future.  States the worst of her pain is located across her lower lumbar region.  Pain is constant, worsened with certain activities.  Notes pain worse in the morning when getting out of bed, associated stiffness, some improvement with activity in the morning.  Denies any numbness, tingling, weakness, bowel or bladder dysfunction.  Patient reports trial of various medications including gabapentin, Motrin, Tylenol all without benefit.  Currently taking tramadol per PCP with some improvement.    Initial Encounter (9/14/23):  Wilfredo Vazquez is a 63 y.o. female who presents today with chronic left sided low back and lower extremity pain. Does have similar symptoms on the right side but these are very mild. Her pain started roughly 5 months ago. No specific inciting events or injuries noted. The pain is primarily loactred in the left lower lumbar region and will radiate to the left lower extremity to her foot/ankle. The pain is constant and worsened with activity. She  denies any associated numbness/tingling weakness or b/b dysfunction. .   This pain is described in detail below.    Physical Therapy:  Yes, not effective    Non-pharmacologic Treatment: Rest helps         TENS? No    Pain Medications:         Currently taking: Tramadol    Has tried in the past:  NSAIDs, tylenol, gabapentin    Has not tried: Muscle relaxants, TCAs, SNRIs, topical creams    Blood thinners: None    Interventional Therapies: None    Relevant Surgeries: None    Affecting sleep? Yes    Affecting daily activities? yes    Depressive symptoms? No          SI/HI? No    Work status: Disabled    Pain Scores:    Best:       2/10  Worst:     8/10  Usually:   5/10  Today:    6/10    Pain Disability Index  Family/Home Responsibilities:: 6  Recreation:: 6  Social Activity:: 6  Occupation:: 6  Sexual Behavior:: 6  Self Care:: 6  Life-Support Activities:: 6  Pain Disability Index (PDI): 42    Review of Systems   Constitutional:  Negative for activity change, appetite change, chills, fatigue, fever and unexpected weight change.   HENT:  Negative for hearing loss.    Eyes:  Negative for visual disturbance.   Respiratory:  Negative for chest tightness and shortness of breath.    Cardiovascular:  Negative for chest pain.   Gastrointestinal:  Negative for abdominal pain, constipation, diarrhea, nausea and vomiting.   Genitourinary:  Negative for difficulty urinating.   Musculoskeletal:  Positive for arthralgias, back pain, gait problem and myalgias. Negative for neck pain.   Skin:  Negative for rash.   Neurological:  Negative for dizziness, weakness, light-headedness, numbness and headaches.   Psychiatric/Behavioral:  Positive for sleep disturbance. Negative for hallucinations and suicidal ideas. The patient is not nervous/anxious.        Past Medical History:   Diagnosis Date    Abdominal pain 2018    Emphysema lung     GERD (gastroesophageal reflux disease) 2018    Hypertension     Shingles        Past Surgical History:    Procedure Laterality Date    APPENDECTOMY      COLONOSCOPY N/A 05/22/2019    Procedure: COLONOSCOPY;  Surgeon: Sim Quinteros MD;  Location: [facility] ENDO;  Service: Endoscopy;  Laterality: N/A;    COLONOSCOPY N/A 6/20/2022    Procedure: COLONOSCOPY WITH POLYPECTOMY AND CLIPPING;  Surgeon: Jarrod Franco MD;  Location: [facility] ENDO;  Service: Endoscopy;  Laterality: N/A;    COLONOSCOPY W/ BIOPSIES AND POLYPECTOMY  06/20/2022    COLONOSCOPY W/ POLYPECTOMY      ESOPHAGEAL DILATION N/A 05/13/2019    Procedure: DILATION, ESOPHAGUS;  Surgeon: Sim Quinteros MD;  Location: [facility];  Service: Endoscopy;  Laterality: N/A;    ESOPHAGEAL DILATION N/A 02/09/2021    Procedure: DILATION, ESOPHAGUS;  Surgeon: Sim Quinteros MD;  Location: [facility] ENDO;  Service: Endoscopy;  Laterality: N/A;    ESOPHAGEAL DILATION N/A 12/14/2021    Procedure: DILATION, ESOPHAGUS;  Surgeon: Sim Quinteros MD;  Location: [facility];  Service: Endoscopy;  Laterality: N/A;    ESOPHAGEAL DILATION N/A 05/17/2022    Procedure: DILATION, ESOPHAGUS;  Surgeon: Sim Quinteros MD;  Location: [facility];  Service: Endoscopy;  Laterality: N/A;    ESOPHAGOGASTRODUODENOSCOPY N/A 05/13/2019    Procedure: EGD (ESOPHAGOGASTRODUODENOSCOPY);  Surgeon: Sim Quinteros MD;  Location: [facility];  Service: Endoscopy;  Laterality: N/A;    ESOPHAGOGASTRODUODENOSCOPY N/A 02/09/2021    Procedure: EGD (ESOPHAGOGASTRODUODENOSCOPY);  Surgeon: Sim Quinteros MD;  Location: [facility];  Service: Endoscopy;  Laterality: N/A;    ESOPHAGOGASTRODUODENOSCOPY N/A 12/14/2021    Procedure: EGD (ESOPHAGOGASTRODUODENOSCOPY);  Surgeon: Sim Quinteros MD;  Location: [facility];  Service: Endoscopy;  Laterality: N/A;    ESOPHAGOGASTRODUODENOSCOPY N/A 05/17/2022    Procedure: EGD (ESOPHAGOGASTRODUODENOSCOPY);  Surgeon: Sim Quinteros MD;  Location: [facility];  Service: Endoscopy;  Laterality: N/A;    ESOPHAGOGASTRODUODENOSCOPY (EGD) WITH DILATION  05/17/2022    HYSTERECTOMY      LAPAROSCOPIC  NISSEN FUNDOPLICATION      REPAIR OF EXTENSOR TENDON Left 2022    Procedure: REPAIR, TENDON, EXTENSOR  ;  Surgeon: Dakota Anaya Jr., MD;  Location: [facility];  Service: Plastics;  Laterality: Left;  THUMB    UPPER GASTROINTESTINAL ENDOSCOPY N/A 2018       Social History     Socioeconomic History    Marital status: Single   Tobacco Use    Smoking status: Every Day     Current packs/day: 0.00     Types: Cigarettes     Start date: 1980     Last attempt to quit: 2022     Years since quittin.4    Smokeless tobacco: Never   Substance and Sexual Activity    Alcohol use: No    Drug use: No       Review of patient's allergies indicates:  No Known Allergies    Current Outpatient Medications on File Prior to Visit   Medication Sig Dispense Refill    albuterol (ACCUNEB) 0.63 mg/3 mL Nebu TAKE 3MLS BY NEBULIZATION EVERY 6 HOURS AS NEEDED  Strength: 0.63 mg/3 mL 75 mL 5    budesonide-glycopyr-formoterol (BREZTRI AEROSPHERE) 160-9-4.8 mcg/actuation HFAA inhale TWO puffs into THE lungs TWICE DAILY 32.1 g 3    cefdinir (OMNICEF) 300 MG capsule Take 1 capsule (300 mg total) by mouth 2 (two) times daily. for 10 days 20 capsule 0    cyproheptadine (PERIACTIN) 4 mg tablet 1 po q HS for decreased appetite 30 tablet 2    lisinopriL (PRINIVIL,ZESTRIL) 20 MG tablet Take 20 mg by mouth once daily.      omeprazole (PRILOSEC) 40 MG capsule Take 40 mg by mouth.      ondansetron (ZOFRAN) 4 MG tablet Take 1 tablet (4 mg total) by mouth 2 (two) times daily. 30 tablet 2    predniSONE (DELTASONE) 20 MG tablet Take 1 tablet (20 mg total) by mouth once daily. for 7 days 7 tablet 0    promethazine-dextromethorphan (PROMETHAZINE-DM) 6.25-15 mg/5 mL Syrp Take 5 mLs by mouth every 6 (six) hours as needed (cough). 180 mL 1    QUEtiapine (SEROQUEL) 300 MG Tab Take 300 mg by mouth every evening.      traMADoL (ULTRAM) 50 mg tablet Take 1 tablet (50 mg total) by mouth every 6 (six) hours. Take 1 tablet by mouth every 6 hours 30  "tablet 0    cetirizine (ZYRTEC) 10 MG tablet Take 1 tablet (10 mg total) by mouth once daily. for 5 days 5 tablet 0    [DISCONTINUED] famotidine (PEPCID) 20 MG tablet Take 1 tablet (20 mg total) by mouth 2 (two) times daily. for 5 days (Patient not taking: Reported on 8/7/2023) 10 tablet 0    [DISCONTINUED] QUEtiapine (SEROQUEL) 200 MG Tab Take 200 mg by mouth nightly.      [DISCONTINUED] triamcinolone acetonide 0.5% (KENALOG) 0.5 % Crea Apply topically 2 (two) times daily. 454 g 0     Current Facility-Administered Medications on File Prior to Visit   Medication Dose Route Frequency Provider Last Rate Last Admin    lactated ringers infusion   Intravenous Continuous Sim Quinteros MD 0 mL/hr at 12/14/21 0846 New Bag at 06/07/22 1151    sodium chloride 0.9% flush 10 mL  10 mL Intravenous PRN Sim Quinteros MD           Objective:      BP (!) 150/87   Pulse 87   Ht 5' 2" (1.575 m)   Wt 82.4 kg (181 lb 10.5 oz)   LMP  (LMP Unknown)   BMI 33.23 kg/m²     Exam:  GEN:  Well developed, well nourished.  No acute distress. Normal pain behavior.  HEENT:  No trauma.  Mucous membranes moist.  Nares patent bilaterally.  PSYCH: Normal affect. Thought content appropriate.  CHEST:  Breathing symmetric.  No audible wheezing.  ABD: Soft, non-distended.  SKIN:  Warm, pink, dry.  No rash on exposed areas.    EXT:  No cyanosis, clubbing, or edema.  No color change or changes in nail or hair growth.  NEURO/MUSCULOSKELETAL:  Fully alert, oriented, and appropriate. Speech normal magalis. No cranial nerve deficits.   Gait: Normal.  No trendelenburg sign bilaterally.   Motor Strength:  5/5 motor strength throughout lower extremities.   Sensory: No sensory deficit in the lower extremities.   L-Spine:  Limited  ROM with pain on flexion and extension.  Positive pain with axial/facet loading bilaterally.  Positive SLR bilaterally.  SI Joint/hip:  Positive HELEN bilaterally, negative FADIR bilaterally    Diffusely TTP over midline lower " lumbar spine, bilateral lower lumbar paraspinals, bilateral SI joints      Imaging:    Narrative & Impression    EXAMINATION:  MRI LUMBAR SPINE WITHOUT CONTRAST     CLINICAL HISTORY:  Low back pain, symptoms persist with > 6wks conservative treatment; Dorsalgia, unspecified     TECHNIQUE:  Multiplanar, multisequence MR images were acquired from the thoracolumbar junction to the sacrum without the administration of contrast.     Examination mildly degraded by patient motion artifact.     COMPARISON:  Radiograph 12/05/2022, CT abdomen pelvis 05/01/2022, CT abdomen pelvis 04/12/2021     FINDINGS:  There is focal conchae cavity of the inferior endplate of the L1 vertebral body.  This is new from the CT of 05/01/2022.  Findings in keeping with a prominent Schmorl's node or mild compression deformity.  Trace edema like signal adjacent to the endplate.     The remainder of the vertebral bodies are normal in height and morphology with normal marrow signal.  No fracture or osseous destructive process elsewhere.     Normal sagittal alignment is preserved.  No spondylolisthesis.     Mild disc bulging at T12-L1, L1-2, L3-4 and L4-5.  No large disc herniation at any level.  The intervertebral disc heights are fairly well maintained.  No significant spinal canal stenosis.  There is mild hypertrophic facet arthropathy in the mid to the lower lumbar spine worst on the left at L4-5 and L5-S1.  No high-grade neural foraminal narrowing.     The terminal spinal cord, conus, and cauda equina demonstrate normal caliber, morphology, and signal.        No intraspinal mass or fluid collection.     No acute abnormalities in the visualized paraspinal soft tissue structures.  1.1 cm round T2 hyperintense lesion on the left kidney, previously characterized as a cyst.     Findings were relayed to the ordering provider (Yumiko) via the epic secure chat system at approximately 08:57.        Impression:     Examination mildly degraded by patient  motion artifact.        Prominent inferior endplate Schmorl's node versus mild compression fracture of the L1 vertebral body, new from prior CT of 05/01/2022, may be acute or subacute.  Clinical correlation is advised.     Mild multilevel degenerative disc disease and facet arthropathy as discussed above.  No evidence of significant spinal canal stenosis or high-grade neural foraminal narrowing.        Electronically signed by: Silviano Arboleda MD  Date:                                            06/21/2023  Time:                                           09:00       Assessment:       Encounter Diagnoses   Name Primary?    DDD (degenerative disc disease), lumbar Yes    Lumbar radiculopathy     Lumbar spondylosis        Plan:       Wilfredo was seen today for low-back pain.    Diagnoses and all orders for this visit:    DDD (degenerative disc disease), lumbar    Lumbar radiculopathy    Lumbar spondylosis          Wilfredo Vazquez is a 63 y.o. female with chronic bilateral lower back and left lower extremity pain.  Patient's pain appears to be multifactorial and with unclear etiology, multiple positive exam findings for possible facet arthropathy, radiculopathy, and SI joint dysfunction.  Subjectively having symptoms consistent with left-sided lower lumbar radiculopathy although no overt nerve root compression noted on MRI.  Facet arthropathy notable at L5-S1 and to a lesser degree at L4-5.  No neurological deficits noted on examination.    Prior records reviewed.  Pertinent imaging studies reviewed by me. Imaging results were discussed with patient.  Discussed with patient that the exact etiology of her pain is somewhat unclear at this time.  She does appear to have a portion of her pain related to lower lumbar facet arthropathy.  Also having symptoms consistent with a left-sided lumbar radiculopathy although no overt nerve root compression noted on MRI.  Also can not rule out bilateral sacroiliac joint  dysfunction.  Schedule for bilateral L3, L4, L5 medial branch blocks.  If successful can proceed with radiofrequency ablation.  Patient recently discontinued physical therapy as this was causing increased pain.  May consider restarting pending improvement with interventional procedures.  Stressed the importance of maintaining regular home exercise program and being mindful of how they use their back throughout the day.  Patient expressed understanding and agreement.  Patient can continue with medications since they are providing benefit, using them appropriately, and without adverse effects.  Return to clinic 2 weeks postprocedure to discuss efficacy.      Crispin Eid PA-C  [facility]  Interventional Pain Management     This note was created by combination of typed  and M-Modal dictation.  Transcription and phonetic errors may be present.  If there are any questions, please contact me.

## 2023-11-22 DIAGNOSIS — M43.06 SPONDYLOLYSIS OF LUMBAR REGION: ICD-10-CM

## 2023-11-22 DIAGNOSIS — M47.816 LUMBAR SPONDYLOSIS: Primary | ICD-10-CM

## 2023-11-27 ENCOUNTER — PATIENT OUTREACH (OUTPATIENT)
Dept: ADMINISTRATIVE | Facility: HOSPITAL | Age: 63
End: 2023-11-27
Payer: MEDICARE

## 2023-11-27 NOTE — PROGRESS NOTES
Health Maintenance Due   Topic Date Due    Sign Pain Contract  Never done    Complete Opioid Risk Tool  Never done    RSV Vaccine (Age 60+ and Pregnant patients) (1 - 1-dose 60+ series) Never done     Immunizations - reviewed and updated   Care Everywhere - triggered   Care Teams - updated   Outreach - MA Gap Report 2023 reviewed, patient seen on 11/7/2023 by PCP, BP controlled at 134/82. Per chart patient takes Lisinopril 20 mg prescribed by nephrologist. Last dispensed 10/31/2023.

## 2023-12-12 ENCOUNTER — OFFICE VISIT (OUTPATIENT)
Dept: ORTHOPEDICS | Facility: CLINIC | Age: 63
End: 2023-12-12
Payer: MEDICARE

## 2023-12-12 VITALS
RESPIRATION RATE: 16 BRPM | BODY MASS INDEX: 33.21 KG/M2 | SYSTOLIC BLOOD PRESSURE: 140 MMHG | HEIGHT: 62 IN | HEART RATE: 77 BPM | WEIGHT: 180.44 LBS | DIASTOLIC BLOOD PRESSURE: 85 MMHG

## 2023-12-12 DIAGNOSIS — M17.12 PRIMARY OSTEOARTHRITIS OF LEFT KNEE: Primary | ICD-10-CM

## 2023-12-12 PROCEDURE — 99999 PR PBB SHADOW E&M-EST. PATIENT-LVL III: ICD-10-PCS | Mod: PBBFAC,HCNC,,

## 2023-12-12 PROCEDURE — 1159F MED LIST DOCD IN RCRD: CPT | Mod: HCNC,CPTII,S$GLB,

## 2023-12-12 PROCEDURE — 3044F PR MOST RECENT HEMOGLOBIN A1C LEVEL <7.0%: ICD-10-PCS | Mod: HCNC,CPTII,S$GLB,

## 2023-12-12 PROCEDURE — 3044F HG A1C LEVEL LT 7.0%: CPT | Mod: HCNC,CPTII,S$GLB,

## 2023-12-12 PROCEDURE — 3008F PR BODY MASS INDEX (BMI) DOCUMENTED: ICD-10-PCS | Mod: HCNC,CPTII,S$GLB,

## 2023-12-12 PROCEDURE — 3079F PR MOST RECENT DIASTOLIC BLOOD PRESSURE 80-89 MM HG: ICD-10-PCS | Mod: HCNC,CPTII,S$GLB,

## 2023-12-12 PROCEDURE — 99213 OFFICE O/P EST LOW 20 MIN: CPT | Mod: HCNC,S$GLB,,

## 2023-12-12 PROCEDURE — 3008F BODY MASS INDEX DOCD: CPT | Mod: HCNC,CPTII,S$GLB,

## 2023-12-12 PROCEDURE — 99213 PR OFFICE/OUTPT VISIT, EST, LEVL III, 20-29 MIN: ICD-10-PCS | Mod: HCNC,S$GLB,,

## 2023-12-12 PROCEDURE — 99999 PR PBB SHADOW E&M-EST. PATIENT-LVL III: CPT | Mod: PBBFAC,HCNC,,

## 2023-12-12 PROCEDURE — 3077F PR MOST RECENT SYSTOLIC BLOOD PRESSURE >= 140 MM HG: ICD-10-PCS | Mod: HCNC,CPTII,S$GLB,

## 2023-12-12 PROCEDURE — 1159F PR MEDICATION LIST DOCUMENTED IN MEDICAL RECORD: ICD-10-PCS | Mod: HCNC,CPTII,S$GLB,

## 2023-12-12 PROCEDURE — 4010F PR ACE/ARB THEARPY RXD/TAKEN: ICD-10-PCS | Mod: HCNC,CPTII,S$GLB,

## 2023-12-12 PROCEDURE — 3079F DIAST BP 80-89 MM HG: CPT | Mod: HCNC,CPTII,S$GLB,

## 2023-12-12 PROCEDURE — 3077F SYST BP >= 140 MM HG: CPT | Mod: HCNC,CPTII,S$GLB,

## 2023-12-12 PROCEDURE — 4010F ACE/ARB THERAPY RXD/TAKEN: CPT | Mod: HCNC,CPTII,S$GLB,

## 2023-12-12 RX ORDER — METHOCARBAMOL 500 MG/1
500 TABLET, FILM COATED ORAL 4 TIMES DAILY
Qty: 40 TABLET | Refills: 0 | Status: SHIPPED | OUTPATIENT
Start: 2023-12-12 | End: 2023-12-18 | Stop reason: CLARIF

## 2023-12-12 RX ORDER — GABAPENTIN 300 MG/1
300 CAPSULE ORAL 2 TIMES DAILY
Qty: 90 CAPSULE | Refills: 0 | Status: ON HOLD | OUTPATIENT
Start: 2023-12-12 | End: 2023-12-28

## 2023-12-12 RX ORDER — MELOXICAM 7.5 MG/1
7.5 TABLET ORAL DAILY
Qty: 30 TABLET | Refills: 1 | Status: SHIPPED | OUTPATIENT
Start: 2023-12-12 | End: 2023-12-18 | Stop reason: CLARIF

## 2023-12-12 NOTE — PROGRESS NOTES
Patient ID: Wilfredo Vazquez is a 63 y.o. female    Pain and Follow-up of the Left Knee      History of Present Illness:    Wilfredo Vazquez presents to clinic for left knee pain.  Patient denies injury.  Reports pain has been present for years.  It has worsened over the past 5-6 months.  Denies history of gout.  Reports pain mostly to her anterior left knee.  Pain is 10/10 today.  Reports walking worsens her pain.  Reports rest improves her pain.  She has had injections in the past with moderate relief.  She is requesting injection today.  Reports she went to the ER and was given Percocet which improved her pain as well.  She had an MRI from her primary care.  She would also like referral to pain management for low back pain.    Ambulating: unassisted  Diabetic: no  Smoking: no  Hx of DVT/PE: no    ____________________________________________________________________    Interval history 12/12/2023 : Patient returns today for follow up of left knee pain. Patient reports injection given about 3 months ago lasted 2 months.   She was scheduled to undergo lumbar RFA in January.  She is interested in TKA at this time for her left knee.      PAST MEDICAL HISTORY:   Past Medical History:   Diagnosis Date    Abdominal pain 2018    Emphysema lung     GERD (gastroesophageal reflux disease) 2018    Hypertension     Shingles      PAST SURGICAL HISTORY:   Past Surgical History:   Procedure Laterality Date    APPENDECTOMY      COLONOSCOPY N/A 05/22/2019    Procedure: COLONOSCOPY;  Surgeon: Sim Quinteros MD;  Location: [facility];  Service: Endoscopy;  Laterality: N/A;    COLONOSCOPY N/A 6/20/2022    Procedure: COLONOSCOPY WITH POLYPECTOMY AND CLIPPING;  Surgeon: Jarrod Franco MD;  Location: [facility];  Service: Endoscopy;  Laterality: N/A;    COLONOSCOPY W/ BIOPSIES AND POLYPECTOMY  06/20/2022    COLONOSCOPY W/ POLYPECTOMY      ESOPHAGEAL DILATION N/A 05/13/2019    Procedure: DILATION, ESOPHAGUS;  Surgeon: Sim ORELLANA  MD [place];  Location: [facility];  Service: Endoscopy;  Laterality: N/A;    ESOPHAGEAL DILATION N/A 02/09/2021    Procedure: DILATION, ESOPHAGUS;  Surgeon: Sim Quinteros MD;  Location: [facility] ENDO;  Service: Endoscopy;  Laterality: N/A;    ESOPHAGEAL DILATION N/A 12/14/2021    Procedure: DILATION, ESOPHAGUS;  Surgeon: Sim Quinteros MD;  Location: [facility] ENDO;  Service: Endoscopy;  Laterality: N/A;    ESOPHAGEAL DILATION N/A 05/17/2022    Procedure: DILATION, ESOPHAGUS;  Surgeon: Sim Quinteros MD;  Location: [facility];  Service: Endoscopy;  Laterality: N/A;    ESOPHAGOGASTRODUODENOSCOPY N/A 05/13/2019    Procedure: EGD (ESOPHAGOGASTRODUODENOSCOPY);  Surgeon: Sim Quinteros MD;  Location: [facility];  Service: Endoscopy;  Laterality: N/A;    ESOPHAGOGASTRODUODENOSCOPY N/A 02/09/2021    Procedure: EGD (ESOPHAGOGASTRODUODENOSCOPY);  Surgeon: Sim Quinteros MD;  Location: [facility];  Service: Endoscopy;  Laterality: N/A;    ESOPHAGOGASTRODUODENOSCOPY N/A 12/14/2021    Procedure: EGD (ESOPHAGOGASTRODUODENOSCOPY);  Surgeon: Sim Quinteros MD;  Location: [facility];  Service: Endoscopy;  Laterality: N/A;    ESOPHAGOGASTRODUODENOSCOPY N/A 05/17/2022    Procedure: EGD (ESOPHAGOGASTRODUODENOSCOPY);  Surgeon: Sim Quinteros MD;  Location: [facility];  Service: Endoscopy;  Laterality: N/A;    ESOPHAGOGASTRODUODENOSCOPY (EGD) WITH DILATION  05/17/2022    HYSTERECTOMY      LAPAROSCOPIC NISSEN FUNDOPLICATION      REPAIR OF EXTENSOR TENDON Left 06/07/2022    Procedure: REPAIR, TENDON, EXTENSOR  ;  Surgeon: Dakota Anaya Jr., MD;  Location: [facility];  Service: Plastics;  Laterality: Left;  THUMB    UPPER GASTROINTESTINAL ENDOSCOPY N/A 01/24/2018     FAMILY HISTORY:   Family History   Family history unknown: Yes     SOCIAL HISTORY:   Social History     Occupational History    Not on file   Tobacco Use    Smoking status: Every Day     Types: Cigarettes    Smokeless tobacco: Never   Substance and Sexual Activity    Alcohol use:  No    Drug use: No    Sexual activity: Not on file     Comment: rare        MEDICATIONS:   Current Outpatient Medications:     lisinopriL (PRINIVIL,ZESTRIL) 20 MG tablet, Take 20 mg by mouth once daily., Disp: , Rfl:     QUEtiapine (SEROQUEL) 300 MG Tab, Take 300 mg by mouth every evening., Disp: , Rfl:     albuterol (ACCUNEB) 0.63 mg/3 mL Nebu, TAKE 3MLS BY NEBULIZATION EVERY 6 HOURS AS NEEDED Strength: 0.63 mg/3 mL (Patient not taking: Reported on 12/12/2023), Disp: 75 mL, Rfl: 5    budesonide-glycopyr-formoterol (BREZTRI AEROSPHERE) 160-9-4.8 mcg/actuation HFAA, inhale TWO puffs into THE lungs TWICE DAILY (Patient not taking: Reported on 12/12/2023), Disp: 32.1 g, Rfl: 3    cetirizine (ZYRTEC) 10 MG tablet, Take 1 tablet (10 mg total) by mouth once daily. for 5 days, Disp: 5 tablet, Rfl: 0    cyproheptadine (PERIACTIN) 4 mg tablet, 1 po q HS for decreased appetite (Patient not taking: Reported on 12/12/2023), Disp: 30 tablet, Rfl: 2    omeprazole (PRILOSEC) 40 MG capsule, Take 40 mg by mouth., Disp: , Rfl:     ondansetron (ZOFRAN) 4 MG tablet, Take 1 tablet (4 mg total) by mouth 2 (two) times daily. (Patient not taking: Reported on 12/12/2023), Disp: 30 tablet, Rfl: 2    promethazine-dextromethorphan (PROMETHAZINE-DM) 6.25-15 mg/5 mL Syrp, Take 5 mLs by mouth every 6 (six) hours as needed (cough). (Patient not taking: Reported on 12/12/2023), Disp: 180 mL, Rfl: 1    traMADoL (ULTRAM) 50 mg tablet, Take 1 tablet (50 mg total) by mouth every 6 (six) hours. Take 1 tablet by mouth every 6 hours (Patient not taking: Reported on 12/12/2023), Disp: 15 tablet, Rfl: 0  No current facility-administered medications for this visit.    Facility-Administered Medications Ordered in Other Visits:     lactated ringers infusion, , Intravenous, Continuous, Sim Quinteros MD, Last Rate: 0 mL/hr at 12/14/21 0846, New Bag at 06/07/22 1151    sodium chloride 0.9% flush 10 mL, 10 mL, Intravenous, PRN, Sim Quinteros MD  ALLERGIES:  Review of patient's allergies indicates:  No Known Allergies      Physical Exam     Vitals:    12/12/23 1431   BP: (!) 140/85   Pulse: 77   Resp: 16     Alert and oriented to person, place and time. No acute distress. Well-groomed, not ill appearing. Pupils round and reactive, normal respiratory effort, no audible wheezing.     GENERAL:  A well-developed, well-nourished 63 y.o. female who is alert and       oriented in no acute distress.      Gait: She  walks with a normal gait.                   EXTREMITIES:  Examination of lower extremities reveals there is no visible mass or deformity.    Left knee:  ROM 5-115 *    Ligamentously stable to varus/valgus stress.    Anterior and posterior drawers negative.    No pain over pes bursa.    No warmth    No erythema     Effusion No    medial joint line tenderness    Positive Patellofemoral grind/crepitus       The skin over both lower extremities is normal and unremarkable.  She has a  painless range of motion of the hips and ankles bilaterally.   Sensation is intact in both lower extremities.    There are no motor deficits in the lower extremities bilaterally.   Pedal pulses are palpable distally bilaterally.    She has no calf tenderness to palpation nor edema.    Imaging:     Left knee X-rays ordered/reviewed by me showing no evidence of fracture or dislocation. There is no obvious malalignment. No evidence of masses, lesions or foreign bodies.  Moderate tricompartmental osteoarthritis.  Kellgren Neftaly grade 3.    MRI left knee: Tricompartmental high-grade chondromalacia and osteoarthritis  Multifocal osteonecrosis about the knee without subchondral collapse  Displaced flap tear of the lateral meniscus body and anterior horn  Possible peripheral tear posterior horn medial meniscus    Assessment & Plan    Primary osteoarthritis of left knee      Patient here today for left knee pain pain. She is interested in total left knee replacement. she has failed conservative  treatment and would like to proceed with surgery to minimize pain and increase functionality.   Will get patient set up for joint boot camp. Instructed this must be completed prior to scheduling.  Instructed patient to schedule for PCP clearance, must be obtained prior to scheduling  Continue activity as tolerated  Recommended smoking cessation and weight loss, if applicable  Also requesting medication today, gabapentin Rx sent.  Take 1 pill twice daily.  Robaxin Rx sent, may cause drowsiness.  Mobic 7.5 mg-- May take 1-2 daily. Patient instructed to not take other NSAIDs with this. Patient instructed to take with food and OTC PPI, such as omeprazole to decrease GI side effects. Labs reviewed.     Follow up: Mina virk L TKA  X-rays next visit: none    All questions were answered and patient is agreeable to the above plan.

## 2023-12-19 ENCOUNTER — TELEPHONE (OUTPATIENT)
Dept: PAIN MEDICINE | Facility: CLINIC | Age: 63
End: 2023-12-19
Payer: MEDICARE

## 2023-12-19 NOTE — TELEPHONE ENCOUNTER
Spoke with patient. Confirmed she received >80% relief in the first 24 hours after the procedure. Pain then returned to the baseline prior to the procedure. New PDI as follows: 5 0 0 0 0 5 5. No further issues discussed.

## 2023-12-20 DIAGNOSIS — R10.13 EPIGASTRIC ABDOMINAL PAIN: ICD-10-CM

## 2023-12-20 RX ORDER — TRAMADOL HYDROCHLORIDE 50 MG/1
50 TABLET ORAL EVERY 6 HOURS
Qty: 15 TABLET | Refills: 0 | OUTPATIENT
Start: 2023-12-20

## 2023-12-20 NOTE — TELEPHONE ENCOUNTER
No care due was identified.  Health [facility] Embedded Care Due Messages. Reference number: [id number].   12/20/2023 4:56:18 PM CST

## 2023-12-20 NOTE — TELEPHONE ENCOUNTER
----- Message from Davidpaco Dejuan sent at 12/20/2023 11:14 AM CST -----  Contact: self  [phone]  Requesting an RX refill or new RX.  Is this a refill or new RX: refill  RX name and strength (copy/paste from chart):  traMADoL (ULTRAM) 50 mg tablet  Is this a 30 day or 90 day RX:   Pharmacy name and phone # (copy/paste from chart):  [facility] - [place], [place] - [address]  The doctors have asked that we provide their patients with the following 2 reminders -- prescription refills can take up to 72 hours, and a friendly reminder that in the future you can use your [facility] account to request refills: yes    Per pt also need her appetite meds do not see it listed on the chart and also requesting a call for a pre-op.    Please call and advise

## 2024-01-01 NOTE — PROGRESS NOTES
LINKS immunization registry updated  Care Everywhere updated  Health Maintenance updated  Chart reviewed for overdue Proactive [facility] Encounters (AYANA) health maintenance testing (CRS, Breast Ca, Diabetic Eye Exam)   Orders entered:N/A  
9

## 2024-01-16 ENCOUNTER — OFFICE VISIT (OUTPATIENT)
Dept: PRIMARY CARE CLINIC | Facility: CLINIC | Age: 64
End: 2024-01-16
Payer: MEDICARE

## 2024-01-16 VITALS
HEIGHT: 62 IN | OXYGEN SATURATION: 99 % | WEIGHT: 177.94 LBS | SYSTOLIC BLOOD PRESSURE: 138 MMHG | DIASTOLIC BLOOD PRESSURE: 84 MMHG | HEART RATE: 75 BPM | RESPIRATION RATE: 18 BRPM | BODY MASS INDEX: 32.74 KG/M2

## 2024-01-16 DIAGNOSIS — M87.9 OSTEONECROSIS OF LEFT KNEE REGION: ICD-10-CM

## 2024-01-16 DIAGNOSIS — I70.0 AORTIC ATHEROSCLEROSIS: ICD-10-CM

## 2024-01-16 DIAGNOSIS — J44.9 CHRONIC OBSTRUCTIVE PULMONARY DISEASE, UNSPECIFIED COPD TYPE: ICD-10-CM

## 2024-01-16 DIAGNOSIS — Z01.818 PREOPERATIVE EXAMINATION: Primary | ICD-10-CM

## 2024-01-16 DIAGNOSIS — Z23 NEED FOR VACCINATION: ICD-10-CM

## 2024-01-16 DIAGNOSIS — M17.12 OSTEOARTHRITIS OF LEFT KNEE, UNSPECIFIED OSTEOARTHRITIS TYPE: ICD-10-CM

## 2024-01-16 DIAGNOSIS — F31.9 BIPOLAR AFFECTIVE DISORDER, REMISSION STATUS UNSPECIFIED: ICD-10-CM

## 2024-01-16 PROBLEM — E66.01 SEVERE OBESITY (BMI 35.0-39.9) WITH COMORBIDITY: Status: RESOLVED | Noted: 2023-04-10 | Resolved: 2024-01-16

## 2024-01-16 PROCEDURE — 3075F SYST BP GE 130 - 139MM HG: CPT | Mod: HCNC,CPTII,S$GLB, | Performed by: FAMILY MEDICINE

## 2024-01-16 PROCEDURE — 90677 PCV20 VACCINE IM: CPT | Mod: HCNC,S$GLB,, | Performed by: FAMILY MEDICINE

## 2024-01-16 PROCEDURE — 93010 ELECTROCARDIOGRAM REPORT: CPT | Mod: HCNC,S$GLB,, | Performed by: INTERNAL MEDICINE

## 2024-01-16 PROCEDURE — 99999 PR PBB SHADOW E&M-EST. PATIENT-LVL III: CPT | Mod: PBBFAC,HCNC,, | Performed by: FAMILY MEDICINE

## 2024-01-16 PROCEDURE — 3008F BODY MASS INDEX DOCD: CPT | Mod: HCNC,CPTII,S$GLB, | Performed by: FAMILY MEDICINE

## 2024-01-16 PROCEDURE — 1159F MED LIST DOCD IN RCRD: CPT | Mod: HCNC,CPTII,S$GLB, | Performed by: FAMILY MEDICINE

## 2024-01-16 PROCEDURE — 3079F DIAST BP 80-89 MM HG: CPT | Mod: HCNC,CPTII,S$GLB, | Performed by: FAMILY MEDICINE

## 2024-01-16 PROCEDURE — 93005 ELECTROCARDIOGRAM TRACING: CPT | Mod: HCNC,S$GLB,, | Performed by: FAMILY MEDICINE

## 2024-01-16 PROCEDURE — 99214 OFFICE O/P EST MOD 30 MIN: CPT | Mod: 25,HCNC,S$GLB, | Performed by: FAMILY MEDICINE

## 2024-01-16 PROCEDURE — 1160F RVW MEDS BY RX/DR IN RCRD: CPT | Mod: HCNC,CPTII,S$GLB, | Performed by: FAMILY MEDICINE

## 2024-01-16 PROCEDURE — G0009 ADMIN PNEUMOCOCCAL VACCINE: HCPCS | Mod: HCNC,S$GLB,, | Performed by: FAMILY MEDICINE

## 2024-01-16 RX ORDER — BUDESONIDE, GLYCOPYRROLATE, AND FORMOTEROL FUMARATE 160; 9; 4.8 UG/1; UG/1; UG/1
AEROSOL, METERED RESPIRATORY (INHALATION)
COMMUNITY
Start: 2024-01-05 | End: 2024-05-08 | Stop reason: SDUPTHER

## 2024-01-16 RX ORDER — TRAMADOL HYDROCHLORIDE 50 MG/1
50 TABLET ORAL EVERY 8 HOURS PRN
Qty: 20 TABLET | Refills: 0 | Status: SHIPPED | OUTPATIENT
Start: 2024-01-16 | End: 2024-05-08 | Stop reason: SDUPTHER

## 2024-01-16 NOTE — PROGRESS NOTES
"Subjective:       Patient ID: Wilfredo Vazquez is a 63 y.o. female.    Chief Complaint: Pre-op Exam    In the process of being scheduled for left knee arthroplasty.  No recent illness or injury.  No previous surgical complications.  Requesting refill of tramadol, which has helped with her knee pain in the past.      Review of Systems   Constitutional:  Negative for chills, fatigue and fever.   HENT:  Negative for congestion and sore throat.    Eyes:  Negative for visual disturbance.   Respiratory:  Negative for cough and shortness of breath.    Cardiovascular:  Negative for chest pain.   Gastrointestinal:  Negative for abdominal pain, nausea and vomiting.   Genitourinary:  Negative for difficulty urinating.   Musculoskeletal:  Positive for arthralgias.   Skin:  Negative for rash.   Allergic/Immunologic: Negative for immunocompromised state.   Neurological:  Negative for dizziness.   Hematological:  Does not bruise/bleed easily.   Psychiatric/Behavioral:  Negative for agitation and confusion.        Objective:      Vitals:    01/16/24 1420   BP: 138/84   BP Location: Right arm   Patient Position: Sitting   BP Method: Medium (Manual)   Pulse: 75   Resp: 18   SpO2: 99%   Weight: 80.7 kg (177 lb 14.6 oz)   Height: 5' 2" (1.575 m)     BP Readings from Last 5 Encounters:   01/16/24 138/84   01/11/24 123/77   12/28/23 (!) 146/78   12/26/23 (!) 152/86   12/14/23 (!) 147/82     Wt Readings from Last 5 Encounters:   01/16/24 80.7 kg (177 lb 14.6 oz)   01/11/24 79.8 kg (175 lb 13.1 oz)   12/28/23 80.7 kg (177 lb 12.8 oz)   12/18/23 81.6 kg (180 lb)   12/07/23 80.3 kg (177 lb)     Physical Exam  Vitals and nursing note reviewed.   Constitutional:       General: She is not in acute distress.     Appearance: Normal appearance. She is well-developed.   HENT:      Head: Normocephalic and atraumatic.      Mouth/Throat:      Mouth: Mucous membranes are moist.      Pharynx: Oropharynx is clear.      Comments: Mallampati class " 1  Cardiovascular:      Rate and Rhythm: Normal rate and regular rhythm.      Heart sounds: Normal heart sounds.   Pulmonary:      Effort: Pulmonary effort is normal.      Breath sounds: Normal breath sounds.   Musculoskeletal:      Left knee: Crepitus present. Decreased range of motion.      Right lower leg: No edema.      Left lower leg: No edema.   Skin:     General: Skin is warm and dry.   Neurological:      Mental Status: She is alert and oriented to person, place, and time.   Psychiatric:         Mood and Affect: Mood normal.         Behavior: Behavior normal.         Lab Results   Component Value Date    WBC 7.70 11/30/2023    HGB 12.1 11/30/2023    HCT 37.5 11/30/2023     11/30/2023    CHOL 186 11/14/2023    TRIG 55 11/14/2023    HDL 70 11/14/2023    ALT 11 11/30/2023    AST 13 11/30/2023     11/30/2023    K 3.9 11/30/2023     11/30/2023    CREATININE 0.8 11/30/2023    BUN 12 11/30/2023    CO2 21 (L) 11/30/2023    TSH 0.237 (L) 11/14/2023    INR 1.0 06/12/2022    HGBA1C 5.3 11/14/2023      Assessment:       1. Preoperative examination    2. Osteoarthritis of left knee, unspecified osteoarthritis type    3. Chronic obstructive pulmonary disease, unspecified COPD type    4. Osteonecrosis of left knee region    5. Bipolar affective disorder, remission status unspecified    6. Aortic atherosclerosis    7. Need for vaccination        Plan:       Preoperative examination  -     EKG 12-lead  No acute EKG changes.  Patient is medically cleared for left knee arthroplasty under general anesthesia  Osteoarthritis of left knee, unspecified osteoarthritis type  -     traMADoL (ULTRAM) 50 mg tablet; Take 1 tablet (50 mg total) by mouth every 8 (eight) hours as needed for Pain.  Dispense: 20 tablet; Refill: 0   reviewed.  One time prescription for tramadol  Chronic obstructive pulmonary disease, unspecified COPD type  Stable  Osteonecrosis of left knee region  Definitive management per Orthopedic  surgery  Bipolar affective disorder, remission status unspecified  Stable  Aortic atherosclerosis  Medical management  Need for vaccination  -     Pneumococcal Conjugate Vaccine (20 Valent) (IM)(Preferred)      Medication List with Changes/Refills   New Medications    TRAMADOL (ULTRAM) 50 MG TABLET    Take 1 tablet (50 mg total) by mouth every 8 (eight) hours as needed for Pain.   Current Medications    BREZTRI AEROSPHERE 160-9-4.8 MCG/ACTUATION HFAA    Inhale into the lungs.    CYPROHEPTADINE (PERIACTIN) 4 MG TABLET    Take 4 mg by mouth every evening.    LISINOPRIL (PRINIVIL,ZESTRIL) 20 MG TABLET    Take 20 mg by mouth once daily.    OMEPRAZOLE (PRILOSEC) 40 MG CAPSULE    Take 40 mg by mouth.    QUETIAPINE (SEROQUEL) 300 MG TAB    Take 300 mg by mouth every evening.    SUCRALFATE (CARAFATE) 1 GRAM TABLET    Take 1 tablet (1 g total) by mouth 4 (four) times daily.

## 2024-01-29 ENCOUNTER — OFFICE VISIT (OUTPATIENT)
Dept: ORTHOPEDICS | Facility: CLINIC | Age: 64
End: 2024-01-29
Payer: MEDICARE

## 2024-01-29 VITALS
SYSTOLIC BLOOD PRESSURE: 164 MMHG | DIASTOLIC BLOOD PRESSURE: 91 MMHG | HEIGHT: 62 IN | BODY MASS INDEX: 31.65 KG/M2 | HEART RATE: 73 BPM | WEIGHT: 172 LBS

## 2024-01-29 DIAGNOSIS — M17.12 PRIMARY OSTEOARTHRITIS OF LEFT KNEE: Primary | ICD-10-CM

## 2024-01-29 PROCEDURE — 99999 PR PBB SHADOW E&M-EST. PATIENT-LVL III: CPT | Mod: PBBFAC,HCNC,, | Performed by: ORTHOPAEDIC SURGERY

## 2024-01-29 PROCEDURE — 1159F MED LIST DOCD IN RCRD: CPT | Mod: HCNC,CPTII,S$GLB, | Performed by: ORTHOPAEDIC SURGERY

## 2024-01-29 PROCEDURE — 3077F SYST BP >= 140 MM HG: CPT | Mod: HCNC,CPTII,S$GLB, | Performed by: ORTHOPAEDIC SURGERY

## 2024-01-29 PROCEDURE — 3080F DIAST BP >= 90 MM HG: CPT | Mod: HCNC,CPTII,S$GLB, | Performed by: ORTHOPAEDIC SURGERY

## 2024-01-29 PROCEDURE — 3008F BODY MASS INDEX DOCD: CPT | Mod: HCNC,CPTII,S$GLB, | Performed by: ORTHOPAEDIC SURGERY

## 2024-01-29 PROCEDURE — 99214 OFFICE O/P EST MOD 30 MIN: CPT | Mod: HCNC,S$GLB,, | Performed by: ORTHOPAEDIC SURGERY

## 2024-01-29 NOTE — PROGRESS NOTES
Patient ID: Wilfredo Vazquez is a 64 y.o. female    Follow-up of the Left Knee      History of Present Illness:    Wilfredo Vazquez presents to clinic for left knee pain.  Patient denies injury.  Has seen Veronica for left knee arthritis.  Has had injections which lasted for a few weeks.  She is interested in discussing total knee replacement.  Pain is interfering with activities of daily living.  Patient not actively working.      PAST MEDICAL HISTORY:   Past Medical History:   Diagnosis Date    Abdominal pain 2018    Emphysema lung     GERD (gastroesophageal reflux disease) 2018    Hypertension     Shingles      PAST SURGICAL HISTORY:   Past Surgical History:   Procedure Laterality Date    APPENDECTOMY      BLOCK, SPINAL NERVE ROOT, LUMBAR, SELECTIVE Bilateral 12/28/2023    L3, L4, L5    COLONOSCOPY N/A 05/22/2019    Procedure: COLONOSCOPY;  Surgeon: Sim Quinteros MD;  Location: [facility];  Service: Endoscopy;  Laterality: N/A;    COLONOSCOPY N/A 06/20/2022    Procedure: COLONOSCOPY WITH POLYPECTOMY AND CLIPPING;  Surgeon: Jarrod Franco MD;  Location: [facility];  Service: Endoscopy;  Laterality: N/A;    COLONOSCOPY W/ BIOPSIES AND POLYPECTOMY  06/20/2022    COLONOSCOPY W/ POLYPECTOMY      ESOPHAGEAL DILATION N/A 05/13/2019    Procedure: DILATION, ESOPHAGUS;  Surgeon: Sim Quinteros MD;  Location: [facility];  Service: Endoscopy;  Laterality: N/A;    ESOPHAGEAL DILATION N/A 02/09/2021    Procedure: DILATION, ESOPHAGUS;  Surgeon: Sim Quinteros MD;  Location: [facility];  Service: Endoscopy;  Laterality: N/A;    ESOPHAGEAL DILATION N/A 12/14/2021    Procedure: DILATION, ESOPHAGUS;  Surgeon: Sim Quinteros MD;  Location: [facility];  Service: Endoscopy;  Laterality: N/A;    ESOPHAGEAL DILATION N/A 05/17/2022    Procedure: DILATION, ESOPHAGUS;  Surgeon: Sim Quinteros MD;  Location: [facility];  Service: Endoscopy;  Laterality: N/A;    ESOPHAGEAL DILATION N/A 12/26/2023    Procedure: DILATION, ESOPHAGUS;   Surgeon: Sim Quinteros MD;  Location: [facility];  Service: Endoscopy;  Laterality: N/A;    ESOPHAGOGASTRODUODENOSCOPY N/A 05/13/2019    Procedure: EGD (ESOPHAGOGASTRODUODENOSCOPY);  Surgeon: Sim Quinteros MD;  Location: [facility];  Service: Endoscopy;  Laterality: N/A;    ESOPHAGOGASTRODUODENOSCOPY N/A 02/09/2021    Procedure: EGD (ESOPHAGOGASTRODUODENOSCOPY);  Surgeon: Sim Quinteros MD;  Location: [facility] ENDO;  Service: Endoscopy;  Laterality: N/A;    ESOPHAGOGASTRODUODENOSCOPY N/A 12/14/2021    Procedure: EGD (ESOPHAGOGASTRODUODENOSCOPY);  Surgeon: Sim Quinteros MD;  Location: [facility] ENDO;  Service: Endoscopy;  Laterality: N/A;    ESOPHAGOGASTRODUODENOSCOPY N/A 05/17/2022    Procedure: EGD (ESOPHAGOGASTRODUODENOSCOPY);  Surgeon: Sim Quinteros MD;  Location: [facility] ENDO;  Service: Endoscopy;  Laterality: N/A;    ESOPHAGOGASTRODUODENOSCOPY N/A 12/26/2023    Procedure: EGD (ESOPHAGOGASTRODUODENOSCOPY);  Surgeon: Sim Quinteros MD;  Location: [facility] ENDO;  Service: Endoscopy;  Laterality: N/A;    ESOPHAGOGASTRODUODENOSCOPY (EGD) WITH DILATION  05/17/2022    HYSTERECTOMY      INJECTION OF ANESTHETIC AGENT AROUND MEDIAL BRANCH NERVES INNERVATING LUMBAR FACET JOINT Bilateral 12/14/2023    Procedure: Block-nerve-medial branch-lumbar----L3,L4,L5;  Surgeon: Iván Velasquez Jr., MD;  Location: [facility];  Service: Pain Management;  Laterality: Bilateral;    INJECTION OF ANESTHETIC AGENT AROUND MEDIAL BRANCH NERVES INNERVATING LUMBAR FACET JOINT Bilateral 12/28/2023    Procedure: Block-nerve-medial branch-lumbar L3,L4,L5;  Surgeon: Iván Velasquez Jr., MD;  Location: [facility];  Service: Pain Management;  Laterality: Bilateral;    LAPAROSCOPIC NISSEN FUNDOPLICATION      RADIOFREQUENCY ABLATION Left 01/25/2024    L3, L4, L5    RADIOFREQUENCY ABLATION, NERVE, SPINAL, LUMBOSACRAL Right 1/25/2024    Procedure: RADIOFREQUENCY ABLATION------L3,L4,L5;  Surgeon: Iván Velasquez Jr., MD;  Location: [facility]  PAIN [facility];  Service: Pain Management;  Laterality: Right;    REPAIR OF EXTENSOR TENDON Left 06/07/2022    Procedure: REPAIR, TENDON, EXTENSOR  ;  Surgeon: Dakota Anaya Jr., MD;  Location: [facility] OR;  Service: Plastics;  Laterality: Left;  THUMB    UPPER GASTROINTESTINAL ENDOSCOPY N/A 01/24/2018     FAMILY HISTORY:   Family History   Family history unknown: Yes     SOCIAL HISTORY:   Social History     Occupational History    Not on file   Tobacco Use    Smoking status: Every Day     Current packs/day: 0.25     Types: Cigarettes    Smokeless tobacco: Never   Substance and Sexual Activity    Alcohol use: No     Comment: occasionally    Drug use: No    Sexual activity: Yes     Partners: Female     Comment: rare        MEDICATIONS:   Current Outpatient Medications:     BREZTRI AEROSPHERE 160-9-4.8 mcg/actuation HFAA, Inhale into the lungs., Disp: , Rfl:     cyproheptadine (PERIACTIN) 4 mg tablet, Take 4 mg by mouth every evening., Disp: , Rfl:     lisinopriL (PRINIVIL,ZESTRIL) 20 MG tablet, Take 20 mg by mouth once daily., Disp: , Rfl:     omeprazole (PRILOSEC) 40 MG capsule, Take 40 mg by mouth., Disp: , Rfl:     QUEtiapine (SEROQUEL) 300 MG Tab, Take 300 mg by mouth every evening., Disp: , Rfl:     sucralfate (CARAFATE) 1 gram tablet, Take 1 tablet (1 g total) by mouth 4 (four) times daily., Disp: 120 tablet, Rfl: 11    traMADoL (ULTRAM) 50 mg tablet, Take 1 tablet (50 mg total) by mouth every 8 (eight) hours as needed for Pain., Disp: 20 tablet, Rfl: 0  No current facility-administered medications for this visit.    Facility-Administered Medications Ordered in Other Visits:     lactated ringers infusion, , Intravenous, Continuous, Sim Quinteros MD, Last Rate: 0 mL/hr at 12/14/21 0846, New Bag at 06/07/22 1151    sodium chloride 0.9% flush 10 mL, 10 mL, Intravenous, PRN, Sim Quinteros MD    sodium chloride 0.9% flush 10 mL, 10 mL, Intravenous, PRN, Sim Quinteros MD  ALLERGIES: Review of patient's  allergies indicates:  No Known Allergies      Physical Exam     Vitals:    01/29/24 1411   BP: (!) 164/91   Pulse: 73     Alert and oriented to person, place and time. No acute distress. Well-groomed, not ill appearing. Pupils round and reactive, normal respiratory effort, no audible wheezing.     GENERAL:  A well-developed, well-nourished 64 y.o. female who is alert and       oriented in no acute distress.      Gait: She  walks with a normal gait.                   EXTREMITIES:  Examination of lower extremities reveals there is no visible mass or deformity.    Left knee:  ROM 5-115 *    Ligamentously stable to varus/valgus stress.    Anterior and posterior drawers negative.    No pain over pes bursa.    No warmth    No erythema     Effusion No    medial joint line tenderness    Positive Patellofemoral grind/crepitus       The skin over both lower extremities is normal and unremarkable.  She has a  painless range of motion of the hips and ankles bilaterally.   Sensation is intact in both lower extremities.    There are no motor deficits in the lower extremities bilaterally.   Pedal pulses are palpable distally bilaterally.    She has no calf tenderness to palpation nor edema.    Imaging:     Left knee X-rays ordered/reviewed by me showing no evidence of fracture or dislocation. There is no obvious malalignment. No evidence of masses, lesions or foreign bodies.  Moderate tricompartmental osteoarthritis.  Kellgren Neftaly grade 3.    MRI left knee: Tricompartmental high-grade chondromalacia and osteoarthritis  Multifocal osteonecrosis about the knee without subchondral collapse  Displaced flap tear of the lateral meniscus body and anterior horn  Possible peripheral tear posterior horn medial meniscus    Assessment & Plan    Primary osteoarthritis of left knee        Treatment options were discussed in detail with her.  I went over her x-rays which show DJD of the left knee.  MRI also shows degenerative medial and  lateral meniscus this tears and osteonecrosis of the knee without collapse.  We discussed multiple treatment options including total knee replacement.  She has already been medically cleared.  She has not yet attended a boot camp.  She is interested in scheduling TKA.  She will need to attend boot camp prior to scheduling.  She will follow up with Veronica for her preoperative visit and to schedule surgery once she attends boot camp.  We discussed the risks and benefits of surgery in detail and she would like to proceed

## 2024-02-03 NOTE — TELEPHONE ENCOUNTER
No care due was identified.  [facility] Embedded Care Due Messages. Reference number: [id number].   2/03/2024 9:16:54 AM CST

## 2024-02-05 RX ORDER — OMEPRAZOLE 40 MG/1
40 CAPSULE, DELAYED RELEASE ORAL
Qty: 90 CAPSULE | Refills: 3 | Status: ON HOLD | OUTPATIENT
Start: 2024-02-05 | End: 2024-05-27 | Stop reason: CLARIF

## 2024-02-12 PROBLEM — M79.5 FOREIGN BODY (FB) IN SOFT TISSUE: Status: ACTIVE | Noted: 2024-02-12

## 2024-02-12 PROBLEM — B34.9 VIRAL SYNDROME: Status: ACTIVE | Noted: 2024-02-12

## 2024-02-22 PROBLEM — H92.01 OTALGIA OF RIGHT EAR: Status: ACTIVE | Noted: 2024-02-22

## 2024-02-22 PROBLEM — J06.9 UPPER RESPIRATORY TRACT INFECTION: Status: ACTIVE | Noted: 2024-02-22

## 2024-02-23 ENCOUNTER — TELEPHONE (OUTPATIENT)
Dept: ORTHOPEDICS | Facility: CLINIC | Age: 64
End: 2024-02-23
Payer: MEDICARE

## 2024-02-23 NOTE — TELEPHONE ENCOUNTER
Called emergency contact, daughter states phone is back on.    Called patient, she is not feeling well and interested in rescheduling. I stated she still need to attend [facility] and get clearance. She aggred to April joint bootcamp date. Once PCP clearance and KELLI have been obtained, we can bring her back for pre op. Patient asked about meds, not taking anti-inflammatory for pain but is going to  naproxen from ER. Recommended this to help with knee pain as well. Patient v/u.

## 2024-03-19 PROBLEM — R19.4 CHANGE IN BOWEL HABITS: Status: ACTIVE | Noted: 2018-07-13

## 2024-04-01 RX ORDER — CYPROHEPTADINE HYDROCHLORIDE 4 MG/1
TABLET ORAL
Qty: 30 TABLET | Refills: 5 | Status: SHIPPED | OUTPATIENT
Start: 2024-04-01

## 2024-04-01 NOTE — TELEPHONE ENCOUNTER
No care due was identified.  [facility] Embedded Care Due Messages. Reference number: [id number].   4/01/2024 2:12:39 PM CDT

## 2024-04-19 ENCOUNTER — TELEPHONE (OUTPATIENT)
Dept: ORTHOPEDICS | Facility: CLINIC | Age: 64
End: 2024-04-19
Payer: MEDICARE

## 2024-04-19 DIAGNOSIS — M17.12 PRIMARY OSTEOARTHRITIS OF LEFT KNEE: Primary | ICD-10-CM

## 2024-04-19 RX ORDER — ACETAMINOPHEN 500 MG
1000 TABLET ORAL EVERY 6 HOURS
Status: CANCELLED | OUTPATIENT
Start: 2024-04-19

## 2024-04-19 RX ORDER — AMOXICILLIN 250 MG
1 CAPSULE ORAL 2 TIMES DAILY
Status: CANCELLED | OUTPATIENT
Start: 2024-04-19

## 2024-04-19 RX ORDER — OXYCODONE HYDROCHLORIDE 5 MG/1
5 TABLET ORAL
Status: CANCELLED | OUTPATIENT
Start: 2024-04-19

## 2024-04-19 RX ORDER — MUPIROCIN 20 MG/G
1 OINTMENT TOPICAL
Status: CANCELLED | OUTPATIENT
Start: 2024-04-19

## 2024-04-19 RX ORDER — CEFAZOLIN SODIUM 2 G/50ML
2 SOLUTION INTRAVENOUS
Status: CANCELLED | OUTPATIENT
Start: 2024-04-19

## 2024-04-19 RX ORDER — MIDAZOLAM HYDROCHLORIDE 1 MG/ML
0.5 INJECTION, SOLUTION INTRAMUSCULAR; INTRAVENOUS
Status: CANCELLED | OUTPATIENT
Start: 2024-04-19 | End: 2024-04-20

## 2024-04-19 RX ORDER — PREGABALIN 75 MG/1
75 CAPSULE ORAL NIGHTLY
Status: CANCELLED | OUTPATIENT
Start: 2024-04-19

## 2024-04-19 RX ORDER — SODIUM CHLORIDE 9 MG/ML
INJECTION, SOLUTION INTRAVENOUS CONTINUOUS
Status: CANCELLED | OUTPATIENT
Start: 2024-04-19 | End: 2024-04-20

## 2024-04-19 RX ORDER — POLYETHYLENE GLYCOL 3350 17 G/17G
17 POWDER, FOR SOLUTION ORAL DAILY
Status: CANCELLED | OUTPATIENT
Start: 2024-04-19

## 2024-04-19 RX ORDER — SODIUM CHLORIDE 9 MG/ML
INJECTION, SOLUTION INTRAVENOUS
Status: CANCELLED | OUTPATIENT
Start: 2024-04-19

## 2024-04-19 RX ORDER — PROCHLORPERAZINE EDISYLATE 5 MG/ML
5 INJECTION INTRAMUSCULAR; INTRAVENOUS EVERY 6 HOURS PRN
Status: CANCELLED | OUTPATIENT
Start: 2024-04-19

## 2024-04-19 RX ORDER — LOPERAMIDE HYDROCHLORIDE 2 MG/1
4 CAPSULE ORAL ONCE
Status: CANCELLED | OUTPATIENT
Start: 2024-04-19 | End: 2024-04-19

## 2024-04-19 RX ORDER — NALOXONE HCL 0.4 MG/ML
0.02 VIAL (ML) INJECTION
Status: CANCELLED | OUTPATIENT
Start: 2024-04-19

## 2024-04-19 RX ORDER — ONDANSETRON HYDROCHLORIDE 2 MG/ML
4 INJECTION, SOLUTION INTRAVENOUS EVERY 8 HOURS PRN
Status: CANCELLED | OUTPATIENT
Start: 2024-04-19

## 2024-04-19 RX ORDER — LIDOCAINE HYDROCHLORIDE 10 MG/ML
1 INJECTION, SOLUTION EPIDURAL; INFILTRATION; INTRACAUDAL; PERINEURAL
Status: CANCELLED | OUTPATIENT
Start: 2024-04-19

## 2024-04-19 RX ORDER — SODIUM CHLORIDE 0.9 % (FLUSH) 0.9 %
10 SYRINGE (ML) INJECTION
Status: CANCELLED | OUTPATIENT
Start: 2024-04-19

## 2024-04-19 RX ORDER — FAMOTIDINE 20 MG/1
20 TABLET, FILM COATED ORAL 2 TIMES DAILY
Status: CANCELLED | OUTPATIENT
Start: 2024-04-19

## 2024-04-19 RX ORDER — CEFAZOLIN SODIUM 2 G/50ML
2 SOLUTION INTRAVENOUS
Status: CANCELLED | OUTPATIENT
Start: 2024-04-19 | End: 2024-04-20

## 2024-04-19 RX ORDER — METHOCARBAMOL 750 MG/1
750 TABLET, FILM COATED ORAL 3 TIMES DAILY
Status: CANCELLED | OUTPATIENT
Start: 2024-04-19

## 2024-04-19 RX ORDER — ASPIRIN 81 MG/1
81 TABLET ORAL 2 TIMES DAILY
Status: CANCELLED | OUTPATIENT
Start: 2024-04-19

## 2024-04-19 RX ORDER — FENTANYL CITRATE 50 UG/ML
25 INJECTION, SOLUTION INTRAMUSCULAR; INTRAVENOUS
Status: CANCELLED | OUTPATIENT
Start: 2024-04-19 | End: 2024-04-20

## 2024-04-19 RX ORDER — OXYCODONE HYDROCHLORIDE 10 MG/1
10 TABLET ORAL
Status: CANCELLED | OUTPATIENT
Start: 2024-04-19

## 2024-04-19 NOTE — TELEPHONE ENCOUNTER
Called patient to discuss TKA.  She would like this 5/27.  We will bring her back for preop appointment.  All questions answered.

## 2024-05-08 ENCOUNTER — OFFICE VISIT (OUTPATIENT)
Dept: PRIMARY CARE CLINIC | Facility: CLINIC | Age: 64
End: 2024-05-08
Payer: MEDICARE

## 2024-05-08 VITALS
RESPIRATION RATE: 18 BRPM | BODY MASS INDEX: 29.3 KG/M2 | OXYGEN SATURATION: 98 % | DIASTOLIC BLOOD PRESSURE: 78 MMHG | WEIGHT: 165.38 LBS | HEIGHT: 63 IN | HEART RATE: 77 BPM | SYSTOLIC BLOOD PRESSURE: 126 MMHG

## 2024-05-08 DIAGNOSIS — Z98.890 HISTORY OF FUNDOPLICATION: ICD-10-CM

## 2024-05-08 DIAGNOSIS — F31.9 BIPOLAR AFFECTIVE DISORDER, REMISSION STATUS UNSPECIFIED: ICD-10-CM

## 2024-05-08 DIAGNOSIS — S32.010D COMPRESSION FRACTURE OF L1 VERTEBRA WITH ROUTINE HEALING: ICD-10-CM

## 2024-05-08 DIAGNOSIS — M23.204 DEGENERATIVE TEAR OF LEFT MEDIAL MENISCUS: ICD-10-CM

## 2024-05-08 DIAGNOSIS — I10 HYPERTENSION, UNSPECIFIED TYPE: ICD-10-CM

## 2024-05-08 DIAGNOSIS — R13.10 DYSPHAGIA, UNSPECIFIED TYPE: ICD-10-CM

## 2024-05-08 DIAGNOSIS — Z87.19 HISTORY OF GASTROESOPHAGEAL REFLUX (GERD): ICD-10-CM

## 2024-05-08 DIAGNOSIS — R73.9 HYPERGLYCEMIA: ICD-10-CM

## 2024-05-08 DIAGNOSIS — F17.210 CIGARETTE NICOTINE DEPENDENCE WITHOUT COMPLICATION: ICD-10-CM

## 2024-05-08 DIAGNOSIS — R55 SYNCOPE AND COLLAPSE: ICD-10-CM

## 2024-05-08 DIAGNOSIS — J44.9 CHRONIC OBSTRUCTIVE PULMONARY DISEASE, UNSPECIFIED COPD TYPE: ICD-10-CM

## 2024-05-08 DIAGNOSIS — J98.01 BRONCHOSPASM: ICD-10-CM

## 2024-05-08 DIAGNOSIS — M17.12 OSTEOARTHRITIS OF LEFT KNEE, UNSPECIFIED OSTEOARTHRITIS TYPE: Primary | ICD-10-CM

## 2024-05-08 DIAGNOSIS — H25.013 CORTICAL AGE-RELATED CATARACT OF BOTH EYES: ICD-10-CM

## 2024-05-08 DIAGNOSIS — K22.2 ESOPHAGEAL STENOSIS: ICD-10-CM

## 2024-05-08 PROBLEM — H25.9 AGE-RELATED CATARACT OF BOTH EYES: Status: ACTIVE | Noted: 2024-05-08

## 2024-05-08 PROCEDURE — 3008F BODY MASS INDEX DOCD: CPT | Mod: HCNC,CPTII,S$GLB, | Performed by: FAMILY MEDICINE

## 2024-05-08 PROCEDURE — 3074F SYST BP LT 130 MM HG: CPT | Mod: HCNC,CPTII,S$GLB, | Performed by: FAMILY MEDICINE

## 2024-05-08 PROCEDURE — 3078F DIAST BP <80 MM HG: CPT | Mod: HCNC,CPTII,S$GLB, | Performed by: FAMILY MEDICINE

## 2024-05-08 PROCEDURE — 99999 PR PBB SHADOW E&M-EST. PATIENT-LVL III: CPT | Mod: PBBFAC,HCNC,, | Performed by: FAMILY MEDICINE

## 2024-05-08 PROCEDURE — 99214 OFFICE O/P EST MOD 30 MIN: CPT | Mod: HCNC,S$GLB,, | Performed by: FAMILY MEDICINE

## 2024-05-08 PROCEDURE — 4010F ACE/ARB THERAPY RXD/TAKEN: CPT | Mod: HCNC,CPTII,S$GLB, | Performed by: FAMILY MEDICINE

## 2024-05-08 PROCEDURE — 1159F MED LIST DOCD IN RCRD: CPT | Mod: HCNC,CPTII,S$GLB, | Performed by: FAMILY MEDICINE

## 2024-05-08 RX ORDER — ALBUTEROL SULFATE 90 UG/1
2 AEROSOL, METERED RESPIRATORY (INHALATION) EVERY 4 HOURS PRN
Qty: 18 G | Refills: 5 | Status: SHIPPED | OUTPATIENT
Start: 2024-05-08

## 2024-05-08 RX ORDER — TRAMADOL HYDROCHLORIDE 50 MG/1
50 TABLET ORAL EVERY 8 HOURS PRN
Qty: 20 TABLET | Refills: 0 | Status: SHIPPED | OUTPATIENT
Start: 2024-05-08

## 2024-05-08 RX ORDER — BUDESONIDE, GLYCOPYRROLATE, AND FORMOTEROL FUMARATE 160; 9; 4.8 UG/1; UG/1; UG/1
AEROSOL, METERED RESPIRATORY (INHALATION)
Qty: 10.7 G | Refills: 5 | Status: SHIPPED | OUTPATIENT
Start: 2024-05-08

## 2024-05-08 RX ORDER — LISINOPRIL 20 MG/1
20 TABLET ORAL DAILY
Qty: 90 TABLET | Refills: 3 | Status: SHIPPED | OUTPATIENT
Start: 2024-05-08

## 2024-05-08 RX ORDER — QUETIAPINE FUMARATE 300 MG/1
300 TABLET, FILM COATED ORAL NIGHTLY
Status: CANCELLED | OUTPATIENT
Start: 2024-05-08

## 2024-05-08 NOTE — PROGRESS NOTES
Subjective:       Patient ID: Wilfredo Vazquez is a 64 y.o. female.    Chief Complaint: Follow-up (6 month )    HPI-63 yo BF for six-month checkup but states that she needs a surgical clearance for left knee replacement  May 22 nd --MRI of the left knee showed chondromalacia osteonecrosis displaced flap tear of the posterior horn of the medial meniscus patient scheduled for knee replacement  Needs refill of Ultram to help with left knee until surgery done   Hypertension on lisinopril blood pressure 126/78  Bipolar patient on Seroquel 300 q.d.  Left knee pain on Ultram  COPD on albuterol and Breo history      PMH   Surgery appendectomy hysterectomy laparoscopic Nissen fundoplication numerous gastroscopy and colonoscopy nerve root blocks  Hospitalization COPD hypertension anemia hyperglycemia dysphagia GERD colonic polyps esophageal stenosis osteoarthritis of the left knee sleep apnea tobacco abuse  Medications see sheet  Allergies naproxen    Social history--smoking 1/3 ppd ETOH --social drinker, , 1 child, disabled--duerbGI issues lives with daughter     Fa HX --sister diabetes and nephew's---father MI--mother MI            ROS:    Skin: no psoriasis, eczema, skin cancer-  HEENT: no headache,  No ocular pain, blurred vision, diplopia, epistaxis, hoarseness change in voice, thyroid trouble  Lung: No pneumonia, +asthma, no Tb, +_wheezing,+ SOB, no smoking + COPD --doing better--needs inhaler albuterol and breztri   Heart: No chest pain, ankle edema, palpitations, MI, patti murmur, +hypertension,no  hyperlipidemia no stent bypass arrhythmia blood pressure 126/78  Abdomen: no nausea,no  Vomiting,no diarrhea, no  constipation, ulcers, hepatitis, gallbladder disease, melena, hematochezia, hematemesis +dysphagia history of fundoplication for GERD history of esophageal dilatation x2Dr Beary doing better--swallowing much better  : no UTI, renal disease, stones  GYN hyst   MS: no fractures, O/A, lupus,  rheumatoid, gout--pain lower back but annette left knee see HPI  Neuro: No dizziness, LOC, seizures   No diabetes, no anemia, no anxiety, no depression   Single--2 children one  --disable secondary to a stomach--lives with daughter and grandson    Objective:   Physical Exam:    General: Well nourished, well developed, no acute distress +obesity  Skin: No lesions  HEENT: Eyes PERRLA, EOM intact, bilateral cataract nose patent, throat non erythematous ears TMs clear   NECK: Supple, no bruits, No JVD, no nodes  Lungs: Clear, no rales, rhonchi, wheezing coarse BS   Heart: Regular rate and rhythm, no murmur s, gallops, or rubs  Abdomen:  No abdominal pain guarding rebound or tenderness organomegaly  MS--no significant change pain left knee palpation--crepitus with flexion extension of the left knee--tenderness with ambulation--able squat retirement down hard to arise--told in the past may need a knee replacement--tenderness lumbar spine L1-S1 anterior flexion 10° extension 10° lateral flexion rotation 10°  Neuro: Alert, CN intact, oriented X 3  Extremities: No cyanosis, clubbing, or edema         Assessment:       1. Osteoarthritis of left knee, unspecified osteoarthritis type    2. Cortical age-related cataract of both eyes    3. Hypertension, unspecified type    4. Hyperglycemia    5. History of gastroesophageal reflux (GERD)    6. History of fundoplication    7. Esophageal stenosis    8. Degenerative tear of left medial meniscus    9. Dysphagia, unspecified type    10. Chronic obstructive pulmonary disease, unspecified COPD type    11. Cigarette nicotine dependence without complication    12. Compression fracture of L1 vertebra with routine healing    13. Bronchospasm    14. Bipolar affective disorder, remission status unspecified    15. Syncope and collapse                  Plan:           Main Reason for Visit--  Multiple medical issues in for 6 mo checkup but states needs surgical clearance for left total knee  replacement May 22nd--patient medically cleared if lab OK   Severe left knee pain-saw orthopedist had knee injection has appointment to meet December for possible surgery-needs refills of Ultram----MRI the knee patient had tricompartment osteo malacia of the left knee with osteonecrosis and a medial meniscus tear needs knee replacement patient referred to orthopedist  Bulging disc T12-S1 and some facet arthropathy L4-5 L5-S1 history of a compression fracture of L1 is mild will monitor at this time if symptoms become worse will refer to Neuro surgery  Claims weight loss 215-187 lb given Periactin 4 mg q.h.s. to increase appetite  Shortness of breath--COPD/emphysema recent hospitalization for chronic respiratory failure with hypoxia--has appointment with pulmonary MD--on aerosol albuterol nebulizer--Breztri--Spiriva--in the hospital was on BiPAP--patient claims had 6 minute walk and did not meet requirements for oxygen--Needs pulmonary see if feels pt would benefit from steroids/CPAP or BiPAP /home O2   Hypertension blood pressure 126/78  on cozaar 100 mg if stays will increase to Hyzaar 112.5  History dysphagia--had Nissen fundoplication--history of GERD--history of esophageal stenosis with dilatation occas  difficulty swallowing water has to eat small amounts of food--history esophageal dilitation --OK now   Lab CBCs CMP lipd Pt and PTT INR --had recent chest x-ray EKG both basically within normal limits will not repeat these patient medically cleared if lab OK

## 2024-05-14 ENCOUNTER — DOCUMENTATION ONLY (OUTPATIENT)
Dept: ORTHOPEDICS | Facility: CLINIC | Age: 64
End: 2024-05-14
Payer: MEDICARE

## 2024-05-14 ENCOUNTER — TELEPHONE (OUTPATIENT)
Dept: ORTHOPEDICS | Facility: CLINIC | Age: 64
End: 2024-05-14
Payer: MEDICARE

## 2024-05-14 DIAGNOSIS — M17.12 PRIMARY OSTEOARTHRITIS OF LEFT KNEE: Primary | ICD-10-CM

## 2024-05-14 NOTE — PROGRESS NOTES
Rolling walking: Since patient will be s/p total joint replacement, the mobility limitation cannot be sufficiently resolved by the use of a cane.   Patient's functional mobility deficit can be sufficiently resolved with the use of a (Rolling Walker or Walker).  Patient's mobility limitation significantly impairs their ability to participate in one of more activities of daily living.  The use of a (Rolling Walker or Walker) will significantly improve the patient's ability to participate in MRADLS and the patient will use it on regular basis in the home.

## 2024-05-14 NOTE — TELEPHONE ENCOUNTER
Spoke with patient about clearance labs. States she has papers for clearance to drop off, which she will do today or tomorrow. All questions answered.

## 2024-05-21 ENCOUNTER — TELEPHONE (OUTPATIENT)
Dept: ORTHOPEDICS | Facility: CLINIC | Age: 64
End: 2024-05-21
Payer: MEDICARE

## 2024-05-21 NOTE — TELEPHONE ENCOUNTER
----- Message from Rubi Ballesteros LPN sent at 5/21/2024 12:56 PM CDT -----  Regarding: FW: clearance    ----- Message -----  From: Virginie Gimenez RN  Sent: 5/21/2024  12:56 PM CDT  To: Debbie Morton CST; Veronica Manning PA-C; #  Subject: clearance                                        Patient did not get her labs that were ordered by Yumiko on 5/8/24.  He said in his note cleared if labs ok.  Is she still having total knee?

## 2024-05-21 NOTE — TELEPHONE ENCOUNTER
Returned the pt's call and she was advised be the provider to go 1st thing in the morning to have her labs done. Pt v/u

## 2024-05-21 NOTE — TELEPHONE ENCOUNTER
Spoke with patient via phone.  Stated she needed to get her lab work done before she got cleared per Dr. Calderon's note.  Patient verbalized understanding, she will go tomorrow morning to have lab work done.  Instructed her not to eat or drink anything prior to lab work.  Appointment scheduled for 8:00 a.m..  Patient verbalized understanding.

## 2024-05-24 ENCOUNTER — TELEPHONE (OUTPATIENT)
Dept: ORTHOPEDICS | Facility: CLINIC | Age: 64
End: 2024-05-24
Payer: MEDICARE

## 2024-05-24 ENCOUNTER — OFFICE VISIT (OUTPATIENT)
Dept: ORTHOPEDICS | Facility: CLINIC | Age: 64
End: 2024-05-24
Payer: MEDICARE

## 2024-05-24 VITALS
BODY MASS INDEX: 28.93 KG/M2 | HEIGHT: 63 IN | SYSTOLIC BLOOD PRESSURE: 137 MMHG | DIASTOLIC BLOOD PRESSURE: 78 MMHG | HEART RATE: 87 BPM | WEIGHT: 163.25 LBS

## 2024-05-24 DIAGNOSIS — Z98.890 S/P LEFT KNEE SURGERY: Primary | ICD-10-CM

## 2024-05-24 DIAGNOSIS — M17.12 PRIMARY OSTEOARTHRITIS OF LEFT KNEE: Primary | ICD-10-CM

## 2024-05-24 PROCEDURE — 99999 PR PBB SHADOW E&M-EST. PATIENT-LVL III: CPT | Mod: PBBFAC,HCNC,,

## 2024-05-24 PROCEDURE — 99499 UNLISTED E&M SERVICE: CPT | Mod: HCNC,S$GLB,,

## 2024-05-24 NOTE — H&P
Wilfredo Vazquez is a 64 y.o. year old here today for preoperative visit in preparation for a Left total knee arthroplasty to be performed by Dr. Enriquez on 5/27/2024.  she was last seen and treated in the clinic on 1/29/2024. she will be medically optimized by the pre op center. There has been no significant change in medical status since last visit. No fever, chills, malaise, or unexplained weight change.      Allergies, Medications, past medical and surgical history reviewed.    Focused examination performed.    All questions answered. Patient encouraged to call with questions. Contact information given.     Pre, jeovanny, and post operative procedures and expectations discussed. Goals of successful surgery reviewed and include manageable pain levels, surgical site free of infection, medication management, and ambulation with PT/OT assistance. Healthy weight management discussed with patient and caregiver who were receptive to eduction of healthy diet and activity. No other necessary lifestyle changes identified. Educated patient about signs and symptoms of infection, medication management, anticoagulation therapy, risk of tobacco and alcohol use, and self-care to promote healing. Surgical guide given for future reference. Hibiclens given to patient with instructions. All questions were answered.     Wilfredo Vazquez verbalized an understanding to the education and goals. Patient has displayed readiness to engage in care and is ready to proceed with surgery.      Surgical and blood consents signed.    Wilfredo Vazquez will contact us if there are any questions, concerns, or changes in medical status prior to surgery.       Joint class: Done    Patient has discussed discharge planning with surgeon. Patient will be discharged to home following surgery.   Home health post-operatively     15 minutes of time was spent on patient education, review of records, templating, H&P, , appointment  scheduling and optimizing patient for surgery.

## 2024-05-27 DIAGNOSIS — G89.18 ACUTE POST-OPERATIVE PAIN: Primary | ICD-10-CM

## 2024-05-27 RX ORDER — DOXYCYCLINE 100 MG/1
100 CAPSULE ORAL 2 TIMES DAILY
Qty: 14 CAPSULE | Refills: 0 | Status: SHIPPED | OUTPATIENT
Start: 2024-05-27 | End: 2024-06-19

## 2024-05-27 RX ORDER — OXYCODONE AND ACETAMINOPHEN 5; 325 MG/1; MG/1
1 TABLET ORAL EVERY 4 HOURS PRN
Qty: 28 EACH | Refills: 0 | Status: SHIPPED | OUTPATIENT
Start: 2024-05-27 | End: 2024-06-04 | Stop reason: SDUPTHER

## 2024-05-27 RX ORDER — IBUPROFEN 600 MG/1
600 TABLET ORAL 3 TIMES DAILY
Qty: 60 TABLET | Refills: 1 | Status: SHIPPED | OUTPATIENT
Start: 2024-05-27

## 2024-05-27 RX ORDER — ONDANSETRON 4 MG/1
4 TABLET, ORALLY DISINTEGRATING ORAL 2 TIMES DAILY
Qty: 30 TABLET | Refills: 0 | Status: SHIPPED | OUTPATIENT
Start: 2024-05-27

## 2024-05-27 RX ORDER — ASPIRIN 81 MG/1
81 TABLET ORAL 2 TIMES DAILY
Qty: 60 TABLET | Refills: 0 | Status: SHIPPED | OUTPATIENT
Start: 2024-05-27 | End: 2025-05-27

## 2024-05-28 ENCOUNTER — TELEPHONE (OUTPATIENT)
Dept: ORTHOPEDICS | Facility: CLINIC | Age: 64
End: 2024-05-28
Payer: MEDICARE

## 2024-05-28 NOTE — TELEPHONE ENCOUNTER
Spoke with daughter via phone.  Daughter states patient is in a lot of pain.  Discussed okay to take 2 of the Percocets and alternate with ibuprofen.  Continue lots of elevation and icing.  Daughter verbalized understanding and will call with any further questions or concerns.

## 2024-06-04 ENCOUNTER — TELEPHONE (OUTPATIENT)
Dept: ORTHOPEDICS | Facility: CLINIC | Age: 64
End: 2024-06-04
Payer: MEDICARE

## 2024-06-04 DIAGNOSIS — G89.18 ACUTE POST-OPERATIVE PAIN: Primary | ICD-10-CM

## 2024-06-04 DIAGNOSIS — G89.18 ACUTE POST-OPERATIVE PAIN: ICD-10-CM

## 2024-06-04 RX ORDER — OXYCODONE AND ACETAMINOPHEN 10; 325 MG/1; MG/1
1 TABLET ORAL EVERY 4 HOURS PRN
Qty: 28 TABLET | Refills: 0 | Status: SHIPPED | OUTPATIENT
Start: 2024-06-04 | End: 2024-06-11 | Stop reason: SDUPTHER

## 2024-06-04 RX ORDER — OXYCODONE AND ACETAMINOPHEN 5; 325 MG/1; MG/1
1 TABLET ORAL EVERY 4 HOURS PRN
Qty: 28 EACH | Refills: 0 | Status: SHIPPED | OUTPATIENT
Start: 2024-06-04 | End: 2024-06-04 | Stop reason: ALTCHOICE

## 2024-06-04 NOTE — TELEPHONE ENCOUNTER
----- Message from Abi Salcedo MA sent at 6/4/2024  9:05 AM CDT -----  Regarding: pt  advice  Contact: pt at [phone]  Liss  from [facility]  is calling to speak with someone in provider office is asking for a return call  regarding  patient  care and  stated that she have night sweats and needed refill on pain meds oxyCODONE-acetaminophen (PERCOCET) 5-325 mg per tablet sent  to   [facility]  [address]  [address]  [place] [address]  Phone: [phone] Fax: [phone]    please call pt at [phone]

## 2024-06-04 NOTE — PROGRESS NOTES
Spoke with patient.  Stated she was still in a lot of pain and requesting Percocet be increased to 10 mg.  Rx sent.  Also states she was waking up in the middle of the night with sweats.  She was not taken her temperature.  Recommended checking her temperature to see if she has a fever and continuing lots of ice and elevation to leg.  Patient verbalized understanding.

## 2024-06-11 ENCOUNTER — OFFICE VISIT (OUTPATIENT)
Dept: ORTHOPEDICS | Facility: CLINIC | Age: 64
End: 2024-06-11
Payer: MEDICARE

## 2024-06-11 VITALS
DIASTOLIC BLOOD PRESSURE: 89 MMHG | SYSTOLIC BLOOD PRESSURE: 156 MMHG | HEIGHT: 63 IN | WEIGHT: 162.06 LBS | HEART RATE: 88 BPM | BODY MASS INDEX: 28.71 KG/M2

## 2024-06-11 DIAGNOSIS — Z96.652 S/P TOTAL KNEE ARTHROPLASTY, LEFT: Primary | ICD-10-CM

## 2024-06-11 DIAGNOSIS — G89.18 ACUTE POST-OPERATIVE PAIN: ICD-10-CM

## 2024-06-11 PROCEDURE — 3077F SYST BP >= 140 MM HG: CPT | Mod: HCNC,CPTII,S$GLB,

## 2024-06-11 PROCEDURE — 1159F MED LIST DOCD IN RCRD: CPT | Mod: HCNC,CPTII,S$GLB,

## 2024-06-11 PROCEDURE — 99024 POSTOP FOLLOW-UP VISIT: CPT | Mod: HCNC,S$GLB,,

## 2024-06-11 PROCEDURE — 3079F DIAST BP 80-89 MM HG: CPT | Mod: HCNC,CPTII,S$GLB,

## 2024-06-11 PROCEDURE — 99999 PR PBB SHADOW E&M-EST. PATIENT-LVL III: CPT | Mod: PBBFAC,HCNC,,

## 2024-06-11 PROCEDURE — 4010F ACE/ARB THERAPY RXD/TAKEN: CPT | Mod: HCNC,CPTII,S$GLB,

## 2024-06-11 RX ORDER — OXYCODONE AND ACETAMINOPHEN 10; 325 MG/1; MG/1
1 TABLET ORAL EVERY 4 HOURS PRN
Qty: 28 TABLET | Refills: 0 | Status: SHIPPED | OUTPATIENT
Start: 2024-06-11 | End: 2024-06-19 | Stop reason: SDUPTHER

## 2024-06-11 NOTE — PROGRESS NOTES
Post-op Note    HPI    Wilfredo Vazquez is here 2 weeks s/p the following procedure:     5/27/2024  Left total knee arthroplasty     Overall doing well. Pain controlled on current regimen. She is currently enrolled in Physical Therapy. Denies any chest pain or shortness of breathe. Denies any drainage from the incision. Denies any fevers, chills or paresthesias. Requesting percocet refill. Doxycycline completed.     DVT Prophylaxis: asa bid x 4 weeks      Physical Exam:     Patient is alert and oriented no acute distress.   Assistive Device: walker    Left knee: aquacel removed. Incision(s) are well healed.  There is no evidence of dehiscence.  There is no induration erythema or signs of infection.  Appropriate soft tissue swelling.  Compartments are soft and compressible.  Warm well-perfused extremity. ROM 15-95    Imaging:     I have personally reviewed the following imaging and these are an interpretation of my findings:     X-Ray: I have reviewed all pertinent results/findings and my personal findings are:  s/p L TKA in good alignment without evidence of hardware failure or complications.    Assessment    Wilfredo Vazquez is 2 weeks Post-op     Plan:    Overall doing as expected.  We discussed expectations of surgery and postoperative course.     Pain: Continued postoperative pain regimen -- percocet refill sent  DVT prophylaxis: cont x 2 weeks  PT/OT: Continue/Initiate physical therapy (weight bearing status: WBAT), cont PT c emphasis on extension     In 24 hours, you may shower without covering your incision.  Do not submerge your incision for another 2 weeks.  If steri strips applied, they can get wet, remove in 48-72 hours if not falling off on their own. Do not submerge incision for another 2 weeks. You may start to do a scar massage with vitamin-E oil/cocoa butter to your incisions. Please inform the clinic if you experience any bleeding or discharge, warmth, or redness.       Follow-up: 4 weeks  Mina   X-rays next visit: left knee

## 2024-06-18 ENCOUNTER — EXTERNAL HOME HEALTH (OUTPATIENT)
Dept: HOME HEALTH SERVICES | Facility: HOSPITAL | Age: 64
End: 2024-06-18
Payer: MEDICARE

## 2024-06-19 DIAGNOSIS — G89.18 ACUTE POST-OPERATIVE PAIN: ICD-10-CM

## 2024-06-19 RX ORDER — OXYCODONE AND ACETAMINOPHEN 10; 325 MG/1; MG/1
1 TABLET ORAL EVERY 4 HOURS PRN
Qty: 28 TABLET | Refills: 0 | Status: SHIPPED | OUTPATIENT
Start: 2024-06-19

## 2024-07-01 DIAGNOSIS — G89.18 ACUTE POST-OPERATIVE PAIN: ICD-10-CM

## 2024-07-01 RX ORDER — OXYCODONE AND ACETAMINOPHEN 10; 325 MG/1; MG/1
1 TABLET ORAL EVERY 4 HOURS PRN
Qty: 28 TABLET | Refills: 0 | Status: SHIPPED | OUTPATIENT
Start: 2024-07-01

## 2024-07-01 NOTE — TELEPHONE ENCOUNTER
----- Message from Abi Salcedo MA sent at 7/1/2024  9:17 AM CDT -----  Regarding: refill  Contact: pt. [phone]  Type:  RX Refill Request    Who Called:  Wilfredo   Refill or New Rx: refill     RX Name and Strength:oxyCODONE-acetaminophen (PERCOCET)  mg  How is the patient currently taking it? Sig - Route: Take 1 tablet by mouth every 4 (four) hours as needed for Pain. - Oral    Is this a 30 day or 90 day RX: 30 day   Preferred Pharmacy with phone number:   [facility] - [address]  [address]  [place] [address]  Phone: [phone] Fax: [phone]     Local or Mail Order: local   Ordering Provider: myron alfaro   Would the patient rather a call back or a response via [facility]?  Call back   Best Call Back Number: pt. [phone]

## 2024-07-10 ENCOUNTER — OFFICE VISIT (OUTPATIENT)
Dept: ORTHOPEDICS | Facility: CLINIC | Age: 64
End: 2024-07-10
Payer: MEDICARE

## 2024-07-10 VITALS
SYSTOLIC BLOOD PRESSURE: 143 MMHG | WEIGHT: 159.31 LBS | HEIGHT: 63 IN | HEART RATE: 93 BPM | DIASTOLIC BLOOD PRESSURE: 85 MMHG | BODY MASS INDEX: 28.23 KG/M2

## 2024-07-10 DIAGNOSIS — Z96.652 S/P TOTAL KNEE ARTHROPLASTY, LEFT: Primary | ICD-10-CM

## 2024-07-10 PROCEDURE — 4010F ACE/ARB THERAPY RXD/TAKEN: CPT | Mod: HCNC,CPTII,S$GLB, | Performed by: ORTHOPAEDIC SURGERY

## 2024-07-10 PROCEDURE — 99024 POSTOP FOLLOW-UP VISIT: CPT | Mod: HCNC,S$GLB,, | Performed by: ORTHOPAEDIC SURGERY

## 2024-07-10 PROCEDURE — 3079F DIAST BP 80-89 MM HG: CPT | Mod: HCNC,CPTII,S$GLB, | Performed by: ORTHOPAEDIC SURGERY

## 2024-07-10 PROCEDURE — 99999 PR PBB SHADOW E&M-EST. PATIENT-LVL III: CPT | Mod: PBBFAC,HCNC,, | Performed by: ORTHOPAEDIC SURGERY

## 2024-07-10 PROCEDURE — 1159F MED LIST DOCD IN RCRD: CPT | Mod: HCNC,CPTII,S$GLB, | Performed by: ORTHOPAEDIC SURGERY

## 2024-07-10 PROCEDURE — 3077F SYST BP >= 140 MM HG: CPT | Mod: HCNC,CPTII,S$GLB, | Performed by: ORTHOPAEDIC SURGERY

## 2024-07-10 RX ORDER — OXYCODONE AND ACETAMINOPHEN 5; 325 MG/1; MG/1
1 TABLET ORAL EVERY 4 HOURS PRN
Qty: 28 TABLET | Refills: 0 | Status: SHIPPED | OUTPATIENT
Start: 2024-07-10

## 2024-07-10 RX ORDER — NALOXONE HYDROCHLORIDE 4 MG/.1ML
SPRAY NASAL
Qty: 1 EACH | Refills: 11 | Status: SHIPPED | OUTPATIENT
Start: 2024-07-10

## 2024-07-10 NOTE — PROGRESS NOTES
Post-op Note    HPI    Wilfredo Vazquez is here 6 weeks s/p the following procedure:     5/27/2024  Left total knee arthroplasty     Overall doing well. Pain controlled on current regimen.  Doing well with therapy.  Making progress.  Using a cane      Physical Exam:     Patient is alert and oriented no acute distress.   Assistive Device:  Cane    Left knee: aquacel removed. Incision(s) are well healed.  There is no evidence of dehiscence.  There is no induration erythema or signs of infection.  Mild soft tissue swelling.  Compartments are soft and compressible.  Warm well-perfused extremity. ROM 5-100.  Quad atrophy    Imaging:     I have personally reviewed the following imaging and these are an interpretation of my findings:     X-Ray: I have reviewed all pertinent results/findings and my personal findings are:  s/p L TKA in good alignment without evidence of hardware failure or complications.    Assessment    Wilfredo Vazquez is 6 weeks Post-op     Plan:    Overall doing as expected.  We discussed expectations of surgery and postoperative course.     Pain: Continued postoperative pain regimen -- percocet refill sent  DVT prophylaxis:  Complete  PT/OT: Continue/Initiate physical therapy (weight bearing status: WBAT), cont PT without restriction      Follow-up: 6-8 weeks [place]   X-rays next visit: left knee

## 2024-07-11 ENCOUNTER — TELEPHONE (OUTPATIENT)
Dept: ORTHOPEDICS | Facility: CLINIC | Age: 64
End: 2024-07-11
Payer: MEDICARE

## 2024-07-11 NOTE — TELEPHONE ENCOUNTER
----- Message from Rubi Ballesteros LPN sent at 7/11/2024  3:54 PM CDT -----  Contact: pt @ [phone]    ----- Message -----  From: Annalee Bose  Sent: 7/11/2024   3:17 PM CDT  To: Mina Manning Staff    Wilfredo Vazquez calling regarding Patient Advice (message) for #pt is calling to speak with nurse concerning pain in knee, asking for call back

## 2024-07-11 NOTE — TELEPHONE ENCOUNTER
"----- Message from Veronica Manning PA-C sent at 7/11/2024  3:22 PM CDT -----  Regarding: RE: pt advice  Contact: [phone]  Since she is 6 weeks out, we need to wean down medicine. 5 mg is appropriate. Cannot give narcotics after 8 weeks.  ----- Message -----  From: Melonie Purcell LPN  Sent: 7/11/2024   3:01 PM CDT  To: Veronica Manning PA-C  Subject: FW: pt advice                                      ----- Message -----  From: Rubi Ballesteros LPN  Sent: 7/11/2024   3:01 PM CDT  To: [facility] Clinical Support  Subject: FW: pt advice                                      ----- Message -----  From: Ann Marie Singletary  Sent: 7/11/2024   2:50 PM CDT  To: Mina Manning Staff  Subject: pt advice                                        .Name Of Caller: Self     Contact Preference?: [phone]     What is the nature of the call?: pt calling in regards to mistaken being given the wrong dosage on pt medication Oxycodone , pt was given 5-325, but pt stating pt been on the   pls call      Additional Notes:  "Thank you for all that you do for our patients"  "

## 2024-07-11 NOTE — TELEPHONE ENCOUNTER
Spoke with patient. Pt advise that Since she is 6 weeks out, we need to wean down medicine. 5 mg is appropriate. Cannot give narcotics after 8 weeks. Pt verbalized understanding.

## 2024-07-16 ENCOUNTER — TELEPHONE (OUTPATIENT)
Dept: ORTHOPEDICS | Facility: CLINIC | Age: 64
End: 2024-07-16
Payer: MEDICARE

## 2024-07-16 DIAGNOSIS — G89.18 ACUTE POST-OPERATIVE PAIN: ICD-10-CM

## 2024-07-16 DIAGNOSIS — Z96.652 S/P TOTAL KNEE ARTHROPLASTY, LEFT: Primary | ICD-10-CM

## 2024-07-16 RX ORDER — OXYCODONE AND ACETAMINOPHEN 5; 325 MG/1; MG/1
1 TABLET ORAL EVERY 4 HOURS PRN
Qty: 28 TABLET | Refills: 0 | Status: SHIPPED | OUTPATIENT
Start: 2024-07-16

## 2024-07-16 NOTE — TELEPHONE ENCOUNTER
----- Message from Elma Villafana sent at 7/16/2024  9:03 AM CDT -----  Regarding: PT'S REQUESTING A REFILL OF MEDS BELOW  Contact: PT  oxyCODONE-acetaminophen (PERCOCET)  mg per tablet      Confirmed contact info below:  Contact Name: Winstoncharu George  Phone Number: [phone]        [facility] [place]  [address]  [address]  [place] [address]  Phone: [phone] Fax: [phone]

## 2024-07-24 ENCOUNTER — TELEPHONE (OUTPATIENT)
Dept: ORTHOPEDICS | Facility: CLINIC | Age: 64
End: 2024-07-24
Payer: MEDICARE

## 2024-07-24 DIAGNOSIS — Z96.652 S/P TOTAL KNEE ARTHROPLASTY, LEFT: ICD-10-CM

## 2024-07-24 DIAGNOSIS — G89.18 ACUTE POST-OPERATIVE PAIN: ICD-10-CM

## 2024-07-24 NOTE — TELEPHONE ENCOUNTER
----- Message from Elma Villafana sent at 7/24/2024  3:58 PM CDT -----  Regarding: PT'S REQUESTING A CALL BACK FOR A REFILL OF MEDS BELOW  Contact: PT  oxyCODONE-acetaminophen (PERCOCET) 5-325 mg per tablet      [facility] - [place] - [address]  [address]  [place] [address]  Phone: [phone] Fax: [phone]    Confirmed contact info below:  Contact Name: Winstoncharu George  Phone Number: [phone]

## 2024-07-25 ENCOUNTER — TELEPHONE (OUTPATIENT)
Dept: ORTHOPEDICS | Facility: CLINIC | Age: 64
End: 2024-07-25
Payer: MEDICARE

## 2024-07-25 RX ORDER — OXYCODONE AND ACETAMINOPHEN 5; 325 MG/1; MG/1
1 TABLET ORAL EVERY 4 HOURS PRN
Qty: 28 TABLET | Refills: 0 | Status: SHIPPED | OUTPATIENT
Start: 2024-07-25

## 2024-07-25 NOTE — TELEPHONE ENCOUNTER
Can someone please let patient know this is her last percocet refill since she is 8 weeks post op? I cannot refill it anymore. Thank you

## 2024-08-02 ENCOUNTER — TELEPHONE (OUTPATIENT)
Dept: ORTHOPEDICS | Facility: CLINIC | Age: 64
End: 2024-08-02
Payer: MEDICARE

## 2024-08-02 NOTE — TELEPHONE ENCOUNTER
----- Message from Tono Wilks sent at 8/2/2024  9:42 AM CDT -----  Regarding: Refill  Contact: pt [phone]  Rx Refill/Request    Is this a Refill or New Rx:  refill    Rx Name and Strength:      - oxyCODONE-acetaminophen (PERCOCET) 5-325 mg per tablet    Preferred Pharmacy with phone number:    [facility] - [place]  [address]  [address]  [place] [address]  Phone: [phone] Fax: [phone]     Communication Preference: please call pt [phone] if needed    Additional Information: pt is requesting refill

## 2024-08-02 NOTE — TELEPHONE ENCOUNTER
Spoke with patient. Pt advise per Veronica last time 7/24/24 was her last percocet refill since she is 8 weeks post op? Pt verbalized understanding.

## 2024-08-26 ENCOUNTER — TELEPHONE (OUTPATIENT)
Dept: PRIMARY CARE CLINIC | Facility: CLINIC | Age: 64
End: 2024-08-26
Payer: MEDICARE

## 2024-08-26 DIAGNOSIS — Z12.31 ENCOUNTER FOR SCREENING MAMMOGRAM FOR MALIGNANT NEOPLASM OF BREAST: Primary | ICD-10-CM

## 2024-08-26 NOTE — TELEPHONE ENCOUNTER
----- Message from Pati Sandhu sent at 8/26/2024  8:49 AM CDT -----  Regarding: ORDER  Patient is requesting an order for yearly Mammogram   Thanks  Pati

## 2024-09-10 ENCOUNTER — OFFICE VISIT (OUTPATIENT)
Dept: PRIMARY CARE CLINIC | Facility: CLINIC | Age: 64
End: 2024-09-10
Payer: MEDICARE

## 2024-09-10 VITALS
HEIGHT: 63 IN | WEIGHT: 170.88 LBS | SYSTOLIC BLOOD PRESSURE: 132 MMHG | DIASTOLIC BLOOD PRESSURE: 84 MMHG | BODY MASS INDEX: 30.28 KG/M2 | HEART RATE: 74 BPM | RESPIRATION RATE: 16 BRPM | OXYGEN SATURATION: 96 %

## 2024-09-10 DIAGNOSIS — M23.204 DEGENERATIVE TEAR OF LEFT MEDIAL MENISCUS: ICD-10-CM

## 2024-09-10 DIAGNOSIS — M54.16 LUMBAR RADICULOPATHY: Primary | ICD-10-CM

## 2024-09-10 DIAGNOSIS — M51.36 DDD (DEGENERATIVE DISC DISEASE), LUMBAR: ICD-10-CM

## 2024-09-10 DIAGNOSIS — J00 ACUTE NASOPHARYNGITIS: ICD-10-CM

## 2024-09-10 DIAGNOSIS — I10 HYPERTENSION, UNSPECIFIED TYPE: ICD-10-CM

## 2024-09-10 DIAGNOSIS — F31.9 BIPOLAR AFFECTIVE DISORDER, REMISSION STATUS UNSPECIFIED: ICD-10-CM

## 2024-09-10 DIAGNOSIS — J44.9 CHRONIC OBSTRUCTIVE PULMONARY DISEASE, UNSPECIFIED COPD TYPE: ICD-10-CM

## 2024-09-10 DIAGNOSIS — M47.816 LUMBAR SPONDYLOSIS: ICD-10-CM

## 2024-09-10 DIAGNOSIS — F17.210 CIGARETTE NICOTINE DEPENDENCE WITHOUT COMPLICATION: ICD-10-CM

## 2024-09-10 DIAGNOSIS — Z87.19 HISTORY OF GASTROESOPHAGEAL REFLUX (GERD): ICD-10-CM

## 2024-09-10 DIAGNOSIS — G47.33 OSA (OBSTRUCTIVE SLEEP APNEA): ICD-10-CM

## 2024-09-10 DIAGNOSIS — D64.9 ANEMIA, UNSPECIFIED TYPE: ICD-10-CM

## 2024-09-10 PROCEDURE — 99214 OFFICE O/P EST MOD 30 MIN: CPT | Mod: HCNC,S$GLB,, | Performed by: FAMILY MEDICINE

## 2024-09-10 PROCEDURE — 3075F SYST BP GE 130 - 139MM HG: CPT | Mod: HCNC,CPTII,S$GLB, | Performed by: FAMILY MEDICINE

## 2024-09-10 PROCEDURE — 4010F ACE/ARB THERAPY RXD/TAKEN: CPT | Mod: HCNC,CPTII,S$GLB, | Performed by: FAMILY MEDICINE

## 2024-09-10 PROCEDURE — 99999 PR PBB SHADOW E&M-EST. PATIENT-LVL III: CPT | Mod: PBBFAC,HCNC,, | Performed by: FAMILY MEDICINE

## 2024-09-10 PROCEDURE — 3008F BODY MASS INDEX DOCD: CPT | Mod: HCNC,CPTII,S$GLB, | Performed by: FAMILY MEDICINE

## 2024-09-10 PROCEDURE — 3079F DIAST BP 80-89 MM HG: CPT | Mod: HCNC,CPTII,S$GLB, | Performed by: FAMILY MEDICINE

## 2024-09-10 RX ORDER — TRAMADOL HYDROCHLORIDE 50 MG/1
50 TABLET ORAL EVERY 6 HOURS
Qty: 30 EACH | Refills: 0 | Status: SHIPPED | OUTPATIENT
Start: 2024-09-10

## 2024-09-10 RX ORDER — PREDNISONE 5 MG/1
TABLET ORAL
Qty: 20 TABLET | Refills: 0 | Status: SHIPPED | OUTPATIENT
Start: 2024-09-10

## 2024-09-10 RX ORDER — PROMETHAZINE HYDROCHLORIDE AND DEXTROMETHORPHAN HYDROBROMIDE 6.25; 15 MG/5ML; MG/5ML
5 SYRUP ORAL EVERY 6 HOURS PRN
Qty: 180 ML | Refills: 1 | Status: SHIPPED | OUTPATIENT
Start: 2024-09-10

## 2024-09-10 RX ORDER — AZITHROMYCIN 250 MG/1
TABLET, FILM COATED ORAL
Qty: 6 TABLET | Refills: 0 | Status: SHIPPED | OUTPATIENT
Start: 2024-09-10 | End: 2024-09-14

## 2024-09-10 NOTE — PROGRESS NOTES
Subjective:       Patient ID: Wilfredo Vazquez is a 64 y.o. female.    Chief Complaint: Back Pain, Cough, and Medication Refill    HPI-65 yo BF in for back pain refills and cold   Cold -- x3 days--+slight fever--+runny stuffy nose--+sore throat--+cough--+phlegm--green.  No pneumonia asthma TB--smoking 1/3 pack per day---no nausea vomiting diarrhea  Back pain--3 days ago--woke up with pain in the lower back---and neck and right shoulder--DDD lumbar spine history lumbar radiculopathy history compression fracture of L1--patient had knee surgery with knee replacement in May left knee--needs tramadol for pain--patient with chronic back pain needs to see neurosurgeon for long-term treatment of chronic pain may benefit from epidural steroid injections has had them in the past  HTN blood pressure 132/84 patient on lisinopril  GERD on Protonix  Insomnia and bipolar patient on Seroquel  Left knee replacement  COPD on albuterol and Breo              ROS:    Skin: no psoriasis, eczema, skin cancer-  HEENT: no headache,  No ocular pain, blurred vision, diplopia, epistaxis, hoarseness change in voice, thyroid trouble  Lung: No pneumonia, +asthma, no Tb, +_wheezing,+ SOB, no smoking + COPD --doing better--needs inhaler albuterol and breztri   Heart: No chest pain, ankle edema, palpitations, MI, patti murmur, +hypertension,no  hyperlipidemia no stent bypass arrhythmia blood pressure 126/78  Abdomen: no nausea,no  Vomiting,no diarrhea, no  constipation, ulcers, hepatitis, gallbladder disease, melena, hematochezia, hematemesis +dysphagia history of fundoplication for GERD history of esophageal dilatation x2Dr Beary doing better--swallowing much better  : no UTI, renal disease, stones  GYN hyst   MS: no fractures, O/A, lupus, rheumatoid, gout--pain lower back but annette left knee see HPI  Neuro: No dizziness, LOC, seizures   No diabetes, no anemia, no anxiety, no depression   Single--2 children one  --disable secondary  to a stomach--lives with daughter and grandson    Objective:   Physical Exam:    General: Well nourished, well developed, no acute distress +obesity  Skin: No lesions  HEENT: Eyes PERRLA, EOM intact, bilateral cataract nose patent, throat non erythematous ears TMs clear   NECK: Supple, no bruits, No JVD, no nodes  Lungs: Clear, no rales, rhonchi, wheezing coarse BS   Heart: Regular rate and rhythm, no murmur s, gallops, or rubs  Abdomen:  No abdominal pain guarding rebound or tenderness organomegaly  MS--no significant change pain left knee palpation--crepitus with flexion extension of the left knee--tenderness with ambulation--able squat USP down hard to arise--told in the past may need a knee replacement--tenderness lumbar spine L1-S1 anterior flexion 10° extension 10° lateral flexion rotation 10°  Neuro: Alert, CN intact, oriented X 3  Extremities: No cyanosis, clubbing, or edema         Assessment:       1. Lumbar radiculopathy    2. Lumbar spondylosis    3. DDD (degenerative disc disease), lumbar    4. Degenerative tear of left medial meniscus    5. Acute nasopharyngitis    6. MARISSA (obstructive sleep apnea)    7. Anemia, unspecified type    8. Bipolar affective disorder, remission status unspecified    9. Cigarette nicotine dependence without complication    10. History of gastroesophageal reflux (GERD)    11. Chronic obstructive pulmonary disease, unspecified COPD type    12. Hypertension, unspecified type                    Plan:           Main Reason for Visit--  Low back pain--and neck pain-- Bulging disc T12-S1 and some facet arthropathy L4-5 L5-S1 history of a compression fracture of L1 --3 days ago developed significant pain getting out of bed specially in the lower back will give Ultram ibuprofen--Moist heat/theragesic/range of motion exercise--needs to see neurosurgeon--patient has had epidural steroid injections in the past  Upper respiratory infection--Zithromax/prednisone taper/Phenergan DM COVID  swab  Shortness of breath--COPD/emphysema recent hospitalization for chronic respiratory failure with hypoxia--has appointment with pulmonary MD--on aerosol albuterol nebulizer--Breztri--Spiriva--in the hospital was on BiPAP--patient claims had 6 minute walk and did not meet requirements for oxygen--Needs pulmonary see if feels pt would benefit from steroids/CPAP or BiPAP /home O2   Hypertension blood pressure 132/84  on cozaar 100 mg if stays will increase to Hyzaar 112.5  History dysphagia--had Nissen fundoplication--history of GERD--history of esophageal stenosis with dilatation occas  difficulty swallowing water has to eat small amounts of food--history esophageal dilitation --OK now   Lab CBCs CMP lipd T4 TSH in 6 mo

## 2024-10-02 NOTE — TELEPHONE ENCOUNTER
No care due was identified.  Health [facility] Embedded Care Due Messages. Reference number: [id number].   10/02/2024 11:55:57 AM CDT

## 2024-10-02 NOTE — TELEPHONE ENCOUNTER
----- Message from Kathy sent at 10/2/2024 11:22 AM CDT -----  Contact: [phone]  Requesting an RX refill or new RX.  Is this a refill or new RX: Refill 1  RX name and strength (copy/paste from chart):  predniSONE (DELTASONE) 5 MG tablet  Is this a 30 day or 90 day RX:   Pharmacy name and phone # (copy/paste from chart):  [facility] - [place], [place] - [address] Phone: [phone]  Fax: [phone]      Requesting an RX refill or new RX.  Is this a refill or new RX: Refill 2  RX name and strength (copy/paste from chart):  traMADoL (ULTRAM) 50 mg tablet  Is this a 30 day or 90 day RX:   Pharmacy name and phone # (copy/paste from chart):  [place] [address] Phone: [phone]  Fax: [phone]          The doctors have asked that we provide their patients with the following 2 reminders -- prescription refills can take up to 72 hours, and a friendly reminder that in the future you can use your [facility] account to request refills:

## 2024-10-03 RX ORDER — PREDNISONE 5 MG/1
TABLET ORAL
Qty: 20 TABLET | Refills: 0 | OUTPATIENT
Start: 2024-10-03

## 2024-10-03 RX ORDER — TRAMADOL HYDROCHLORIDE 50 MG/1
50 TABLET ORAL EVERY 6 HOURS
Qty: 30 EACH | Refills: 0 | OUTPATIENT
Start: 2024-10-03

## 2024-10-04 RX ORDER — TRAMADOL HYDROCHLORIDE 50 MG/1
50 TABLET ORAL EVERY 6 HOURS
Qty: 30 EACH | Refills: 0 | Status: SHIPPED | OUTPATIENT
Start: 2024-10-04

## 2024-10-10 ENCOUNTER — HOSPITAL ENCOUNTER (OUTPATIENT)
Dept: RADIOLOGY | Facility: HOSPITAL | Age: 64
Discharge: HOME | End: 2024-10-10
Attending: FAMILY MEDICINE
Payer: MEDICARE

## 2024-10-10 DIAGNOSIS — Z12.31 ENCOUNTER FOR SCREENING MAMMOGRAM FOR MALIGNANT NEOPLASM OF BREAST: ICD-10-CM

## 2024-10-10 PROCEDURE — 77067 SCR MAMMO BI INCL CAD: CPT | Mod: TC,HCNC

## 2024-10-10 RX ORDER — ALBUTEROL SULFATE 90 UG/1
INHALANT RESPIRATORY (INHALATION)
Qty: 25.5 G | Refills: 3 | Status: SHIPPED | OUTPATIENT
Start: 2024-10-10

## 2024-10-10 NOTE — TELEPHONE ENCOUNTER
No care due was identified.  [facility] Embedded Care Due Messages. Reference number: [id number].   10/10/2024 9:33:11 AM CDT

## 2024-10-10 NOTE — TELEPHONE ENCOUNTER
Refill Decision Note   Wilfredo Vazquez  is requesting a refill authorization.  Brief Assessment and Rationale for Refill:  Approve     Medication Therapy Plan:        Comments:     Note composed:1:18 PM 10/10/2024

## 2024-11-05 RX ORDER — TRAMADOL HYDROCHLORIDE 50 MG/1
50 TABLET ORAL EVERY 6 HOURS
Qty: 30 EACH | Refills: 0 | OUTPATIENT
Start: 2024-11-05

## 2024-11-05 NOTE — TELEPHONE ENCOUNTER
----- Message from Ginger sent at 11/5/2024  2:23 PM CST -----  Contact: Pt [phone]  Requesting an RX refill or new RX.    Is this a refill or new RX: Refill    RX name and strength (copy/paste from chart):  traMADoL (ULTRAM) 50 mg tablet    Is this a 30 day or 90 day RX: 30    Pharmacy name and phone # (copy/paste from chart):    [facility] - [place] - [address]  [address] [place] [address]  Phone: [phone] Fax: [phone]    Please call and advise.    Thank You

## 2024-11-05 NOTE — TELEPHONE ENCOUNTER
No care due was identified.  [facility] Embedded Care Due Messages. Reference number: [id number].   11/05/2024 2:44:32 PM CST

## 2024-11-06 NOTE — TELEPHONE ENCOUNTER
Called pt to tell her control medication can not be called in over the phone. LM on VM to return call to schedule annual visit.

## 2024-11-08 DIAGNOSIS — M51.369 DEGENERATION OF INTERVERTEBRAL DISC OF LUMBAR REGION, UNSPECIFIED WHETHER PAIN PRESENT: ICD-10-CM

## 2024-11-08 DIAGNOSIS — M54.16 LUMBAR RADICULOPATHY: Primary | ICD-10-CM

## 2024-11-08 NOTE — TELEPHONE ENCOUNTER
----- Message from [place] sent at 11/8/2024 10:46 AM CST -----  Contact: Mobile  [phone]  Requesting an RX refill or new RX.    Is this a refill or new RX: Refill    RX name and strength traMADoL (ULTRAM) 50 mg tablet    Is this a 30 day or 90 day RX: 30    Pharmacy name and phone #   [facility] - [place] - [address]  [address]  [place] [address]  Phone: [phone] Fax: [phone]        The doctors have asked that we provide their patients with the following 2 reminders -- prescription refills can take up to 72 hours, and a friendly reminder that in the future you can use your [facility] account to request refills:

## 2024-11-08 NOTE — TELEPHONE ENCOUNTER
No care due was identified.  [facility] Embedded Care Due Messages. Reference number: [id number].   11/08/2024 10:53:03 AM CST

## 2024-11-09 RX ORDER — TRAMADOL HYDROCHLORIDE 50 MG/1
50 TABLET ORAL EVERY 6 HOURS
Qty: 30 EACH | Refills: 0 | OUTPATIENT
Start: 2024-11-09

## 2024-11-11 ENCOUNTER — OFFICE VISIT (OUTPATIENT)
Dept: PRIMARY CARE CLINIC | Facility: CLINIC | Age: 64
End: 2024-11-11
Payer: MEDICARE

## 2024-11-11 VITALS
DIASTOLIC BLOOD PRESSURE: 78 MMHG | OXYGEN SATURATION: 98 % | SYSTOLIC BLOOD PRESSURE: 130 MMHG | BODY MASS INDEX: 29.79 KG/M2 | RESPIRATION RATE: 19 BRPM | HEART RATE: 86 BPM | HEIGHT: 63 IN | WEIGHT: 168.13 LBS

## 2024-11-11 DIAGNOSIS — J40 BRONCHITIS: ICD-10-CM

## 2024-11-11 DIAGNOSIS — J98.01 BRONCHOSPASM: Primary | ICD-10-CM

## 2024-11-11 DIAGNOSIS — G47.33 OBSTRUCTIVE SLEEP APNEA SYNDROME: ICD-10-CM

## 2024-11-11 DIAGNOSIS — Z98.890 HISTORY OF ESOPHAGEAL DILATATION: ICD-10-CM

## 2024-11-11 DIAGNOSIS — J44.9 CHRONIC OBSTRUCTIVE PULMONARY DISEASE, UNSPECIFIED COPD TYPE: ICD-10-CM

## 2024-11-11 DIAGNOSIS — J01.01 ACUTE RECURRENT MAXILLARY SINUSITIS: ICD-10-CM

## 2024-11-11 DIAGNOSIS — Z87.19 HISTORY OF GASTROESOPHAGEAL REFLUX (GERD): ICD-10-CM

## 2024-11-11 DIAGNOSIS — E66.9 OBESITY (BMI 35.0-39.9 WITHOUT COMORBIDITY): ICD-10-CM

## 2024-11-11 DIAGNOSIS — M54.16 LUMBAR RADICULOPATHY: ICD-10-CM

## 2024-11-11 DIAGNOSIS — I10 HYPERTENSION, UNSPECIFIED TYPE: ICD-10-CM

## 2024-11-11 DIAGNOSIS — G89.18 ACUTE POST-OPERATIVE PAIN: ICD-10-CM

## 2024-11-11 PROCEDURE — 99214 OFFICE O/P EST MOD 30 MIN: CPT | Mod: HCNC,S$GLB,, | Performed by: FAMILY MEDICINE

## 2024-11-11 PROCEDURE — 4010F ACE/ARB THERAPY RXD/TAKEN: CPT | Mod: HCNC,CPTII,S$GLB, | Performed by: FAMILY MEDICINE

## 2024-11-11 PROCEDURE — 3078F DIAST BP <80 MM HG: CPT | Mod: HCNC,CPTII,S$GLB, | Performed by: FAMILY MEDICINE

## 2024-11-11 PROCEDURE — 3008F BODY MASS INDEX DOCD: CPT | Mod: HCNC,CPTII,S$GLB, | Performed by: FAMILY MEDICINE

## 2024-11-11 PROCEDURE — 99999 PR PBB SHADOW E&M-EST. PATIENT-LVL III: CPT | Mod: PBBFAC,HCNC,, | Performed by: FAMILY MEDICINE

## 2024-11-11 PROCEDURE — 3075F SYST BP GE 130 - 139MM HG: CPT | Mod: HCNC,CPTII,S$GLB, | Performed by: FAMILY MEDICINE

## 2024-11-11 RX ORDER — PREDNISONE 5 MG/1
TABLET ORAL
Qty: 20 TABLET | Refills: 0 | Status: SHIPPED | OUTPATIENT
Start: 2024-11-11

## 2024-11-11 RX ORDER — PROMETHAZINE HYDROCHLORIDE AND DEXTROMETHORPHAN HYDROBROMIDE 6.25; 15 MG/5ML; MG/5ML
5 SYRUP ORAL EVERY 6 HOURS PRN
Qty: 180 ML | Refills: 1 | Status: CANCELLED | OUTPATIENT
Start: 2024-11-11

## 2024-11-11 RX ORDER — CEFDINIR 300 MG/1
300 CAPSULE ORAL 2 TIMES DAILY
Qty: 20 CAPSULE | Refills: 0 | Status: SHIPPED | OUTPATIENT
Start: 2024-11-11 | End: 2024-11-21

## 2024-11-11 RX ORDER — CYPROHEPTADINE HYDROCHLORIDE 4 MG/1
TABLET ORAL
Qty: 30 TABLET | Refills: 5 | Status: SHIPPED | OUTPATIENT
Start: 2024-11-11

## 2024-11-11 RX ORDER — VARENICLINE TARTRATE 0.5 (11)-1
KIT ORAL
Qty: 1 EACH | Refills: 0 | Status: SHIPPED | OUTPATIENT
Start: 2024-11-11

## 2024-11-11 RX ORDER — TRAMADOL HYDROCHLORIDE 50 MG/1
50 TABLET ORAL EVERY 6 HOURS
Qty: 30 EACH | Refills: 0 | Status: SHIPPED | OUTPATIENT
Start: 2024-11-11

## 2024-11-11 RX ORDER — PROMETHAZINE HYDROCHLORIDE AND DEXTROMETHORPHAN HYDROBROMIDE 6.25; 15 MG/5ML; MG/5ML
5 SYRUP ORAL EVERY 6 HOURS PRN
Qty: 180 ML | Refills: 1 | Status: SHIPPED | OUTPATIENT
Start: 2024-11-11

## 2024-11-11 RX ORDER — LISINOPRIL 20 MG/1
20 TABLET ORAL DAILY
Qty: 90 TABLET | Refills: 3 | Status: SHIPPED | OUTPATIENT
Start: 2024-11-11

## 2024-11-11 RX ORDER — PANTOPRAZOLE SODIUM 40 MG/1
40 TABLET, DELAYED RELEASE ORAL DAILY
Qty: 90 TABLET | Refills: 3 | Status: SHIPPED | OUTPATIENT
Start: 2024-11-11 | End: 2025-11-11

## 2024-11-11 RX ORDER — ONDANSETRON 4 MG/1
4 TABLET, ORALLY DISINTEGRATING ORAL EVERY 6 HOURS PRN
Qty: 30 TABLET | Refills: 5 | Status: SHIPPED | OUTPATIENT
Start: 2024-11-11

## 2024-11-11 NOTE — PROGRESS NOTES
Subjective:       Patient ID: Wilfredo Vazquez is a 64 y.o. female.    Chief Complaint: Follow-up (6 month ), Sinus Problem, and Medication Problem    HPI-office visit 09/10/2024 65 yo BF in for 6 month checkup-medication refills-sinus  Patient want Chantix for smoking cessation--smokes 1/2ppd  Sinus --x3 days--slight fever--+loose bowel movements no vomiting--+runny stuffy nose--+sore throat--+cough---+phlegm--yellow  No pneumonia asthma TB +wheeze +shortness of breath  Decreased appetite wants refill of Periactin  Hypertension blood pressure 130/78 on lisinopril 20 mg  COPD on albuterol and Breztri  Left knee replacement no problems   GERD on protonix  Back pain still gets it not as bad wants referral   Lab done Aug redo Feb                 ROS:    Skin: no psoriasis, eczema, skin cancer-  HEENT: no headache,  No ocular pain, blurred vision, diplopia, epistaxis, hoarseness change in voice, thyroid trouble  Lung: No pneumonia, +asthma, no Tb, +_wheezing,+ SOB, no smoking + COPD --doing better--needs inhaler albuterol and breztri   Heart: No chest pain, ankle edema, palpitations, MI, patti murmur, +hypertension,no  hyperlipidemia no stent bypass arrhythmia blood pressure 126/78  Abdomen: no nausea,no  Vomiting,no diarrhea, no  constipation, ulcers, hepatitis, gallbladder disease, melena, hematochezia, hematemesis +dysphagia history of fundoplication for GERD history of esophageal dilatation x2Dr Beary doing better--swallowing much better  : no UTI, renal disease, stones  GYN hyst   MS: no fractures, O/A, lupus, rheumatoid, gout--pain lower back but annette left knee see HPI  Neuro: No dizziness, LOC, seizures   No diabetes, no anemia, no anxiety, no depression   Single--2 children one  --disable secondary to a stomach--lives with daughter and grandson    Objective:   Physical Exam:    General: Well nourished, well developed, no acute distress +obesity  Skin: No lesions  HEENT: Eyes PERRLA, EOM intact,  bilateral cataract nose cleear D/C  throat +1/4 erythematous ears TMs clear   NECK: Supple, no bruits, No JVD, no nodes  Lungs: Clear, no rales, rhonchi, wheezing coarse BS   Heart: Regular rate and rhythm, no murmur s, gallops, or rubs  Abdomen:  No abdominal pain guarding rebound or tenderness organomegaly  MS--no significant change pain left knee palpation--crepitus with flexion extension of the left knee--tenderness with ambulation--able squat USP down hard to arise--told in the past may need a knee replacement--tenderness lumbar spine L1-S1 anterior flexion 10° extension 10° lateral flexion rotation 10°  Neuro: Alert, CN intact, oriented X 3  Extremities: No cyanosis, clubbing, or edema         Assessment:       1. Bronchospasm    2. Acute post-operative pain    3. Bronchitis    4. Chronic obstructive pulmonary disease, unspecified COPD type    5. History of gastroesophageal reflux (GERD)    6. History of esophageal dilatation    7. Hypertension, unspecified type    8. Lumbar radiculopathy    9. Obesity (BMI 35.0-39.9 without comorbidity)    10. Obstructive sleep apnea syndrome    11. Acute recurrent maxillary sinusitis                    Plan:           Main Reason for Visit--  Cold --omnicef 300 bid x 10 days / Pred taper /phenergan DM  COPD --chroi=elo bronchitie --albuterol and Breztri   Tobacco abuse half pack per day [place]  Low back pain--and neck pain-- Bulging disc T12-S1 and some facet arthropathy L4-5 L5-S1 history of a compression fracture of L1 --3 days ago developed significant pain getting out of bed specially in the lower back will give Ultram ibuprofen--Moist heat/theragesic/range of motion exercise--needs to see neurosurgeon--patient has had epidural steroid injections in the past  Upper respiratory infection--Zithromax/prednisone taper/Phenergan DM COVID swab  Hypertension blood pressure 130/78  on Hyzaar 1---12.5   Periactin 4 mg q.h.s.  History dysphagia--had Nissen  fundoplication--history of GERD--history of esophageal stenosis with dilatation occas  difficulty swallowing water has to eat small amounts of food--history esophageal dilitation --OK now  on protonix q hs   Lab CBCs CMP lipd T4 TSH in 6 mo

## 2024-12-09 DIAGNOSIS — M54.16 LUMBAR RADICULOPATHY: ICD-10-CM

## 2024-12-09 RX ORDER — TRAMADOL HYDROCHLORIDE 50 MG/1
50 TABLET ORAL EVERY 6 HOURS
Qty: 30 EACH | Refills: 0 | Status: SHIPPED | OUTPATIENT
Start: 2024-12-09

## 2024-12-09 NOTE — TELEPHONE ENCOUNTER
No care due was identified.  [facility] Embedded Care Due Messages. Reference number: [id number].   12/09/2024 12:41:17 PM CST

## 2024-12-09 NOTE — TELEPHONE ENCOUNTER
----- Message from Elsa sent at 12/9/2024 12:14 PM CST -----  Contact: Self/[phone]  Requesting an RX refill or new RX.    Is this a refill or new RX: New    RX name and strength :  traMADoL (ULTRAM) 50 mg tablet    Is this a 30 day or 90 day RX: 30    Pharmacy name and phone # (:  [facility] - [place] - [address]  [address]  [place] [address]  Phone: [phone] Fax: [phone]       The doctors have asked that we provide their patients with the following 2 reminders -- prescription refills can take up to 72 hours, and a friendly reminder that in the future you can use your [facility] account to request refills: Yes

## 2025-01-14 DIAGNOSIS — Z00.00 ENCOUNTER FOR MEDICARE ANNUAL WELLNESS EXAM: ICD-10-CM

## 2025-01-17 ENCOUNTER — TELEPHONE (OUTPATIENT)
Dept: PRIMARY CARE CLINIC | Facility: CLINIC | Age: 65
End: 2025-01-17
Payer: MEDICARE

## 2025-01-17 NOTE — TELEPHONE ENCOUNTER
----- Message from Kathy sent at 1/16/2025 11:25 AM CST -----  Contact: [phone]  Requesting an RX refill or new RX.    Is this a refill or new RX: refill 1    RX name and strength (copy/paste from chart):  traMADoL (ULTRAM) 50 mg tablet    Is this a 30 day or 90 day RX:     Pharmacy name and phone # (copy/paste from chart):  [facility] - [place], [place] - [address]   Phone: [phone]  Fax: [phone]          The doctors have asked that we provide their patients with the following 2 reminders -- prescription refills can take up to 72 hours, and a friendly reminder that in the future you can use your [facility] account to request refills:

## 2025-01-24 ENCOUNTER — TELEPHONE (OUTPATIENT)
Dept: PRIMARY CARE CLINIC | Facility: CLINIC | Age: 65
End: 2025-01-24
Payer: MEDICARE

## 2025-01-24 NOTE — TELEPHONE ENCOUNTER
----- Message from Courtney sent at 1/24/2025  3:09 PM CST -----  Patient called returning a call she is trying to get her medicine refilled.

## 2025-02-11 ENCOUNTER — OFFICE VISIT (OUTPATIENT)
Dept: PRIMARY CARE CLINIC | Facility: CLINIC | Age: 65
End: 2025-02-11
Payer: MEDICARE

## 2025-02-11 VITALS
HEART RATE: 96 BPM | DIASTOLIC BLOOD PRESSURE: 76 MMHG | SYSTOLIC BLOOD PRESSURE: 124 MMHG | WEIGHT: 171.94 LBS | OXYGEN SATURATION: 93 % | BODY MASS INDEX: 30.46 KG/M2 | TEMPERATURE: 99 F | HEIGHT: 63 IN

## 2025-02-11 DIAGNOSIS — J44.9 CHRONIC OBSTRUCTIVE PULMONARY DISEASE, UNSPECIFIED COPD TYPE: ICD-10-CM

## 2025-02-11 DIAGNOSIS — R63.5 WEIGHT INCREASE: ICD-10-CM

## 2025-02-11 DIAGNOSIS — J40 BRONCHITIS: Primary | ICD-10-CM

## 2025-02-11 DIAGNOSIS — S39.012D LUMBOSACRAL STRAIN, SUBSEQUENT ENCOUNTER: ICD-10-CM

## 2025-02-11 DIAGNOSIS — J44.9 CHRONIC OBSTRUCTIVE PULMONARY DISEASE, UNSPECIFIED COPD TYPE: Primary | ICD-10-CM

## 2025-02-11 DIAGNOSIS — J98.01 BRONCHOSPASM: ICD-10-CM

## 2025-02-11 DIAGNOSIS — F31.9 BIPOLAR AFFECTIVE DISORDER, REMISSION STATUS UNSPECIFIED: ICD-10-CM

## 2025-02-11 DIAGNOSIS — Z98.890 HISTORY OF ESOPHAGEAL DILATATION: ICD-10-CM

## 2025-02-11 DIAGNOSIS — M87.9 OSTEONECROSIS, UNSPECIFIED: ICD-10-CM

## 2025-02-11 DIAGNOSIS — M54.16 LUMBAR RADICULOPATHY: ICD-10-CM

## 2025-02-11 DIAGNOSIS — M25.552 LEFT HIP PAIN: ICD-10-CM

## 2025-02-11 DIAGNOSIS — I10 HYPERTENSION, UNSPECIFIED TYPE: ICD-10-CM

## 2025-02-11 DIAGNOSIS — K22.2 ESOPHAGEAL STENOSIS: ICD-10-CM

## 2025-02-11 PROCEDURE — 99999 PR PBB SHADOW E&M-EST. PATIENT-LVL III: CPT | Mod: PBBFAC,HCNC,, | Performed by: FAMILY MEDICINE

## 2025-02-11 RX ORDER — TRAMADOL HYDROCHLORIDE 50 MG/1
50 TABLET ORAL EVERY 6 HOURS
Qty: 30 EACH | Refills: 0 | Status: ON HOLD | OUTPATIENT
Start: 2025-02-11

## 2025-02-11 RX ORDER — PREDNISONE 20 MG/1
20 TABLET ORAL DAILY
Qty: 7 TABLET | Refills: 0 | Status: ON HOLD | OUTPATIENT
Start: 2025-02-11 | End: 2025-02-18

## 2025-02-11 RX ORDER — CEFDINIR 300 MG/1
300 CAPSULE ORAL 2 TIMES DAILY
Qty: 20 CAPSULE | Refills: 0 | Status: ON HOLD | OUTPATIENT
Start: 2025-02-11 | End: 2025-02-21

## 2025-02-11 RX ORDER — CYPROHEPTADINE HYDROCHLORIDE 4 MG/1
TABLET ORAL
Qty: 30 TABLET | Refills: 5 | Status: ON HOLD | OUTPATIENT
Start: 2025-02-11

## 2025-02-11 RX ORDER — PROMETHAZINE HYDROCHLORIDE AND DEXTROMETHORPHAN HYDROBROMIDE 6.25; 15 MG/5ML; MG/5ML
5 SYRUP ORAL EVERY 6 HOURS PRN
Qty: 180 ML | Refills: 1 | Status: ON HOLD | OUTPATIENT
Start: 2025-02-11

## 2025-02-11 RX ORDER — TRIAMCINOLONE ACETONIDE 40 MG/ML
40 INJECTION, SUSPENSION INTRA-ARTICULAR; INTRAMUSCULAR ONCE
Status: COMPLETED | OUTPATIENT
Start: 2025-02-11 | End: 2025-02-11

## 2025-02-11 RX ADMIN — TRIAMCINOLONE ACETONIDE 40 MG: 40 INJECTION, SUSPENSION INTRA-ARTICULAR; INTRAMUSCULAR at 04:02

## 2025-02-11 NOTE — PROGRESS NOTES
Subjective:       Patient ID: Wilrfedo Vazquez is a 65 y.o. female.    Chief Complaint: Cough and Generalized Body Aches    OEL-57-pysu-old black female in for refills  Has a cold---no fever---+runny stuffy nose---+sore throat---+cough---+phlegm--green---no pneumonia asthma TB--no smoking---+wheezing +short of breath--patient quit smoking  Hypertension blood pressure 124/76 lisinopril 20 mg  COPD albuterol and Breztri  GERD on Protonix  History left knee replacement  History insomnia on Seroquel--sees psychiatrist in [place]-  History pain in the left groin---no trauma or excessive activity----hurting x3 days                ROS:    Skin: no psoriasis, eczema, skin cancer-  HEENT: no headache,  No ocular pain, blurred vision, diplopia, epistaxis, hoarseness change in voice, thyroid trouble  Lung: No pneumonia, +asthma, no Tb, +_wheezing,+ SOB, no smoking + COPD --doing better--needs inhaler albuterol and breztri   Heart: No chest pain, ankle edema, palpitations, MI, patti murmur, +hypertension,no  hyperlipidemia no stent bypass arrhythmia blood pressure 126/78  Abdomen: no nausea,no  Vomiting,no diarrhea, no  constipation, ulcers, hepatitis, gallbladder disease, melena, hematochezia, hematemesis +dysphagia history of fundoplication for GERD history of esophageal dilatation x2Dr Beary doing better--swallowing much better  : no UTI, renal disease, stones  GYN hyst   MS: no fractures, O/A, lupus, rheumatoid, gout--pain lower back but annetet left knee see HPI  Neuro: No dizziness, LOC, seizures   No diabetes, no anemia, no anxiety, no depression   Single--2 children one  --disable secondary to a stomach--lives with daughter and grandson    Objective:   Physical Exam:    General: Well nourished, well developed, no acute distress +obesity  Skin: No lesions  HEENT: Eyes PERRLA, EOM intact, bilateral cataract nose cleear D/C  throat +1/4 erythematous ears TMs clear   NECK: Supple, no bruits, No JVD, no  nodes  Lungs: Clear, no rales, rhonchi, wheezing coarse BS   Heart: Regular rate and rhythm, no murmur s, gallops, or rubs  Abdomen:  No abdominal pain guarding rebound or tenderness organomegaly  MS--no significant change pain left knee palpation--crepitus with flexion extension of the left knee--tenderness with ambulation--able squat retirement down hard to arise--told in the past may need a knee replacement--tenderness lumbar spine L1-S1 anterior flexion 10° extension 10° lateral flexion rotation 10°  Neuro: Alert, CN intact, oriented X 3  Extremities: No cyanosis, clubbing, or edema         Assessment:       1. Bronchitis    2. Bronchospasm    3. Bipolar affective disorder, remission status unspecified    4. Chronic obstructive pulmonary disease, unspecified COPD type    5. Esophageal stenosis    6. History of esophageal dilatation    7. Hypertension, unspecified type    8. Lumbosacral strain, subsequent encounter    9. Lumbar radiculopathy    10. Osteonecrosis, unspecified                      Plan:           Main Reason for Visit--  Cold --Kenalog 40 mg----Omnicef 300 mg 1 p.o. b.i.d. times 10 days---prednisone 20 mg 1 p.o. q.d. x7 days--Phenergan DM  Left hip pain---should be help with Kenalog prednisone---Moist heat/theragesic/range of motion exercise----see orthopedist--no NSAIDs due to esophageal problems with the esophageal stenosis and requiring esophageal dilatation  COPD --chroi=elo bronchitie --albuterol and Breztri   Tobacco abuse half pack per day ---patient states no longer smoking  Low back pain--and neck pain-- Bulging disc T12-S1 and some facet arthropathy L4-5 L5-S1 history of a compression fracture of L1 --3 days ago developed significant pain getting out of bed specially in the lower back will give Ultram ibuprofen--Moist heat/theragesic/range of motion exercise--needs to see neurosurgeon--patient has had epidural steroid injections in the past  Hypertension blood pressure 124/76--on lisinopril  20 mg  Insomnia on Seroquel should get from psychiatrist  History dysphagia--had Nissen fundoplication--history of GERD--history of esophageal stenosis with dilatation occas  difficulty swallowing water has to eat small amounts of food--history esophageal dilitation --OK now  on protonix q hs   Lab CBCs CMP lipd T4 TSH when fasting or off steroids for 2 weeks

## 2025-02-11 NOTE — TELEPHONE ENCOUNTER
No care due was identified.  [facility] Embedded Care Due Messages. Reference number: [id number].   2/11/2025 3:58:32 PM CST

## 2025-02-16 PROBLEM — R10.13 EPIGASTRIC ABDOMINAL PAIN: Status: ACTIVE | Noted: 2025-02-16

## 2025-02-18 PROBLEM — R10.13 EPIGASTRIC ABDOMINAL PAIN: Status: RESOLVED | Noted: 2025-02-16 | Resolved: 2025-02-18

## 2025-02-18 PROBLEM — K44.9 HIATAL HERNIA: Status: ACTIVE | Noted: 2025-02-18

## 2025-02-19 ENCOUNTER — PATIENT OUTREACH (OUTPATIENT)
Dept: ADMINISTRATIVE | Facility: CLINIC | Age: 65
End: 2025-02-19
Payer: MEDICARE

## 2025-02-19 NOTE — PROGRESS NOTES
C3 nurse spoke with Wilfredo Vazquez  for a TCC post hospital discharge follow up call. Nurse offered to scheduled TCC hospital follow-up appointment. The patient declined appointment at this time.

## 2025-02-20 ENCOUNTER — RESULTS FOLLOW-UP (OUTPATIENT)
Dept: GASTROENTEROLOGY | Facility: HOSPITAL | Age: 65
End: 2025-02-20
Payer: MEDICARE

## 2025-02-20 NOTE — TELEPHONE ENCOUNTER
----- Message from Eferm Garcia MD sent at 2/20/2025  8:26 AM CST -----  Pathologic findings for recent EGD reviewed.  No evidence of H pylori infection identified.  Some mild inflammation was noted.  Continue current medications.  Follow up as needed  ----- Message -----  From: Michael, [facility]  Sent: 2/18/2025   5:18 PM CST  To: Efrem Garcia MD

## 2025-02-20 NOTE — PROGRESS NOTES
Pathologic findings for recent EGD reviewed.  No evidence of H pylori infection identified.  Some mild inflammation was noted.  Continue current medications.  Follow up as needed

## 2025-03-07 DIAGNOSIS — M54.16 LUMBAR RADICULOPATHY: ICD-10-CM

## 2025-03-07 NOTE — TELEPHONE ENCOUNTER
No care due was identified.  [facility] Embedded Care Due Messages. Reference number: [id number].   3/07/2025 12:25:21 PM CST

## 2025-03-09 RX ORDER — TRAMADOL HYDROCHLORIDE 50 MG/1
50 TABLET ORAL EVERY 6 HOURS
Qty: 30 EACH | Refills: 0 | Status: SHIPPED | OUTPATIENT
Start: 2025-03-09

## 2025-03-25 ENCOUNTER — HOSPITAL ENCOUNTER (EMERGENCY)
Facility: HOSPITAL | Age: 65
Discharge: LEFT BEFORE DISCHARGE | End: 2025-03-25
Payer: MEDICARE

## 2025-03-25 PROCEDURE — 99999 HC NO LEVEL OF SERVICE - ED ONLY: CPT | Mod: HCNC

## 2025-04-01 ENCOUNTER — OFFICE VISIT (OUTPATIENT)
Dept: ORTHOPEDICS | Facility: CLINIC | Age: 65
End: 2025-04-01
Payer: MEDICARE

## 2025-04-01 VITALS
SYSTOLIC BLOOD PRESSURE: 109 MMHG | DIASTOLIC BLOOD PRESSURE: 76 MMHG | WEIGHT: 158.75 LBS | BODY MASS INDEX: 28.13 KG/M2 | HEART RATE: 101 BPM | HEIGHT: 63 IN

## 2025-04-01 DIAGNOSIS — M43.06 LUMBAR SPONDYLOLYSIS: Primary | ICD-10-CM

## 2025-04-01 PROCEDURE — 3074F SYST BP LT 130 MM HG: CPT | Mod: HCNC,CPTII,S$GLB,

## 2025-04-01 PROCEDURE — 1160F RVW MEDS BY RX/DR IN RCRD: CPT | Mod: HCNC,CPTII,S$GLB,

## 2025-04-01 PROCEDURE — 99214 OFFICE O/P EST MOD 30 MIN: CPT | Mod: HCNC,S$GLB,,

## 2025-04-01 PROCEDURE — 1159F MED LIST DOCD IN RCRD: CPT | Mod: HCNC,CPTII,S$GLB,

## 2025-04-01 PROCEDURE — 4010F ACE/ARB THERAPY RXD/TAKEN: CPT | Mod: HCNC,CPTII,S$GLB,

## 2025-04-01 PROCEDURE — 99999 PR PBB SHADOW E&M-EST. PATIENT-LVL IV: CPT | Mod: PBBFAC,HCNC,,

## 2025-04-01 PROCEDURE — 3078F DIAST BP <80 MM HG: CPT | Mod: HCNC,CPTII,S$GLB,

## 2025-04-01 PROCEDURE — 3008F BODY MASS INDEX DOCD: CPT | Mod: HCNC,CPTII,S$GLB,

## 2025-04-01 RX ORDER — METHYLPREDNISOLONE 4 MG/1
TABLET ORAL
Qty: 21 EACH | Refills: 0 | Status: SHIPPED | OUTPATIENT
Start: 2025-04-01

## 2025-04-01 NOTE — PROGRESS NOTES
"Patient ID: Wilfredo Vazquez is a 65 y.o. female    Pain of the Left Hip    History of Present Illness:    Wilfredo Vazquez presents to clinic for left hip and low back pain. Patient is 10 months s/p L TKA with Dr. Enriquez in May 2024, doing "wonderful" from knee perspective. Patient denies known MIKEY. The pain started 3 days ago and is becoming progressively worse.  Pain is located over (points to) across lumbar spine and radiates into anterior left thigh. No right leg radiation.  She reports that the pain is a 8 /10 "burning" pain toda. The pain is affecting ADLs and limiting desired level of activity. Denies numbness, tingling, radiation and inability to bear weight. /p right L3, L4, and L4 RFA with pain management. Reports the pain she is experiencing to her left side is similar to what she had on her right side.     She has been taking tylenol with no improvement. She denies groin paraesthesias. No bladder or bowel dysfunction.     Occupation: retired    Ambulating: unassisted  Diabetic: no  Smoking: no  Hx of DVT/PE: no    PAST MEDICAL HISTORY:   Past Medical History:   Diagnosis Date    Abdominal pain 2018    Emphysema lung     GERD (gastroesophageal reflux disease) 2018    Hypertension     Shingles      PAST SURGICAL HISTORY:   Past Surgical History:   Procedure Laterality Date    APPENDECTOMY      ARTHROPLASTY, KNEE, TOTAL - Left Left 05/27/2024    BLOCK, SPINAL NERVE ROOT, LUMBAR, SELECTIVE Bilateral 12/28/2023    L3, L4, L5    COLONOSCOPY N/A 05/22/2019    Procedure: COLONOSCOPY;  Surgeon: Sim Quinteros MD;  Location: [facility];  Service: Endoscopy;  Laterality: N/A;    COLONOSCOPY N/A 06/20/2022    Procedure: COLONOSCOPY WITH POLYPECTOMY AND CLIPPING;  Surgeon: Jarrod Franco MD;  Location: [facility];  Service: Endoscopy;  Laterality: N/A;    COLONOSCOPY  03/19/2024    COLONOSCOPY N/A 03/19/2024    Procedure: COLONOSCOPY;  Surgeon: Sim Quinteros MD;  Location: [facility];  Service: " Endoscopy;  Laterality: N/A;    COLONOSCOPY W/ POLYPECTOMY      EGD, WITH CLOSED BIOPSY  02/17/2025    ESOPHAGEAL DILATION N/A 05/13/2019    Procedure: DILATION, ESOPHAGUS;  Surgeon: Sim Quinteros MD;  Location: [facility] ENDO;  Service: Endoscopy;  Laterality: N/A;    ESOPHAGEAL DILATION N/A 02/09/2021    Procedure: DILATION, ESOPHAGUS;  Surgeon: Sim Quinteros MD;  Location: [facility] ENDO;  Service: Endoscopy;  Laterality: N/A;    ESOPHAGEAL DILATION N/A 12/14/2021    Procedure: DILATION, ESOPHAGUS;  Surgeon: Sim Quinteros MD;  Location: [facility] ENDO;  Service: Endoscopy;  Laterality: N/A;    ESOPHAGEAL DILATION N/A 05/17/2022    Procedure: DILATION, ESOPHAGUS;  Surgeon: Sim Quinteros MD;  Location: [facility] ENDO;  Service: Endoscopy;  Laterality: N/A;    ESOPHAGEAL DILATION N/A 12/26/2023    Procedure: DILATION, ESOPHAGUS;  Surgeon: Sim Quinteros MD;  Location: [facility] ENDO;  Service: Endoscopy;  Laterality: N/A;    ESOPHAGEAL DILATION N/A 07/25/2024    Procedure: DILATION, ESOPHAGUS;  Surgeon: Sim Quinteros MD;  Location: [facility];  Service: Endoscopy;  Laterality: N/A;    ESOPHAGOGASTRODUODENOSCOPY N/A 05/13/2019    Procedure: EGD (ESOPHAGOGASTRODUODENOSCOPY);  Surgeon: Sim Quinteros MD;  Location: [facility];  Service: Endoscopy;  Laterality: N/A;    ESOPHAGOGASTRODUODENOSCOPY N/A 02/09/2021    Procedure: EGD (ESOPHAGOGASTRODUODENOSCOPY);  Surgeon: Sim Quinteros MD;  Location: [facility];  Service: Endoscopy;  Laterality: N/A;    ESOPHAGOGASTRODUODENOSCOPY N/A 12/14/2021    Procedure: EGD (ESOPHAGOGASTRODUODENOSCOPY);  Surgeon: Sim Quinteros MD;  Location: [facility];  Service: Endoscopy;  Laterality: N/A;    ESOPHAGOGASTRODUODENOSCOPY N/A 05/17/2022    Procedure: EGD (ESOPHAGOGASTRODUODENOSCOPY);  Surgeon: Sim Quinteros MD;  Location: [facility];  Service: Endoscopy;  Laterality: N/A;    ESOPHAGOGASTRODUODENOSCOPY N/A 12/26/2023    Procedure: EGD (ESOPHAGOGASTRODUODENOSCOPY);  Surgeon: Sim Quinteros MD;   Location: [facility] ENDO;  Service: Endoscopy;  Laterality: N/A;    ESOPHAGOGASTRODUODENOSCOPY N/A 07/25/2024    Procedure: EGD (ESOPHAGOGASTRODUODENOSCOPY);  Surgeon: Sim Quinteros MD;  Location: [facility] ENDO;  Service: Endoscopy;  Laterality: N/A;    ESOPHAGOGASTRODUODENOSCOPY N/A 2/17/2025    Procedure: EGD (ESOPHAGOGASTRODUODENOSCOPY);  Surgeon: Efrem Garcia MD;  Location: [facility] ENDO;  Service: Endoscopy;  Laterality: N/A;    ESOPHAGOGASTRODUODENOSCOPY (EGD) WITH DILATION  05/17/2022    HYSTERECTOMY      INJECTION OF ANESTHETIC AGENT AROUND MEDIAL BRANCH NERVES INNERVATING LUMBAR FACET JOINT Bilateral 12/14/2023    Procedure: Block-nerve-medial branch-lumbar----L3,L4,L5;  Surgeon: Iván Velasquez Jr., MD;  Location: [facility]IN [facility];  Service: Pain Management;  Laterality: Bilateral;    INJECTION OF ANESTHETIC AGENT AROUND MEDIAL BRANCH NERVES INNERVATING LUMBAR FACET JOINT Bilateral 12/28/2023    Procedure: Block-nerve-medial branch-lumbar L3,L4,L5;  Surgeon: Iván Velasquez Jr., MD;  Location: [facility];  Service: Pain Management;  Laterality: Bilateral;    LAPAROSCOPIC NISSEN FUNDOPLICATION      RADIOFREQUENCY ABLATION, NERVE, SPINAL, LUMBOSACRAL Right 01/25/2024    Procedure: RADIOFREQUENCY ABLATION------L3,L4,L5;  Surgeon: Iván Velasquez Jr., MD;  Location: [facility];  Service: Pain Management;  Laterality: Right;    REPAIR OF EXTENSOR TENDON Left 06/07/2022    Procedure: REPAIR, TENDON, EXTENSOR  ;  Surgeon: Dakota Anaya Jr., MD;  Location: [facility] [place] OR;  Service: Plastics;  Laterality: Left;  THUMB    TOTAL KNEE ARTHROPLASTY Left 05/27/2024    Procedure: ARTHROPLASTY, KNEE, TOTAL;  Surgeon: Nigel Enriquez MD;  Location: [facility] OR;  Service: Orthopedics;  Laterality: Left;  [facility], hip bumped, left TKA    UPPER GASTROINTESTINAL ENDOSCOPY N/A 01/24/2018     FAMILY HISTORY:   Family History   Family history unknown: Yes     SOCIAL HISTORY:   Social History      Occupational History    Not on file   Tobacco Use    Smoking status: Every Day     Current packs/day: 0.25     Types: Cigarettes    Smokeless tobacco: Never   Substance and Sexual Activity    Alcohol use: Yes     Comment: occasionally    Drug use: No    Sexual activity: Yes     Partners: Female     Comment: rare        MEDICATIONS: Current Medications[1]  ALLERGIES:   Review of patient's allergies indicates:   Allergen Reactions    Naproxen Rash         Physical Exam     Vitals:    04/01/25 1527   BP: 109/76   Pulse: 101     Alert and oriented to person, place and time. No acute distress. Well-groomed, not ill appearing. Pupils round and reactive, normal respiratory effort, no audible wheezing.       General:  The patient is alert and oriented x 3.  Mood is pleasant.      Previously well healed L midline incision    left HIP EXAMINATION     OBSERVATION / INSPECTION  Gait:   antalgic   Alignment:  Neutral   Scars:   None   Muscle atrophy: None   Effusion:  None   Warmth:  None   Leg lengths:   Equal     TENDERNESS         Trochanteric bursa   -   Piriformis    +  SI joint    -   Psoas tendon   -   Rectus insertion  -   Adductor insertion  -   Pubic symphysis  -   + TTP at lumbar vertebras     ROM: (* = pain)    Flexion:    120 degrees  External rotation: 40 degrees  Internal rotation with axial load: 30 degrees  Internal rotation without axial load: 40 degrees  Abduction:  45 degrees  Adduction:   20 degrees    SPECIAL TESTS:  Pain w/ forced internal rotation (FADIR): Negative   Pain w/ forced external rotation (HELEN): Negative   Circumduction test:    Negative   Stinchfield test:    Negative   Log roll:      Negative   Snapping hip (internal):   Negative   Sit-up pain:     Negative   Resisted sit-up pain:    Negative   Trendelenburg test:    Negative       EXTREMITY NEURO-VASCULAR EXAMINATION:   Sensation:  Grossly intact to light touch all dermatomal regions.   Motor Function:  Fully intact motor function at  hip, knee, foot and ankle    DTRs;  quadriceps and  achilles 2+.  No clonus and downgoing Babinski.    Vascular status:  DP and PT pulses 2+, brisk capillary refill, symmetric.    Skin: intact, compartments soft.    Imaging:     Bilateral hip x-rays 2022 X-rays ordered/reviewed by me showing no evidence of fracture or dislocation. There is no obvious malalignment. No evidence of masses, lesions or foreign bodies.     Assessment & Plan    Lumbar spondylolysis  -     Ambulatory referral/consult to Orthopedics  -     methylPREDNISolone (MEDROL DOSEPACK) 4 mg tablet; use as directed  Dispense: 21 each; Refill: 0         I made the decision to obtain old records of the patient including previous notes and imaging. New imaging was ordered today of the extremity or extremities evaluated. I independently reviewed and interpreted the radiographs and/or MRIs/CT scan today as well as prior imaging.    We discussed at length different treatment options including conservative vs surgical management. These include anti-inflammatories, acetaminophen, rest, ice, heat, formal physical therapy including strengthening and stretching exercises, home exercise programs, injections, dry needling, and finally surgical intervention.      Patient here for acute onset of low back pain as well as left thigh pain.  Discussed with patient I believe this is likely lumbar spondylosis/radiculopathy that is causing her pain.   After discussion with patient, she believes the symptoms she is experiencing on her left side today were similar to what was she is experiencing on her right side before she had RFA.  We will get her rescheduled with pain management to discuss left-sided L3,4,5 RFA  Medrol Dosepak sent for acute pain  ER precautions given regarding saddle anesthesia and bladder/bowel dysfunction  Hip x-rays today to rule out any hip pathology.  May have some arthritis that could be contributing.  If this shows arthritis, consider  intra-articular injection    Follow up: pain management  X-rays next visit: none    All questions were answered and patient is agreeable to the above plan.                     [1]   Current Outpatient Medications:     albuterol (PROVENTIL/VENTOLIN HFA) 90 mcg/actuation inhaler, inhale TWO puffs into THE lungs EVERY 4 HOURS AS NEEDED, Disp: 25.5 g, Rfl: 3    BREZTRI AEROSPHERE 160-9-4.8 mcg/actuation HFAA, Two inhalations b.i.d., Disp: 10.7 g, Rfl: 5    cyproheptadine (PERIACTIN) 4 mg tablet, 1 po q hs to increase weight, Disp: 30 tablet, Rfl: 5    lisinopriL (PRINIVIL,ZESTRIL) 20 MG tablet, Take 1 tablet (20 mg total) by mouth once daily., Disp: 100 tablet, Rfl: 3    nicotine (NICODERM CQ) 21 mg/24 hr, Place 1 patch onto the skin once daily., Disp: 30 patch, Rfl: 0    pantoprazole (PROTONIX) 40 MG tablet, Take 1 tablet (40 mg total) by mouth once daily., Disp: 90 tablet, Rfl: 0    promethazine-dextromethorphan (PROMETHAZINE-DM) 6.25-15 mg/5 mL Syrp, Take 5 mLs by mouth every 6 (six) hours as needed (cough)., Disp: 180 mL, Rfl: 1    QUEtiapine (SEROQUEL) 300 MG Tab, Take 300 mg by mouth every evening., Disp: , Rfl:     traMADoL (ULTRAM) 50 mg tablet, Take 1 tablet (50 mg total) by mouth every 6 (six) hours., Disp: 30 each, Rfl: 0    varenicline (CHANTIX STARTING MONTH BOX) 0.5 mg (11)- 1 mg (42) tablet, Take one 0.5mg tab by mouth once daily X3 days,then increase to one 0.5mg tab twice daily X4 days,then increase to one 1mg tab twice daily, Disp: 1 each, Rfl: 0    methylPREDNISolone (MEDROL DOSEPACK) 4 mg tablet, use as directed, Disp: 21 each, Rfl: 0  No current facility-administered medications for this visit.    Facility-Administered Medications Ordered in Other Visits:     lactated ringers infusion, , Intravenous, Continuous, Sim Quinteros MD, Last Rate: 0 mL/hr at 12/14/21 0846, New Bag at 06/07/22 1151    lactated ringers infusion, , Intravenous, Continuous, Sim Quinteros MD    sodium chloride 0.9% flush  10 mL, 10 mL, Intravenous, PRN, Sim Quinteros MD    sodium chloride 0.9% flush 10 mL, 10 mL, Intravenous, Aldair FRANK David A., MD    sodium chloride 0.9% flush 10 mL, 10 mL, Intravenous, Aldair FRANK David A., MD

## 2025-04-02 ENCOUNTER — TELEPHONE (OUTPATIENT)
Dept: PRIMARY CARE CLINIC | Facility: CLINIC | Age: 65
End: 2025-04-02
Payer: MEDICARE

## 2025-04-02 NOTE — TELEPHONE ENCOUNTER
----- Message from Deanna sent at 4/2/2025  3:33 PM CDT -----  Contact: Destiny, [phone]  2TESTRESULTSType: Test ResultsWhat test was performed? XrayWho ordered the test? Dr VANNESA Moy and where were the test performed? 04/01/2025Would you like a call back and or thru [facility]: Call backComments: Please call her. Thanks.

## 2025-04-06 ENCOUNTER — RESULTS FOLLOW-UP (OUTPATIENT)
Dept: PRIMARY CARE CLINIC | Facility: CLINIC | Age: 65
End: 2025-04-06
Payer: MEDICARE

## 2025-04-06 DIAGNOSIS — M25.552 PAIN OF LEFT HIP: Primary | ICD-10-CM

## 2025-04-07 ENCOUNTER — TELEPHONE (OUTPATIENT)
Dept: PRIMARY CARE CLINIC | Facility: CLINIC | Age: 65
End: 2025-04-07
Payer: MEDICARE

## 2025-04-07 NOTE — TELEPHONE ENCOUNTER
----- Message from Makeda sent at 4/7/2025 11:54 AM CDT -----  Contact: [phone]  Calling to get test results. Name of test (lab, x-ray): xrayDate of test: 04/01/25Where was the test performed: [place] [facility] Would you like a call back, or a response through your [facility] portal?:    call back Comments:

## 2025-04-07 NOTE — TELEPHONE ENCOUNTER
Called and inform pt of xray results and the need to see an orthopedists. Ortho appt scheduled for 04/08/2025 at 0800. Pt verbalized understanding

## 2025-04-08 ENCOUNTER — OFFICE VISIT (OUTPATIENT)
Dept: ORTHOPEDICS | Facility: CLINIC | Age: 65
End: 2025-04-08
Payer: MEDICARE

## 2025-04-08 VITALS
HEART RATE: 97 BPM | HEIGHT: 63 IN | DIASTOLIC BLOOD PRESSURE: 85 MMHG | BODY MASS INDEX: 28.13 KG/M2 | SYSTOLIC BLOOD PRESSURE: 129 MMHG | WEIGHT: 158.75 LBS

## 2025-04-08 DIAGNOSIS — M16.12 PRIMARY OSTEOARTHRITIS OF LEFT HIP: ICD-10-CM

## 2025-04-08 DIAGNOSIS — S72.145A CLOSED NONDISPLACED INTERTROCHANTERIC FRACTURE OF LEFT FEMUR, INITIAL ENCOUNTER: Primary | ICD-10-CM

## 2025-04-08 DIAGNOSIS — M25.552 PAIN OF LEFT HIP: ICD-10-CM

## 2025-04-08 DIAGNOSIS — M54.16 LUMBAR RADICULOPATHY: ICD-10-CM

## 2025-04-08 PROBLEM — I21.4 NSTEMI (NON-ST ELEVATED MYOCARDIAL INFARCTION): Status: ACTIVE | Noted: 2025-04-08

## 2025-04-08 PROBLEM — R79.89 ELEVATED TROPONIN: Status: ACTIVE | Noted: 2025-04-08

## 2025-04-08 PROBLEM — G89.29 CHRONIC HEADACHE DISORDER: Status: ACTIVE | Noted: 2019-03-25

## 2025-04-08 PROBLEM — R51.9 CHRONIC HEADACHE DISORDER: Status: ACTIVE | Noted: 2019-03-25

## 2025-04-08 PROBLEM — F17.200 NICOTINE DEPENDENCE: Status: ACTIVE | Noted: 2019-03-25

## 2025-04-08 PROCEDURE — 4010F ACE/ARB THERAPY RXD/TAKEN: CPT | Mod: HCNC,CPTII,S$GLB,

## 2025-04-08 PROCEDURE — 3074F SYST BP LT 130 MM HG: CPT | Mod: HCNC,CPTII,S$GLB,

## 2025-04-08 PROCEDURE — 3008F BODY MASS INDEX DOCD: CPT | Mod: HCNC,CPTII,S$GLB,

## 2025-04-08 PROCEDURE — 99214 OFFICE O/P EST MOD 30 MIN: CPT | Mod: HCNC,S$GLB,,

## 2025-04-08 PROCEDURE — 3079F DIAST BP 80-89 MM HG: CPT | Mod: HCNC,CPTII,S$GLB,

## 2025-04-08 PROCEDURE — 99999 PR PBB SHADOW E&M-EST. PATIENT-LVL III: CPT | Mod: PBBFAC,HCNC,,

## 2025-04-08 RX ORDER — TRAMADOL HYDROCHLORIDE 50 MG/1
50 TABLET ORAL EVERY 4 HOURS PRN
Qty: 28 EACH | Refills: 0 | Status: SHIPPED | OUTPATIENT
Start: 2025-04-08

## 2025-04-08 NOTE — PROGRESS NOTES
"Patient ID: Wilfredo Vazquez is a 65 y.o. female    Pain of the Left Hip    History of Present Illness:    Wilfredo Vazquez presents to clinic for left hip and low back pain. Patient is 10 months s/p L TKA with Dr. Enriquez in May 2024, doing "wonderful" from knee perspective. Patient denies known MIKEY. The pain started 3 days ago and is becoming progressively worse.  Pain is located over (points to) across lumbar spine and radiates into anterior left thigh. No right leg radiation.  She reports that the pain is a 8 /10 "burning" pain toda. The pain is affecting ADLs and limiting desired level of activity. Denies numbness, tingling, radiation and inability to bear weight. /p right L3, L4, and L4 RFA with pain management. Reports the pain she is experiencing to her left side is similar to what she had on her right side.     She has been taking tylenol with no improvement. She denies groin paraesthesias. No bladder or bowel dysfunction.     Occupation: retired    Ambulating: unassisted  Diabetic: no  Smoking: no  Hx of DVT/PE: no    ____________________________________________________________________    Interval history 4/8/2025 : Patient returns today for follow up of left hip/low back pain. She got xrays after her last appt through her PCP, was referred back as there was concern for occult injury on xray. She does endorse groin pain without and falls or injury. She is able to ambulate unassisted. She was placed on medrol dose pack last visit without improvement. Pain does also radiate into left leg. Appt with pain to discuss low back not until mid May.        PAST MEDICAL HISTORY:   Past Medical History:   Diagnosis Date    Abdominal pain 2018    Emphysema lung     GERD (gastroesophageal reflux disease) 2018    Hypertension     Shingles      PAST SURGICAL HISTORY:   Past Surgical History:   Procedure Laterality Date    APPENDECTOMY      ARTHROPLASTY, KNEE, TOTAL - Left Left 05/27/2024    BLOCK, SPINAL NERVE " ROOT, LUMBAR, SELECTIVE Bilateral 12/28/2023    L3, L4, L5    COLONOSCOPY N/A 05/22/2019    Procedure: COLONOSCOPY;  Surgeon: Sim Quinteros MD;  Location: [facility] ENDO;  Service: Endoscopy;  Laterality: N/A;    COLONOSCOPY N/A 06/20/2022    Procedure: COLONOSCOPY WITH POLYPECTOMY AND CLIPPING;  Surgeon: Jarrod Franco MD;  Location: [facility] ENDO;  Service: Endoscopy;  Laterality: N/A;    COLONOSCOPY  03/19/2024    COLONOSCOPY N/A 03/19/2024    Procedure: COLONOSCOPY;  Surgeon: Sim Quinteros MD;  Location: [facility] ENDO;  Service: Endoscopy;  Laterality: N/A;    COLONOSCOPY W/ POLYPECTOMY      EGD, WITH CLOSED BIOPSY  02/17/2025    ESOPHAGEAL DILATION N/A 05/13/2019    Procedure: DILATION, ESOPHAGUS;  Surgeon: Sim Quinteros MD;  Location: [facility] ENDO;  Service: Endoscopy;  Laterality: N/A;    ESOPHAGEAL DILATION N/A 02/09/2021    Procedure: DILATION, ESOPHAGUS;  Surgeon: Sim Quinteros MD;  Location: [facility] ENDO;  Service: Endoscopy;  Laterality: N/A;    ESOPHAGEAL DILATION N/A 12/14/2021    Procedure: DILATION, ESOPHAGUS;  Surgeon: Sim Quinteros MD;  Location: [facility];  Service: Endoscopy;  Laterality: N/A;    ESOPHAGEAL DILATION N/A 05/17/2022    Procedure: DILATION, ESOPHAGUS;  Surgeon: Sim Quinteros MD;  Location: [facility];  Service: Endoscopy;  Laterality: N/A;    ESOPHAGEAL DILATION N/A 12/26/2023    Procedure: DILATION, ESOPHAGUS;  Surgeon: Sim Quinteros MD;  Location: [facility] ENDO;  Service: Endoscopy;  Laterality: N/A;    ESOPHAGEAL DILATION N/A 07/25/2024    Procedure: DILATION, ESOPHAGUS;  Surgeon: Sim Quinteros MD;  Location: [facility] ENDO;  Service: Endoscopy;  Laterality: N/A;    ESOPHAGOGASTRODUODENOSCOPY N/A 05/13/2019    Procedure: EGD (ESOPHAGOGASTRODUODENOSCOPY);  Surgeon: Sim Quinteros MD;  Location: [facility] ENDO;  Service: Endoscopy;  Laterality: N/A;    ESOPHAGOGASTRODUODENOSCOPY N/A 02/09/2021    Procedure: EGD (ESOPHAGOGASTRODUODENOSCOPY);  Surgeon: Sim Quinteros MD;  Location: [facility];   Service: Endoscopy;  Laterality: N/A;    ESOPHAGOGASTRODUODENOSCOPY N/A 12/14/2021    Procedure: EGD (ESOPHAGOGASTRODUODENOSCOPY);  Surgeon: Sim Quinteros MD;  Location: [facility] ENDO;  Service: Endoscopy;  Laterality: N/A;    ESOPHAGOGASTRODUODENOSCOPY N/A 05/17/2022    Procedure: EGD (ESOPHAGOGASTRODUODENOSCOPY);  Surgeon: Sim Quinteros MD;  Location: [facility] ENDO;  Service: Endoscopy;  Laterality: N/A;    ESOPHAGOGASTRODUODENOSCOPY N/A 12/26/2023    Procedure: EGD (ESOPHAGOGASTRODUODENOSCOPY);  Surgeon: Sim Quinteros MD;  Location: [facility] ENDO;  Service: Endoscopy;  Laterality: N/A;    ESOPHAGOGASTRODUODENOSCOPY N/A 07/25/2024    Procedure: EGD (ESOPHAGOGASTRODUODENOSCOPY);  Surgeon: Sim Quinteros MD;  Location: [facility] ENDO;  Service: Endoscopy;  Laterality: N/A;    ESOPHAGOGASTRODUODENOSCOPY N/A 2/17/2025    Procedure: EGD (ESOPHAGOGASTRODUODENOSCOPY);  Surgeon: Efrem Garcia MD;  Location: [facility];  Service: Endoscopy;  Laterality: N/A;    ESOPHAGOGASTRODUODENOSCOPY (EGD) WITH DILATION  05/17/2022    HYSTERECTOMY      INJECTION OF ANESTHETIC AGENT AROUND MEDIAL BRANCH NERVES INNERVATING LUMBAR FACET JOINT Bilateral 12/14/2023    Procedure: Block-nerve-medial branch-lumbar----L3,L4,L5;  Surgeon: Iván Velasquez Jr., MD;  Location: [facility] PAIN [facility];  Service: Pain Management;  Laterality: Bilateral;    INJECTION OF ANESTHETIC AGENT AROUND MEDIAL BRANCH NERVES INNERVATING LUMBAR FACET JOINT Bilateral 12/28/2023    Procedure: Block-nerve-medial branch-lumbar L3,L4,L5;  Surgeon: Iván Velasquez Jr., MD;  Location: [facility] PA[facility];  Service: Pain Management;  Laterality: Bilateral;    LAPAROSCOPIC NISSEN FUNDOPLICATION      RADIOFREQUENCY ABLATION, NERVE, SPINAL, LUMBOSACRAL Right 01/25/2024    Procedure: RADIOFREQUENCY ABLATION------L3,L4,L5;  Surgeon: Iván Velasquez Jr., MD;  Location: [facility];  Service: Pain Management;  Laterality: Right;    REPAIR OF EXTENSOR TENDON Left  06/07/2022    Procedure: REPAIR, TENDON, EXTENSOR  ;  Surgeon: Dakota Anaya Jr., MD;  Location: [facility] [place] [facility];  Service: Plastics;  Laterality: Left;  THUMB    TOTAL KNEE ARTHROPLASTY Left 05/27/2024    Procedure: ARTHROPLASTY, KNEE, TOTAL;  Surgeon: Nigel Enriquez MD;  Location: [facility] OR;  Service: Orthopedics;  Laterality: Left;  [facility], hip bumped, left TKA    UPPER GASTROINTESTINAL ENDOSCOPY N/A 01/24/2018     FAMILY HISTORY:   Family History   Family history unknown: Yes     SOCIAL HISTORY:   Social History     Occupational History    Not on file   Tobacco Use    Smoking status: Every Day     Current packs/day: 0.25     Types: Cigarettes    Smokeless tobacco: Never   Substance and Sexual Activity    Alcohol use: Yes     Comment: occasionally    Drug use: No    Sexual activity: Yes     Partners: Female     Comment: rare        MEDICATIONS: Current Medications[1]  ALLERGIES:   Review of patient's allergies indicates:   Allergen Reactions    Naproxen Rash         Physical Exam     Vitals:    04/08/25 0807   BP: 129/85   Pulse: 97     Alert and oriented to person, place and time. No acute distress. Well-groomed, not ill appearing. Pupils round and reactive, normal respiratory effort, no audible wheezing.       General:  The patient is alert and oriented x 3.  Mood is pleasant.      Previously well healed L midline incision    left HIP EXAMINATION     OBSERVATION / INSPECTION  Gait:   antalgic   Alignment:  Neutral   Scars:   None   Muscle atrophy: None   Effusion:  None   Warmth:  None   Leg lengths:   Equal     TENDERNESS         Trochanteric bursa   -   Piriformis    +  SI joint    -   Psoas tendon   -   Rectus insertion  -   Adductor insertion  -   Pubic symphysis  -   + TTP at lumbar vertebras     ROM: (* = pain)    Flexion:    120 degrees  External rotation: 40 degrees  Internal rotation with axial load: 30 degrees  Internal rotation without axial load: 40 degrees  Abduction:  45  degrees  Adduction:   20 degrees    SPECIAL TESTS:  Pain w/ forced internal rotation (FADIR): +  Pain w/ forced external rotation (HELEN): Negative   Circumduction test:    Negative   Stinchfield test:    Negative   Log roll:      +  Snapping hip (internal):   Negative   Sit-up pain:     Negative   Resisted sit-up pain:    Negative   Trendelenburg test:    Negative       EXTREMITY NEURO-VASCULAR EXAMINATION:   Sensation:  Grossly intact to light touch all dermatomal regions.   Motor Function:  Fully intact motor function at hip, knee, foot and ankle    DTRs;  quadriceps and  achilles 2+.  No clonus and downgoing Babinski.    Vascular status:  DP and PT pulses 2+, brisk capillary refill, symmetric.    Skin: intact, compartments soft.    Imaging:     Bilateral hip x-rays 2022 X-rays ordered/reviewed by me showing no evidence of fracture or dislocation. There is no obvious malalignment. No evidence of masses, lesions or foreign bodies.     Repeat bilateral hip xray 4/1/2025:  Degenerative change, without evidence of displaced fracture or dislocation.  Correlation with cross-sectional imaging suggested if there is high clinical concern for an occult injury.    Assessment & Plan    Closed nondisplaced intertrochanteric fracture of left femur, initial encounter  -     MRI Hip Without Contrast Left; Future; Expected date: 04/08/2025    Pain of left hip  -     Ambulatory referral/consult to Orthopedics  -     MRI Hip Without Contrast Left; Future; Expected date: 04/08/2025    Primary osteoarthritis of left hip  -     MRI Hip Without Contrast Left; Future; Expected date: 04/08/2025    Lumbar radiculopathy  -     traMADoL (ULTRAM) 50 mg tablet; Take 1 tablet (50 mg total) by mouth every 4 (four) hours as needed for Pain.  Dispense: 28 each; Refill: 0         Patient here for follow up of low back/left hip pain.  She had x-rays which were done by her primary care which showed concern for possible occult injury.  Discussed  with patient she does have arthritis and since she is having groin pain today, we will order stat MRI to rule out any fracture.  Certainly could be left hip arthritic flare-up as well.  If it is arthritis, may consider intra-articular hip injection.  We will also attempt to get her pain management appointment moved up to discuss back pain.  She was given refill of tramadol, discussed this is a one time refill I can provide.  We will call her with her MRI results.    Stat MRI left hip to rule out fracture    Follow up: pending MRI results  X-rays next visit: none    All questions were answered and patient is agreeable to the above plan.                       [1]   Current Outpatient Medications:     albuterol (PROVENTIL/VENTOLIN HFA) 90 mcg/actuation inhaler, inhale TWO puffs into THE lungs EVERY 4 HOURS AS NEEDED, Disp: 25.5 g, Rfl: 3    BREZTRI AEROSPHERE 160-9-4.8 mcg/actuation HFAA, Two inhalations b.i.d., Disp: 10.7 g, Rfl: 5    cyproheptadine (PERIACTIN) 4 mg tablet, 1 po q hs to increase weight, Disp: 30 tablet, Rfl: 5    lisinopriL (PRINIVIL,ZESTRIL) 20 MG tablet, Take 1 tablet (20 mg total) by mouth once daily., Disp: 100 tablet, Rfl: 3    methylPREDNISolone (MEDROL DOSEPACK) 4 mg tablet, use as directed, Disp: 21 each, Rfl: 0    nicotine (NICODERM CQ) 21 mg/24 hr, Place 1 patch onto the skin once daily., Disp: 30 patch, Rfl: 0    pantoprazole (PROTONIX) 40 MG tablet, Take 1 tablet (40 mg total) by mouth once daily., Disp: 90 tablet, Rfl: 0    promethazine-dextromethorphan (PROMETHAZINE-DM) 6.25-15 mg/5 mL Syrp, Take 5 mLs by mouth every 6 (six) hours as needed (cough)., Disp: 180 mL, Rfl: 1    QUEtiapine (SEROQUEL) 300 MG Tab, Take 300 mg by mouth every evening., Disp: , Rfl:     traMADoL (ULTRAM) 50 mg tablet, Take 1 tablet (50 mg total) by mouth every 4 (four) hours as needed for Pain., Disp: 28 each, Rfl: 0    varenicline (CHANTIX STARTING MONTH BOX) 0.5 mg (11)- 1 mg (42) tablet, Take one 0.5mg tab by  mouth once daily X3 days,then increase to one 0.5mg tab twice daily X4 days,then increase to one 1mg tab twice daily, Disp: 1 each, Rfl: 0  No current facility-administered medications for this visit.    Facility-Administered Medications Ordered in Other Visits:     lactated ringers infusion, , Intravenous, Continuous, Sim Quinteros MD, Last Rate: 0 mL/hr at 12/14/21 0846, New Bag at 06/07/22 1151    lactated ringers infusion, , Intravenous, Continuous, Sim Quinteros MD    sodium chloride 0.9% flush 10 mL, 10 mL, Intravenous, PRN, Sim Quinteros MD    sodium chloride 0.9% flush 10 mL, 10 mL, Intravenous, PRN, Sim Quinteros MD    sodium chloride 0.9% flush 10 mL, 10 mL, Intravenous, PRN, Sim Quinteros MD

## 2025-04-09 PROBLEM — I50.22 HEART FAILURE WITH MILDLY REDUCED EJECTION FRACTION (HFMREF): Status: ACTIVE | Noted: 2025-04-09

## 2025-04-10 ENCOUNTER — PATIENT OUTREACH (OUTPATIENT)
Dept: ADMINISTRATIVE | Facility: CLINIC | Age: 65
End: 2025-04-10
Payer: MEDICARE

## 2025-04-10 NOTE — PROGRESS NOTES
Spoke with patient Wilfredo Larakathia Vazquez . Patient has a HOSFU appointment on 04/16/2025 with Marcio Calderon MD . Message sent to PCP staff.

## 2025-04-14 ENCOUNTER — TELEPHONE (OUTPATIENT)
Dept: ORTHOPEDICS | Facility: CLINIC | Age: 65
End: 2025-04-14
Payer: MEDICARE

## 2025-04-14 NOTE — TELEPHONE ENCOUNTER
----- Message from Elma sent at 4/14/2025  1:39 PM CDT -----  Regarding: PT'S RETURNING A CALL FROM STAFF REGARDING MRI RESULTS  Contact: PT  Confirmed contact info below:Contact Name: Wilfredo VazquezSatya Number: [phone]

## 2025-04-15 ENCOUNTER — TELEPHONE (OUTPATIENT)
Dept: ORTHOPEDICS | Facility: CLINIC | Age: 65
End: 2025-04-15
Payer: MEDICARE

## 2025-04-15 ENCOUNTER — RESULTS FOLLOW-UP (OUTPATIENT)
Dept: ORTHOPEDICS | Facility: CLINIC | Age: 65
End: 2025-04-15

## 2025-04-15 DIAGNOSIS — M16.12 PRIMARY OSTEOARTHRITIS OF LEFT HIP: Primary | ICD-10-CM

## 2025-04-15 NOTE — TELEPHONE ENCOUNTER
----- Message from Veronica Manning PA-C sent at 4/14/2025  1:03 PM CDT -----  Please let patient know she does not have a fracture, but a fair amount of hip arthritis. Can trial an injection to hips for pain, if she is interested I can place a referral  ----- Message -----  From: Interface, Rad Results In  Sent: 4/13/2025   4:44 PM CDT  To: Veronica Manning PA-C

## 2025-04-15 NOTE — TELEPHONE ENCOUNTER
Returned the pt's call no answer LVM letting the pt know that she does not have a fracture and the provider wants to know if the pt want to try a hip injection if so she can place the referral.

## 2025-04-15 NOTE — TELEPHONE ENCOUNTER
Spoke with the pt about her results per Veronica and ask is she wanted and injection for the pain the pt stated yes. Pt v/u

## 2025-04-15 NOTE — TELEPHONE ENCOUNTER
----- Message from Roberto sent at 4/15/2025 12:29 PM CDT -----  Regarding: pt  Type:Patient Returning Call:Pt  Who Called: MA Who Left Message for Patient: Does the patient know what this is regarding? MISSED CALL Best Call Back Number: Telephone Information:[employer]          [phone] Information:

## 2025-04-15 NOTE — TELEPHONE ENCOUNTER
----- Message from Elma sent at 4/15/2025  9:53 AM CDT -----  Regarding: PT RETURNED CALL BUT HUNG UP  Contact: pt  I returned pt's all but she didn't answer.. Confirmed contact info below:Contact Name: Wilfredo VazquezPhone Number: [phone]

## 2025-04-16 ENCOUNTER — OFFICE VISIT (OUTPATIENT)
Dept: PRIMARY CARE CLINIC | Facility: CLINIC | Age: 65
End: 2025-04-16
Payer: MEDICARE

## 2025-04-16 VITALS
TEMPERATURE: 98 F | BODY MASS INDEX: 30.53 KG/M2 | SYSTOLIC BLOOD PRESSURE: 118 MMHG | OXYGEN SATURATION: 99 % | WEIGHT: 172.31 LBS | RESPIRATION RATE: 16 BRPM | DIASTOLIC BLOOD PRESSURE: 74 MMHG | HEART RATE: 82 BPM | HEIGHT: 63 IN

## 2025-04-16 DIAGNOSIS — K44.9 HIATAL HERNIA: ICD-10-CM

## 2025-04-16 DIAGNOSIS — R73.03 PREDIABETES: ICD-10-CM

## 2025-04-16 DIAGNOSIS — M87.052 AVASCULAR NECROSIS OF BONE OF LEFT HIP: ICD-10-CM

## 2025-04-16 DIAGNOSIS — Z98.890 HISTORY OF ESOPHAGEAL DILATATION: ICD-10-CM

## 2025-04-16 DIAGNOSIS — K80.20 CALCULUS OF GALLBLADDER WITHOUT CHOLECYSTITIS WITHOUT OBSTRUCTION: ICD-10-CM

## 2025-04-16 DIAGNOSIS — R73.9 HYPERGLYCEMIA: ICD-10-CM

## 2025-04-16 DIAGNOSIS — S39.012D LUMBOSACRAL STRAIN, SUBSEQUENT ENCOUNTER: ICD-10-CM

## 2025-04-16 DIAGNOSIS — G47.33 OBSTRUCTIVE SLEEP APNEA SYNDROME: ICD-10-CM

## 2025-04-16 DIAGNOSIS — J44.9 CHRONIC OBSTRUCTIVE PULMONARY DISEASE, UNSPECIFIED COPD TYPE: ICD-10-CM

## 2025-04-16 DIAGNOSIS — I10 HYPERTENSION, UNSPECIFIED TYPE: ICD-10-CM

## 2025-04-16 DIAGNOSIS — Z87.19 HISTORY OF GASTROESOPHAGEAL REFLUX (GERD): ICD-10-CM

## 2025-04-16 DIAGNOSIS — Z86.0100 HISTORY OF COLONIC POLYPS: ICD-10-CM

## 2025-04-16 DIAGNOSIS — R07.89 ATYPICAL CHEST PAIN: Primary | ICD-10-CM

## 2025-04-16 DIAGNOSIS — M54.16 LUMBAR RADICULOPATHY: ICD-10-CM

## 2025-04-16 DIAGNOSIS — R63.5 WEIGHT INCREASE: ICD-10-CM

## 2025-04-16 DIAGNOSIS — Z12.31 SCREENING MAMMOGRAM, ENCOUNTER FOR: ICD-10-CM

## 2025-04-16 PROBLEM — J06.9 UPPER RESPIRATORY TRACT INFECTION: Status: RESOLVED | Noted: 2024-02-22 | Resolved: 2025-04-16

## 2025-04-16 PROBLEM — B34.9 VIRAL SYNDROME: Status: RESOLVED | Noted: 2024-02-12 | Resolved: 2025-04-16

## 2025-04-16 PROCEDURE — 4010F ACE/ARB THERAPY RXD/TAKEN: CPT | Mod: HCNC,CPTII,S$GLB, | Performed by: FAMILY MEDICINE

## 2025-04-16 PROCEDURE — 99999 PR PBB SHADOW E&M-EST. PATIENT-LVL IV: CPT | Mod: PBBFAC,HCNC,, | Performed by: FAMILY MEDICINE

## 2025-04-16 PROCEDURE — 3008F BODY MASS INDEX DOCD: CPT | Mod: HCNC,CPTII,S$GLB, | Performed by: FAMILY MEDICINE

## 2025-04-16 PROCEDURE — 3074F SYST BP LT 130 MM HG: CPT | Mod: HCNC,CPTII,S$GLB, | Performed by: FAMILY MEDICINE

## 2025-04-16 PROCEDURE — 99214 OFFICE O/P EST MOD 30 MIN: CPT | Mod: HCNC,S$GLB,, | Performed by: FAMILY MEDICINE

## 2025-04-16 PROCEDURE — 1159F MED LIST DOCD IN RCRD: CPT | Mod: HCNC,CPTII,S$GLB, | Performed by: FAMILY MEDICINE

## 2025-04-16 PROCEDURE — 3078F DIAST BP <80 MM HG: CPT | Mod: HCNC,CPTII,S$GLB, | Performed by: FAMILY MEDICINE

## 2025-04-16 RX ORDER — CYPROHEPTADINE HYDROCHLORIDE 4 MG/1
TABLET ORAL
Qty: 30 TABLET | Refills: 5 | Status: SHIPPED | OUTPATIENT
Start: 2025-04-16

## 2025-04-16 RX ORDER — TRAMADOL HYDROCHLORIDE 50 MG/1
50 TABLET ORAL EVERY 4 HOURS PRN
Qty: 28 EACH | Refills: 0 | Status: SHIPPED | OUTPATIENT
Start: 2025-04-16

## 2025-04-16 NOTE — PROGRESS NOTES
Subjective:       Patient ID: Wilfredo Vazquez is a 65 y.o. female.    Chief Complaint: Hospital Follow Up    [id number]-old black female in for hospital follow-up---patient was seen emergency room 4 8 2025---having chest pain in the right anterior chest between the clavicle breast--pain radiated through the back---had CBCs CMP BNP lipids all basically normal repeat CBCs showed hematocrit 35.8--troponin was increase had ultrasound of the abdomen showing gallstones with sludge dilated pancreatic duct--had MRI which was negative---had MRI of the hip which showed bilateral avascular necrosis with advanced degenerative changes of the left hip--CT scan of the head was normal  EKG was normal echocardiogram ejection fraction 40% basically normal patient did not have stress test or angiogram needs to see Cardiology  Given medication for cholesterol/dizziness/something for stomach---patient did have upper GI in February showing some irritation of the stomach  Hypertension blood pressure 124/76 lisinopril 20 mg  COPD albuterol and Breztri  GERD on Protonix  History left knee replacement  History insomnia on Seroquel--sees psychiatrist in [place]-  Problems to address  1-chest pain  2 gallstone  3 EGD showing some gastritis with area that appears to have bled patient on Protonix  4 MRI of the hip showing bilaterally avascular necrosis with advanced degenerative changes needs follow up with orthopedist for possible injection into the left hip--  needs refill of tramadol and Periactin                ROS:  No significant change except for history of present illness  Skin: no psoriasis, eczema, skin cancer-  HEENT: no headache,  No ocular pain, blurred vision, diplopia, epistaxis, hoarseness change in voice, thyroid trouble  Lung: No pneumonia, +asthma, no Tb, +_wheezing,+ SOB, no smoking + COPD --doing better--needs inhaler albuterol and breztri   Heart: No chest pain, ankle edema, palpitations, MI, patti murmur,  +hypertension,no  hyperlipidemia no stent bypass arrhythmia blood pressure 126/78  Abdomen: no nausea,no  Vomiting,no diarrhea, no  constipation, ulcers, hepatitis, gallbladder disease, melena, hematochezia, hematemesis +dysphagia history of fundoplication for GERD history of esophageal dilatation x2Dr Beary doing better--swallowing much better  : no UTI, renal disease, stones  GYN hyst   MS: no fractures, O/A, lupus, rheumatoid, gout--saw orthopedist had MRI of the hips showing avascular necrosis of the hips bilaterally degenerative changes of the left hip needs to follow-up with orthopedist for possible hip injections  Neuro: No dizziness, LOC, seizures   No diabetes, no anemia, no anxiety, no depression   Single--2 children one  --disable secondary to a stomach--lives with daughter and grandson    Objective:   Physical Exam:    General: Well nourished, well developed, no acute distress +obesity  Skin: No lesions  HEENT: Eyes PERRLA, EOM intact, bilateral cataract nose cleear D/C  throat +1/4 erythematous ears TMs clear   NECK: Supple, no bruits, No JVD, no nodes  Lungs: Clear, no rales, rhonchi, wheezing coarse BS   Heart: Regular rate and rhythm, no murmur s, gallops, or rubs  Abdomen:  No abdominal pain guarding rebound or tenderness organomegaly  MS--no significant change pain left knee palpation--crepitus with flexion extension of the left knee--tenderness with ambulation--able squat snf down hard to arise--told in the past may need a knee replacement--tenderness lumbar spine L1-S1 anterior flexion 10° extension 10° lateral flexion rotation 10°--bilateral hip pain left greater than right  Neuro: Alert, CN intact, oriented X 3  Extremities: No cyanosis, clubbing, or edema         Assessment:       1. Atypical chest pain    2. Avascular necrosis of bone of left hip    3. Hyperglycemia    4. Screening mammogram, encounter for    5. Obstructive sleep apnea syndrome    6. Prediabetes    7.  Lumbosacral strain, subsequent encounter    8. Hypertension, unspecified type    9. History of esophageal dilatation    10. History of gastroesophageal reflux (GERD)    11. History of colonic polyps    12. Hiatal hernia    13. Chronic obstructive pulmonary disease, unspecified COPD type    14. Lumbar radiculopathy    15. Weight increase                      Plan:           Main Reason for Visit--  ER follow-up April 8, 2025--atypical chest pain--elevated troponins--echo EKGs unremarkable patient needs to see Cardiology for possible angiogram  Cholelithiasis---patient with history of EGD showing area where bleeding had occurred--does get some gastritis will refer to general surgery evaluate gallbladder for possible cholecystectomy  Left hip pain---patient saw orthopedist had MRI of the hip showing bilateral avascular necrosis of the hip with degenerative changes left hip patient has appointment with orthopedist for possible injection of the hip  Chronic pain syndrome needs refill of Ultram  Decreased appetite with a Periactin  COPD --chroi=elo bronchitie --albuterol and Breztri   Tobacco abuse half pack per day ---patient states no longer smoking  Low back pain--and neck pain-- Bulging disc T12-S1 and some facet arthropathy L4-5 L5-S1 history of a compression fracture of L1 --3 days ago developed significant pain getting out of bed specially in the lower back will give Ultram ibuprofen--Moist heat/theragesic/range of motion exercise--needs to see neurosurgeon--patient has had epidural steroid injections in the past  Hypertension blood pressure 124/76--on lisinopril 20 mg  Insomnia on Seroquel should get from psychiatrist  History dysphagia--had Nissen fundoplication--history of GERD--history of esophageal stenosis with dilatation occas  difficulty swallowing water has to eat small amounts of food--history esophageal dilitation --OK now  on protonix q hs   Lab CBCs CMP lipd T4 TSH 6 weeks ---see Cardiology for  angiogram or nuclear stress test---see general surgery for possible cholecystectomy---see orthopedist for avascular necrosis of the femoral head for possible injections---return in 3 months--redo lab in six-month

## 2025-04-22 ENCOUNTER — OFFICE VISIT (OUTPATIENT)
Dept: PAIN MEDICINE | Facility: CLINIC | Age: 65
End: 2025-04-22
Payer: MEDICARE

## 2025-04-22 VITALS
BODY MASS INDEX: 30.51 KG/M2 | HEIGHT: 63 IN | WEIGHT: 172.19 LBS | HEART RATE: 89 BPM | SYSTOLIC BLOOD PRESSURE: 145 MMHG | DIASTOLIC BLOOD PRESSURE: 83 MMHG

## 2025-04-22 DIAGNOSIS — M16.12 PRIMARY OSTEOARTHRITIS OF LEFT HIP: Primary | ICD-10-CM

## 2025-04-22 DIAGNOSIS — M54.16 LUMBAR RADICULOPATHY: ICD-10-CM

## 2025-04-22 DIAGNOSIS — M43.06 SPONDYLOLYSIS OF LUMBAR REGION: ICD-10-CM

## 2025-04-22 DIAGNOSIS — M47.816 LUMBAR SPONDYLOSIS: ICD-10-CM

## 2025-04-22 DIAGNOSIS — M51.362 DEGENERATION OF INTERVERTEBRAL DISC OF LUMBAR REGION WITH DISCOGENIC BACK PAIN AND LOWER EXTREMITY PAIN: Primary | ICD-10-CM

## 2025-04-22 PROCEDURE — 1160F RVW MEDS BY RX/DR IN RCRD: CPT | Mod: HCNC,CPTII,S$GLB,

## 2025-04-22 PROCEDURE — 4010F ACE/ARB THERAPY RXD/TAKEN: CPT | Mod: HCNC,CPTII,S$GLB,

## 2025-04-22 PROCEDURE — 99999 PR PBB SHADOW E&M-EST. PATIENT-LVL IV: CPT | Mod: PBBFAC,HCNC,,

## 2025-04-22 PROCEDURE — 1159F MED LIST DOCD IN RCRD: CPT | Mod: HCNC,CPTII,S$GLB,

## 2025-04-22 PROCEDURE — 3079F DIAST BP 80-89 MM HG: CPT | Mod: HCNC,CPTII,S$GLB,

## 2025-04-22 PROCEDURE — 99214 OFFICE O/P EST MOD 30 MIN: CPT | Mod: HCNC,S$GLB,,

## 2025-04-22 PROCEDURE — 3008F BODY MASS INDEX DOCD: CPT | Mod: HCNC,CPTII,S$GLB,

## 2025-04-22 PROCEDURE — 3077F SYST BP >= 140 MM HG: CPT | Mod: HCNC,CPTII,S$GLB,

## 2025-04-22 NOTE — PROGRESS NOTES
Subjective:     Patient ID: Wilfredo Vazquez is a 65 y.o. female    Chief Complaint: Low-back Pain      Referred by: No ref. provider found      HPI:    Interval History PA (04/22/2025):  Patient presents for follow-up after receiving right-sided back ablations in January 2024, which provided approximately 90% pain relief and remains effective. She reports a new onset of left hip pain for about two weeks, located in the groin area and extending across the hip crease. Pain worsens with weight-bearing activities such as walking and sitting down. She reports severe pain, rating it as 10/10. She has difficulty bending down and lifting her left leg, expressing significant discomfort and limited mobility. An MRI revealed hip arthritis but no fracture. She was prescribed Tramadol for pain management but reports it is not effective in alleviating the pain. She also mentions ongoing lower back pain across the back, similar to the pain experienced before the right-sided ablation.    Interval History PA (11/14/2023):  Patient returns to clinic for follow up of bilateral lower back and left lower extremity pain.  Patient notes since previous visit she has been attending physical therapy with minimal improvement overall.  Noting increased pain following each physical therapy session, feels like physical therapy has made her pain worse.  She has since discontinued physical therapy and continues with a home exercise program.  Pain today is at a 6/10.  States pain is located across her lower lumbar paraspinal region with radiation into her left anterior thigh, stopping at the knee.  Patient also with chronic left knee pain secondary to osteoarthritis, followed by orthopedics for this.  Notes that she is likely going to proceed with a total left knee replacement in the future.  States the worst of her pain is located across her lower lumbar region.  Pain is constant, worsened with certain activities.  Notes pain worse in the  morning when getting out of bed, associated stiffness, some improvement with activity in the morning.  Denies any numbness, tingling, weakness, bowel or bladder dysfunction.  Patient reports trial of various medications including gabapentin, Motrin, Tylenol all without benefit.  Currently taking tramadol per PCP with some improvement.    Initial Encounter (9/14/23):  Wilfredo Vazquez is a 65 y.o. female who presents today with chronic left sided low back and lower extremity pain. Does have similar symptoms on the right side but these are very mild. Her pain started roughly 5 months ago. No specific inciting events or injuries noted. The pain is primarily loactred in the left lower lumbar region and will radiate to the left lower extremity to her foot/ankle. The pain is constant and worsened with activity. She denies any associated numbness/tingling weakness or b/b dysfunction. .   This pain is described in detail below.    Physical Therapy:  Yes, not effective    Non-pharmacologic Treatment: Rest helps         TENS? No    Pain Medications:         Currently taking: Tramadol    Has tried in the past:  NSAIDs, tylenol, gabapentin    Has not tried: Muscle relaxants, TCAs, SNRIs, topical creams    Blood thinners: None    Interventional Therapies:   01/25/2024 - right L3, L4, L5 radiofrequency ablation - 100% relief x1 year    Relevant Surgeries: None    Affecting sleep? Yes    Affecting daily activities? yes    Depressive symptoms? No          SI/HI? No    Work status: Disabled    Pain Scores:    Best:       2/10  Worst:     8/10  Usually:   5/10  Today:    6/10     Pain Disability Index  Family/Home Responsibilities:: 9  Recreation:: 9  Social Activity:: 0  Occupation:: 9  Sexual Behavior:: 0  Self Care:: 5  Life-Support Activities:: 8  Pain Disability Index (PDI): 40     Review of Systems   Constitutional:  Negative for activity change, appetite change, chills, fatigue, fever and unexpected weight change.   HENT:   Negative for hearing loss.    Eyes:  Negative for visual disturbance.   Respiratory:  Negative for chest tightness and shortness of breath.    Cardiovascular:  Negative for chest pain.   Gastrointestinal:  Negative for abdominal pain, constipation, diarrhea, nausea and vomiting.   Genitourinary:  Negative for difficulty urinating.   Musculoskeletal:  Positive for arthralgias, back pain, gait problem and myalgias. Negative for neck pain.   Skin:  Negative for rash.   Neurological:  Negative for dizziness, weakness, light-headedness, numbness and headaches.   Psychiatric/Behavioral:  Positive for sleep disturbance. Negative for hallucinations and suicidal ideas. The patient is not nervous/anxious.        Past Medical History:   Diagnosis Date    Abdominal pain 2018    Emphysema lung     GERD (gastroesophageal reflux disease) 2018    Hypertension     Shingles        Past Surgical History:   Procedure Laterality Date    APPENDECTOMY      ARTHROPLASTY, KNEE, TOTAL - Left Left 05/27/2024    BLOCK, SPINAL NERVE ROOT, LUMBAR, SELECTIVE Bilateral 12/28/2023    L3, L4, L5    COLONOSCOPY N/A 05/22/2019    Procedure: COLONOSCOPY;  Surgeon: Sim Quinteros MD;  Location: [facility];  Service: Endoscopy;  Laterality: N/A;    COLONOSCOPY N/A 06/20/2022    Procedure: COLONOSCOPY WITH POLYPECTOMY AND CLIPPING;  Surgeon: Jarrod Franco MD;  Location: [facility];  Service: Endoscopy;  Laterality: N/A;    COLONOSCOPY  03/19/2024    COLONOSCOPY N/A 03/19/2024    Procedure: COLONOSCOPY;  Surgeon: Sim Quinteros MD;  Location: [facility];  Service: Endoscopy;  Laterality: N/A;    COLONOSCOPY W/ POLYPECTOMY      EGD, WITH CLOSED BIOPSY  02/17/2025    ESOPHAGEAL DILATION N/A 05/13/2019    Procedure: DILATION, ESOPHAGUS;  Surgeon: Sim Quinteros MD;  Location: [facility];  Service: Endoscopy;  Laterality: N/A;    ESOPHAGEAL DILATION N/A 02/09/2021    Procedure: DILATION, ESOPHAGUS;  Surgeon: Sim Quinteros MD;  Location: [facility];   Service: Endoscopy;  Laterality: N/A;    ESOPHAGEAL DILATION N/A 12/14/2021    Procedure: DILATION, ESOPHAGUS;  Surgeon: Sim Quinteros MD;  Location: [facility];  Service: Endoscopy;  Laterality: N/A;    ESOPHAGEAL DILATION N/A 05/17/2022    Procedure: DILATION, ESOPHAGUS;  Surgeon: Sim Quinteros MD;  Location: [facility];  Service: Endoscopy;  Laterality: N/A;    ESOPHAGEAL DILATION N/A 12/26/2023    Procedure: DILATION, ESOPHAGUS;  Surgeon: Sim Quinteros MD;  Location: [facility];  Service: Endoscopy;  Laterality: N/A;    ESOPHAGEAL DILATION N/A 07/25/2024    Procedure: DILATION, ESOPHAGUS;  Surgeon: Sim Quinteros MD;  Location: [facility];  Service: Endoscopy;  Laterality: N/A;    ESOPHAGOGASTRODUODENOSCOPY N/A 05/13/2019    Procedure: EGD (ESOPHAGOGASTRODUODENOSCOPY);  Surgeon: Sim Quinteros MD;  Location: [facility];  Service: Endoscopy;  Laterality: N/A;    ESOPHAGOGASTRODUODENOSCOPY N/A 02/09/2021    Procedure: EGD (ESOPHAGOGASTRODUODENOSCOPY);  Surgeon: Sim Quinteros MD;  Location: [facility];  Service: Endoscopy;  Laterality: N/A;    ESOPHAGOGASTRODUODENOSCOPY N/A 12/14/2021    Procedure: EGD (ESOPHAGOGASTRODUODENOSCOPY);  Surgeon: Sim Quinteros MD;  Location: [facility];  Service: Endoscopy;  Laterality: N/A;    ESOPHAGOGASTRODUODENOSCOPY N/A 05/17/2022    Procedure: EGD (ESOPHAGOGASTRODUODENOSCOPY);  Surgeon: Sim Quinteros MD;  Location: [facility];  Service: Endoscopy;  Laterality: N/A;    ESOPHAGOGASTRODUODENOSCOPY N/A 12/26/2023    Procedure: EGD (ESOPHAGOGASTRODUODENOSCOPY);  Surgeon: Sim Quinteros MD;  Location: [facility];  Service: Endoscopy;  Laterality: N/A;    ESOPHAGOGASTRODUODENOSCOPY N/A 07/25/2024    Procedure: EGD (ESOPHAGOGASTRODUODENOSCOPY);  Surgeon: Sim Quinteros MD;  Location: [facility];  Service: Endoscopy;  Laterality: N/A;    ESOPHAGOGASTRODUODENOSCOPY N/A 2/17/2025    Procedure: EGD (ESOPHAGOGASTRODUODENOSCOPY);  Surgeon: Efrem Garcia MD;  Location: [facility]  ENDO;  Service: Endoscopy;  Laterality: N/A;    ESOPHAGOGASTRODUODENOSCOPY (EGD) WITH DILATION  05/17/2022    HYSTERECTOMY      INJECTION OF ANESTHETIC AGENT AROUND MEDIAL BRANCH NERVES INNERVATING LUMBAR FACET JOINT Bilateral 12/14/2023    Procedure: Block-nerve-medial branch-lumbar----L3,L4,L5;  Surgeon: Iávn Velasquez Jr., MD;  Location: [facility];  Service: Pain Management;  Laterality: Bilateral;    INJECTION OF ANESTHETIC AGENT AROUND MEDIAL BRANCH NERVES INNERVATING LUMBAR FACET JOINT Bilateral 12/28/2023    Procedure: Block-nerve-medial branch-lumbar L3,L4,L5;  Surgeon: Iván Velasquez Jr., MD;  Location: [facility];  Service: Pain Management;  Laterality: Bilateral;    LAPAROSCOPIC NISSEN FUNDOPLICATION      RADIOFREQUENCY ABLATION, NERVE, SPINAL, LUMBOSACRAL Right 01/25/2024    Procedure: RADIOFREQUENCY ABLATION------L3,L4,L5;  Surgeon: Iván Velasquez Jr., MD;  Location: [facility];  Service: Pain Management;  Laterality: Right;    REPAIR OF EXTENSOR TENDON Left 06/07/2022    Procedure: REPAIR, TENDON, EXTENSOR  ;  Surgeon: Dakota Anaya Jr., MD;  Location: [facility] [place];  Service: Plastics;  Laterality: Left;  THUMB    TOTAL KNEE ARTHROPLASTY Left 05/27/2024    Procedure: ARTHROPLASTY, KNEE, TOTAL;  Surgeon: Nigel Enriquez MD;  Location: [facility] OR;  Service: Orthopedics;  Laterality: Left;  [facility], hip bumped, left TKA    UPPER GASTROINTESTINAL ENDOSCOPY N/A 01/24/2018       Social History     Socioeconomic History    Marital status: Single   Tobacco Use    Smoking status: Every Day     Current packs/day: 0.25     Types: Cigarettes    Smokeless tobacco: Never   Substance and Sexual Activity    Alcohol use: Yes     Comment: occasionally    Drug use: No    Sexual activity: Yes     Partners: Female     Comment: rare     Social Drivers of Health     Financial Resource Strain: Low Risk  (2/17/2025)    Overall Financial Resource Strain (CARDIA)     Difficulty of Paying  Living Expenses: Not hard at all   Food Insecurity: No Food Insecurity (2/17/2025)    Hunger Vital Sign     Worried About Running Out of Food in the Last Year: Never true     Ran Out of Food in the Last Year: Never true   Transportation Needs: No Transportation Needs (8/13/2024)    PRAPARE - Transportation     Lack of Transportation (Medical): No     Lack of Transportation (Non-Medical): No   Physical Activity: Patient Unable To Answer (8/13/2024)    Exercise Vital Sign     Days of Exercise per Week: Patient unable to answer     Minutes of Exercise per Session: Patient unable to answer   Stress: No Stress Concern Present (2/17/2025)    [employer] - Occupational Stress Questionnaire     Feeling of Stress : Not at all   Housing Stability: Low Risk  (2/17/2025)    Housing Stability Vital Sign     Unable to Pay for Housing in the Last Year: No     Homeless in the Last Year: No       Review of patient's allergies indicates:   Allergen Reactions    Naproxen Rash       Current Outpatient Medications on File Prior to Visit   Medication Sig Dispense Refill    albuterol (PROVENTIL/VENTOLIN HFA) 90 mcg/actuation inhaler inhale TWO puffs into THE lungs EVERY 4 HOURS AS NEEDED 25.5 g 3    atorvastatin (LIPITOR) 40 MG tablet Take 1 tablet (40 mg total) by mouth once daily. 90 tablet 0    BREZTRI AEROSPHERE 160-9-4.8 mcg/actuation HFAA Two inhalations b.i.d. 10.7 g 5    cyproheptadine (PERIACTIN) 4 mg tablet 1 po q hs to increase weight 30 tablet 5    lisinopriL (PRINIVIL,ZESTRIL) 20 MG tablet Take 1 tablet (20 mg total) by mouth once daily. 100 tablet 3    meclizine (ANTIVERT) 25 mg tablet Take 1 tablet (25 mg total) by mouth 3 (three) times daily as needed for Dizziness. 60 tablet 0    pantoprazole (PROTONIX) 40 MG tablet Take 1 tablet (40 mg total) by mouth once daily. 90 tablet 0    QUEtiapine (SEROQUEL) 300 MG Tab Take 300 mg by mouth every evening.      traMADoL (ULTRAM) 50 mg tablet Take 1  "tablet (50 mg total) by mouth every 4 (four) hours as needed for Pain. 28 each 0     Current Facility-Administered Medications on File Prior to Visit   Medication Dose Route Frequency Provider Last Rate Last Admin    lactated ringers infusion   Intravenous Continuous Sim Quinteros MD 0 mL/hr at 12/14/21 0846 New Bag at 06/07/22 1151    lactated ringers infusion   Intravenous Continuous Sim Quinteros MD        sodium chloride 0.9% flush 10 mL  10 mL Intravenous PRN Sim Quinteros MD        sodium chloride 0.9% flush 10 mL  10 mL Intravenous PRN Sim Quinteros MD        sodium chloride 0.9% flush 10 mL  10 mL Intravenous PRN Sim Quinteros MD           Objective:      BP (!) 145/83 (BP Location: Right arm)   Pulse 89   Ht 5' 3" (1.6 m)   Wt 78.1 kg (172 lb 2.9 oz)   LMP  (LMP Unknown)   BMI 30.50 kg/m²     Exam:  GEN:  Well developed, well nourished.  No acute distress. Normal pain behavior.  HEENT:  No trauma.  Mucous membranes moist.  Nares patent bilaterally.  PSYCH: Normal affect. Thought content appropriate.  CHEST:  Breathing symmetric.  No audible wheezing.  ABD: Soft, non-distended.  SKIN:  Warm, pink, dry.  No rash on exposed areas.    EXT:  No cyanosis, clubbing, or edema.  No color change or changes in nail or hair growth.  NEURO/MUSCULOSKELETAL:  Fully alert, oriented, and appropriate. Speech normal magalis. No cranial nerve deficits.   Gait: Normal.  No trendelenburg sign bilaterally.     Left hip:  Limited ROM secondary to pain  Positive FADIR - hip pain fully reproduced with internal rotation  Positive HELEN    Imaging:  Narrative & Impression  EXAMINATION:  MRI HIP WITHOUT CONTRAST LEFT     CLINICAL HISTORY:  Fracture, hip;  Pain in left hip     TECHNIQUE:  Multiplanar, multisequence imaging of the pelvis and bilateral  hip without the use of contrast     COMPARISON:  None     FINDINGS:  Osseous Structures: No fracture. Bilateral avascular necrosis best seen series 4 image 24.No " subchondral collapse.No marrow signal abnormality to suggest bone marrow replacement process.     Hip and Sacroiliac joints: Small effusions left greater than right with ossific body. Degenerative change left hip with cartialge irregularity and subchondral femoroacetabular edema.  No gross abnormalities of the acetabular jun are shown.  MR arthrogram would be necessary for complete evaluation of the jun, if clinically indicated.     Bursae: No trochanteric bursitis.Left iliopsoas bursitis.     Soft Tissues: Muscles and tendons of the pelvis and both hips show no atrophy, edema, mass, tears or other abnormalities.     Impression:     Bilateral hip AVN.  Superimposed advanced degenerative change left hip with cartilage irregularity, subchondral geode formation and subchondral femoroacetabular edema.        Electronically signed by:Efrem Hardy MD  Date:                                            04/13/2025  Time:                                           16:41    Narrative & Impression    EXAMINATION:  MRI LUMBAR SPINE WITHOUT CONTRAST     CLINICAL HISTORY:  Low back pain, symptoms persist with > 6wks conservative treatment; Dorsalgia, unspecified     TECHNIQUE:  Multiplanar, multisequence MR images were acquired from the thoracolumbar junction to the sacrum without the administration of contrast.     Examination mildly degraded by patient motion artifact.     COMPARISON:  Radiograph 12/05/2022, CT abdomen pelvis 05/01/2022, CT abdomen pelvis 04/12/2021     FINDINGS:  There is focal conchae cavity of the inferior endplate of the L1 vertebral body.  This is new from the CT of 05/01/2022.  Findings in keeping with a prominent Schmorl's node or mild compression deformity.  Trace edema like signal adjacent to the endplate.     The remainder of the vertebral bodies are normal in height and morphology with normal marrow signal.  No fracture or osseous destructive process elsewhere.     Normal sagittal alignment is  preserved.  No spondylolisthesis.     Mild disc bulging at T12-L1, L1-2, L3-4 and L4-5.  No large disc herniation at any level.  The intervertebral disc heights are fairly well maintained.  No significant spinal canal stenosis.  There is mild hypertrophic facet arthropathy in the mid to the lower lumbar spine worst on the left at L4-5 and L5-S1.  No high-grade neural foraminal narrowing.     The terminal spinal cord, conus, and cauda equina demonstrate normal caliber, morphology, and signal.        No intraspinal mass or fluid collection.     No acute abnormalities in the visualized paraspinal soft tissue structures.  1.1 cm round T2 hyperintense lesion on the left kidney, previously characterized as a cyst.     Findings were relayed to the ordering provider (Yumiko) via the epic secure chat system at approximately 08:57.        Impression:     Examination mildly degraded by patient motion artifact.        Prominent inferior endplate Schmorl's node versus mild compression fracture of the L1 vertebral body, new from prior CT of 05/01/2022, may be acute or subacute.  Clinical correlation is advised.     Mild multilevel degenerative disc disease and facet arthropathy as discussed above.  No evidence of significant spinal canal stenosis or high-grade neural foraminal narrowing.        Electronically signed by: Silviano Arboleda MD  Date:                                            06/21/2023  Time:                                           09:00       Assessment:       Encounter Diagnoses   Name Primary?    Degeneration of intervertebral disc of lumbar region with discogenic back pain and lower extremity pain Yes    Spondylolysis of lumbar region     Lumbar spondylosis     Lumbar radiculopathy        Plan:       Wilfredo was seen today for low-back pain.    Diagnoses and all orders for this visit:    Degeneration of intervertebral disc of lumbar region with discogenic back pain and lower extremity pain    Spondylolysis of  lumbar region    Lumbar spondylosis    Lumbar radiculopathy          Wilfredo Vazquez is a 65 y.o. female with chronic bilateral lower back and left lower extremity pain.  Patient's pain appears to be multifactorial and with unclear etiology, multiple positive exam findings for possible facet arthropathy, radiculopathy, and SI joint dysfunction.  Subjectively having symptoms consistent with left-sided lower lumbar radiculopathy although no overt nerve root compression noted on MRI.  Facet arthropathy notable at L5-S1 and to a lesser degree at L4-5.  No neurological deficits noted on examination.  Significant, 100% relief following right L3, L4, L5 RFA.  Now with gradual return of symptoms.  However primary concern today is acute left hip pain.  Symptoms and exam consistent with intra-articular hip dysfunction.    Prior records reviewed.  Pertinent imaging studies reviewed by me. Imaging results were discussed with patient.  Proceed with left intra-articular hip injection.  Direct pain procedure ordered by Orthopedics.  We will schedule patient for this injection today.  Patient also with return of chronic bilateral lower back pain.  Appears to be mostly axial.  Significant benefit from previous right L3, L4, L5 RFA with almost 100% relief for 1 year.  Now with recurrent symptoms.  May consider repeating in the future.  Okay to utilize Tylenol p.r.n..  Unable to use NSAIDs due to allergic reaction.  Stressed the importance of maintaining regular home exercise program and being mindful of how they use their back throughout the day.  Patient expressed understanding and agreement.  Return to clinic after procedure to discuss efficacy.        Crispin Eid PA-C  [facility]  Interventional Pain Management     This note was created by combination of typed  and M-Modal dictation.  Transcription and phonetic errors may be present.  If there are any questions, please contact me.      This note was generated with the assistance of ambient listening technology. Verbal consent was obtained by the patient and accompanying visitor(s) for the recording of patient appointment to facilitate this note. I attest to having reviewed and edited the generated note for accuracy, though some syntax or spelling errors may persist. Please contact the author of this note for any clarification.

## 2025-04-22 NOTE — PATIENT INSTRUCTIONS
Reviewed pre op instructions with patient:    Please leave jewelry and valuables at home or give them to your escort for safe keeping.  You will be required to have an adult to escort you after the procedure is over. Although we will not be sedating you for your procedure we ask for an escort for safety after the procedure.  Do not take over the counter blood thinning medications beginning 7 days before the procedure (aspirin, Goody's Powder, BC Powder, Motrin, ibuprofen, Advil, Aleve, naproxen). If you are taking prescribed thinners (Coumadin, warfarin, apixaban, Eliquis, Plavix, clopidogrel, Pletal, etc) we will obtain cardiac clearance from your cardiologist or provider that is monitoring your medications and levels to hold the medications if appropriate.  Cleanse your skin the evening before with an antibacterial soap (Dial or Hibiclens) and then repeat it again the morning of the procedure.  Do not apply lotions, creams, deodorants, perfumes, or colognes the day of the procedure.  You will be contacted the day before the procedure between 12-3p to be given the time to arrive the day of the procedure. If you are not contacted by the afternoon before the procedure you should contact [phone].  Nothing by mouth after midnight before the procedure.    Patient verbalized understanding of the instructions provided to them and clinic contact number was provided for any further questions they may have.

## 2025-04-28 ENCOUNTER — PATIENT OUTREACH (OUTPATIENT)
Facility: OTHER | Age: 65
End: 2025-04-28
Payer: MEDICARE

## 2025-04-28 ENCOUNTER — OFFICE VISIT (OUTPATIENT)
Dept: SURGERY | Facility: CLINIC | Age: 65
End: 2025-04-28
Payer: MEDICARE

## 2025-04-28 VITALS
HEART RATE: 92 BPM | WEIGHT: 178.44 LBS | DIASTOLIC BLOOD PRESSURE: 91 MMHG | SYSTOLIC BLOOD PRESSURE: 162 MMHG | BODY MASS INDEX: 31.61 KG/M2

## 2025-04-28 DIAGNOSIS — K80.20 CALCULUS OF GALLBLADDER WITHOUT CHOLECYSTITIS WITHOUT OBSTRUCTION: Primary | ICD-10-CM

## 2025-04-28 PROCEDURE — 1160F RVW MEDS BY RX/DR IN RCRD: CPT | Mod: CPTII,HCNC,S$GLB, | Performed by: SURGERY

## 2025-04-28 PROCEDURE — 1159F MED LIST DOCD IN RCRD: CPT | Mod: CPTII,HCNC,S$GLB, | Performed by: SURGERY

## 2025-04-28 PROCEDURE — 99999 PR PBB SHADOW E&M-EST. PATIENT-LVL IV: CPT | Mod: PBBFAC,HCNC,, | Performed by: SURGERY

## 2025-04-28 PROCEDURE — 3008F BODY MASS INDEX DOCD: CPT | Mod: CPTII,HCNC,S$GLB, | Performed by: SURGERY

## 2025-04-28 PROCEDURE — 3080F DIAST BP >= 90 MM HG: CPT | Mod: CPTII,HCNC,S$GLB, | Performed by: SURGERY

## 2025-04-28 PROCEDURE — 4010F ACE/ARB THERAPY RXD/TAKEN: CPT | Mod: CPTII,HCNC,S$GLB, | Performed by: SURGERY

## 2025-04-28 PROCEDURE — 3077F SYST BP >= 140 MM HG: CPT | Mod: CPTII,HCNC,S$GLB, | Performed by: SURGERY

## 2025-04-28 PROCEDURE — 99213 OFFICE O/P EST LOW 20 MIN: CPT | Mod: HCNC,S$GLB,, | Performed by: SURGERY

## 2025-04-28 RX ORDER — SODIUM CHLORIDE 9 MG/ML
INJECTION, SOLUTION INTRAVENOUS CONTINUOUS
OUTPATIENT
Start: 2025-04-28

## 2025-04-28 RX ORDER — ENOXAPARIN SODIUM 100 MG/ML
40 INJECTION SUBCUTANEOUS
OUTPATIENT
Start: 2025-04-28

## 2025-04-28 NOTE — PROGRESS NOTES
History & Physical    Subjective     History of Present Illness:  Patient is a 65 y.o. female presents with abdominal pain on both the left and right sides.    Patient states she had another bowel movement 3 days and feels constipated.    Also had a recent epigastric discomfort and was found on ultrasound to have gallstones.    Stated that some of her abdominal discomfort right now is likely due to constipation however the gallstones seen on ultrasound and her other symptoms recommended cholecystectomy.    Patient expressed understanding and agreed to this plan.      Chief Complaint   Patient presents with    Gall Bladder Problem       Review of patient's allergies indicates:   Allergen Reactions    Naproxen Rash       Current Medications[1]    Past Medical History:   Diagnosis Date    Abdominal pain 2018    Emphysema lung     GERD (gastroesophageal reflux disease) 2018    Hypertension     Shingles      Past Surgical History:   Procedure Laterality Date    APPENDECTOMY      ARTHROPLASTY, KNEE, TOTAL - Left Left 05/27/2024    BLOCK, SPINAL NERVE ROOT, LUMBAR, SELECTIVE Bilateral 12/28/2023    L3, L4, L5    COLONOSCOPY N/A 05/22/2019    Procedure: COLONOSCOPY;  Surgeon: Sim Quinteros MD;  Location: [facility];  Service: Endoscopy;  Laterality: N/A;    COLONOSCOPY N/A 06/20/2022    Procedure: COLONOSCOPY WITH POLYPECTOMY AND CLIPPING;  Surgeon: Jarrod Franco MD;  Location: [facility];  Service: Endoscopy;  Laterality: N/A;    COLONOSCOPY  03/19/2024    COLONOSCOPY N/A 03/19/2024    Procedure: COLONOSCOPY;  Surgeon: Sim Quinteros MD;  Location: [facility];  Service: Endoscopy;  Laterality: N/A;    COLONOSCOPY W/ POLYPECTOMY      EGD, WITH CLOSED BIOPSY  02/17/2025    ESOPHAGEAL DILATION N/A 05/13/2019    Procedure: DILATION, ESOPHAGUS;  Surgeon: Sim Quinteros MD;  Location: [facility];  Service: Endoscopy;  Laterality: N/A;    ESOPHAGEAL DILATION N/A 02/09/2021    Procedure: DILATION, ESOPHAGUS;  Surgeon: Sim ORELLANA  MD [place];  Location: [facility];  Service: Endoscopy;  Laterality: N/A;    ESOPHAGEAL DILATION N/A 12/14/2021    Procedure: DILATION, ESOPHAGUS;  Surgeon: Sim Quinteros MD;  Location: [facility];  Service: Endoscopy;  Laterality: N/A;    ESOPHAGEAL DILATION N/A 05/17/2022    Procedure: DILATION, ESOPHAGUS;  Surgeon: Sim Quinteros MD;  Location: [facility];  Service: Endoscopy;  Laterality: N/A;    ESOPHAGEAL DILATION N/A 12/26/2023    Procedure: DILATION, ESOPHAGUS;  Surgeon: Sim Quinteros MD;  Location: [facility];  Service: Endoscopy;  Laterality: N/A;    ESOPHAGEAL DILATION N/A 07/25/2024    Procedure: DILATION, ESOPHAGUS;  Surgeon: Sim Quinteros MD;  Location: [facility];  Service: Endoscopy;  Laterality: N/A;    ESOPHAGOGASTRODUODENOSCOPY N/A 05/13/2019    Procedure: EGD (ESOPHAGOGASTRODUODENOSCOPY);  Surgeon: Sim Quinteros MD;  Location: [facility];  Service: Endoscopy;  Laterality: N/A;    ESOPHAGOGASTRODUODENOSCOPY N/A 02/09/2021    Procedure: EGD (ESOPHAGOGASTRODUODENOSCOPY);  Surgeon: Sim Quinteros MD;  Location: [facility];  Service: Endoscopy;  Laterality: N/A;    ESOPHAGOGASTRODUODENOSCOPY N/A 12/14/2021    Procedure: EGD (ESOPHAGOGASTRODUODENOSCOPY);  Surgeon: Sim Quinteros MD;  Location: [facility];  Service: Endoscopy;  Laterality: N/A;    ESOPHAGOGASTRODUODENOSCOPY N/A 05/17/2022    Procedure: EGD (ESOPHAGOGASTRODUODENOSCOPY);  Surgeon: Sim Quinteros MD;  Location: [facility];  Service: Endoscopy;  Laterality: N/A;    ESOPHAGOGASTRODUODENOSCOPY N/A 12/26/2023    Procedure: EGD (ESOPHAGOGASTRODUODENOSCOPY);  Surgeon: Sim Quinteros MD;  Location: [facility];  Service: Endoscopy;  Laterality: N/A;    ESOPHAGOGASTRODUODENOSCOPY N/A 07/25/2024    Procedure: EGD (ESOPHAGOGASTRODUODENOSCOPY);  Surgeon: Sim Quinteros MD;  Location: [facility];  Service: Endoscopy;  Laterality: N/A;    ESOPHAGOGASTRODUODENOSCOPY N/A 2/17/2025    Procedure: EGD (ESOPHAGOGASTRODUODENOSCOPY);  Surgeon: Jose  Efrem ORTEZ MD;  Location: [facility] ENDO;  Service: Endoscopy;  Laterality: N/A;    ESOPHAGOGASTRODUODENOSCOPY (EGD) WITH DILATION  05/17/2022    HYSTERECTOMY      INJECTION OF ANESTHETIC AGENT AROUND MEDIAL BRANCH NERVES INNERVATING LUMBAR FACET JOINT Bilateral 12/14/2023    Procedure: Block-nerve-medial branch-lumbar----L3,L4,L5;  Surgeon: Iván Velasquez Jr., MD;  Location: [facility];  Service: Pain Management;  Laterality: Bilateral;    INJECTION OF ANESTHETIC AGENT AROUND MEDIAL BRANCH NERVES INNERVATING LUMBAR FACET JOINT Bilateral 12/28/2023    Procedure: Block-nerve-medial branch-lumbar L3,L4,L5;  Surgeon: Iván Velasquez Jr., MD;  Location: [facility];  Service: Pain Management;  Laterality: Bilateral;    LAPAROSCOPIC NISSEN FUNDOPLICATION      RADIOFREQUENCY ABLATION, NERVE, SPINAL, LUMBOSACRAL Right 01/25/2024    Procedure: RADIOFREQUENCY ABLATION------L3,L4,L5;  Surgeon: Iván Velasquez Jr., MD;  Location: [facility];  Service: Pain Management;  Laterality: Right;    REPAIR OF EXTENSOR TENDON Left 06/07/2022    Procedure: REPAIR, TENDON, EXTENSOR  ;  Surgeon: Dakota Anaya Jr., MD;  Location: [facility] OR;  Service: Plastics;  Laterality: Left;  THUMB    TOTAL KNEE ARTHROPLASTY Left 05/27/2024    Procedure: ARTHROPLASTY, KNEE, TOTAL;  Surgeon: Nigel Enriquez MD;  Location: [facility] OR;  Service: Orthopedics;  Laterality: Left;  [facility], hip bumped, left TKA    UPPER GASTROINTESTINAL ENDOSCOPY N/A 01/24/2018     Family History   Family history unknown: Yes     Social History[2]     Review of Systems:  Review of Systems   Constitutional:  Negative for appetite change, fatigue, fever and unexpected weight change.   HENT:  Negative for sore throat and trouble swallowing.    Eyes: Negative.    Respiratory:  Negative for cough, shortness of breath and wheezing.    Cardiovascular:  Negative for chest pain and leg swelling.   Gastrointestinal:  Positive for abdominal pain and  constipation. Negative for abdominal distention, blood in stool, diarrhea, nausea and vomiting.   Endocrine: Negative.    Genitourinary: Negative.    Musculoskeletal:  Negative for back pain.   Skin: Negative.  Negative for rash.   Allergic/Immunologic: Negative.    Neurological: Negative.    Hematological: Negative.    Psychiatric/Behavioral:  Negative for confusion.         Objective     Vital Signs (Most Recent)  Pulse: 92 (04/28/25 1028)  BP: (!) 162/91 (04/28/25 1028)     81 kg (178 lb 7.4 oz)     Physical Exam:  Physical Exam  Vitals and nursing note reviewed.   Constitutional:       Appearance: She is well-developed.   HENT:      Head: Normocephalic and atraumatic.   Cardiovascular:      Rate and Rhythm: Normal rate.      Heart sounds: Normal heart sounds.   Pulmonary:      Effort: Pulmonary effort is normal.   Abdominal:      General: Bowel sounds are normal. There is no distension.      Palpations: Abdomen is soft.      Tenderness: There is abdominal tenderness (mild diffuse, mild distention).   Musculoskeletal:         General: Normal range of motion.      Cervical back: Normal range of motion.   Skin:     General: Skin is warm and dry.      Capillary Refill: Capillary refill takes less than 2 seconds.   Neurological:      Mental Status: She is alert and oriented to person, place, and time.   Psychiatric:         Behavior: Behavior normal.     Laboratory  CBC: Reviewed  CMP: Reviewed    Diagnostic Results:  US: Reviewed  Cholelithiasis       Assessment and Plan   65-year-old female with symptomatic cholelithiasis    PLAN:     I described the nature of the patient's pathology and the spectrum of disease presentation ranging from mild discomfort to acute cholecystitis or gallstone/necrotizing pancreatitis. Non-operative therapy was discussed, but the patient would require a strict non-fat diet and still this would not guarantee resolution of symptoms. I think surgery is in the patient's best interest given  the severity of symptoms, and she is agreeable. I described the risks of the surgery including but not limited to bleeding, infection, wound complications, injury to local structures including the common bile duct, bile leak, persistent abd pain, and potential need for further interventions. The patient demonstrated understanding of these risks and asked appropriate questions. A consent form was signed today, and the patient will be booked for surgery as laparoscopic cholecystectomy, possible open, possible intraoperative cholangiogram.              [1]   Current Outpatient Medications   Medication Sig Dispense Refill    albuterol (PROVENTIL/VENTOLIN HFA) 90 mcg/actuation inhaler inhale TWO puffs into THE lungs EVERY 4 HOURS AS NEEDED 25.5 g 3    atorvastatin (LIPITOR) 40 MG tablet Take 1 tablet (40 mg total) by mouth once daily. 90 tablet 0    BREZTRI AEROSPHERE 160-9-4.8 mcg/actuation HFAA Two inhalations b.i.d. 10.7 g 5    cyproheptadine (PERIACTIN) 4 mg tablet 1 po q hs to increase weight 30 tablet 5    dexAMETHasone (DECADRON) 6 MG tablet Take 1 tablet (6 mg total) by mouth daily with breakfast. for 5 days 5 tablet 0    HYDROcodone-acetaminophen (NORCO) 5-325 mg per tablet Take 1 tablet by mouth every 6 (six) hours as needed for Pain. 12 tablet 0    lisinopriL (PRINIVIL,ZESTRIL) 20 MG tablet Take 1 tablet (20 mg total) by mouth once daily. 100 tablet 3    meclizine (ANTIVERT) 25 mg tablet Take 1 tablet (25 mg total) by mouth 3 (three) times daily as needed for Dizziness. 60 tablet 0    pantoprazole (PROTONIX) 40 MG tablet Take 1 tablet (40 mg total) by mouth once daily. 90 tablet 0    QUEtiapine (SEROQUEL) 300 MG Tab Take 300 mg by mouth every evening.      traMADoL (ULTRAM) 50 mg tablet Take 1 tablet (50 mg total) by mouth every 4 (four) hours as needed for Pain. 28 each 0     No current facility-administered medications for this visit.     Facility-Administered Medications Ordered in Other Visits   Medication  Dose Route Frequency Provider Last Rate Last Admin    lactated ringers infusion   Intravenous Continuous Sim Quinteros MD 0 mL/hr at 12/14/21 0846 New Bag at 06/07/22 1151    lactated ringers infusion   Intravenous Continuous Sim Quinteros MD        sodium chloride 0.9% flush 10 mL  10 mL Intravenous PRN Sim Quinteros MD        sodium chloride 0.9% flush 10 mL  10 mL Intravenous PRN Sim Quinteros MD        sodium chloride 0.9% flush 10 mL  10 mL Intravenous PRN Sim Quinteros MD       [2]   Social History  Tobacco Use    Smoking status: Former     Current packs/day: 0.25     Types: Cigarettes    Smokeless tobacco: Never   Substance Use Topics    Alcohol use: Yes     Comment: occasionally    Drug use: No

## 2025-04-28 NOTE — PROGRESS NOTES
Holly Barreto MA  ED Navigator  Emergency Department    Project: [facility] ED Navigator  Role: Community Health Worker    Date: 04/28/2025  Patient Name: Wilfredo Vazquez  MRN: [id number]  PCP: Marcio Calderon MD    Assessment:     Wilfredo Vazquez is a 65 y.o. female who has presented to ED for shortness of breath and pain. Patient has visited the ED 1 times in the past 3 months. Patient did not contact PCP.     ED Navigator Initial Assessment    ED Navigator Enrollment Documentation  Consent to Services  Does patient consent to completing the assessment?: Yes  Contact  Method of Initial Contact: Phone  Transportation  Insurance Coverage  Do you have coverage/adequate coverage?: Yes  Specialist Appointment  PCP Follow Up Appointment  Medications  Is patient able to afford medication?: Yes  Psychological  Food  Communication/Education  Other Financial Concerns  Other Social Barriers/Concerns  Primary Barrier         Social History     Socioeconomic History    Marital status: Single   Tobacco Use    Smoking status: Former     Current packs/day: 0.25     Types: Cigarettes    Smokeless tobacco: Never   Substance and Sexual Activity    Alcohol use: Yes     Comment: occasionally    Drug use: No    Sexual activity: Yes     Partners: Female     Comment: rare     Social Drivers of Health     Financial Resource Strain: Low Risk  (2/17/2025)    Overall Financial Resource Strain (CARDIA)     Difficulty of Paying Living Expenses: Not hard at all   Food Insecurity: No Food Insecurity (2/17/2025)    Hunger Vital Sign     Worried About Running Out of Food in the Last Year: Never true     Ran Out of Food in the Last Year: Never true   Transportation Needs: No Transportation Needs (8/13/2024)    PRAPARE - Transportation     Lack of Transportation (Medical): No     Lack of Transportation (Non-Medical): No   Physical Activity: Patient Unable To Answer (8/13/2024)    Exercise Vital Sign     Days of Exercise per Week: Patient unable to  answer     Minutes of Exercise per Session: Patient unable to answer   Stress: No Stress Concern Present (2/17/2025)    [employer] [place] [employer] - Occupational Stress Questionnaire     Feeling of Stress : Not at all   Housing Stability: Low Risk  (2/17/2025)    Housing Stability Vital Sign     Unable to Pay for Housing in the Last Year: No     Homeless in the Last Year: No       Plan:   Ms Vazquez stated she would wait and follow up with Dr Calderon on 5/12/25 at 4 pm. Ms Vazquez will receive an appointment reminder and follow up on 5/9/25.

## 2025-05-09 ENCOUNTER — PATIENT OUTREACH (OUTPATIENT)
Facility: OTHER | Age: 65
End: 2025-05-09
Payer: MEDICARE

## 2025-05-09 NOTE — PROGRESS NOTES
Ms Vazquez stated her surgery went well after her ED visit, she is a bit sore but no complaints at this time. ED Navigator reminded the patient of scheduled appointment with Dr Calderon on 5/12/25 at 5 pm. Patient agreed to appointment date and time. Patient declined additional services. Follow up encounter closed.

## 2025-05-10 PROBLEM — I47.10 SVT (SUPRAVENTRICULAR TACHYCARDIA): Status: ACTIVE | Noted: 2025-05-10

## 2025-05-11 ENCOUNTER — HOSPITAL ENCOUNTER (INPATIENT)
Facility: HOSPITAL | Age: 65
LOS: 6 days | Discharge: HOME | DRG: 948 | End: 2025-05-17
Attending: STUDENT IN AN ORGANIZED HEALTH CARE EDUCATION/TRAINING PROGRAM | Admitting: HOSPITALIST
Payer: MEDICARE

## 2025-05-11 DIAGNOSIS — E80.6 HYPERBILIRUBINEMIA: ICD-10-CM

## 2025-05-11 DIAGNOSIS — G89.18 ACUTE POSTOPERATIVE PAIN OF ABDOMEN: Primary | ICD-10-CM

## 2025-05-11 DIAGNOSIS — R79.89 ELEVATED LFTS: ICD-10-CM

## 2025-05-11 DIAGNOSIS — R10.9 ACUTE POSTOPERATIVE PAIN OF ABDOMEN: Primary | ICD-10-CM

## 2025-05-11 PROBLEM — I21.A1 TYPE 2 MYOCARDIAL INFARCTION WITHOUT ST ELEVATION: Status: ACTIVE | Noted: 2025-05-11

## 2025-05-11 PROBLEM — R07.2 PRECORDIAL PAIN: Status: ACTIVE | Noted: 2025-05-11

## 2025-05-11 PROBLEM — R74.01 TRANSAMINITIS: Status: ACTIVE | Noted: 2025-05-11

## 2025-05-11 LAB
HCV AB SERPL QL IA: NORMAL
HIV 1+2 AB+HIV1 P24 AG SERPL QL IA: NORMAL
LIPASE SERPL-CCNC: 38 U/L (ref 4–60)

## 2025-05-11 PROCEDURE — 86803 HEPATITIS C AB TEST: CPT | Mod: HCNC | Performed by: PHYSICIAN ASSISTANT

## 2025-05-11 PROCEDURE — 96376 TX/PRO/DX INJ SAME DRUG ADON: CPT | Mod: HCNC

## 2025-05-11 PROCEDURE — 96375 TX/PRO/DX INJ NEW DRUG ADDON: CPT | Mod: HCNC

## 2025-05-11 PROCEDURE — 99285 EMERGENCY DEPT VISIT HI MDM: CPT | Mod: 25,HCNC

## 2025-05-11 PROCEDURE — 96374 THER/PROPH/DIAG INJ IV PUSH: CPT | Mod: HCNC

## 2025-05-11 PROCEDURE — 11000001 HC ACUTE MED/SURG PRIVATE ROOM

## 2025-05-11 PROCEDURE — 25000003 PHARM REV CODE 250: Performed by: HOSPITALIST

## 2025-05-11 PROCEDURE — 83690 ASSAY OF LIPASE: CPT | Mod: HCNC

## 2025-05-11 PROCEDURE — 87389 HIV-1 AG W/HIV-1&-2 AB AG IA: CPT | Mod: HCNC | Performed by: PHYSICIAN ASSISTANT

## 2025-05-11 PROCEDURE — 63600175 PHARM REV CODE 636 W HCPCS: Mod: HCNC | Performed by: STUDENT IN AN ORGANIZED HEALTH CARE EDUCATION/TRAINING PROGRAM

## 2025-05-11 PROCEDURE — 25000003 PHARM REV CODE 250: Mod: HCNC

## 2025-05-11 PROCEDURE — 63600175 PHARM REV CODE 636 W HCPCS: Mod: HCNC

## 2025-05-11 RX ORDER — METOPROLOL SUCCINATE 50 MG/1
50 TABLET, EXTENDED RELEASE ORAL DAILY
Status: DISCONTINUED | OUTPATIENT
Start: 2025-05-12 | End: 2025-05-17 | Stop reason: HOSPADM

## 2025-05-11 RX ORDER — PROCHLORPERAZINE EDISYLATE 5 MG/ML
5 INJECTION INTRAMUSCULAR; INTRAVENOUS EVERY 6 HOURS PRN
Status: DISCONTINUED | OUTPATIENT
Start: 2025-05-11 | End: 2025-05-17 | Stop reason: HOSPADM

## 2025-05-11 RX ORDER — GLUCAGON 1 MG
1 KIT INJECTION
Status: DISCONTINUED | OUTPATIENT
Start: 2025-05-11 | End: 2025-05-17 | Stop reason: HOSPADM

## 2025-05-11 RX ORDER — ONDANSETRON HYDROCHLORIDE 2 MG/ML
4 INJECTION, SOLUTION INTRAVENOUS EVERY 8 HOURS PRN
Status: DISCONTINUED | OUTPATIENT
Start: 2025-05-11 | End: 2025-05-17 | Stop reason: HOSPADM

## 2025-05-11 RX ORDER — TALC
6 POWDER (GRAM) TOPICAL NIGHTLY PRN
Status: DISCONTINUED | OUTPATIENT
Start: 2025-05-11 | End: 2025-05-17 | Stop reason: HOSPADM

## 2025-05-11 RX ORDER — OXYCODONE HYDROCHLORIDE 5 MG/1
5 TABLET ORAL
Refills: 0 | Status: COMPLETED | OUTPATIENT
Start: 2025-05-11 | End: 2025-05-11

## 2025-05-11 RX ORDER — ACETAMINOPHEN 325 MG/1
650 TABLET ORAL EVERY 4 HOURS PRN
Status: DISCONTINUED | OUTPATIENT
Start: 2025-05-11 | End: 2025-05-17 | Stop reason: HOSPADM

## 2025-05-11 RX ORDER — ONDANSETRON HYDROCHLORIDE 2 MG/ML
4 INJECTION, SOLUTION INTRAVENOUS
Status: COMPLETED | OUTPATIENT
Start: 2025-05-11 | End: 2025-05-11

## 2025-05-11 RX ORDER — IBUPROFEN 200 MG
16 TABLET ORAL
Status: DISCONTINUED | OUTPATIENT
Start: 2025-05-11 | End: 2025-05-17 | Stop reason: HOSPADM

## 2025-05-11 RX ORDER — OXYCODONE HYDROCHLORIDE 10 MG/1
10 TABLET ORAL EVERY 4 HOURS PRN
Refills: 0 | Status: DISCONTINUED | OUTPATIENT
Start: 2025-05-11 | End: 2025-05-14

## 2025-05-11 RX ORDER — MORPHINE SULFATE 4 MG/ML
4 INJECTION, SOLUTION INTRAMUSCULAR; INTRAVENOUS
Refills: 0 | Status: COMPLETED | OUTPATIENT
Start: 2025-05-11 | End: 2025-05-11

## 2025-05-11 RX ORDER — SODIUM CHLORIDE 0.9 % (FLUSH) 0.9 %
10 SYRINGE (ML) INJECTION
Status: DISCONTINUED | OUTPATIENT
Start: 2025-05-11 | End: 2025-05-17 | Stop reason: HOSPADM

## 2025-05-11 RX ORDER — NALOXONE HCL 0.4 MG/ML
0.02 VIAL (ML) INJECTION
Status: DISCONTINUED | OUTPATIENT
Start: 2025-05-11 | End: 2025-05-17 | Stop reason: HOSPADM

## 2025-05-11 RX ORDER — LISINOPRIL 20 MG/1
20 TABLET ORAL DAILY
Status: DISCONTINUED | OUTPATIENT
Start: 2025-05-12 | End: 2025-05-17 | Stop reason: HOSPADM

## 2025-05-11 RX ORDER — TRAMADOL HYDROCHLORIDE 50 MG/1
50 TABLET, FILM COATED ORAL EVERY 6 HOURS PRN
Status: DISCONTINUED | OUTPATIENT
Start: 2025-05-11 | End: 2025-05-13

## 2025-05-11 RX ORDER — QUETIAPINE FUMARATE 300 MG/1
300 TABLET, FILM COATED ORAL NIGHTLY
Status: DISCONTINUED | OUTPATIENT
Start: 2025-05-11 | End: 2025-05-17 | Stop reason: HOSPADM

## 2025-05-11 RX ORDER — IBUPROFEN 200 MG
24 TABLET ORAL
Status: DISCONTINUED | OUTPATIENT
Start: 2025-05-11 | End: 2025-05-17 | Stop reason: HOSPADM

## 2025-05-11 RX ORDER — PANTOPRAZOLE SODIUM 40 MG/1
40 TABLET, DELAYED RELEASE ORAL DAILY
Status: DISCONTINUED | OUTPATIENT
Start: 2025-05-12 | End: 2025-05-17 | Stop reason: HOSPADM

## 2025-05-11 RX ORDER — OXYCODONE HYDROCHLORIDE 5 MG/1
5 TABLET ORAL EVERY 4 HOURS PRN
Refills: 0 | Status: DISCONTINUED | OUTPATIENT
Start: 2025-05-11 | End: 2025-05-11

## 2025-05-11 RX ADMIN — QUETIAPINE FUMARATE 300 MG: 300 TABLET ORAL at 10:05

## 2025-05-11 RX ADMIN — MORPHINE SULFATE 4 MG: 4 INJECTION INTRAVENOUS at 03:05

## 2025-05-11 RX ADMIN — ONDANSETRON 4 MG: 2 INJECTION INTRAMUSCULAR; INTRAVENOUS at 03:05

## 2025-05-11 RX ADMIN — MORPHINE SULFATE 4 MG: 4 INJECTION INTRAVENOUS at 08:05

## 2025-05-11 RX ADMIN — OXYCODONE HYDROCHLORIDE 10 MG: 10 TABLET ORAL at 10:05

## 2025-05-11 RX ADMIN — OXYCODONE 5 MG: 5 TABLET ORAL at 05:05

## 2025-05-11 NOTE — ASSESSMENT & PLAN NOTE
65F with multiple medical problems who had XI cholecystectomy on 5/7 OSH and was admitted post op after being discharge with SVT requiring cardioversion. She now has persistent pain, elevated T bili/liver studies and with questionable CBD dilation.     Recommend admission to medicine   No obvious issues post-operatively on CT scan   Reasonable to get an MRCP to work-up hyperbilirubinemia   No surgical intervention  If MRCP positive would discuss with [facility]  Please call with questions.

## 2025-05-11 NOTE — SUBJECTIVE & OBJECTIVE
Current Facility-Administered Medications on File Prior to Encounter   Medication    [] adenosine (ADENOCARD) 3 mg/mL injection    [COMPLETED] HYDROmorphone injection 0.5 mg    [COMPLETED] iohexoL (OMNIPAQUE 350) injection 130 mL    lactated ringers infusion    lactated ringers infusion    [] nitroGLYCERIN (NITROSTAT) 0.4 MG SL tablet    sodium chloride 0.9% flush 10 mL    sodium chloride 0.9% flush 10 mL    sodium chloride 0.9% flush 10 mL    [DISCONTINUED] 0.9% NaCl infusion    [DISCONTINUED] acetaminophen tablet 650 mg    [DISCONTINUED] atorvastatin tablet 40 mg    [DISCONTINUED] enoxaparin injection 40 mg    [DISCONTINUED] famotidine tablet 20 mg    [DISCONTINUED] hydrALAZINE injection 10 mg    [DISCONTINUED] HYDROmorphone (PF) injection 2 mg    [DISCONTINUED] labetaloL injection 10 mg    [DISCONTINUED] lisinopriL tablet 20 mg    [DISCONTINUED] magnesium oxide tablet 800 mg    [DISCONTINUED] magnesium oxide tablet 800 mg    [DISCONTINUED] metoprolol succinate (TOPROL-XL) 24 hr tablet 50 mg    [DISCONTINUED] morphine injection 2 mg    [DISCONTINUED] mupirocin 2 % ointment    [DISCONTINUED] ondansetron injection 4 mg    [DISCONTINUED] pantoprazole EC tablet 40 mg    [DISCONTINUED] piperacillin-tazobactam (ZOSYN) 4.5 g in D5W 100 mL IVPB (MB+)    [DISCONTINUED] potassium bicarbonate disintegrating tablet 35 mEq    [DISCONTINUED] potassium bicarbonate disintegrating tablet 50 mEq    [DISCONTINUED] potassium bicarbonate disintegrating tablet 60 mEq    [DISCONTINUED] potassium, sodium phosphates 280-160-250 mg packet 2 packet    [DISCONTINUED] potassium, sodium phosphates 280-160-250 mg packet 2 packet    [DISCONTINUED] potassium, sodium phosphates 280-160-250 mg packet 2 packet    [DISCONTINUED] prochlorperazine injection Soln 5 mg    [DISCONTINUED] QUEtiapine tablet 300 mg    [DISCONTINUED] sodium chloride 0.9% flush 10 mL    [DISCONTINUED] traMADoL tablet 50 mg     Current Outpatient Medications on  File Prior to Encounter   Medication Sig    albuterol (PROVENTIL/VENTOLIN HFA) 90 mcg/actuation inhaler inhale TWO puffs into THE lungs EVERY 4 HOURS AS NEEDED    atorvastatin (LIPITOR) 40 MG tablet Take 1 tablet (40 mg total) by mouth once daily.    BREZTRI AEROSPHERE 160-9-4.8 mcg/actuation HFAA Two inhalations b.i.d.    cyproheptadine (PERIACTIN) 4 mg tablet 1 po q hs to increase weight    HYDROcodone-acetaminophen (NORCO) 7.5-325 mg per tablet Take 1 tablet by mouth every 6 (six) hours as needed for Pain.    lisinopriL (PRINIVIL,ZESTRIL) 20 MG tablet Take 1 tablet (20 mg total) by mouth once daily.    meclizine (ANTIVERT) 25 mg tablet Take 1 tablet (25 mg total) by mouth 3 (three) times daily as needed for Dizziness.    pantoprazole (PROTONIX) 40 MG tablet Take 1 tablet (40 mg total) by mouth once daily.    QUEtiapine (SEROQUEL) 300 MG Tab Take 300 mg by mouth every evening.    traMADoL (ULTRAM) 50 mg tablet Take 1 tablet (50 mg total) by mouth every 4 (four) hours as needed for Pain.       Review of patient's allergies indicates:   Allergen Reactions    Naproxen Rash       Past Medical History:   Diagnosis Date    Abdominal pain 2018    Back pain     Benign paroxysmal vertigo, bilateral     Emphysema lung     GERD (gastroesophageal reflux disease) 2018    HLD (hyperlipidemia)     Hypertension     Shingles      Past Surgical History:   Procedure Laterality Date    APPENDECTOMY      ARTHROPLASTY, KNEE, TOTAL - Left Left 05/27/2024    BLOCK, SPINAL NERVE ROOT, LUMBAR, SELECTIVE Bilateral 12/28/2023    L3, L4, L5    COLONOSCOPY N/A 05/22/2019    Procedure: COLONOSCOPY;  Surgeon: Sim Quinteros MD;  Location: [facility];  Service: Endoscopy;  Laterality: N/A;    COLONOSCOPY N/A 06/20/2022    Procedure: COLONOSCOPY WITH POLYPECTOMY AND CLIPPING;  Surgeon: Jarrod Franco MD;  Location: [facility];  Service: Endoscopy;  Laterality: N/A;    COLONOSCOPY N/A 03/19/2024    Procedure: COLONOSCOPY;  Surgeon: Sim Quinteros  MD ESTEBAN;  Location: [facility];  Service: Endoscopy;  Laterality: N/A;    COLONOSCOPY W/ POLYPECTOMY      EGD, WITH CLOSED BIOPSY  02/17/2025    ESOPHAGEAL DILATION N/A 05/13/2019    Procedure: DILATION, ESOPHAGUS;  Surgeon: Sim Quinteros MD;  [place] [facility];  Service: Endoscopy;  Laterality: N/A;    ESOPHAGEAL DILATION N/A 02/09/2021    Procedure: DILATION, ESOPHAGUS;  Surgeon: Sim Quinteros MD;  Location: [facility];  Service: Endoscopy;  Laterality: N/A;    ESOPHAGEAL DILATION N/A 12/14/2021    Procedure: DILATION, ESOPHAGUS;  Surgeon: Sim Quinteros MD;  Location: [facility];  Service: Endoscopy;  Laterality: N/A;    ESOPHAGEAL DILATION N/A 05/17/2022    Procedure: DILATION, ESOPHAGUS;  Surgeon: Sim Quinteros MD;  Location: [facility];  Service: Endoscopy;  Laterality: N/A;    ESOPHAGEAL DILATION N/A 12/26/2023    Procedure: DILATION, ESOPHAGUS;  Surgeon: Sim Quinteros MD;  Location: [facility];  Service: Endoscopy;  Laterality: N/A;    ESOPHAGEAL DILATION N/A 07/25/2024    Procedure: DILATION, ESOPHAGUS;  Surgeon: Sim Quinteros MD;  Location: [facility];  Service: Endoscopy;  Laterality: N/A;    ESOPHAGOGASTRODUODENOSCOPY N/A 05/13/2019    Procedure: EGD (ESOPHAGOGASTRODUODENOSCOPY);  Surgeon: Sim Quinteros MD;  Location: [facility];  Service: Endoscopy;  Laterality: N/A;    ESOPHAGOGASTRODUODENOSCOPY N/A 02/09/2021    Procedure: EGD (ESOPHAGOGASTRODUODENOSCOPY);  Surgeon: Sim Quinteros MD;  Location: [facility];  Service: Endoscopy;  Laterality: N/A;    ESOPHAGOGASTRODUODENOSCOPY N/A 12/14/2021    Procedure: EGD (ESOPHAGOGASTRODUODENOSCOPY);  Surgeon: Sim Quinteros MD;  Location: [facility];  Service: Endoscopy;  Laterality: N/A;    ESOPHAGOGASTRODUODENOSCOPY N/A 05/17/2022    Procedure: EGD (ESOPHAGOGASTRODUODENOSCOPY);  Surgeon: Sim Quinteros MD;  Location: [facility];  Service: Endoscopy;  Laterality: N/A;    ESOPHAGOGASTRODUODENOSCOPY N/A 12/26/2023    Procedure: EGD  (ESOPHAGOGASTRODUODENOSCOPY);  Surgeon: Sim Quinteros MD;  Location: [facility] [place] END[facility];  Service: Endoscopy;  Laterality: N/A;    ESOPHAGOGASTRODUODENOSCOPY N/A 07/25/2024    Procedure: EGD (ESOPHAGOGASTRODUODENOSCOPY);  Surgeon: Sim Quinteros MD;  Location: [facility] [place] ENDO;  Service: Endoscopy;  Laterality: N/A;    ESOPHAGOGASTRODUODENOSCOPY N/A 02/17/2025    Procedure: EGD (ESOPHAGOGASTRODUODENOSCOPY);  Surgeon: Efrem Garcia MD;  Location: [facility] [place] END[facility];  Service: Endoscopy;  Laterality: N/A;    HYSTERECTOMY      INJECTION OF ANESTHETIC AGENT AROUND MEDIAL BRANCH NERVES INNERVATING LUMBAR FACET JOINT Bilateral 12/14/2023    Procedure: Block-nerve-medial branch-lumbar----L3,L4,L5;  Surgeon: Iván Velasquez Jr., MD;  Location: [facility] [place] [facility]IN [facility]MT;  Service: Pain Management;  Laterality: Bilateral;    INJECTION OF ANESTHETIC AGENT AROUND MEDIAL BRANCH NERVES INNERVATING LUMBAR FACET JOINT Bilateral 12/28/2023    Procedure: Block-nerve-medial branch-lumbar L3,L4,L5;  Surgeon: Iván Velasquez Jr., MD;  Location: [facility] [place] [facility];  Service: Pain Management;  Laterality: Bilateral;    INJECTION OF JOINT Left 05/01/2025    Procedure: Injection, Joint, Shoulder, Hip, Or Knee---LEFT INTRAARTICULAR HIP INJECTION UNDER FLUORO;  Surgeon: Iván Velasquez Jr., MD;  Location: [facility] [place] [facility];  Service: Pain Management;  Laterality: Left;  CONSENT DAY OF PROCEDURE    LAPAROSCOPIC NISSEN FUNDOPLICATION      RADIOFREQUENCY ABLATION, NERVE, SPINAL, LUMBOSACRAL Right 01/25/2024    Procedure: RADIOFREQUENCY ABLATION------L3,L4,L5;  Surgeon: Iván Velasquez Jr., MD;  Location: [facility] [place] [facility];  Service: Pain Management;  Laterality: Right;    REPAIR OF EXTENSOR TENDON Left 06/07/2022    Procedure: REPAIR, TENDON, EXTENSOR  ;  Surgeon: Dakota Anaya Jr., MD;  Location: [facility] OR;  Service: Plastics;  Laterality: Left;  THUMB    ROBOT-ASSISTED CHOLECYSTECTOMY USING DA SADA XI N/A 5/7/2025    Procedure: XI ROBOTIC  CHOLECYSTECTOMY;  Surgeon: Bebeto Cohen MD;  [facility]: [facility] [place] [place];  Service: General;  Laterality: N/A;    TOTAL KNEE ARTHROPLASTY Left 05/27/2024    Procedure: ARTHROPLASTY, KNEE, TOTAL;  Surgeon: Nigel Enriquez MD;  [facility]: [facility] [place] [place];  Service: Orthopedics;  Laterality: Left;  olivia ortho, hip bumped, left TKA    UPPER GASTROINTESTINAL ENDOSCOPY N/A 01/24/2018     Family History    Family history is unknown by patient.       Tobacco Use    Smoking status: Former     Current packs/day: 0.25     Types: Cigarettes    Smokeless tobacco: Never   Substance and Sexual Activity    Alcohol use: Yes     Comment: occasionally    Drug use: No    Sexual activity: Yes     Partners: Female     Comment: rare     Review of Systems   Cardiovascular:  Positive for chest pain and palpitations.   Gastrointestinal:  Positive for abdominal pain and nausea.     Objective:     Vital Signs (Most Recent):  Temp: 98.1 °F (36.7 °C) (05/11/25 1432)  Pulse: 84 (05/11/25 1600)  Resp: 18 (05/11/25 1510)  BP: (!) 142/72 (05/11/25 1600)  SpO2: (!) 92 % (05/11/25 1600) Vital Signs (24h Range):  Temp:  [97.7 °F (36.5 °C)-98.3 °F (36.8 °C)] 98.1 °F (36.7 °C)  Pulse:  [70-96] 84  Resp:  [10-35] 18  SpO2:  [91 %-100 %] 92 %  BP: ()/(50-94) 142/72     Weight: 80.3 kg (177 lb)  Body mass index is 31.35 kg/m².     Physical Exam  Vitals reviewed.   Constitutional:       General: She is not in acute distress.  HENT:      Head: Normocephalic and atraumatic.      Nose: Nose normal.   Eyes:      General:         Right eye: No discharge.         Left eye: No discharge.   Cardiovascular:      Rate and Rhythm: Normal rate and regular rhythm.   Pulmonary:      Effort: Pulmonary effort is normal. No respiratory distress.      Breath sounds: No stridor.   Abdominal:      Comments: Soft, ND, TTP mostly on the right. Mild.    Musculoskeletal:         General: Normal range of motion.      Cervical back: Normal range of motion.   Skin:     General:  Skin is warm and dry.      Capillary Refill: Capillary refill takes 2 to 3 seconds.   Neurological:      General: No focal deficit present.      Mental Status: She is alert and oriented to person, place, and time.   Psychiatric:         Mood and Affect: Mood normal.         Behavior: Behavior normal.            I have reviewed all pertinent lab results within the past 24 hours.    Significant Diagnostics:  I have reviewed all pertinent imaging results/findings within the past 24 hours.

## 2025-05-11 NOTE — CONSULTS
[facility] - Emergency Dept  General Surgery  Consult Note    Patient Name: Wilfredo Vazquez  MRN: [id number]  Code Status: Prior  Admission Date: 2025  Hospital Length of Stay: 0 days  Attending Physician: Sylvain Palomares MD  Primary Care Provider: Marcio Calderon MD    Patient information was obtained from patient, past medical records, and ER records.     Inpatient consult to General surgery  Consult performed by: Suresh Rice MD  Consult ordered by: Sylvain Stewart MD        Subjective:     Principal Problem: <principal problem not specified>    History of Present Illness: 65F with multiple medical issues s/p lap parvez who was admitted post-operatively with SVT that required cardioversion. During admission for SVT she developed right sided lower abdominal pain. She has some nausea. Most of her pain is around her incisions but feels deeper to her. No fevers or chills. She states she is passing gas but has not had a bowel movement for a couple of days. Her cholecystectomy and Lap Nissen in   was her only abdominal surgery. Work-up revealed CT that was pretty unremakrbale and with some post op changes/possible enlargement of CBD. She has elevated liver enzymes and Tbili of 1.2. General surgery consulted    Current Facility-Administered Medications on File Prior to Encounter   Medication    [] adenosine (ADENOCARD) 3 mg/mL injection    [COMPLETED] HYDROmorphone injection 0.5 mg    [COMPLETED] iohexoL (OMNIPAQUE 350) injection 130 mL    lactated ringers infusion    lactated ringers infusion    [] nitroGLYCERIN (NITROSTAT) 0.4 MG SL tablet    sodium chloride 0.9% flush 10 mL    sodium chloride 0.9% flush 10 mL    sodium chloride 0.9% flush 10 mL    [DISCONTINUED] 0.9% NaCl infusion    [DISCONTINUED] acetaminophen tablet 650 mg    [DISCONTINUED] atorvastatin tablet 40 mg    [DISCONTINUED] enoxaparin injection 40 mg    [DISCONTINUED] famotidine tablet 20 mg    [DISCONTINUED] hydrALAZINE  injection 10 mg    [DISCONTINUED] HYDROmorphone (PF) injection 2 mg    [DISCONTINUED] labetaloL injection 10 mg    [DISCONTINUED] lisinopriL tablet 20 mg    [DISCONTINUED] magnesium oxide tablet 800 mg    [DISCONTINUED] magnesium oxide tablet 800 mg    [DISCONTINUED] metoprolol succinate (TOPROL-XL) 24 hr tablet 50 mg    [DISCONTINUED] morphine injection 2 mg    [DISCONTINUED] mupirocin 2 % ointment    [DISCONTINUED] ondansetron injection 4 mg    [DISCONTINUED] pantoprazole EC tablet 40 mg    [DISCONTINUED] piperacillin-tazobactam (ZOSYN) 4.5 g in D5W 100 mL IVPB (MB+)    [DISCONTINUED] potassium bicarbonate disintegrating tablet 35 mEq    [DISCONTINUED] potassium bicarbonate disintegrating tablet 50 mEq    [DISCONTINUED] potassium bicarbonate disintegrating tablet 60 mEq    [DISCONTINUED] potassium, sodium phosphates 280-160-250 mg packet 2 packet    [DISCONTINUED] potassium, sodium phosphates 280-160-250 mg packet 2 packet    [DISCONTINUED] potassium, sodium phosphates 280-160-250 mg packet 2 packet    [DISCONTINUED] prochlorperazine injection Soln 5 mg    [DISCONTINUED] QUEtiapine tablet 300 mg    [DISCONTINUED] sodium chloride 0.9% flush 10 mL    [DISCONTINUED] traMADoL tablet 50 mg     Current Outpatient Medications on File Prior to Encounter   Medication Sig    albuterol (PROVENTIL/VENTOLIN HFA) 90 mcg/actuation inhaler inhale TWO puffs into THE lungs EVERY 4 HOURS AS NEEDED    atorvastatin (LIPITOR) 40 MG tablet Take 1 tablet (40 mg total) by mouth once daily.    BREZTRI AEROSPHERE 160-9-4.8 mcg/actuation HFAA Two inhalations b.i.d.    cyproheptadine (PERIACTIN) 4 mg tablet 1 po q hs to increase weight    HYDROcodone-acetaminophen (NORCO) 7.5-325 mg per tablet Take 1 tablet by mouth every 6 (six) hours as needed for Pain.    lisinopriL (PRINIVIL,ZESTRIL) 20 MG tablet Take 1 tablet (20 mg total) by mouth once daily.    meclizine (ANTIVERT) 25 mg tablet Take 1 tablet (25 mg total) by mouth 3 (three) times daily  as needed for Dizziness.    pantoprazole (PROTONIX) 40 MG tablet Take 1 tablet (40 mg total) by mouth once daily.    QUEtiapine (SEROQUEL) 300 MG Tab Take 300 mg by mouth every evening.    traMADoL (ULTRAM) 50 mg tablet Take 1 tablet (50 mg total) by mouth every 4 (four) hours as needed for Pain.       Review of patient's allergies indicates:   Allergen Reactions    Naproxen Rash       Past Medical History:   Diagnosis Date    Abdominal pain 2018    Back pain     Benign paroxysmal vertigo, bilateral     Emphysema lung     GERD (gastroesophageal reflux disease) 2018    HLD (hyperlipidemia)     Hypertension     Shingles      Past Surgical History:   Procedure Laterality Date    APPENDECTOMY      ARTHROPLASTY, KNEE, TOTAL - Left Left 05/27/2024    BLOCK, SPINAL NERVE ROOT, LUMBAR, SELECTIVE Bilateral 12/28/2023    L3, L4, L5    COLONOSCOPY N/A 05/22/2019    Procedure: COLONOSCOPY;  Surgeon: Sim Quinteros MD;  Location: [facility];  Service: Endoscopy;  Laterality: N/A;    COLONOSCOPY N/A 06/20/2022    Procedure: COLONOSCOPY WITH POLYPECTOMY AND CLIPPING;  Surgeon: Jarrod Franco MD;  Location: [facility];  Service: Endoscopy;  Laterality: N/A;    COLONOSCOPY N/A 03/19/2024    Procedure: COLONOSCOPY;  Surgeon: Sim Quinteros MD;  Location: [facility];  Service: Endoscopy;  Laterality: N/A;    COLONOSCOPY W/ POLYPECTOMY      EGD, WITH CLOSED BIOPSY  02/17/2025    ESOPHAGEAL DILATION N/A 05/13/2019    Procedure: DILATION, ESOPHAGUS;  Surgeon: Sim Quinteros MD;  Location: [facility];  Service: Endoscopy;  Laterality: N/A;    ESOPHAGEAL DILATION N/A 02/09/2021    Procedure: DILATION, ESOPHAGUS;  Surgeon: Sim Quinteros MD;  Location: [facility];  Service: Endoscopy;  Laterality: N/A;    ESOPHAGEAL DILATION N/A 12/14/2021    Procedure: DILATION, ESOPHAGUS;  Surgeon: Sim Quinteros MD;  Location: [facility];  Service: Endoscopy;  Laterality: N/A;    ESOPHAGEAL DILATION N/A 05/17/2022    Procedure: DILATION, ESOPHAGUS;   Surgeon: Sim Quinteros MD;  Location: [facility];  Service: Endoscopy;  Laterality: N/A;    ESOPHAGEAL DILATION N/A 12/26/2023    Procedure: DILATION, ESOPHAGUS;  Surgeon: Sim Quinteros MD;  Location: [facility] ENDO;  Service: Endoscopy;  Laterality: N/A;    ESOPHAGEAL DILATION N/A 07/25/2024    Procedure: DILATION, ESOPHAGUS;  Surgeon: Sim Quinteros MD;  Location: [facility] ENDO;  Service: Endoscopy;  Laterality: N/A;    ESOPHAGOGASTRODUODENOSCOPY N/A 05/13/2019    Procedure: EGD (ESOPHAGOGASTRODUODENOSCOPY);  Surgeon: Sim Quinteros MD;  Location: [facility] ENDO;  Service: Endoscopy;  Laterality: N/A;    ESOPHAGOGASTRODUODENOSCOPY N/A 02/09/2021    Procedure: EGD (ESOPHAGOGASTRODUODENOSCOPY);  Surgeon: Sim Quinteros MD;  Location: [facility];  Service: Endoscopy;  Laterality: N/A;    ESOPHAGOGASTRODUODENOSCOPY N/A 12/14/2021    Procedure: EGD (ESOPHAGOGASTRODUODENOSCOPY);  Surgeon: Sim Quinteros MD;  Location: [facility];  Service: Endoscopy;  Laterality: N/A;    ESOPHAGOGASTRODUODENOSCOPY N/A 05/17/2022    Procedure: EGD (ESOPHAGOGASTRODUODENOSCOPY);  Surgeon: Sim Quinteros MD;  Location: [facility];  Service: Endoscopy;  Laterality: N/A;    ESOPHAGOGASTRODUODENOSCOPY N/A 12/26/2023    Procedure: EGD (ESOPHAGOGASTRODUODENOSCOPY);  Surgeon: Sim Quinteros MD;  Location: [facility];  Service: Endoscopy;  Laterality: N/A;    ESOPHAGOGASTRODUODENOSCOPY N/A 07/25/2024    Procedure: EGD (ESOPHAGOGASTRODUODENOSCOPY);  Surgeon: Sim Quinteros MD;  Location: [facility];  Service: Endoscopy;  Laterality: N/A;    ESOPHAGOGASTRODUODENOSCOPY N/A 02/17/2025    Procedure: EGD (ESOPHAGOGASTRODUODENOSCOPY);  Surgeon: Efrem Garcia MD;  Location: [facility];  Service: Endoscopy;  Laterality: N/A;    HYSTERECTOMY      INJECTION OF ANESTHETIC AGENT AROUND MEDIAL BRANCH NERVES INNERVATING LUMBAR FACET JOINT Bilateral 12/14/2023    Procedure: Block-nerve-medial branch-lumbar----L3,L4,L5;  Surgeon: Iván Velasquez Jr.,  MD;  Location: [facility];  Service: Pain Management;  Laterality: Bilateral;    INJECTION OF ANESTHETIC AGENT AROUND MEDIAL BRANCH NERVES INNERVATING LUMBAR FACET JOINT Bilateral 12/28/2023    Procedure: Block-nerve-medial branch-lumbar L3,L4,L5;  Surgeon: Iván Velasquez Jr., MD;  Location: [facility];  Service: Pain Management;  Laterality: Bilateral;    INJECTION OF JOINT Left 05/01/2025    Procedure: Injection, Joint, Shoulder, Hip, Or Knee---LEFT INTRAARTICULAR HIP INJECTION UNDER FLUORO;  Surgeon: Iván Velasquez Jr., MD;  Location: [facility];  Service: Pain Management;  Laterality: Left;  CONSENT DAY OF PROCEDURE    LAPAROSCOPIC NISSEN FUNDOPLICATION      RADIOFREQUENCY ABLATION, NERVE, SPINAL, LUMBOSACRAL Right 01/25/2024    Procedure: RADIOFREQUENCY ABLATION------L3,L4,L5;  Surgeon: Iván Velasquez Jr., MD;  Location: [facility];  Service: Pain Management;  Laterality: Right;    REPAIR OF EXTENSOR TENDON Left 06/07/2022    Procedure: REPAIR, TENDON, EXTENSOR  ;  Surgeon: Dakota Anaya Jr., MD;  Location: [facility] OR;  Service: Plastics;  Laterality: Left;  THUMB    ROBOT-ASSISTED CHOLECYSTECTOMY USING DA SADA XI N/A 5/7/2025    Procedure: XI ROBOTIC CHOLECYSTECTOMY;  Surgeon: Bebeot Cohen MD;  Location: [facility] OR;  Service: General;  Laterality: N/A;    TOTAL KNEE ARTHROPLASTY Left 05/27/2024    Procedure: ARTHROPLASTY, KNEE, TOTAL;  Surgeon: Nigel Enriquez MD;  Location: [facility] OR;  Service: Orthopedics;  Laterality: Left;  olivia ortho, hip bumped, left TKA    UPPER GASTROINTESTINAL ENDOSCOPY N/A 01/24/2018     Family History    Family history is unknown by patient.       Tobacco Use    Smoking status: Former     Current packs/day: 0.25     Types: Cigarettes    Smokeless tobacco: Never   Substance and Sexual Activity    Alcohol use: Yes     Comment: occasionally    Drug use: No    Sexual activity: Yes     Partners: Female     Comment: rare     Review of  Systems   Cardiovascular:  Positive for chest pain and palpitations.   Gastrointestinal:  Positive for abdominal pain and nausea.     Objective:     Vital Signs (Most Recent):  Temp: 98.1 °F (36.7 °C) (05/11/25 1432)  Pulse: 84 (05/11/25 1600)  Resp: 18 (05/11/25 1510)  BP: (!) 142/72 (05/11/25 1600)  SpO2: (!) 92 % (05/11/25 1600) Vital Signs (24h Range):  Temp:  [97.7 °F (36.5 °C)-98.3 °F (36.8 °C)] 98.1 °F (36.7 °C)  Pulse:  [70-96] 84  Resp:  [10-35] 18  SpO2:  [91 %-100 %] 92 %  BP: ()/(50-94) 142/72     Weight: 80.3 kg (177 lb)  Body mass index is 31.35 kg/m².     Physical Exam  Vitals reviewed.   Constitutional:       General: She is not in acute distress.  HENT:      Head: Normocephalic and atraumatic.      Nose: Nose normal.   Eyes:      General:         Right eye: No discharge.         Left eye: No discharge.   Cardiovascular:      Rate and Rhythm: Normal rate and regular rhythm.   Pulmonary:      Effort: Pulmonary effort is normal. No respiratory distress.      Breath sounds: No stridor.   Abdominal:      Comments: Soft, ND, TTP mostly on the right. Mild.    Musculoskeletal:         General: Normal range of motion.      Cervical back: Normal range of motion.   Skin:     General: Skin is warm and dry.      Capillary Refill: Capillary refill takes 2 to 3 seconds.   Neurological:      General: No focal deficit present.      Mental Status: She is alert and oriented to person, place, and time.   Psychiatric:         Mood and Affect: Mood normal.         Behavior: Behavior normal.            I have reviewed all pertinent lab results within the past 24 hours.    Significant Diagnostics:  I have reviewed all pertinent imaging results/findings within the past 24 hours.    Assessment/Plan:     Acute postoperative pain of abdomen  65F with multiple medical problems who had XI cholecystectomy on 5/7 OSH and was admitted post op after being discharge with SVT requiring cardioversion. She now has persistent pain,  elevated T bili/liver studies and with questionable CBD dilation.     Recommend admission to medicine   No obvious issues post-operatively on CT scan   Reasonable to get an MRCP to work-up hyperbilirubinemia   No surgical intervention  If MRCP positive would discuss with [facility]  Please call with questions.        VTE Risk Mitigation (From admission, onward)      None            Thank you for your consult. I will follow-up with patient. Please contact us if you have any additional questions.    Suresh Rice MD  General Surgery  [facility] - [facility] Dept

## 2025-05-11 NOTE — ED TRIAGE NOTES
Wilfredo Vazquez, a 65 y.o. female presents to the ED w/ complaint of RUQ ABD pain.     Pt is a transfer from [facility] for Gen Surg consult. Pt is s/p lap parvez x 4 days. Was admitted to [place] ICU for SVT with sync cardiovert.     Pt's HR currently stable.     Triage note:  Chief Complaint   Patient presents with    Abdominal Pain     Pt arrives via EMS from [facility] with complaint of RUQ pain here for surgical consult     Review of patient's allergies indicates:   Allergen Reactions    Naproxen Rash     Past Medical History:   Diagnosis Date    Abdominal pain 2018    Back pain     Benign paroxysmal vertigo, bilateral     Emphysema lung     GERD (gastroesophageal reflux disease) 2018    HLD (hyperlipidemia)     Hypertension     Shingles

## 2025-05-11 NOTE — ED PROVIDER NOTES
Encounter Date: 5/11/2025       History     Chief Complaint   Patient presents with    Abdominal Pain     Pt arrives via EMS from [facility] with complaint of RUQ pain here for surgical consult     65 y.o. female with a past medical history of Abdominal pain (2018), Back pain, Benign paroxysmal vertigo, bilateral, Emphysema lung, GERD (gastroesophageal reflux disease) (2018), HLD (hyperlipidemia), Hypertension, and Shingles presenting as transfer from outside hospital for surgical evaluation of abdominal pain.  Patient was admitted to outside hospital yesterday after presentation for chest pain and shortness of breath.  Patient had episode of SVT at outside hospital prior to admission to ICU.  While at outside hospital, patient developed acute right-sided abdominal pain with guarding and rebound.  CT and CTA of abdomen and pelvis did not show any acute pathology.  However, given concerns for surgical abdomen in this patient with cholecystectomy 2 weeks ago, patient was transferred to [facility] for further evaluation.  On arrival in emergency department, patient reporting nausea without vomiting and right sided abdominal pain.  She also endorses chest pain and shortness of breath that is unchanged in the past 4 days.  She denies constipation, diarrhea, dysuria, any other symptoms.    The history is provided by the patient and medical records. No  was used.     Review of patient's allergies indicates:   Allergen Reactions    Naproxen Rash     Past Medical History:   Diagnosis Date    Abdominal pain 2018    Back pain     Benign paroxysmal vertigo, bilateral     Emphysema lung     GERD (gastroesophageal reflux disease) 2018    HLD (hyperlipidemia)     Hypertension     Shingles      Past Surgical History:   Procedure Laterality Date    APPENDECTOMY      ARTHROPLASTY, KNEE, TOTAL - Left Left 05/27/2024    BLOCK, SPINAL NERVE ROOT, LUMBAR, SELECTIVE Bilateral 12/28/2023    L3, L4, L5     COLONOSCOPY N/A 05/22/2019    Procedure: COLONOSCOPY;  Surgeon: Sim Quinteros MD;  Location: [facility] ENDO;  Service: Endoscopy;  Laterality: N/A;    COLONOSCOPY N/A 06/20/2022    Procedure: COLONOSCOPY WITH POLYPECTOMY AND CLIPPING;  Surgeon: Jarrod Franco MD;  Location: [facility] ENDO;  Service: Endoscopy;  Laterality: N/A;    COLONOSCOPY N/A 03/19/2024    Procedure: COLONOSCOPY;  Surgeon: Sim Quinteros MD;  Location: [facility] ENDO;  Service: Endoscopy;  Laterality: N/A;    COLONOSCOPY W/ POLYPECTOMY      EGD, WITH CLOSED BIOPSY  02/17/2025    ESOPHAGEAL DILATION N/A 05/13/2019    Procedure: DILATION, ESOPHAGUS;  Surgeon: Sim Quinteros MD;  Location: [facility];  Service: Endoscopy;  Laterality: N/A;    ESOPHAGEAL DILATION N/A 02/09/2021    Procedure: DILATION, ESOPHAGUS;  Surgeon: Sim Quinteros MD;  Location: [facility];  Service: Endoscopy;  Laterality: N/A;    ESOPHAGEAL DILATION N/A 12/14/2021    Procedure: DILATION, ESOPHAGUS;  Surgeon: Sim Quinteros MD;  Location: [facility];  Service: Endoscopy;  Laterality: N/A;    ESOPHAGEAL DILATION N/A 05/17/2022    Procedure: DILATION, ESOPHAGUS;  Surgeon: Sim Quinteros MD;  Location: [facility];  Service: Endoscopy;  Laterality: N/A;    ESOPHAGEAL DILATION N/A 12/26/2023    Procedure: DILATION, ESOPHAGUS;  Surgeon: Sim Quinteros MD;  Location: [facility];  Service: Endoscopy;  Laterality: N/A;    ESOPHAGEAL DILATION N/A 07/25/2024    Procedure: DILATION, ESOPHAGUS;  Surgeon: Sim Quinteros MD;  Location: [facility];  Service: Endoscopy;  Laterality: N/A;    ESOPHAGOGASTRODUODENOSCOPY N/A 05/13/2019    Procedure: EGD (ESOPHAGOGASTRODUODENOSCOPY);  Surgeon: Sim Quinteros MD;  Location: [facility];  Service: Endoscopy;  Laterality: N/A;    ESOPHAGOGASTRODUODENOSCOPY N/A 02/09/2021    Procedure: EGD (ESOPHAGOGASTRODUODENOSCOPY);  Surgeon: Sim Quinteros MD;  Location: [facility];  Service: Endoscopy;  Laterality: N/A;    ESOPHAGOGASTRODUODENOSCOPY N/A 12/14/2021     Procedure: EGD (ESOPHAGOGASTRODUODENOSCOPY);  Surgeon: Sim Quinteros MD;  Location: [facility] ENDO;  Service: Endoscopy;  Laterality: N/A;    ESOPHAGOGASTRODUODENOSCOPY N/A 05/17/2022    Procedure: EGD (ESOPHAGOGASTRODUODENOSCOPY);  Surgeon: Sim Quinteros MD;  Location: [facility] ENDO;  Service: Endoscopy;  Laterality: N/A;    ESOPHAGOGASTRODUODENOSCOPY N/A 12/26/2023    Procedure: EGD (ESOPHAGOGASTRODUODENOSCOPY);  Surgeon: Sim Quinteros MD;  Location: [facility] ENDO;  Service: Endoscopy;  Laterality: N/A;    ESOPHAGOGASTRODUODENOSCOPY N/A 07/25/2024    Procedure: EGD (ESOPHAGOGASTRODUODENOSCOPY);  Surgeon: Sim Quinteros MD;  Location: [facility] ENDO;  Service: Endoscopy;  Laterality: N/A;    ESOPHAGOGASTRODUODENOSCOPY N/A 02/17/2025    Procedure: EGD (ESOPHAGOGASTRODUODENOSCOPY);  Surgeon: Efrem Garcia MD;  Location: [facility];  Service: Endoscopy;  Laterality: N/A;    HYSTERECTOMY      INJECTION OF ANESTHETIC AGENT AROUND MEDIAL BRANCH NERVES INNERVATING LUMBAR FACET JOINT Bilateral 12/14/2023    Procedure: Block-nerve-medial branch-lumbar----L3,L4,L5;  Surgeon: Iván Velasquez Jr., MD;  Location: [facility] PAIN MGMT;  Service: Pain Management;  Laterality: Bilateral;    INJECTION OF ANESTHETIC AGENT AROUND MEDIAL BRANCH NERVES INNERVATING LUMBAR FACET JOINT Bilateral 12/28/2023    Procedure: Block-nerve-medial branch-lumbar L3,L4,L5;  Surgeon: Iván Velasquez Jr., MD;  Location: [facility] PAIN [facility]MT;  Service: Pain Management;  Laterality: Bilateral;    INJECTION OF JOINT Left 05/01/2025    Procedure: Injection, Joint, Shoulder, Hip, Or Knee---LEFT INTRAARTICULAR HIP INJECTION UNDER FLUORO;  Surgeon: Iván Velasquez Jr., MD;  Location: [facility] PAIN [facility]MT;  Service: Pain Management;  Laterality: Left;  CONSENT DAY OF PROCEDURE    LAPAROSCOPIC NISSEN FUNDOPLICATION      RADIOFREQUENCY ABLATION, NERVE, SPINAL, LUMBOSACRAL Right 01/25/2024    Procedure: RADIOFREQUENCY ABLATION------L3,L4,L5;  Surgeon: Ron,  Ivná LANIER Jr., MD;  Location: [facility] PAIN MGMT;  Service: Pain Management;  Laterality: Right;    REPAIR OF EXTENSOR TENDON Left 06/07/2022    Procedure: REPAIR, TENDON, EXTENSOR  ;  Surgeon: aDkota Anaya Jr., MD;  Location: [facility] OR;  Service: Plastics;  Laterality: Left;  THUMB    ROBOT-ASSISTED CHOLECYSTECTOMY USING DA SADA XI N/A 5/7/2025    Procedure: XI ROBOTIC CHOLECYSTECTOMY;  Surgeon: Bebeto Cohen MD;  Location: [facility] OR;  Service: General;  Laterality: N/A;    TOTAL KNEE ARTHROPLASTY Left 05/27/2024    Procedure: ARTHROPLASTY, KNEE, TOTAL;  Surgeon: Nigel Enriquez MD;  Location: [facility] OR;  Service: Orthopedics;  Laterality: Left;  olivia ortho, hip bumped, left TKA    UPPER GASTROINTESTINAL ENDOSCOPY N/A 01/24/2018     Family History   Family history unknown: Yes     Social History[1]      Physical Exam     Initial Vitals [05/11/25 1432]   BP Pulse Resp Temp SpO2   124/80 70 18 98.1 °F (36.7 °C) 95 %      MAP       --         Physical Exam    Nursing note and vitals reviewed.  Constitutional: She appears well-developed and well-nourished.   HENT:   Head: Normocephalic and atraumatic.   Eyes: Conjunctivae and EOM are normal. Pupils are equal, round, and reactive to light.   Cardiovascular:  Normal rate, regular rhythm and normal heart sounds.           Pulmonary/Chest: Breath sounds normal. No respiratory distress. She has no wheezes. She has no rhonchi. She has no rales.   Abdominal: Abdomen is soft. There is abdominal tenderness in the right upper quadrant, right lower quadrant and epigastric area.   Healing incisions from recent laparoscopic cholecystectomy; incisions are clean, dry, intact There is guarding. There is no rebound, no tenderness at McBurney's point and negative Pinedo's sign. negative Rovsing's sign    Neurological: She is alert and oriented to person, place, and time.         ED Course   Procedures  Labs Reviewed   HEPATITIS C ANTIBODY - Normal       Result Value     Hep C Ab Interp Non-Reactive     HIV 1 / 2 ANTIBODY - Normal    HIV 1/2 Ag/Ab Non-Reactive     LIPASE - Normal    Lipase Level 38     HEP C VIRUS HOLD SPECIMEN          Imaging Results              MRI MRCP Abdomen WO Contrast 3D WO Independent WS XPD (In process)                      Medications   sodium chloride 0.9% flush 10 mL (has no administration in time range)   melatonin tablet 6 mg (has no administration in time range)   ondansetron injection 4 mg (has no administration in time range)   prochlorperazine injection Soln 5 mg (has no administration in time range)   acetaminophen tablet 650 mg (has no administration in time range)   naloxone 0.4 mg/mL injection 0.02 mg (has no administration in time range)   glucose chewable tablet 16 g (has no administration in time range)   glucose chewable tablet 24 g (has no administration in time range)   dextrose 50% injection 12.5 g (has no administration in time range)   dextrose 50% injection 25 g (has no administration in time range)   glucagon (human recombinant) injection 1 mg (has no administration in time range)   traMADoL tablet 50 mg (has no administration in time range)   lisinopriL tablet 20 mg (has no administration in time range)   pantoprazole EC tablet 40 mg (has no administration in time range)   QUEtiapine tablet 300 mg (300 mg Oral Given 5/11/25 2249)   metoprolol succinate (TOPROL-XL) 24 hr tablet 50 mg (has no administration in time range)   oxyCODONE immediate release tablet Tab 10 mg (10 mg Oral Given 5/11/25 2249)   ondansetron injection 4 mg (4 mg Intravenous Given 5/11/25 1509)   morphine injection 4 mg (4 mg Intravenous Given 5/11/25 1510)   oxyCODONE immediate release tablet 5 mg (5 mg Oral Given 5/11/25 1735)   morphine injection 4 mg (4 mg Intravenous Given 5/11/25 2006)     Medical Decision Making  65 y.o. female with a past medical history of Abdominal pain (2018), Back pain, Benign paroxysmal vertigo, bilateral, Emphysema lung, GERD  (gastroesophageal reflux disease) (2018), HLD (hyperlipidemia), Hypertension, and Shingles presenting as transfer from outside hospital for surgical evaluation of abdominal pain.     Differential diagnosis includes, but is not limited to, postoperative pain, pancreatitis, SBO.    In emergency department, patient is hemodynamically stable, mild distress secondary to pain.  On my evaluation, patient with right-sided abdominal tenderness, but abdomen is soft, no concerns for peritonitis or acute surgical abdomen at this time.  Lipase negative, lowering concern for pancreatitis.  Discussed patient with general surgery, who evaluated patient in the emergency department and did not feel that patient warranted any surgical intervention at this time.  General surgery agreed with plan for MRCP given hyperbilirubinemia and persistent abdominal pain.  Discussed patient with the Hospital Medicine, who admitted patient to their service.    Amount and/or Complexity of Data Reviewed  Labs:  Decision-making details documented in ED Course.    Risk  Prescription drug management.  Decision regarding hospitalization.  Risk Details: Patient received Zofran and morphine in the emergency department.  Admitted patient to Hospital Medicine for MRCP and possible advanced endoscopy.              Attending Attestation:   Physician Attestation Statement for Resident:  As the supervising MD   Physician Attestation Statement: I have personally seen and examined this patient.   I agree with the above history.  -:   As the supervising MD I agree with the above PE.     As the supervising MD I agree with the above treatment, course, plan, and disposition.                    ED Course as of 05/11/25 6367   Sun May 11, 2025   1551 Lipase  Not concerning for pancreatitis [BH]      ED Course User Index  [BH] Sylvain Stewart MD                           Clinical Impression:  Final diagnoses:  [R79.89] Elevated LFTs          ED Disposition Condition    Admit                      [1]   Social History  Tobacco Use    Smoking status: Former     Current packs/day: 0.25     Types: Cigarettes    Smokeless tobacco: Never   Substance Use Topics    Alcohol use: Yes     Comment: occasionally    Drug use: No        Sylvain Stewart MD  Resident  05/11/25 0824

## 2025-05-11 NOTE — ED NOTES
Bed: 03  Expected date: 5/11/25  Expected time:   Means of arrival:   Comments:  [place] transfer

## 2025-05-11 NOTE — HPI
65F with multiple medical issues s/p lap parvez who was admitted post-operatively with SVT that required cardioversion. During admission for SVT she developed right sided lower abdominal pain. She has some nausea. Most of her pain is around her incisions but feels deeper to her. No fevers or chills. She states she is passing gas but has not had a bowel movement for a couple of days. Her cholecystectomy and Lap Nissen in 2013  was her only abdominal surgery. Work-up revealed CT that was pretty unremakrbale and with some post op changes/possible enlargement of CBD. She has elevated liver enzymes and Tbili of 1.2. General surgery consulted

## 2025-05-12 PROBLEM — E80.6 HYPERBILIRUBINEMIA: Status: ACTIVE | Noted: 2025-05-12

## 2025-05-12 LAB
ABSOLUTE EOSINOPHIL (OHS): 0.08 K/UL
ABSOLUTE MONOCYTE (OHS): 0.82 K/UL (ref 0.3–1)
ABSOLUTE NEUTROPHIL COUNT (OHS): 4.8 K/UL (ref 1.8–7.7)
ALBUMIN SERPL BCP-MCNC: 2.7 G/DL (ref 3.5–5.2)
ALP SERPL-CCNC: 274 UNIT/L (ref 40–150)
ALT SERPL W/O P-5'-P-CCNC: 341 UNIT/L (ref 10–44)
ANION GAP (OHS): 6 MMOL/L (ref 8–16)
AST SERPL-CCNC: 195 UNIT/L (ref 11–45)
BASOPHILS # BLD AUTO: 0.01 K/UL
BASOPHILS NFR BLD AUTO: 0.1 %
BILIRUB SERPL-MCNC: 1.4 MG/DL (ref 0.1–1)
BUN SERPL-MCNC: 11 MG/DL (ref 8–23)
CALCIUM SERPL-MCNC: 9 MG/DL (ref 8.7–10.5)
CHLORIDE SERPL-SCNC: 102 MMOL/L (ref 95–110)
CO2 SERPL-SCNC: 27 MMOL/L (ref 23–29)
CREAT SERPL-MCNC: 0.7 MG/DL (ref 0.5–1.4)
ERYTHROCYTE [DISTWIDTH] IN BLOOD BY AUTOMATED COUNT: 15 % (ref 11.5–14.5)
GFR SERPLBLD CREATININE-BSD FMLA CKD-EPI: >60 ML/MIN/1.73/M2
GLUCOSE SERPL-MCNC: 96 MG/DL (ref 70–110)
HCT VFR BLD AUTO: 34 % (ref 37–48.5)
HGB BLD-MCNC: 11.2 GM/DL (ref 12–16)
IMM GRANULOCYTES # BLD AUTO: 0.02 K/UL (ref 0–0.04)
IMM GRANULOCYTES NFR BLD AUTO: 0.3 % (ref 0–0.5)
LYMPHOCYTES # BLD AUTO: 1.8 K/UL (ref 1–4.8)
MCH RBC QN AUTO: 29.1 PG (ref 27–31)
MCHC RBC AUTO-ENTMCNC: 32.9 G/DL (ref 32–36)
MCV RBC AUTO: 88 FL (ref 82–98)
NUCLEATED RBC (/100WBC) (OHS): 0 /100 WBC
PLATELET # BLD AUTO: 266 K/UL (ref 150–450)
PMV BLD AUTO: 9.8 FL (ref 9.2–12.9)
POTASSIUM SERPL-SCNC: 4 MMOL/L (ref 3.5–5.1)
PROT SERPL-MCNC: 6.2 GM/DL (ref 6–8.4)
RBC # BLD AUTO: 3.85 M/UL (ref 4–5.4)
RELATIVE EOSINOPHIL (OHS): 1.1 %
RELATIVE LYMPHOCYTE (OHS): 23.9 % (ref 18–48)
RELATIVE MONOCYTE (OHS): 10.9 % (ref 4–15)
RELATIVE NEUTROPHIL (OHS): 63.7 % (ref 38–73)
SODIUM SERPL-SCNC: 135 MMOL/L (ref 136–145)
WBC # BLD AUTO: 7.53 K/UL (ref 3.9–12.7)

## 2025-05-12 PROCEDURE — 80053 COMPREHEN METABOLIC PANEL: CPT | Mod: HCNC | Performed by: HOSPITALIST

## 2025-05-12 PROCEDURE — A9537 TC99M MEBROFENIN: HCPCS | Mod: HCNC | Performed by: STUDENT IN AN ORGANIZED HEALTH CARE EDUCATION/TRAINING PROGRAM

## 2025-05-12 PROCEDURE — 25000003 PHARM REV CODE 250: Mod: HCNC | Performed by: HOSPITALIST

## 2025-05-12 PROCEDURE — 11000001 HC ACUTE MED/SURG PRIVATE ROOM: Mod: HCNC

## 2025-05-12 PROCEDURE — 63600175 PHARM REV CODE 636 W HCPCS: Mod: HCNC | Performed by: HOSPITALIST

## 2025-05-12 PROCEDURE — 85025 COMPLETE CBC W/AUTO DIFF WBC: CPT | Mod: HCNC | Performed by: HOSPITALIST

## 2025-05-12 PROCEDURE — 99232 SBSQ HOSP IP/OBS MODERATE 35: CPT | Mod: HCNC,24,, | Performed by: STUDENT IN AN ORGANIZED HEALTH CARE EDUCATION/TRAINING PROGRAM

## 2025-05-12 PROCEDURE — 63600175 PHARM REV CODE 636 W HCPCS: Performed by: HOSPITALIST

## 2025-05-12 PROCEDURE — 63600175 PHARM REV CODE 636 W HCPCS: Mod: HCNC

## 2025-05-12 PROCEDURE — 36415 COLL VENOUS BLD VENIPUNCTURE: CPT | Mod: HCNC | Performed by: HOSPITALIST

## 2025-05-12 RX ORDER — MORPHINE SULFATE 4 MG/ML
0.04 INJECTION, SOLUTION INTRAMUSCULAR; INTRAVENOUS ONCE
Status: COMPLETED | OUTPATIENT
Start: 2025-05-12 | End: 2025-05-12

## 2025-05-12 RX ORDER — KIT FOR THE PREPARATION OF TECHNETIUM TC 99M MEBROFENIN 45 MG/10ML
5 INJECTION, POWDER, LYOPHILIZED, FOR SOLUTION INTRAVENOUS
Status: COMPLETED | OUTPATIENT
Start: 2025-05-12 | End: 2025-05-12

## 2025-05-12 RX ORDER — MORPHINE SULFATE 4 MG/ML
INJECTION, SOLUTION INTRAMUSCULAR; INTRAVENOUS
Status: COMPLETED
Start: 2025-05-12 | End: 2025-05-12

## 2025-05-12 RX ORDER — MORPHINE SULFATE 4 MG/ML
4 INJECTION, SOLUTION INTRAMUSCULAR; INTRAVENOUS EVERY 6 HOURS PRN
Status: DISCONTINUED | OUTPATIENT
Start: 2025-05-12 | End: 2025-05-13

## 2025-05-12 RX ORDER — DEXTROSE MONOHYDRATE 50 MG/ML
INJECTION, SOLUTION INTRAVENOUS CONTINUOUS
Status: DISCONTINUED | OUTPATIENT
Start: 2025-05-12 | End: 2025-05-12

## 2025-05-12 RX ADMIN — OXYCODONE HYDROCHLORIDE 10 MG: 10 TABLET ORAL at 05:05

## 2025-05-12 RX ADMIN — OXYCODONE HYDROCHLORIDE 10 MG: 10 TABLET ORAL at 06:05

## 2025-05-12 RX ADMIN — ONDANSETRON 4 MG: 2 INJECTION INTRAMUSCULAR; INTRAVENOUS at 12:05

## 2025-05-12 RX ADMIN — PANTOPRAZOLE SODIUM 40 MG: 40 TABLET, DELAYED RELEASE ORAL at 08:05

## 2025-05-12 RX ADMIN — MORPHINE SULFATE 4 MG: 4 INJECTION INTRAVENOUS at 08:05

## 2025-05-12 RX ADMIN — MORPHINE SULFATE 3.21 MG: 4 INJECTION INTRAVENOUS at 04:05

## 2025-05-12 RX ADMIN — MEBROFENIN 4.3 MILLICURIE: 45 INJECTION, POWDER, LYOPHILIZED, FOR SOLUTION INTRAVENOUS at 03:05

## 2025-05-12 RX ADMIN — MORPHINE SULFATE 4 MG: 4 INJECTION INTRAVENOUS at 09:05

## 2025-05-12 RX ADMIN — MORPHINE SULFATE 4 MG: 4 INJECTION INTRAVENOUS at 01:05

## 2025-05-12 RX ADMIN — OXYCODONE HYDROCHLORIDE 10 MG: 10 TABLET ORAL at 12:05

## 2025-05-12 RX ADMIN — METOPROLOL SUCCINATE 50 MG: 50 TABLET, EXTENDED RELEASE ORAL at 08:05

## 2025-05-12 RX ADMIN — QUETIAPINE FUMARATE 300 MG: 300 TABLET ORAL at 08:05

## 2025-05-12 NOTE — HPI
66 yo F with PMHx HTN, HLD, and HFpEF who presents to the ED as transfer from [facility] inpatient for abdominal pain and elevated LFTs after recent cholecystectomy 5/7. Pt. Presented to [facility] ED May 10 with palpitations and chest pain.  In the emergency department she had SVT with heart rate in the 200s.  She was cardioverted to a sinus rhythm in ED. CTA of the chest and CT abdomen and pelvis did not show acute pathology.  AST and ALT were elevated on presentation.  She was admitted to the ICU for the episode SVT and elevated troponin.  With borderline BP, rate-controlling agents were held.  She was seen by Cardiology with recommendations of adding beta blockade. On May 11 she developed severe abdominal pain with tense abdomen and high-pitched bowel sounds with rebound and guarding. On exam, she had abdominal tenderness and distention along with guarding and rebound.  CTA of the abdomen and pelvis did not show evidence of acute GI bleed or obvious abnormalities of the abdominal vasculature. Transfer center contacted AES at [facility] who recommended MRCP.  With the severe, persistent abdominal pain/abdominal distention despite narcotic medications, Hospital Medicine at [facility] spoke with General Surgery at [facility], recommendation was for transfer to [facility] for General Surgery and potentially AES evaluations.      Pt. Reports to me abdominal pain has improved significantly. She was given one PO oxycodone in ED and has not required further medications since presentation.

## 2025-05-12 NOTE — PLAN OF CARE
Problem: Adult Inpatient Plan of Care  Goal: Readiness for Transition of Care  Outcome: Progressing     Problem: Adult Inpatient Plan of Care  Goal: Plan of Care Review  Outcome: Progressing  Goal: Patient-Specific Goal (Individualized)  Outcome: Progressing  Goal: Absence of Hospital-Acquired Illness or Injury  Outcome: Progressing  Goal: Optimal Comfort and Wellbeing  Outcome: Progressing  Goal: Readiness for Transition of Care  Outcome: Progressing     Problem: Wound  Goal: Optimal Coping  Outcome: Progressing  Goal: Optimal Functional Ability  Outcome: Progressing  Goal: Absence of Infection Signs and Symptoms  Outcome: Progressing  Goal: Improved Oral Intake  Outcome: Progressing  Goal: Optimal Pain Control and Function  Outcome: Progressing  Goal: Skin Health and Integrity  Outcome: Progressing  Goal: Optimal Wound Healing  Outcome: Progressing

## 2025-05-12 NOTE — ASSESSMENT & PLAN NOTE
-Pt. With narrow complex tachycardia at rate of 216 bpm on presentation to [place] ED 5/10. She underwent electrical cardioversion to sinus rhythms   -Continue cardiac monitoring, Toprol-XL 50 mg started per cardiology recommendations. Cards f/u at D/c

## 2025-05-12 NOTE — PROGRESS NOTES
[facility] Surg  General Surgery  Progress Note    Subjective:     History of Present Illness:  65F with multiple medical issues s/p lap parvez who was admitted post-operatively with SVT that required cardioversion. During admission for SVT she developed right sided lower abdominal pain. She has some nausea. Most of her pain is around her incisions but feels deeper to her. No fevers or chills. She states she is passing gas but has not had a bowel movement for a couple of days. Her cholecystectomy and Lap Nissen in 2013  was her only abdominal surgery. Work-up revealed CT that was pretty unremakrbale and with some post op changes/possible enlargement of CBD. She has elevated liver enzymes and Tbili of 1.2. General surgery consulted    Post-Op Info:  * No surgery found *         Interval History: NAEON. Afebrile. HDS. Continued abdominal pain overnight. Denies n/v. No new symptoms or concerns this morning. All questions answered.   MRCP completed, read pending  T bili 1.2 > 1.4      Medications:  Continuous Infusions:  Scheduled Meds:   lisinopriL  20 mg Oral Daily    metoprolol succinate  50 mg Oral Daily    pantoprazole  40 mg Oral Daily    QUEtiapine  300 mg Oral QHS     PRN Meds:  Current Facility-Administered Medications:     acetaminophen, 650 mg, Oral, Q4H PRN    dextrose 50%, 12.5 g, Intravenous, PRN    dextrose 50%, 25 g, Intravenous, PRN    glucagon (human recombinant), 1 mg, Intramuscular, PRN    glucose, 16 g, Oral, PRN    glucose, 24 g, Oral, PRN    melatonin, 6 mg, Oral, Nightly PRN    morphine, 4 mg, Intravenous, Q6H PRN    naloxone, 0.02 mg, Intravenous, PRN    ondansetron, 4 mg, Intravenous, Q8H PRN    oxyCODONE, 10 mg, Oral, Q4H PRN    prochlorperazine, 5 mg, Intravenous, Q6H PRN    sodium chloride 0.9%, 10 mL, Intravenous, PRN    traMADoL, 50 mg, Oral, Q6H PRN     Review of patient's allergies indicates:   Allergen Reactions    Naproxen Rash     Objective:     Vital Signs (Most Recent):  Temp:  98.8 °F (37.1 °C) (05/12/25 0443)  Pulse: 86 (05/12/25 0443)  Resp: 18 (05/12/25 0522)  BP: 132/77 (05/12/25 0443)  SpO2: (!) 90 % (05/12/25 0443) Vital Signs (24h Range):  Temp:  [98.1 °F (36.7 °C)-99.3 °F (37.4 °C)] 98.8 °F (37.1 °C)  Pulse:  [70-96] 86  Resp:  [16-35] 18  SpO2:  [90 %-100 %] 90 %  BP: ()/(52-85) 132/77     Weight: 80.3 kg (177 lb)  Body mass index is 31.35 kg/m².    Intake/Output - Last 3 Shifts         05/10 0700  05/11 0659 05/11 0700  05/12 0659 05/12 0700  05/13 0659    P.O.  360     Total Intake(mL/kg)  360 (4.5)     Net  +360            Unmeasured Urine Occurrence  2 x              Physical Exam  Vitals and nursing note reviewed.   Constitutional:       General: She is not in acute distress.     Appearance: She is not diaphoretic.      Comments: Room air   HENT:      Head: Normocephalic and atraumatic.      Mouth/Throat:      Mouth: Mucous membranes are moist.      Pharynx: Oropharynx is clear.   Eyes:      Extraocular Movements: Extraocular movements intact.      Conjunctiva/sclera: Conjunctivae normal.   Cardiovascular:      Rate and Rhythm: Normal rate.   Pulmonary:      Effort: Pulmonary effort is normal. No respiratory distress.   Abdominal:      General: There is no distension.      Palpations: Abdomen is soft.      Tenderness: There is no guarding or rebound.      Comments: Incision is clean, dry, and intact without drainage, erythema, or increased warmth. TTP in R abdomen, not peritonitic  Periumbilical ecchymosis    Musculoskeletal:         General: No deformity.   Skin:     General: Skin is warm and dry.   Neurological:      Mental Status: She is alert and oriented to person, place, and time.          Significant Labs:  I have reviewed all pertinent lab results within the past 24 hours.    Significant Diagnostics:  I have reviewed all pertinent imaging results/findings within the past 24 hours.  Assessment/Plan:     * Acute postoperative pain of abdomen  Patient is a 65 year  old female with multiple medical problems who had XI cholecystectomy on 5/7 OSH and was admitted post op after being discharge with SVT requiring cardioversion. She now has persistent pain, elevated T bili/liver studies and with questionable CBD dilation.     NPO for now   No obvious issues post-operatively on CT scan   Reasonable to get an MRCP to work-up hyperbilirubinemia. Completed, read pending at this time  If MRCP unremarkable, would obtain HIDA scan and AES consult as bile leak would be consideration   Consider starting abx with intra-abdominal coverage  No surgical intervention  If MRCP positive would discuss with AES  Serial abdominal exams  Remainder of care per primary team  Please contact general surgery with any questions, concerns, or clinical status changes      Patient and POC discussed with general surgery staff, Dr. Marsh. They are in agreement with current POC.       Jerry Olivares PA-C  General Surgery  [facility] - Med Surg

## 2025-05-12 NOTE — SUBJECTIVE & OBJECTIVE
Past Medical History:   Diagnosis Date    Abdominal pain 2018    Back pain     Benign paroxysmal vertigo, bilateral     Emphysema lung     GERD (gastroesophageal reflux disease) 2018    HLD (hyperlipidemia)     Hypertension     Shingles        Past Surgical History:   Procedure Laterality Date    APPENDECTOMY      ARTHROPLASTY, KNEE, TOTAL - Left Left 05/27/2024    BLOCK, SPINAL NERVE ROOT, LUMBAR, SELECTIVE Bilateral 12/28/2023    L3, L4, L5    COLONOSCOPY N/A 05/22/2019    Procedure: COLONOSCOPY;  Surgeon: Sim Quinteros MD;  Location: [facility];  Service: Endoscopy;  Laterality: N/A;    COLONOSCOPY N/A 06/20/2022    Procedure: COLONOSCOPY WITH POLYPECTOMY AND CLIPPING;  Surgeon: Jarrod Franco MD;  Location: [facility] ENDO;  Service: Endoscopy;  Laterality: N/A;    COLONOSCOPY N/A 03/19/2024    Procedure: COLONOSCOPY;  Surgeon: Sim Quinteros MD;  Location: [facility] ENDO;  Service: Endoscopy;  Laterality: N/A;    COLONOSCOPY W/ POLYPECTOMY      EGD, WITH CLOSED BIOPSY  02/17/2025    ESOPHAGEAL DILATION N/A 05/13/2019    Procedure: DILATION, ESOPHAGUS;  Surgeon: Sim Quinteros MD;  Location: [facility];  Service: Endoscopy;  Laterality: N/A;    ESOPHAGEAL DILATION N/A 02/09/2021    Procedure: DILATION, ESOPHAGUS;  Surgeon: Sim Quinteros MD;  Location: [facility];  Service: Endoscopy;  Laterality: N/A;    ESOPHAGEAL DILATION N/A 12/14/2021    Procedure: DILATION, ESOPHAGUS;  Surgeon: Sim Quinteros MD;  Location: [facility] ENDO;  Service: Endoscopy;  Laterality: N/A;    ESOPHAGEAL DILATION N/A 05/17/2022    Procedure: DILATION, ESOPHAGUS;  Surgeon: Sim Quinteros MD;  Location: [facility];  Service: Endoscopy;  Laterality: N/A;    ESOPHAGEAL DILATION N/A 12/26/2023    Procedure: DILATION, ESOPHAGUS;  Surgeon: Sim Quinteros MD;  Location: [facility] ENDO;  Service: Endoscopy;  Laterality: N/A;    ESOPHAGEAL DILATION N/A 07/25/2024    Procedure: DILATION, ESOPHAGUS;  Surgeon: Sim Quinteros MD;  Location: [facility];   Service: Endoscopy;  Laterality: N/A;    ESOPHAGOGASTRODUODENOSCOPY N/A 05/13/2019    Procedure: EGD (ESOPHAGOGASTRODUODENOSCOPY);  Surgeon: Sim Quinteros MD;  Location: [facility] ENDO;  Service: Endoscopy;  Laterality: N/A;    ESOPHAGOGASTRODUODENOSCOPY N/A 02/09/2021    Procedure: EGD (ESOPHAGOGASTRODUODENOSCOPY);  Surgeon: Sim Quinteros MD;  Location: [facility] ENDO;  Service: Endoscopy;  Laterality: N/A;    ESOPHAGOGASTRODUODENOSCOPY N/A 12/14/2021    Procedure: EGD (ESOPHAGOGASTRODUODENOSCOPY);  Surgeon: Sim Quinteros MD;  Location: [facility] ENDO;  Service: Endoscopy;  Laterality: N/A;    ESOPHAGOGASTRODUODENOSCOPY N/A 05/17/2022    Procedure: EGD (ESOPHAGOGASTRODUODENOSCOPY);  Surgeon: Sim Quinteros MD;  Location: [facility] ENDO;  Service: Endoscopy;  Laterality: N/A;    ESOPHAGOGASTRODUODENOSCOPY N/A 12/26/2023    Procedure: EGD (ESOPHAGOGASTRODUODENOSCOPY);  Surgeon: Sim Quinteros MD;  Location: [facility] ENDO;  Service: Endoscopy;  Laterality: N/A;    ESOPHAGOGASTRODUODENOSCOPY N/A 07/25/2024    Procedure: EGD (ESOPHAGOGASTRODUODENOSCOPY);  Surgeon: Sim Quinteros MD;  Location: [facility] ENDO;  Service: Endoscopy;  Laterality: N/A;    ESOPHAGOGASTRODUODENOSCOPY N/A 02/17/2025    Procedure: EGD (ESOPHAGOGASTRODUODENOSCOPY);  Surgeon: Efrem Garcia MD;  Location: [facility] ENDO;  Service: Endoscopy;  Laterality: N/A;    HYSTERECTOMY      INJECTION OF ANESTHETIC AGENT AROUND MEDIAL BRANCH NERVES INNERVATING LUMBAR FACET JOINT Bilateral 12/14/2023    Procedure: Block-nerve-medial branch-lumbar----L3,L4,L5;  Surgeon: Iván Velasquez Jr., MD;  Location: [facility] PAIN [facility];  Service: Pain Management;  Laterality: Bilateral;    INJECTION OF ANESTHETIC AGENT AROUND MEDIAL BRANCH NERVES INNERVATING LUMBAR FACET JOINT Bilateral 12/28/2023    Procedure: Block-nerve-medial branch-lumbar L3,L4,L5;  Surgeon: Iván Velasquez Jr., MD;  Location: [facility] PAIN [facility];  Service: Pain Management;  Laterality: Bilateral;    INJECTION  OF JOINT Left 2025    Procedure: Injection, Joint, Shoulder, Hip, Or Knee---LEFT INTRAARTICULAR HIP INJECTION UNDER FLUORO;  Surgeon: Iván Velasquez Jr., MD;  Location: [facility] PAIN [facility];  Service: Pain Management;  Laterality: Left;  CONSENT DAY OF PROCEDURE    LAPAROSCOPIC NISSEN FUNDOPLICATION      RADIOFREQUENCY ABLATION, NERVE, SPINAL, LUMBOSACRAL Right 2024    Procedure: RADIOFREQUENCY ABLATION------L3,L4,L5;  Surgeon: Iván Velasquez Jr., MD;  Location: [facility] PAIN [facility];  Service: Pain Management;  Laterality: Right;    REPAIR OF EXTENSOR TENDON Left 2022    Procedure: REPAIR, TENDON, EXTENSOR  ;  Surgeon: Dakota Anaya Jr., MD;  Location: [facility] OR;  Service: Plastics;  Laterality: Left;  THUMB    ROBOT-ASSISTED CHOLECYSTECTOMY USING DA SADA XI N/A 2025    Procedure: XI ROBOTIC CHOLECYSTECTOMY;  Surgeon: Bebeto Cohen MD;  Location: [facility] OR;  Service: General;  Laterality: N/A;    TOTAL KNEE ARTHROPLASTY Left 2024    Procedure: ARTHROPLASTY, KNEE, TOTAL;  Surgeon: Nigel Enriquez MD;  Location: [facility] OR;  Service: Orthopedics;  Laterality: Left;  olivia ortho, hip bumped, left TKA    UPPER GASTROINTESTINAL ENDOSCOPY N/A 2018       Review of patient's allergies indicates:   Allergen Reactions    Naproxen Rash       Current Facility-Administered Medications on File Prior to Encounter   Medication    [] adenosine (ADENOCARD) 3 mg/mL injection    [COMPLETED] iohexoL (OMNIPAQUE 350) injection 130 mL    lactated ringers infusion    lactated ringers infusion    [] nitroGLYCERIN (NITROSTAT) 0.4 MG SL tablet    sodium chloride 0.9% flush 10 mL    sodium chloride 0.9% flush 10 mL    sodium chloride 0.9% flush 10 mL    [DISCONTINUED] 0.9% NaCl infusion    [DISCONTINUED] acetaminophen tablet 650 mg    [DISCONTINUED] enoxaparin injection 40 mg    [DISCONTINUED] hydrALAZINE injection 10 mg    [DISCONTINUED] HYDROmorphone (PF) injection 2 mg     [DISCONTINUED] labetaloL injection 10 mg    [DISCONTINUED] lisinopriL tablet 20 mg    [DISCONTINUED] magnesium oxide tablet 800 mg    [DISCONTINUED] magnesium oxide tablet 800 mg    [DISCONTINUED] metoprolol succinate (TOPROL-XL) 24 hr tablet 50 mg    [DISCONTINUED] morphine injection 2 mg    [DISCONTINUED] mupirocin 2 % ointment    [DISCONTINUED] ondansetron injection 4 mg    [DISCONTINUED] pantoprazole EC tablet 40 mg    [DISCONTINUED] piperacillin-tazobactam (ZOSYN) 4.5 g in D5W 100 mL IVPB (MB+)    [DISCONTINUED] potassium bicarbonate disintegrating tablet 35 mEq    [DISCONTINUED] potassium bicarbonate disintegrating tablet 50 mEq    [DISCONTINUED] potassium bicarbonate disintegrating tablet 60 mEq    [DISCONTINUED] potassium, sodium phosphates 280-160-250 mg packet 2 packet    [DISCONTINUED] potassium, sodium phosphates 280-160-250 mg packet 2 packet    [DISCONTINUED] potassium, sodium phosphates 280-160-250 mg packet 2 packet    [DISCONTINUED] prochlorperazine injection Soln 5 mg    [DISCONTINUED] QUEtiapine tablet 300 mg    [DISCONTINUED] sodium chloride 0.9% flush 10 mL    [DISCONTINUED] traMADoL tablet 50 mg     Current Outpatient Medications on File Prior to Encounter   Medication Sig    albuterol (PROVENTIL/VENTOLIN HFA) 90 mcg/actuation inhaler inhale TWO puffs into THE lungs EVERY 4 HOURS AS NEEDED    atorvastatin (LIPITOR) 40 MG tablet Take 1 tablet (40 mg total) by mouth once daily.    BREZTRI AEROSPHERE 160-9-4.8 mcg/actuation HFAA Two inhalations b.i.d.    cyproheptadine (PERIACTIN) 4 mg tablet 1 po q hs to increase weight    HYDROcodone-acetaminophen (NORCO) 7.5-325 mg per tablet Take 1 tablet by mouth every 6 (six) hours as needed for Pain.    lisinopriL (PRINIVIL,ZESTRIL) 20 MG tablet Take 1 tablet (20 mg total) by mouth once daily.    meclizine (ANTIVERT) 25 mg tablet Take 1 tablet (25 mg total) by mouth 3 (three) times daily as needed for Dizziness.    pantoprazole (PROTONIX) 40 MG tablet Take  1 tablet (40 mg total) by mouth once daily.    QUEtiapine (SEROQUEL) 300 MG Tab Take 300 mg by mouth every evening.    traMADoL (ULTRAM) 50 mg tablet Take 1 tablet (50 mg total) by mouth every 4 (four) hours as needed for Pain.     Family History    Family history is unknown by patient.       Tobacco Use    Smoking status: Former     Current packs/day: 0.25     Types: Cigarettes    Smokeless tobacco: Never   Substance and Sexual Activity    Alcohol use: Yes     Comment: occasionally    Drug use: No    Sexual activity: Yes     Partners: Female     Comment: rare     Review of Systems   Constitutional:  Negative for activity change, appetite change, chills, fever and unexpected weight change.   HENT:  Negative for congestion and sore throat.    Respiratory:  Negative for cough and shortness of breath.    Cardiovascular:  Positive for palpitations. Negative for chest pain and leg swelling.   Gastrointestinal:  Positive for abdominal pain. Negative for abdominal distention, blood in stool, constipation, diarrhea, nausea and vomiting.   Genitourinary:  Negative for difficulty urinating, dysuria and hematuria.   Musculoskeletal:  Positive for arthralgias. Negative for myalgias.   Skin:  Negative for color change and rash.   Neurological:  Negative for dizziness, tremors and seizures.     Objective:     Vital Signs (Most Recent):  Temp: 98.5 °F (36.9 °C) (05/11/25 1748)  Pulse: 85 (05/11/25 2000)  Resp: 16 (05/11/25 2006)  BP: (!) 97/52 (05/11/25 2000)  SpO2: (!) 92 % (05/11/25 2000) Vital Signs (24h Range):  Temp:  [97.7 °F (36.5 °C)-98.5 °F (36.9 °C)] 98.5 °F (36.9 °C)  Pulse:  [70-96] 85  Resp:  [10-35] 16  SpO2:  [91 %-100 %] 92 %  BP: ()/(50-81) 97/52     Weight: 80.3 kg (177 lb)  Body mass index is 31.35 kg/m².     Physical Exam  Vitals reviewed.   Constitutional:       General: She is not in acute distress.     Appearance: She is well-developed.   HENT:      Head: Normocephalic and atraumatic.   Eyes:       Extraocular Movements: Extraocular movements intact.      Pupils: Pupils are equal, round, and reactive to light.   Neck:      Vascular: No JVD.      Trachea: No tracheal deviation.   Cardiovascular:      Rate and Rhythm: Normal rate and regular rhythm.      Heart sounds: No murmur heard.     No friction rub. No gallop.   Pulmonary:      Effort: No respiratory distress.      Breath sounds: Normal breath sounds. No wheezing or rales.   Abdominal:      General: Bowel sounds are normal. There is no distension.      Palpations: Abdomen is soft. There is no mass.      Tenderness: There is abdominal tenderness.      Comments: RUQ tenderness, negative Pinedo's sign   Musculoskeletal:         General: No deformity.      Cervical back: Neck supple.   Lymphadenopathy:      Cervical: No cervical adenopathy.   Skin:     General: Skin is warm and dry.      Findings: No rash.   Neurological:      Mental Status: She is alert and oriented to person, place, and time.          CRANIAL NERVES     CN III, IV, VI   Pupils are equal, round, and reactive to light.     Significant Labs: All pertinent labs within the past 24 hours have been reviewed.    Significant Imaging: I have reviewed all pertinent imaging results/findings within the past 24 hours.

## 2025-05-12 NOTE — NURSING
Medicated with Morphine as per prn order for c/o abdominal pain 10/10. Instructed to call for assist with oob and prn after receiving pain medication, verbalizes understanding. Safety and monitoring ongoing.

## 2025-05-12 NOTE — ASSESSMENT & PLAN NOTE
Patient is a 65 year old female with multiple medical problems who had XI cholecystectomy on 5/7 OSH and was admitted post op after being discharge with SVT requiring cardioversion. She now has persistent pain, elevated T bili/liver studies and with questionable CBD dilation.     NPO for now   No obvious issues post-operatively on CT scan   Reasonable to get an MRCP to work-up hyperbilirubinemia. Completed, read pending at this time  If MRCP unremarkable, would obtain HIDA scan and AES consult as bile leak would be consideration   Consider starting abx with intra-abdominal coverage  No surgical intervention  If MRCP positive would discuss with AES  Serial abdominal exams  Remainder of care per primary team  Please contact general surgery with any questions, concerns, or clinical status changes

## 2025-05-12 NOTE — PROGRESS NOTES
[facility] Medicine  Progress Note    Patient Name: Wilfredo Vazquez  MRN: [id number]  Patient Class: IP- Inpatient   Admission Date: 5/11/2025  Length of Stay: 1 days  Attending Physician: Ashu Sheikh MD  Primary Care Provider: Marcio Calderon MD        Subjective     Principal Problem:Acute postoperative pain of abdomen        HPI:  66 yo F with PMHx HTN, HLD, and HFpEF who presents to the ED as transfer from [facility] inpatient for abdominal pain and elevated LFTs after recent cholecystectomy 5/7. Pt. Presented to [facility] ED May 10 with palpitations and chest pain.  In the emergency department she had SVT with heart rate in the 200s.  She was cardioverted to a sinus rhythm in ED. CTA of the chest and CT abdomen and pelvis did not show acute pathology.  AST and ALT were elevated on presentation.  She was admitted to the ICU for the episode SVT and elevated troponin.  With borderline BP, rate-controlling agents were held.  She was seen by Cardiology with recommendations of adding beta blockade. On May 11 she developed severe abdominal pain with tense abdomen and high-pitched bowel sounds with rebound and guarding. On exam, she had abdominal tenderness and distention along with guarding and rebound.  CTA of the abdomen and pelvis did not show evidence of acute GI bleed or obvious abnormalities of the abdominal vasculature. Transfer center contacted AES at [facility] who recommended MRCP.  With the severe, persistent abdominal pain/abdominal distention despite narcotic medications, Hospital Medicine at [facility] spoke with General Surgery at [facility], recommendation was for transfer to [facility] for General Surgery and potentially AES evaluations.      Pt. Reports to me abdominal pain has improved significantly. She was given one PO oxycodone in ED and has not required further medications since presentation.    Overview/Hospital Course:  No  notes on file    Interval History:   Admitted as transfer overnight.  MRCP completed that was unrevealing.  HIDA scan ordered.  Advanced endoscopy consulted. Ongoing abdominal pain.      Review of Systems   All other systems reviewed and are negative.    Objective:     Vital Signs (Most Recent):  Temp: 99 °F (37.2 °C) (05/12/25 1209)  Pulse: 91 (05/12/25 1657)  Resp: 16 (05/12/25 1657)  BP: 121/78 (05/12/25 1657)  SpO2: (!) 92 % (05/12/25 1657) Vital Signs (24h Range):  Temp:  [98.5 °F (36.9 °C)-99.3 °F (37.4 °C)] 99 °F (37.2 °C)  Pulse:  [79-96] 91  Resp:  [16-18] 16  SpO2:  [90 %-98 %] 92 %  BP: ()/(52-85) 121/78     Weight: 80.3 kg (177 lb)  Body mass index is 31.35 kg/m².    Intake/Output Summary (Last 24 hours) at 5/12/2025 1745  Last data filed at 5/12/2025 0630  Gross per 24 hour   Intake 360 ml   Output --   Net 360 ml         Physical Exam  Vitals reviewed.   Constitutional:       General: She is not in acute distress.     Appearance: She is well-developed.   HENT:      Head: Normocephalic and atraumatic.   Eyes:      Extraocular Movements: Extraocular movements intact.      Pupils: Pupils are equal, round, and reactive to light.   Neck:      Vascular: No JVD.      Trachea: No tracheal deviation.   Cardiovascular:      Rate and Rhythm: Normal rate and regular rhythm.      Heart sounds: No murmur heard.     No friction rub. No gallop.   Pulmonary:      Effort: No respiratory distress.      Breath sounds: Normal breath sounds. No wheezing or rales.   Abdominal:      General: Bowel sounds are normal. There is no distension.      Palpations: Abdomen is soft. There is no mass.      Tenderness: There is abdominal tenderness.      Comments: RUQ tenderness, negative Pinedo's sign   Musculoskeletal:         General: No deformity.      Cervical back: Neck supple.   Lymphadenopathy:      Cervical: No cervical adenopathy.   Skin:     General: Skin is warm and dry.      Findings: No rash.   Neurological:       Mental Status: She is alert and oriented to person, place, and time.               Significant Labs: All pertinent labs within the past 24 hours have been reviewed.  CBC:   Recent Labs   Lab 05/11/25 0457 05/11/25  0812 05/12/25  0515   WBC 6.67  --  7.53   HGB 10.8* 11.8* 11.2*   HCT 33.9* 38.0 34.0*     --  266     CMP:   Recent Labs   Lab 05/11/25  0457 05/12/25  0515    135*   K 4.0 4.0    102   CO2 25 27   GLU 86 96   BUN 16 11   CREATININE 0.7 0.7   CALCIUM 8.6* 9.0   PROT 6.0 6.2   ALBUMIN 2.5* 2.7*   BILITOT 1.2* 1.4*   ALKPHOS 165* 274*   * 195*   * 341*   ANIONGAP 7* 6*       Significant Imaging: I have reviewed all pertinent imaging results/findings within the past 24 hours.      Assessment & Plan  Acute postoperative pain of abdomen  Transaminitis  Hyperbilirubinemia  Presenting as outside hospital transfer with RUQ pain, elevated LFTs after recent cholecystectomy 5/7  - CT abd notes Mild fat stranding and fluid within the gallbladder fossa and borderline dilatation of the CBD without intrahepatic biliary dilatation, presumed within normal limits for recent post cholecystectomy  - General surgery consulted  - MRCP unrevealing   - HIDA scan ordered   - Advanced endoscopy consulted  - Trend LFTs    Hypertension  - Continue lisinopril    SVT (supraventricular tachycardia)  - Patient with narrow complex tachycardia at rate of 216 bpm on presentation to [facility] ED 5/10. She underwent electrical cardioversion to sinus rhythms   - Continue cardiac monitoring  - Toprol-XL 50 mg started per OSH cardiology recommendations.   - Cards f/u at D/c    VTE Risk Mitigation (From admission, onward)           Ordered     IP VTE HIGH RISK PATIENT  Once         05/11/25 2021     Place sequential compression device  Until discontinued         05/11/25 2021                    Discharge Planning   NETTIE: 5/15/2025     Code Status: Full Code   Medical Readiness for Discharge Date:   Discharge  Plan A: Home with family                        Ashu Sheikh MD  Department of Hospital Medicine   [facility]

## 2025-05-12 NOTE — ASSESSMENT & PLAN NOTE
-Pt. With RUQ pain, elevated LFTs after recent cholecystectomy 5/7  -CT abd notes Mild fat stranding and fluid within the gallbladder fossa and borderline dilatation of the CBD without intrahepatic biliary dilatation, presumed within normal limits for recent post cholecystectomy  -MRCP ordered for further evaluation, continue to trend LFTs. Consult AES if MRCP abnormal or LFTs worsen

## 2025-05-12 NOTE — CONSULTS
[facility] Service Consult Note    Attending: Ashu Sheikh MD   Admit Date: 5/11/2025  Today's Date: 05/12/2025    SUBJECTIVE:     HPI:  Ms. Vazquez is a 65-year-old female with a history of emphysema, gastroesophageal reflux disease, hypertension, shingles, and recent robotic cholecystectomy on May 7 at [facility]. She was admitted to [facility] on May 10 with palpitations, N/V, and chest pain. In the ER, he had SVT & was cardioverted. Initial labs - WBC 6k, T bili 1.2, , ; CTA of the abdomen and pelvis did not show evidence of acute GI bleed or obvious abnormalities of the abdominal vasculature. AES consulted for concern of bile leak. MRCP - s/p cholecystectomy with mild dilatation of the common duct and pancreatic duct.     Today, transaminases are trending down but T bili elevated at 1.4.     Most Recent Endoscopic Procedures:   EGD (2/17/25):                        - No endoscopic esophageal abnormality to explain                          patient's dysphagia.                          - 3 cm hiatal hernia.                          - Erythematous mucosa in the antrum. Biopsied.                          - A single non-bleeding angioectasia in the                          duodenum.       All home medications reviewed.    Review of Systems   Constitutional:  Negative for chills and fever.   Cardiovascular:  Positive for palpitations.   Gastrointestinal:  Positive for abdominal pain, nausea and vomiting.       OBJECTIVE:     Vital Signs Trends/History Reviewed  Vitals:    05/12/25 0443 05/12/25 0522 05/12/25 0825 05/12/25 0850   BP: 132/77  101/70    BP Location: Left arm      Patient Position: Lying      Pulse: 86  96    Resp: 18 18 18 18   Temp: 98.8 °F (37.1 °C)  98.5 °F (36.9 °C)    TempSrc: Oral  Oral    SpO2: (!) 90%  (!) 94%    Weight:           Physical Exam  Constitutional:       Appearance: Normal appearance. She is ill-appearing. She  is not toxic-appearing.   HENT:      Head: Normocephalic and atraumatic.   Eyes:      General: No scleral icterus.  Pulmonary:      Effort: Pulmonary effort is normal. No respiratory distress.   Abdominal:      General: Abdomen is flat.      Palpations: Abdomen is soft.      Tenderness: There is abdominal tenderness. There is no guarding or rebound.      Comments: Noted right sided tenderness to palpation. Well healing surgical scars noted.    Skin:     General: Skin is warm and dry.      Coloration: Skin is not jaundiced.   Neurological:      General: No focal deficit present.      Mental Status: She is alert and oriented to person, place, and time. Mental status is at baseline.   Psychiatric:         Mood and Affect: Mood normal.         Behavior: Behavior normal.         Thought Content: Thought content normal.         Judgment: Judgment normal.           Laboratory: All labs results within last 24 hours have been reviewed.     Imaging: All imaging results within the last 24 hours have been reviewed.       ASSESSMENT:      Ms. Vazquez is a 65-year-old female with a history of emphysema, gastroesophageal reflux disease, hypertension, shingles, and recent robotic cholecystectomy on May 7 at [facility]. She was admitted to [facility] on May 10 with palpitations, N/V, and chest pain. In the ER, he had SVT & was cardioverted. Initial labs - WBC 6k, T bili 1.2, , ; CTA of the abdomen and pelvis did not show evidence of acute GI bleed or obvious abnormalities of the abdominal vasculature. MRCP revealed biliary dilation. AES consulted for concern of bile leak.     #s/p cholecystectomy, elevated LFTs        RECOMMENDATIONS:      - Await results of HIDA scan.   - NPO at [facility].   - Daily CBC, CMP.     Thank you for allowing us to participate in the care of this patient. Please call with questions.        Cortez Lopez MD , PGY-VI  Gastroenterology Fellow  [facility]

## 2025-05-12 NOTE — NURSING
Called to nuclear medicine for morphine administration during HIDA scan. Orders received. Patient identified using name and . VSS. Morphine administered. Patient monitored for 15 mins after medication administration, no adverse effect.

## 2025-05-12 NOTE — SUBJECTIVE & OBJECTIVE
Interval History:   Admitted as transfer overnight.  MRCP completed that was unrevealing.  HIDA scan ordered.  Advanced endoscopy consulted. Ongoing abdominal pain.      Review of Systems   All other systems reviewed and are negative.    Objective:     Vital Signs (Most Recent):  Temp: 99 °F (37.2 °C) (05/12/25 1209)  Pulse: 91 (05/12/25 1657)  Resp: 16 (05/12/25 1657)  BP: 121/78 (05/12/25 1657)  SpO2: (!) 92 % (05/12/25 1657) Vital Signs (24h Range):  Temp:  [98.5 °F (36.9 °C)-99.3 °F (37.4 °C)] 99 °F (37.2 °C)  Pulse:  [79-96] 91  Resp:  [16-18] 16  SpO2:  [90 %-98 %] 92 %  BP: ()/(52-85) 121/78     Weight: 80.3 kg (177 lb)  Body mass index is 31.35 kg/m².    Intake/Output Summary (Last 24 hours) at 5/12/2025 6554  Last data filed at 5/12/2025 0630  Gross per 24 hour   Intake 360 ml   Output --   Net 360 ml         Physical Exam  Vitals reviewed.   Constitutional:       General: She is not in acute distress.     Appearance: She is well-developed.   HENT:      Head: Normocephalic and atraumatic.   Eyes:      Extraocular Movements: Extraocular movements intact.      Pupils: Pupils are equal, round, and reactive to light.   Neck:      Vascular: No JVD.      Trachea: No tracheal deviation.   Cardiovascular:      Rate and Rhythm: Normal rate and regular rhythm.      Heart sounds: No murmur heard.     No friction rub. No gallop.   Pulmonary:      Effort: No respiratory distress.      Breath sounds: Normal breath sounds. No wheezing or rales.   Abdominal:      General: Bowel sounds are normal. There is no distension.      Palpations: Abdomen is soft. There is no mass.      Tenderness: There is abdominal tenderness.      Comments: RUQ tenderness, negative Pinedo's sign   Musculoskeletal:         General: No deformity.      Cervical back: Neck supple.   Lymphadenopathy:      Cervical: No cervical adenopathy.   Skin:     General: Skin is warm and dry.      Findings: No rash.   Neurological:      Mental Status: She is  alert and oriented to person, place, and time.               Significant Labs: All pertinent labs within the past 24 hours have been reviewed.  CBC:   Recent Labs   Lab 05/11/25 0457 05/11/25  0812 05/12/25  0515   WBC 6.67  --  7.53   HGB 10.8* 11.8* 11.2*   HCT 33.9* 38.0 34.0*     --  266     CMP:   Recent Labs   Lab 05/11/25 0457 05/12/25  0515    135*   K 4.0 4.0    102   CO2 25 27   GLU 86 96   BUN 16 11   CREATININE 0.7 0.7   CALCIUM 8.6* 9.0   PROT 6.0 6.2   ALBUMIN 2.5* 2.7*   BILITOT 1.2* 1.4*   ALKPHOS 165* 274*   * 195*   * 341*   ANIONGAP 7* 6*       Significant Imaging: I have reviewed all pertinent imaging results/findings within the past 24 hours.

## 2025-05-12 NOTE — PLAN OF CARE
"[facility] - Med Surg  Initial Discharge Assessment       Primary Care Provider: Marcio Calderon MD    Admission Diagnosis: Acute abdominal pain [R10.9]  Elevated LFTs [R79.89]    Admission Date: 5/11/2025  Expected Discharge Date: 5/15/2025    Transition of Care Barriers: None    Payor: HU[employer] MANAGED MEDICARE / Plan: HU[employer] SNP HMO PPO SPECIAL NEEDS / Product Type: Medicare Advantage /     Extended Emergency Contact Information  Primary Emergency Contact: Adriana Ross   [facility]  Home Phone: [phone]  Mobile Phone: [phone]  Relation: Daughter    Discharge Plan A: Home with family  Discharge Plan B: Home      [facility] [place] [address]  [place] [address]  Phone: [phone] Fax: [phone]      Initial Assessment (most recent)       Adult Discharge Assessment - 05/12/25 1203          Discharge Assessment    Assessment Type Discharge Planning Assessment     Confirmed/corrected address, phone number and insurance Yes     Confirmed Demographics Correct on Facesheet     Source of Information patient     When was your last doctors appointment? 04/30/25   "week before last"    Does patient/caregiver understand observation status Yes     Communicated NETTIE with patient/caregiver Date not available/Unable to determine     Reason For Admission acute postoperative pain of abdomen     People in Home child(joey), adult;grandchild(joey)   "daughter and grandson"    Facility Arrived From: [facility]     Do you expect to return to your current living situation? Yes     Do you have help at home or someone to help you manage your care at home? No     Prior to hospitilization cognitive status: Alert/Oriented     Current cognitive status: Alert/Oriented     Walking or Climbing Stairs Difficulty no     Dressing/Bathing Difficulty no     Home Accessibility --   "no stairs within home"    Home Layout Able to live on 1st floor     " Equipment Currently Used at Home none     Readmission within 30 days? Yes     Patient currently being followed by outpatient case management? No     Do you currently have service(s) that help you manage your care at home? No     Do you have any problems affording any of your prescribed medications? TBD     How do you get to doctors appointments? car, drives self     Are you on dialysis? No     Do you take coumadin? No     Discharge Plan A Home with family     Discharge Plan B Home     DME Needed Upon Discharge  none     Discharge Plan discussed with: Patient     Transition of Care Barriers None        Physical Activity    On average, how many days per week do you engage in moderate to strenuous exercise (like a brisk walk)? 7 days     On average, how many minutes do you engage in exercise at this level? 30 min        Financial Resource Strain    How hard is it for you to pay for the very basics like food, housing, medical care, and heating? Not hard at all        Housing Stability    In the last 12 months, was there a time when you were not able to pay the mortgage or rent on time? No     At any time in the past 12 months, were you homeless or living in a shelter (including now)? No        Transportation Needs    In the past 12 months, has lack of transportation kept you from medical appointments or from getting medications? No     In the past 12 months, has lack of transportation kept you from meetings, work, or from getting things needed for daily living? No        Food Insecurity    Within the past 12 months, you worried that your food would run out before you got the money to buy more. Never true     Within the past 12 months, the food you bought just didn't last and you didn't have money to get more. Never true        Stress    Do you feel stress - tense, restless, nervous, or anxious, or unable to sleep at night because your mind is troubled all the time - these days? Not at all        Social Isolation    How  "often do you feel lonely or isolated from those around you?  Never        Alcohol Use    Q1: How often do you have a drink containing alcohol? 2-4 times a month     Q2: How many drinks containing alcohol do you have on a typical day when you are drinking? 1 or 2     Q3: How often do you have six or more drinks on one occasion? Never        Utilities    In the past 12 months has the electric, gas, oil, or water company threatened to shut off services in your home? No        Health Literacy    How often do you need to have someone help you when you read instructions, pamphlets, or other written material from your doctor or pharmacy? Never        OTHER    Name(s) of People in Home Adriana Ross (Daughter)  [phone] (Mobile)   and grandson                     Readmission Assessment (most recent)       Readmission Assessment - 05/12/25 1219          Readmission    Was this a planned readmission? No     Why were you hospitalized in the last 30 days? heart rate elevated and SOB and pain in stomach     Why were you readmitted? Related to previous admission     When you left the hospital how did you feel? felt a little better, symtoms started to subside and then worsened     When you left the hospital where did you go? Home with Family     Did patient/caregiver refused recommended DC plan? No     Tell me about what happened between when you left the hospital and the day you returned. had an injection done thursday and then gallstones     When did you start not feeling well? never started feeling better     Did you try to manage your symptoms your self? No     Did you call anyone? No     Did you try to see or did see a doctor or nurse before you came? No     Did you have  a follow-up appointment on discharge? Yes   "have a follow up this coming monday"    Did you go? --   pt has an upcoming appointment                   Discharge Plan A and Plan B have been determined by review of patient's clinical status, future " medical and therapeutic needs, and coverage/benefits for post-acute care in coordination with multidisciplinary team members.      spoke to pt at the bedside. Pt verified all information. Pt lives with daughter and grandson. Pt is independent with their ADL's. Pt is not on dialysis/coumidin/or using DME.     Pt will have a ride home at the time of discharge.      Ronda Wilkes, CORTEZ, LMSW  [facility]  Case Management

## 2025-05-12 NOTE — SUBJECTIVE & OBJECTIVE
Interval History: NAEON. Afebrile. HDS. Continued abdominal pain overnight. Denies n/v. No new symptoms or concerns this morning. All questions answered.   MRCP completed, read pending  T bili 1.2 > 1.4      Medications:  Continuous Infusions:  Scheduled Meds:   lisinopriL  20 mg Oral Daily    metoprolol succinate  50 mg Oral Daily    pantoprazole  40 mg Oral Daily    QUEtiapine  300 mg Oral QHS     PRN Meds:  Current Facility-Administered Medications:     acetaminophen, 650 mg, Oral, Q4H PRN    dextrose 50%, 12.5 g, Intravenous, PRN    dextrose 50%, 25 g, Intravenous, PRN    glucagon (human recombinant), 1 mg, Intramuscular, PRN    glucose, 16 g, Oral, PRN    glucose, 24 g, Oral, PRN    melatonin, 6 mg, Oral, Nightly PRN    morphine, 4 mg, Intravenous, Q6H PRN    naloxone, 0.02 mg, Intravenous, PRN    ondansetron, 4 mg, Intravenous, Q8H PRN    oxyCODONE, 10 mg, Oral, Q4H PRN    prochlorperazine, 5 mg, Intravenous, Q6H PRN    sodium chloride 0.9%, 10 mL, Intravenous, PRN    traMADoL, 50 mg, Oral, Q6H PRN     Review of patient's allergies indicates:   Allergen Reactions    Naproxen Rash     Objective:     Vital Signs (Most Recent):  Temp: 98.8 °F (37.1 °C) (05/12/25 0443)  Pulse: 86 (05/12/25 0443)  Resp: 18 (05/12/25 0522)  BP: 132/77 (05/12/25 0443)  SpO2: (!) 90 % (05/12/25 0443) Vital Signs (24h Range):  Temp:  [98.1 °F (36.7 °C)-99.3 °F (37.4 °C)] 98.8 °F (37.1 °C)  Pulse:  [70-96] 86  Resp:  [16-35] 18  SpO2:  [90 %-100 %] 90 %  BP: ()/(52-85) 132/77     Weight: 80.3 kg (177 lb)  Body mass index is 31.35 kg/m².    Intake/Output - Last 3 Shifts         05/10 0700  05/11 0659 05/11 0700 05/12 0659 05/12 0700  05/13 0659    P.O.  360     Total Intake(mL/kg)  360 (4.5)     Net  +360            Unmeasured Urine Occurrence  2 x              Physical Exam  Vitals and nursing note reviewed.   Constitutional:       General: She is not in acute distress.     Appearance: She is not diaphoretic.      Comments: Room  air   HENT:      Head: Normocephalic and atraumatic.      Mouth/Throat:      Mouth: Mucous membranes are moist.      Pharynx: Oropharynx is clear.   Eyes:      Extraocular Movements: Extraocular movements intact.      Conjunctiva/sclera: Conjunctivae normal.   Cardiovascular:      Rate and Rhythm: Normal rate.   Pulmonary:      Effort: Pulmonary effort is normal. No respiratory distress.   Abdominal:      General: There is no distension.      Palpations: Abdomen is soft.      Tenderness: There is no guarding or rebound.      Comments: Incision is clean, dry, and intact without drainage, erythema, or increased warmth. TTP in R abdomen, not peritonitic  Periumbilical ecchymosis    Musculoskeletal:         General: No deformity.   Skin:     General: Skin is warm and dry.   Neurological:      Mental Status: She is alert and oriented to person, place, and time.          Significant Labs:  I have reviewed all pertinent lab results within the past 24 hours.    Significant Diagnostics:  I have reviewed all pertinent imaging results/findings within the past 24 hours.

## 2025-05-12 NOTE — NURSING
Pt oob to toilet to void, reports ongoing RQ abdominal pain, states the Oxycodone did not work, no relief. Medicated with Zofran for nausea, no vomiting. Requesting iv pain medication. Secure chat sent to LAURA Simmons, awaiting response.

## 2025-05-12 NOTE — ASSESSMENT & PLAN NOTE
- Patient with narrow complex tachycardia at rate of 216 bpm on presentation to [place] ED 5/10. She underwent electrical cardioversion to sinus rhythms   - Continue cardiac monitoring  - Toprol-XL 50 mg started per OSH cardiology recommendations.   - Cards f/u at D/c

## 2025-05-12 NOTE — H&P
[facility] Surg  [facility] Medicine  History & Physical    Patient Name: Wilfredo Vazquez  MRN: [id number]  Patient Class: IP- Inpatient  Admission Date: 5/11/2025  Attending Physician: Ashu Sheikh MD   Primary Care Provider: Marcio Calderon MD         Patient information was obtained from patient, past medical records, and ER records.     Subjective:     Principal Problem:Acute postoperative pain of abdomen    Chief Complaint:   Chief Complaint   Patient presents with    Abdominal Pain     Pt arrives via EMS from [facility] ICU with complaint of RUQ pain here for surgical consult        HPI: 64 yo F with PMHx HTN, HLD, and HFpEF who presents to the ED as transfer from [facility] inpatient for abdominal pain and elevated LFTs after recent cholecystectomy 5/7. Pt. Presented to [facility] ED May 10 with palpitations and chest pain.  In the emergency department she had SVT with heart rate in the 200s.  She was cardioverted to a sinus rhythm in ED. CTA of the chest and CT abdomen and pelvis did not show acute pathology.  AST and ALT were elevated on presentation.  She was admitted to the ICU for the episode SVT and elevated troponin.  With borderline BP, rate-controlling agents were held.  She was seen by Cardiology with recommendations of adding beta blockade. On May 11 she developed severe abdominal pain with tense abdomen and high-pitched bowel sounds with rebound and guarding. On exam, she had abdominal tenderness and distention along with guarding and rebound.  CTA of the abdomen and pelvis did not show evidence of acute GI bleed or obvious abnormalities of the abdominal vasculature. Transfer center contacted AES at [facility] who recommended MRCP.  With the severe, persistent abdominal pain/abdominal distention despite narcotic medications, Hospital Medicine at [facility] spoke with General Surgery at [facility], recommendation was for transfer to [facility]  for General Surgery and potentially AES evaluations.      Pt. Reports to me abdominal pain has improved significantly. She was given one PO oxycodone in ED and has not required further medications since presentation.    Past Medical History:   Diagnosis Date    Abdominal pain 2018    Back pain     Benign paroxysmal vertigo, bilateral     Emphysema lung     GERD (gastroesophageal reflux disease) 2018    HLD (hyperlipidemia)     Hypertension     Shingles        Past Surgical History:   Procedure Laterality Date    APPENDECTOMY      ARTHROPLASTY, KNEE, TOTAL - Left Left 05/27/2024    BLOCK, SPINAL NERVE ROOT, LUMBAR, SELECTIVE Bilateral 12/28/2023    L3, L4, L5    COLONOSCOPY N/A 05/22/2019    Procedure: COLONOSCOPY;  Surgeon: Sim Quinteros MD;  Location: [facility];  Service: Endoscopy;  Laterality: N/A;    COLONOSCOPY N/A 06/20/2022    Procedure: COLONOSCOPY WITH POLYPECTOMY AND CLIPPING;  Surgeon: Jarrod Franco MD;  Location: [facility];  Service: Endoscopy;  Laterality: N/A;    COLONOSCOPY N/A 03/19/2024    Procedure: COLONOSCOPY;  Surgeon: Sim Quinteros MD;  Location: [facility];  Service: Endoscopy;  Laterality: N/A;    COLONOSCOPY W/ POLYPECTOMY      EGD, WITH CLOSED BIOPSY  02/17/2025    ESOPHAGEAL DILATION N/A 05/13/2019    Procedure: DILATION, ESOPHAGUS;  Surgeon: Sim Quinteros MD;  Location: [facility];  Service: Endoscopy;  Laterality: N/A;    ESOPHAGEAL DILATION N/A 02/09/2021    Procedure: DILATION, ESOPHAGUS;  Surgeon: Sim Quinteros MD;  Location: [facility];  Service: Endoscopy;  Laterality: N/A;    ESOPHAGEAL DILATION N/A 12/14/2021    Procedure: DILATION, ESOPHAGUS;  Surgeon: Sim Quinteros MD;  Location: [facility];  Service: Endoscopy;  Laterality: N/A;    ESOPHAGEAL DILATION N/A 05/17/2022    Procedure: DILATION, ESOPHAGUS;  Surgeon: Sim Quinteros MD;  Location: [facility];  Service: Endoscopy;  Laterality: N/A;    ESOPHAGEAL DILATION N/A 12/26/2023    Procedure: DILATION, ESOPHAGUS;   Surgeon: Sim Quinteros MD;  Location: [facility];  Service: Endoscopy;  Laterality: N/A;    ESOPHAGEAL DILATION N/A 07/25/2024    Procedure: DILATION, ESOPHAGUS;  Surgeon: Sim Quinteros MD;  Location: [facility] ENDO;  Service: Endoscopy;  Laterality: N/A;    ESOPHAGOGASTRODUODENOSCOPY N/A 05/13/2019    Procedure: EGD (ESOPHAGOGASTRODUODENOSCOPY);  Surgeon: Sim Quinteros MD;  Location: [facility] ENDO;  Service: Endoscopy;  Laterality: N/A;    ESOPHAGOGASTRODUODENOSCOPY N/A 02/09/2021    Procedure: EGD (ESOPHAGOGASTRODUODENOSCOPY);  Surgeon: Sim Quinteros MD;  Location: [facility];  Service: Endoscopy;  Laterality: N/A;    ESOPHAGOGASTRODUODENOSCOPY N/A 12/14/2021    Procedure: EGD (ESOPHAGOGASTRODUODENOSCOPY);  Surgeon: Sim Quinteros MD;  Location: [facility];  Service: Endoscopy;  Laterality: N/A;    ESOPHAGOGASTRODUODENOSCOPY N/A 05/17/2022    Procedure: EGD (ESOPHAGOGASTRODUODENOSCOPY);  Surgeon: Sim Quinteros MD;  Location: [facility];  Service: Endoscopy;  Laterality: N/A;    ESOPHAGOGASTRODUODENOSCOPY N/A 12/26/2023    Procedure: EGD (ESOPHAGOGASTRODUODENOSCOPY);  Surgeon: Sim Quinteros MD;  Location: [facility];  Service: Endoscopy;  Laterality: N/A;    ESOPHAGOGASTRODUODENOSCOPY N/A 07/25/2024    Procedure: EGD (ESOPHAGOGASTRODUODENOSCOPY);  Surgeon: Sim Quinteros MD;  Location: [facility];  Service: Endoscopy;  Laterality: N/A;    ESOPHAGOGASTRODUODENOSCOPY N/A 02/17/2025    Procedure: EGD (ESOPHAGOGASTRODUODENOSCOPY);  Surgeon: Efrem Garcia MD;  Location: [facility] ENDO;  Service: Endoscopy;  Laterality: N/A;    HYSTERECTOMY      INJECTION OF ANESTHETIC AGENT AROUND MEDIAL BRANCH NERVES INNERVATING LUMBAR FACET JOINT Bilateral 12/14/2023    Procedure: Block-nerve-medial branch-lumbar----L3,L4,L5;  Surgeon: Iván Velasquez Jr., MD;  Location: [facility]IN [facility];  Service: Pain Management;  Laterality: Bilateral;    INJECTION OF ANESTHETIC AGENT AROUND MEDIAL BRANCH NERVES INNERVATING LUMBAR  FACET JOINT Bilateral 2023    Procedure: Block-nerve-medial branch-lumbar L3,L4,L5;  Surgeon: Iván Velasquez Jr., MD;  Location: [facility] PAIN [facility];  Service: Pain Management;  Laterality: Bilateral;    INJECTION OF JOINT Left 2025    Procedure: Injection, Joint, Shoulder, Hip, Or Knee---LEFT INTRAARTICULAR HIP INJECTION UNDER FLUORO;  Surgeon: Iván Velasquez Jr., MD;  Location: [facility] PAIN [facility];  Service: Pain Management;  Laterality: Left;  CONSENT DAY OF PROCEDURE    LAPAROSCOPIC NISSEN FUNDOPLICATION      RADIOFREQUENCY ABLATION, NERVE, SPINAL, LUMBOSACRAL Right 2024    Procedure: RADIOFREQUENCY ABLATION------L3,L4,L5;  Surgeon: Iván Velasquez Jr., MD;  Location: [facility] PAIN [facility];  Service: Pain Management;  Laterality: Right;    REPAIR OF EXTENSOR TENDON Left 2022    Procedure: REPAIR, TENDON, EXTENSOR  ;  Surgeon: Dakota Anaya Jr., MD;  Location: [facility] OR;  Service: Plastics;  Laterality: Left;  THUMB    ROBOT-ASSISTED CHOLECYSTECTOMY USING DA SADA XI N/A 2025    Procedure: XI ROBOTIC CHOLECYSTECTOMY;  Surgeon: Bebeto Cohen MD;  Location: [facility] OR;  Service: General;  Laterality: N/A;    TOTAL KNEE ARTHROPLASTY Left 2024    Procedure: ARTHROPLASTY, KNEE, TOTAL;  Surgeon: Nigel Enriquez MD;  Location: [facility] OR;  Service: Orthopedics;  Laterality: Left;  olivia ortho, hip bumped, left TKA    UPPER GASTROINTESTINAL ENDOSCOPY N/A 2018       Review of patient's allergies indicates:   Allergen Reactions    Naproxen Rash       Current Facility-Administered Medications on File Prior to Encounter   Medication    [] adenosine (ADENOCARD) 3 mg/mL injection    [COMPLETED] iohexoL (OMNIPAQUE 350) injection 130 mL    lactated ringers infusion    lactated ringers infusion    [] nitroGLYCERIN (NITROSTAT) 0.4 MG SL tablet    sodium chloride 0.9% flush 10 mL    sodium chloride 0.9% flush 10 mL    sodium chloride 0.9% flush 10 mL     [DISCONTINUED] 0.9% NaCl infusion    [DISCONTINUED] acetaminophen tablet 650 mg    [DISCONTINUED] enoxaparin injection 40 mg    [DISCONTINUED] hydrALAZINE injection 10 mg    [DISCONTINUED] HYDROmorphone (PF) injection 2 mg    [DISCONTINUED] labetaloL injection 10 mg    [DISCONTINUED] lisinopriL tablet 20 mg    [DISCONTINUED] magnesium oxide tablet 800 mg    [DISCONTINUED] magnesium oxide tablet 800 mg    [DISCONTINUED] metoprolol succinate (TOPROL-XL) 24 hr tablet 50 mg    [DISCONTINUED] morphine injection 2 mg    [DISCONTINUED] mupirocin 2 % ointment    [DISCONTINUED] ondansetron injection 4 mg    [DISCONTINUED] pantoprazole EC tablet 40 mg    [DISCONTINUED] piperacillin-tazobactam (ZOSYN) 4.5 g in D5W 100 mL IVPB (MB+)    [DISCONTINUED] potassium bicarbonate disintegrating tablet 35 mEq    [DISCONTINUED] potassium bicarbonate disintegrating tablet 50 mEq    [DISCONTINUED] potassium bicarbonate disintegrating tablet 60 mEq    [DISCONTINUED] potassium, sodium phosphates 280-160-250 mg packet 2 packet    [DISCONTINUED] potassium, sodium phosphates 280-160-250 mg packet 2 packet    [DISCONTINUED] potassium, sodium phosphates 280-160-250 mg packet 2 packet    [DISCONTINUED] prochlorperazine injection Soln 5 mg    [DISCONTINUED] QUEtiapine tablet 300 mg    [DISCONTINUED] sodium chloride 0.9% flush 10 mL    [DISCONTINUED] traMADoL tablet 50 mg     Current Outpatient Medications on File Prior to Encounter   Medication Sig    albuterol (PROVENTIL/VENTOLIN HFA) 90 mcg/actuation inhaler inhale TWO puffs into THE lungs EVERY 4 HOURS AS NEEDED    atorvastatin (LIPITOR) 40 MG tablet Take 1 tablet (40 mg total) by mouth once daily.    BREZTRI AEROSPHERE 160-9-4.8 mcg/actuation HFAA Two inhalations b.i.d.    cyproheptadine (PERIACTIN) 4 mg tablet 1 po q hs to increase weight    HYDROcodone-acetaminophen (NORCO) 7.5-325 mg per tablet Take 1 tablet by mouth every 6 (six) hours as needed for Pain.    lisinopriL (PRINIVIL,ZESTRIL) 20  MG tablet Take 1 tablet (20 mg total) by mouth once daily.    meclizine (ANTIVERT) 25 mg tablet Take 1 tablet (25 mg total) by mouth 3 (three) times daily as needed for Dizziness.    pantoprazole (PROTONIX) 40 MG tablet Take 1 tablet (40 mg total) by mouth once daily.    QUEtiapine (SEROQUEL) 300 MG Tab Take 300 mg by mouth every evening.    traMADoL (ULTRAM) 50 mg tablet Take 1 tablet (50 mg total) by mouth every 4 (four) hours as needed for Pain.     Family History    Family history is unknown by patient.       Tobacco Use    Smoking status: Former     Current packs/day: 0.25     Types: Cigarettes    Smokeless tobacco: Never   Substance and Sexual Activity    Alcohol use: Yes     Comment: occasionally    Drug use: No    Sexual activity: Yes     Partners: Female     Comment: rare     Review of Systems   Constitutional:  Negative for activity change, appetite change, chills, fever and unexpected weight change.   HENT:  Negative for congestion and sore throat.    Respiratory:  Negative for cough and shortness of breath.    Cardiovascular:  Positive for palpitations. Negative for chest pain and leg swelling.   Gastrointestinal:  Positive for abdominal pain. Negative for abdominal distention, blood in stool, constipation, diarrhea, nausea and vomiting.   Genitourinary:  Negative for difficulty urinating, dysuria and hematuria.   Musculoskeletal:  Positive for arthralgias. Negative for myalgias.   Skin:  Negative for color change and rash.   Neurological:  Negative for dizziness, tremors and seizures.     Objective:     Vital Signs (Most Recent):  Temp: 98.5 °F (36.9 °C) (05/11/25 1748)  Pulse: 85 (05/11/25 2000)  Resp: 16 (05/11/25 2006)  BP: (!) 97/52 (05/11/25 2000)  SpO2: (!) 92 % (05/11/25 2000) Vital Signs (24h Range):  Temp:  [97.7 °F (36.5 °C)-98.5 °F (36.9 °C)] 98.5 °F (36.9 °C)  Pulse:  [70-96] 85  Resp:  [10-35] 16  SpO2:  [91 %-100 %] 92 %  BP: ()/(50-81) 97/52     Weight: 80.3 kg (177 lb)  Body mass  index is 31.35 kg/m².     Physical Exam  Vitals reviewed.   Constitutional:       General: She is not in acute distress.     Appearance: She is well-developed.   HENT:      Head: Normocephalic and atraumatic.   Eyes:      Extraocular Movements: Extraocular movements intact.      Pupils: Pupils are equal, round, and reactive to light.   Neck:      Vascular: No JVD.      Trachea: No tracheal deviation.   Cardiovascular:      Rate and Rhythm: Normal rate and regular rhythm.      Heart sounds: No murmur heard.     No friction rub. No gallop.   Pulmonary:      Effort: No respiratory distress.      Breath sounds: Normal breath sounds. No wheezing or rales.   Abdominal:      General: Bowel sounds are normal. There is no distension.      Palpations: Abdomen is soft. There is no mass.      Tenderness: There is abdominal tenderness.      Comments: RUQ tenderness, negative Pinedo's sign   Musculoskeletal:         General: No deformity.      Cervical back: Neck supple.   Lymphadenopathy:      Cervical: No cervical adenopathy.   Skin:     General: Skin is warm and dry.      Findings: No rash.   Neurological:      Mental Status: She is alert and oriented to person, place, and time.          CRANIAL NERVES     CN III, IV, VI   Pupils are equal, round, and reactive to light.     Significant Labs: All pertinent labs within the past 24 hours have been reviewed.    Significant Imaging: I have reviewed all pertinent imaging results/findings within the past 24 hours.  Assessment/Plan:     Assessment & Plan  Acute postoperative pain of abdomen  Transaminitis  -Pt. With RUQ pain, elevated LFTs after recent cholecystectomy 5/7  -CT abd notes Mild fat stranding and fluid within the gallbladder fossa and borderline dilatation of the CBD without intrahepatic biliary dilatation, presumed within normal limits for recent post cholecystectomy  -MRCP ordered for further evaluation, continue to trend LFTs. Consult AES if MRCP abnormal or LFTs  worsen      Hypertension  -Continue lisinopril  SVT (supraventricular tachycardia)  -Pt. With narrow complex tachycardia at rate of 216 bpm on presentation to [place] ED 5/10. She underwent electrical cardioversion to sinus rhythms   -Continue cardiac monitoring, Toprol-XL 50 mg started per cardiology recommendations. Cards f/u at D/c  VTE Risk Mitigation (From admission, onward)           Ordered     IP VTE HIGH RISK PATIENT  Once         05/11/25 2021     Place sequential compression device  Until discontinued         05/11/25 2021                                    Faraz Gamble MD  Department of Hospital Medicine  [facility] - Med Surg

## 2025-05-12 NOTE — ASSESSMENT & PLAN NOTE
Presenting as outside hospital transfer with RUQ pain, elevated LFTs after recent cholecystectomy 5/7  - CT abd notes Mild fat stranding and fluid within the gallbladder fossa and borderline dilatation of the CBD without intrahepatic biliary dilatation, presumed within normal limits for recent post cholecystectomy  - General surgery consulted  - MRCP unrevealing   - HIDA scan ordered   - Advanced endoscopy consulted  - Trend LFTs

## 2025-05-12 NOTE — NURSING
Rec'd AA&Ox4 via stretcher from ED with transporter. Oriented to staff, room and use of call light. VSS. Abdomen rounded, taunt with abdominal puncture sites x4 intact with skin affix, moderate bruise to umbilicus site. Admit assessment completed. CATALINA Vitale. At  to assess pt, states she will increase dose of Oxycodone. Safety and monitoring ongoing.

## 2025-05-13 LAB
ABSOLUTE EOSINOPHIL (OHS): 0.09 K/UL
ABSOLUTE MONOCYTE (OHS): 0.96 K/UL (ref 0.3–1)
ABSOLUTE NEUTROPHIL COUNT (OHS): 5.03 K/UL (ref 1.8–7.7)
ALBUMIN SERPL BCP-MCNC: 2.7 G/DL (ref 3.5–5.2)
ALP SERPL-CCNC: 365 UNIT/L (ref 40–150)
ALT SERPL W/O P-5'-P-CCNC: 311 UNIT/L (ref 10–44)
ANION GAP (OHS): 9 MMOL/L (ref 8–16)
AST SERPL-CCNC: 197 UNIT/L (ref 11–45)
BASOPHILS # BLD AUTO: 0.02 K/UL
BASOPHILS NFR BLD AUTO: 0.3 %
BILIRUB SERPL-MCNC: 1.7 MG/DL (ref 0.1–1)
BUN SERPL-MCNC: 11 MG/DL (ref 8–23)
CALCIUM SERPL-MCNC: 9.3 MG/DL (ref 8.7–10.5)
CHLORIDE SERPL-SCNC: 102 MMOL/L (ref 95–110)
CO2 SERPL-SCNC: 26 MMOL/L (ref 23–29)
CREAT SERPL-MCNC: 0.7 MG/DL (ref 0.5–1.4)
ERYTHROCYTE [DISTWIDTH] IN BLOOD BY AUTOMATED COUNT: 14.8 % (ref 11.5–14.5)
GFR SERPLBLD CREATININE-BSD FMLA CKD-EPI: >60 ML/MIN/1.73/M2
GLUCOSE SERPL-MCNC: 84 MG/DL (ref 70–110)
HCT VFR BLD AUTO: 36.2 % (ref 37–48.5)
HGB BLD-MCNC: 11.6 GM/DL (ref 12–16)
IMM GRANULOCYTES # BLD AUTO: 0.02 K/UL (ref 0–0.04)
IMM GRANULOCYTES NFR BLD AUTO: 0.3 % (ref 0–0.5)
LYMPHOCYTES # BLD AUTO: 1.74 K/UL (ref 1–4.8)
MAGNESIUM SERPL-MCNC: 1.8 MG/DL (ref 1.6–2.6)
MCH RBC QN AUTO: 28.6 PG (ref 27–31)
MCHC RBC AUTO-ENTMCNC: 32 G/DL (ref 32–36)
MCV RBC AUTO: 89 FL (ref 82–98)
NUCLEATED RBC (/100WBC) (OHS): 0 /100 WBC
PHOSPHATE SERPL-MCNC: 3.3 MG/DL (ref 2.7–4.5)
PLATELET # BLD AUTO: 271 K/UL (ref 150–450)
PMV BLD AUTO: 10.1 FL (ref 9.2–12.9)
POTASSIUM SERPL-SCNC: 4.3 MMOL/L (ref 3.5–5.1)
PROT SERPL-MCNC: 6.6 GM/DL (ref 6–8.4)
RBC # BLD AUTO: 4.06 M/UL (ref 4–5.4)
RELATIVE EOSINOPHIL (OHS): 1.1 %
RELATIVE LYMPHOCYTE (OHS): 22.1 % (ref 18–48)
RELATIVE MONOCYTE (OHS): 12.2 % (ref 4–15)
RELATIVE NEUTROPHIL (OHS): 64 % (ref 38–73)
SODIUM SERPL-SCNC: 137 MMOL/L (ref 136–145)
WBC # BLD AUTO: 7.86 K/UL (ref 3.9–12.7)

## 2025-05-13 PROCEDURE — 84100 ASSAY OF PHOSPHORUS: CPT | Mod: HCNC | Performed by: STUDENT IN AN ORGANIZED HEALTH CARE EDUCATION/TRAINING PROGRAM

## 2025-05-13 PROCEDURE — 25000003 PHARM REV CODE 250: Mod: HCNC | Performed by: HOSPITALIST

## 2025-05-13 PROCEDURE — 63600175 PHARM REV CODE 636 W HCPCS: Mod: HCNC | Performed by: STUDENT IN AN ORGANIZED HEALTH CARE EDUCATION/TRAINING PROGRAM

## 2025-05-13 PROCEDURE — 85025 COMPLETE CBC W/AUTO DIFF WBC: CPT | Mod: HCNC | Performed by: STUDENT IN AN ORGANIZED HEALTH CARE EDUCATION/TRAINING PROGRAM

## 2025-05-13 PROCEDURE — 25000003 PHARM REV CODE 250: Mod: HCNC | Performed by: STUDENT IN AN ORGANIZED HEALTH CARE EDUCATION/TRAINING PROGRAM

## 2025-05-13 PROCEDURE — 36415 COLL VENOUS BLD VENIPUNCTURE: CPT | Mod: HCNC | Performed by: STUDENT IN AN ORGANIZED HEALTH CARE EDUCATION/TRAINING PROGRAM

## 2025-05-13 PROCEDURE — 11000001 HC ACUTE MED/SURG PRIVATE ROOM: Mod: HCNC

## 2025-05-13 PROCEDURE — 63600175 PHARM REV CODE 636 W HCPCS: Mod: HCNC | Performed by: HOSPITALIST

## 2025-05-13 PROCEDURE — 83735 ASSAY OF MAGNESIUM: CPT | Mod: HCNC | Performed by: STUDENT IN AN ORGANIZED HEALTH CARE EDUCATION/TRAINING PROGRAM

## 2025-05-13 PROCEDURE — 80053 COMPREHEN METABOLIC PANEL: CPT | Mod: HCNC | Performed by: STUDENT IN AN ORGANIZED HEALTH CARE EDUCATION/TRAINING PROGRAM

## 2025-05-13 RX ORDER — MORPHINE SULFATE 4 MG/ML
4 INJECTION, SOLUTION INTRAMUSCULAR; INTRAVENOUS EVERY 6 HOURS PRN
Status: DISCONTINUED | OUTPATIENT
Start: 2025-05-13 | End: 2025-05-14

## 2025-05-13 RX ORDER — HYDROCODONE BITARTRATE AND ACETAMINOPHEN 7.5; 325 MG/1; MG/1
1 TABLET ORAL EVERY 4 HOURS PRN
Refills: 0 | Status: DISCONTINUED | OUTPATIENT
Start: 2025-05-13 | End: 2025-05-14

## 2025-05-13 RX ORDER — HYDROCODONE BITARTRATE AND ACETAMINOPHEN 10; 325 MG/1; MG/1
1 TABLET ORAL EVERY 4 HOURS PRN
Refills: 0 | Status: DISCONTINUED | OUTPATIENT
Start: 2025-05-13 | End: 2025-05-14

## 2025-05-13 RX ORDER — METHOCARBAMOL 500 MG/1
500 TABLET, FILM COATED ORAL 3 TIMES DAILY
Status: DISCONTINUED | OUTPATIENT
Start: 2025-05-13 | End: 2025-05-17 | Stop reason: HOSPADM

## 2025-05-13 RX ADMIN — METHOCARBAMOL 500 MG: 500 TABLET ORAL at 02:05

## 2025-05-13 RX ADMIN — QUETIAPINE FUMARATE 300 MG: 300 TABLET ORAL at 09:05

## 2025-05-13 RX ADMIN — MORPHINE SULFATE 4 MG: 4 INJECTION INTRAVENOUS at 06:05

## 2025-05-13 RX ADMIN — ONDANSETRON 4 MG: 2 INJECTION INTRAMUSCULAR; INTRAVENOUS at 08:05

## 2025-05-13 RX ADMIN — MORPHINE SULFATE 4 MG: 4 INJECTION INTRAVENOUS at 10:05

## 2025-05-13 RX ADMIN — PANTOPRAZOLE SODIUM 40 MG: 40 TABLET, DELAYED RELEASE ORAL at 08:05

## 2025-05-13 RX ADMIN — METHOCARBAMOL 500 MG: 500 TABLET ORAL at 09:05

## 2025-05-13 RX ADMIN — MORPHINE SULFATE 4 MG: 4 INJECTION INTRAVENOUS at 03:05

## 2025-05-13 RX ADMIN — METOPROLOL SUCCINATE 50 MG: 50 TABLET, EXTENDED RELEASE ORAL at 08:05

## 2025-05-13 RX ADMIN — HYDROCODONE BITARTRATE AND ACETAMINOPHEN 1 TABLET: 10; 325 TABLET ORAL at 09:05

## 2025-05-13 NOTE — TREATMENT PLAN
Brief AES Treatment Plan    MRCP and HIDA negative for any obstruction or bile leak. AES will sign off. Further management per surgery team.     Cortez Lopez MD  PGY-VI, GI/Hepatology

## 2025-05-13 NOTE — SUBJECTIVE & OBJECTIVE
Interval History:   No acute events overnight.   Patient states continued RUQ pain.   MRCP negative. Followed by HIDA which is negative for bile leak. CBD open.   T bili 1.7 this am from 1.4.   AST//197.     Medications:  Continuous Infusions:  Scheduled Meds:   lisinopriL  20 mg Oral Daily    metoprolol succinate  50 mg Oral Daily    pantoprazole  40 mg Oral Daily    QUEtiapine  300 mg Oral QHS     PRN Meds:  Current Facility-Administered Medications:     acetaminophen, 650 mg, Oral, Q4H PRN    dextrose 50%, 12.5 g, Intravenous, PRN    dextrose 50%, 25 g, Intravenous, PRN    glucagon (human recombinant), 1 mg, Intramuscular, PRN    glucose, 16 g, Oral, PRN    glucose, 24 g, Oral, PRN    melatonin, 6 mg, Oral, Nightly PRN    morphine, 4 mg, Intravenous, Q6H PRN    naloxone, 0.02 mg, Intravenous, PRN    ondansetron, 4 mg, Intravenous, Q8H PRN    oxyCODONE, 10 mg, Oral, Q4H PRN    prochlorperazine, 5 mg, Intravenous, Q6H PRN    sodium chloride 0.9%, 10 mL, Intravenous, PRN    traMADoL, 50 mg, Oral, Q6H PRN     Review of patient's allergies indicates:   Allergen Reactions    Naproxen Rash     Objective:     Vital Signs (Most Recent):  Temp: 99.3 °F (37.4 °C) (05/13/25 0734)  Pulse: 92 (05/13/25 0734)  Resp: 18 (05/13/25 0734)  BP: 104/69 (05/13/25 0734)  SpO2: (!) 94 % (05/13/25 0734) Vital Signs (24h Range):  Temp:  [97.7 °F (36.5 °C)-99.3 °F (37.4 °C)] 99.3 °F (37.4 °C)  Pulse:  [77-96] 92  Resp:  [16-18] 18  SpO2:  [91 %-95 %] 94 %  BP: (101-149)/(55-82) 104/69     Weight: 80.3 kg (177 lb 0.5 oz)  Body mass index is 31.36 kg/m².    Intake/Output - Last 3 Shifts         05/11 0700 05/12 0659 05/12 0700 05/13 0659 05/13 0700 05/14 0659    P.O. 360 480     Total Intake(mL/kg) 360 (4.5) 480 (6)     Net +360 +480            Unmeasured Urine Occurrence 2 x 2 x              Physical Exam  Vitals and nursing note reviewed.   Constitutional:       General: She is not in acute distress.     Appearance: She is not  diaphoretic.      Comments: Room air   HENT:      Head: Normocephalic and atraumatic.      Mouth/Throat:      Mouth: Mucous membranes are moist.      Pharynx: Oropharynx is clear.   Eyes:      Extraocular Movements: Extraocular movements intact.      Conjunctiva/sclera: Conjunctivae normal.   Cardiovascular:      Rate and Rhythm: Normal rate.   Pulmonary:      Effort: Pulmonary effort is normal. No respiratory distress.   Abdominal:      General: There is no distension.      Palpations: Abdomen is soft.      Tenderness: There is no guarding or rebound.      Comments: Incision is clean, dry, and intact without drainage, erythema, or increased warmth. TTP in R abdomen, not peritonitic  Periumbilical ecchymosis    Musculoskeletal:         General: No deformity.   Skin:     General: Skin is warm and dry.   Neurological:      Mental Status: She is alert and oriented to person, place, and time.          Significant Labs:  I have reviewed all pertinent lab results within the past 24 hours.    Significant Diagnostics:  I have reviewed all pertinent imaging results/findings within the past 24 hours.

## 2025-05-13 NOTE — CONSULTS
[facility] Consult Note  Patient Name: Wilfredo Vazquez  MRN: [id number]  Location: 609/609 A  Attending: Ashu Sheikh MD   Admit Date: 5/11/2025  Today's Date: 05/13/2025      SUBJECTIVE:     HPI:  65y F w/ PMHx of HTN, HLD, and HFpEF, COPD who presents as a transfer from OSH due to elevated transaminases. Pt had cholecystectomy on 5/7. She presented to OSH for abd pain and chest pain. Pt was found to have SVT that required cardioversion. At admission , TB 0.6, , . Transaminases have improved but TB now 1.7 and . MRCP shows mild dilation CBD and pancreatic duct. HIDA suggests cholestasis due to elevated liver enzymes. No blockages seen. Advanced GI consulted and no plan for intervention. No family hx of liver issues or past exposure of hepatitis. Pt smokes but has stopped recently. Occasional alcohol use but not heavy.      Review of patient's allergies indicates:   Allergen Reactions    Naproxen Rash       Past Medical History:   Diagnosis Date    Abdominal pain 2018    Back pain     Benign paroxysmal vertigo, bilateral     Emphysema lung     GERD (gastroesophageal reflux disease) 2018    HLD (hyperlipidemia)     Hypertension     Shingles      Past Surgical History:   Procedure Laterality Date    APPENDECTOMY      ARTHROPLASTY, KNEE, TOTAL - Left Left 05/27/2024    BLOCK, SPINAL NERVE ROOT, LUMBAR, SELECTIVE Bilateral 12/28/2023    L3, L4, L5    COLONOSCOPY N/A 05/22/2019    Procedure: COLONOSCOPY;  Surgeon: iSm Quinteros MD;  Location: [facility];  Service: Endoscopy;  Laterality: N/A;    COLONOSCOPY N/A 06/20/2022    Procedure: COLONOSCOPY WITH POLYPECTOMY AND CLIPPING;  Surgeon: Jarrod Franco MD;  Location: [facility] ENDO;  Service: Endoscopy;  Laterality: N/A;    COLONOSCOPY N/A 03/19/2024    Procedure: COLONOSCOPY;  Surgeon: Sim Quinteros MD;  Location: [facility];  Service: Endoscopy;  Laterality: N/A;    COLONOSCOPY W/ POLYPECTOMY      EGD, WITH CLOSED  BIOPSY  02/17/2025    ESOPHAGEAL DILATION N/A 05/13/2019    Procedure: DILATION, ESOPHAGUS;  Surgeon: Sim Quinteros MD;  Location: [facility] ENDO;  Service: Endoscopy;  Laterality: N/A;    ESOPHAGEAL DILATION N/A 02/09/2021    Procedure: DILATION, ESOPHAGUS;  Surgeon: Sim Quinteros MD;  Location: [facility] ENDO;  Service: Endoscopy;  Laterality: N/A;    ESOPHAGEAL DILATION N/A 12/14/2021    Procedure: DILATION, ESOPHAGUS;  Surgeon: Sim Quinteros MD;  Location: [facility] ENDO;  Service: Endoscopy;  Laterality: N/A;    ESOPHAGEAL DILATION N/A 05/17/2022    Procedure: DILATION, ESOPHAGUS;  Surgeon: Sim Quinteros MD;  Location: [facility] ENDO;  Service: Endoscopy;  Laterality: N/A;    ESOPHAGEAL DILATION N/A 12/26/2023    Procedure: DILATION, ESOPHAGUS;  Surgeon: Sim Quinteros MD;  Location: [facility] ENDO;  Service: Endoscopy;  Laterality: N/A;    ESOPHAGEAL DILATION N/A 07/25/2024    Procedure: DILATION, ESOPHAGUS;  Surgeon: Sim Quinteros MD;  Location: [facility];  Service: Endoscopy;  Laterality: N/A;    ESOPHAGOGASTRODUODENOSCOPY N/A 05/13/2019    Procedure: EGD (ESOPHAGOGASTRODUODENOSCOPY);  Surgeon: Sim Quinteros MD;  Location: [facility];  Service: Endoscopy;  Laterality: N/A;    ESOPHAGOGASTRODUODENOSCOPY N/A 02/09/2021    Procedure: EGD (ESOPHAGOGASTRODUODENOSCOPY);  Surgeon: Smi Quinteros MD;  Location: [facility];  Service: Endoscopy;  Laterality: N/A;    ESOPHAGOGASTRODUODENOSCOPY N/A 12/14/2021    Procedure: EGD (ESOPHAGOGASTRODUODENOSCOPY);  Surgeon: Sim Quinteros MD;  Location: [facility] ENDO;  Service: Endoscopy;  Laterality: N/A;    ESOPHAGOGASTRODUODENOSCOPY N/A 05/17/2022    Procedure: EGD (ESOPHAGOGASTRODUODENOSCOPY);  Surgeon: Sim Quinteros MD;  Location: [facility] ENDO;  Service: Endoscopy;  Laterality: N/A;    ESOPHAGOGASTRODUODENOSCOPY N/A 12/26/2023    Procedure: EGD (ESOPHAGOGASTRODUODENOSCOPY);  Surgeon: Sim Quinteros MD;  Location: [facility];  Service: Endoscopy;  Laterality: N/A;     ESOPHAGOGASTRODUODENOSCOPY N/A 07/25/2024    Procedure: EGD (ESOPHAGOGASTRODUODENOSCOPY);  Surgeon: Sim Quinteros MD;  Location: [facility] ENDO;  Service: Endoscopy;  Laterality: N/A;    ESOPHAGOGASTRODUODENOSCOPY N/A 02/17/2025    Procedure: EGD (ESOPHAGOGASTRODUODENOSCOPY);  Surgeon: Efrem Garcia MD;  Location: [facility] ENDO;  Service: Endoscopy;  Laterality: N/A;    HYSTERECTOMY      INJECTION OF ANESTHETIC AGENT AROUND MEDIAL BRANCH NERVES INNERVATING LUMBAR FACET JOINT Bilateral 12/14/2023    Procedure: Block-nerve-medial branch-lumbar----L3,L4,L5;  Surgeon: Iván Velasquez Jr., MD;  Location: [facility] PAIN MGMT;  Service: Pain Management;  Laterality: Bilateral;    INJECTION OF ANESTHETIC AGENT AROUND MEDIAL BRANCH NERVES INNERVATING LUMBAR FACET JOINT Bilateral 12/28/2023    Procedure: Block-nerve-medial branch-lumbar L3,L4,L5;  Surgeon: Iván Velasquez Jr., MD;  Location: [facility] PAIN MGMT;  Service: Pain Management;  Laterality: Bilateral;    INJECTION OF JOINT Left 05/01/2025    Procedure: Injection, Joint, Shoulder, Hip, Or Knee---LEFT INTRAARTICULAR HIP INJECTION UNDER FLUORO;  Surgeon: Iván Velasquez Jr., MD;  Location: [facility] PAIN [facility]MT;  Service: Pain Management;  Laterality: Left;  CONSENT DAY OF PROCEDURE    LAPAROSCOPIC NISSEN FUNDOPLICATION      RADIOFREQUENCY ABLATION, NERVE, SPINAL, LUMBOSACRAL Right 01/25/2024    Procedure: RADIOFREQUENCY ABLATION------L3,L4,L5;  Surgeon: Iván Velasquez Jr., MD;  Location: [facility] PAIN MGMT;  Service: Pain Management;  Laterality: Right;    REPAIR OF EXTENSOR TENDON Left 06/07/2022    Procedure: REPAIR, TENDON, EXTENSOR  ;  Surgeon: Dakota Anaya Jr., MD;  Location: [facility] [place] OR;  Service: Plastics;  Laterality: Left;  THUMB    ROBOT-ASSISTED CHOLECYSTECTOMY USING DA SADA XI N/A 5/7/2025    Procedure: XI ROBOTIC CHOLECYSTECTOMY;  Surgeon: Bebeto Cohen MD;  Location: [facility] OR;  Service: General;  Laterality: N/A;    TOTAL KNEE  ARTHROPLASTY Left 05/27/2024    Procedure: ARTHROPLASTY, KNEE, TOTAL;  Surgeon: Nigel Enriquez MD;  Location: [facility] OR;  Service: Orthopedics;  Laterality: Left;  olivia ortho, hip bumped, left TKA    UPPER GASTROINTESTINAL ENDOSCOPY N/A 01/24/2018     Family History   Family history unknown: Yes     Social History[1]    All home medications reviewed.    Review of Systems   Constitutional: Negative.    HENT: Negative.     Eyes: Negative.    Respiratory: Negative.     Cardiovascular: Negative.    Gastrointestinal:  Positive for abdominal pain.   Musculoskeletal: Negative.    Skin: Negative.    Neurological: Negative.    Psychiatric/Behavioral: Negative.         OBJECTIVE:     Vital Signs Trends/History Reviewed  Vitals:    05/13/25 0335 05/13/25 0450 05/13/25 0734 05/13/25 1044   BP:  (!) 145/78 104/69    BP Location:  Left arm Left arm    Patient Position:  Lying Lying    Pulse:  79 92    Resp: 18 18 18 18   Temp:  97.8 °F (36.6 °C) 99.3 °F (37.4 °C)    TempSrc:  Oral Oral    SpO2:  (!) 91% (!) 94%    Weight:             Physical Exam  Constitutional:       General: She is not in acute distress.     Appearance: Normal appearance. She is not ill-appearing.   HENT:      Head: Normocephalic and atraumatic.   Eyes:      General: No scleral icterus.     Extraocular Movements: Extraocular movements intact.   Cardiovascular:      Rate and Rhythm: Normal rate.   Pulmonary:      Effort: Pulmonary effort is normal. No respiratory distress.   Abdominal:      General: There is distension.      Tenderness: There is abdominal tenderness. There is no guarding.   Musculoskeletal:         General: Normal range of motion.      Cervical back: Normal range of motion.   Skin:     General: Skin is warm.      Coloration: Skin is not jaundiced.   Neurological:      General: No focal deficit present.      Mental Status: She is alert and oriented to person, place, and time.   Psychiatric:         Mood and Affect: Mood normal.          Behavior: Behavior normal.           Laboratory: All labs within last 24 hours reviewed.     MELD:   MELD 3.0: 8 at 5/22/2024  8:06 AM  MELD-Na: 6 at 5/22/2024  8:06 AM  Calculated from:  Serum Creatinine: 0.9 mg/dL (Using min of 1 mg/dL) at 5/22/2024  8:06 AM  Serum Sodium: 139 mmol/L (Using max of 137 mmol/L) at 5/22/2024  8:06 AM  Total Bilirubin: 0.4 mg/dL (Using min of 1 mg/dL) at 5/22/2024  8:06 AM  Serum Albumin: 3.3 g/dL at 5/22/2024  8:06 AM  INR(ratio): 0.9 (Using min of 1) at 5/22/2024  8:06 AM  Age at listing (hypothetical): 64 years  Sex: Female at 5/22/2024  8:06 AM        Imaging: All imaging within last 24 hours reviewed.           Scheduled Medications:    lisinopriL  20 mg Oral Daily    metoprolol succinate  50 mg Oral Daily    pantoprazole  40 mg Oral Daily    QUEtiapine  300 mg Oral QHS       PRN Medications:     Current Facility-Administered Medications:     acetaminophen, 650 mg, Oral, Q4H PRN    dextrose 50%, 12.5 g, Intravenous, PRN    dextrose 50%, 25 g, Intravenous, PRN    glucagon (human recombinant), 1 mg, Intramuscular, PRN    glucose, 16 g, Oral, PRN    glucose, 24 g, Oral, PRN    melatonin, 6 mg, Oral, Nightly PRN    morphine, 4 mg, Intravenous, Q6H PRN    naloxone, 0.02 mg, Intravenous, PRN    ondansetron, 4 mg, Intravenous, Q8H PRN    oxyCODONE, 10 mg, Oral, Q4H PRN    prochlorperazine, 5 mg, Intravenous, Q6H PRN    sodium chloride 0.9%, 10 mL, Intravenous, PRN    traMADoL, 50 mg, Oral, Q6H PRN    ASSESSMENT:      65y F w/ PMHx of HTN, HLD, and HFpEF, COPD who presents as a transfer from OSH due to elevated transaminases. Pt had cholecystectomy on 5/7. Elevated transaminases started on 5/10. No acute signs of blockage. Due to acute elevation of transaminases and ALP and TB, possibly could be due to post surgical changes or ischemia injury from pt's SVT episode. Less likely acute infection. No major CBD or pancreatic dilation so blockage less likely.      RECOMMENDATIONS:    -Daily  CMP  -Cont to monitor labs daily  -Inpt hepatology will cont to follow peripherally    Rest of care per primary team    Iván Gonzalez MD  PGY 5  GI       [1]   Social History  Tobacco Use    Smoking status: Former     Current packs/day: 0.25     Types: Cigarettes    Smokeless tobacco: Never   Substance Use Topics    Alcohol use: Yes     Comment: occasionally    Drug use: No

## 2025-05-13 NOTE — ASSESSMENT & PLAN NOTE
Presenting as outside hospital transfer with RUQ pain, elevated LFTs after recent cholecystectomy 5/7  - CT abd notes Mild fat stranding and fluid within the gallbladder fossa and borderline dilatation of the CBD without intrahepatic biliary dilatation, presumed within normal limits for recent post cholecystectomy  - MRCP unrevealing   - HIDA scan unrevealing  - General surgery consulted  -- No evidence of post-operative complication   -- No surgical intervention  -- Signed off  - Advanced endoscopy consulted  -- MRCP and HIDA negative for any obstruction or bile leak. Signed off.  - Hepatology consulted  - Trend LFTs  - Pain control

## 2025-05-13 NOTE — SUBJECTIVE & OBJECTIVE
Interval History:   No events overnight.  Patient with continued right-sided abdominal pain and nausea.  Tolerating diet advancement.  LFTs still elevated, and hepatology consulted.      Review of Systems   All other systems reviewed and are negative.    Objective:     Vital Signs (Most Recent):  Temp: 99.6 °F (37.6 °C) (05/13/25 1153)  Pulse: 77 (05/13/25 1153)  Resp: 18 (05/13/25 1153)  BP: 123/76 (05/13/25 1153)  SpO2: (!) 94 % (05/13/25 1153) Vital Signs (24h Range):  Temp:  [97.7 °F (36.5 °C)-99.6 °F (37.6 °C)] 99.6 °F (37.6 °C)  Pulse:  [77-94] 77  Resp:  [16-18] 18  SpO2:  [91 %-94 %] 94 %  BP: (102-149)/(55-78) 123/76     Weight: 80.3 kg (177 lb 0.5 oz)  Body mass index is 31.36 kg/m².    Intake/Output Summary (Last 24 hours) at 5/13/2025 1318  Last data filed at 5/13/2025 0618  Gross per 24 hour   Intake 480 ml   Output --   Net 480 ml         Physical Exam  Vitals reviewed.   Constitutional:       General: She is not in acute distress.     Appearance: She is well-developed.   HENT:      Head: Normocephalic and atraumatic.   Eyes:      Extraocular Movements: Extraocular movements intact.      Pupils: Pupils are equal, round, and reactive to light.   Neck:      Vascular: No JVD.      Trachea: No tracheal deviation.   Cardiovascular:      Rate and Rhythm: Normal rate and regular rhythm.      Heart sounds: No murmur heard.     No friction rub. No gallop.   Pulmonary:      Effort: No respiratory distress.      Breath sounds: Normal breath sounds. No wheezing or rales.   Abdominal:      General: Bowel sounds are normal. There is no distension.      Palpations: Abdomen is soft. There is no mass.      Tenderness: There is abdominal tenderness.      Comments: RUQ tenderness, negative Pinedo's sign   Musculoskeletal:         General: No deformity.      Cervical back: Neck supple.   Lymphadenopathy:      Cervical: No cervical adenopathy.   Skin:     General: Skin is warm and dry.      Findings: No rash.    Neurological:      Mental Status: She is alert and oriented to person, place, and time.               Significant Labs: All pertinent labs within the past 24 hours have been reviewed.  CBC:   Recent Labs   Lab 05/12/25  0515 05/13/25  0553   WBC 7.53 7.86   HGB 11.2* 11.6*   HCT 34.0* 36.2*    271     CMP:   Recent Labs   Lab 05/12/25  0515 05/13/25  0553   * 137   K 4.0 4.3    102   CO2 27 26   GLU 96 84   BUN 11 11   CREATININE 0.7 0.7   CALCIUM 9.0 9.3   PROT 6.2 6.6   ALBUMIN 2.7* 2.7*   BILITOT 1.4* 1.7*   ALKPHOS 274* 365*   * 197*   * 311*   ANIONGAP 6* 9       Significant Imaging: I have reviewed all pertinent imaging results/findings within the past 24 hours.

## 2025-05-13 NOTE — ASSESSMENT & PLAN NOTE
- Patient with narrow complex tachycardia at rate of 216 bpm on presentation to [place] ED 5/10. She underwent electrical cardioversion to sinus rhythms   - Continue cardiac monitoring  - Toprol-XL 50 mg started per OSH cardiology recommendations.   - Cards follow up at discharge

## 2025-05-13 NOTE — PROGRESS NOTES
[facility] - [facility] Surg  General Surgery  Progress Note    Subjective:     History of Present Illness:  65F with multiple medical issues s/p lap parvez who was admitted post-operatively with SVT that required cardioversion. During admission for SVT she developed right sided lower abdominal pain. She has some nausea. Most of her pain is around her incisions but feels deeper to her. No fevers or chills. She states she is passing gas but has not had a bowel movement for a couple of days. Her cholecystectomy and Lap Nissen in 2013  was her only abdominal surgery. Work-up revealed CT that was pretty unremakrbale and with some post op changes/possible enlargement of CBD. She has elevated liver enzymes and Tbili of 1.2. General surgery consulted    Post-Op Info:  * No surgery found *         Interval History:   No acute events overnight.   Patient states continued RUQ pain.   MRCP negative. Followed by HIDA which is negative for bile leak. CBD open.   T bili 1.7 this am from 1.4.   AST//197.     Medications:  Continuous Infusions:  Scheduled Meds:   lisinopriL  20 mg Oral Daily    metoprolol succinate  50 mg Oral Daily    pantoprazole  40 mg Oral Daily    QUEtiapine  300 mg Oral QHS     PRN Meds:  Current Facility-Administered Medications:     acetaminophen, 650 mg, Oral, Q4H PRN    dextrose 50%, 12.5 g, Intravenous, PRN    dextrose 50%, 25 g, Intravenous, PRN    glucagon (human recombinant), 1 mg, Intramuscular, PRN    glucose, 16 g, Oral, PRN    glucose, 24 g, Oral, PRN    melatonin, 6 mg, Oral, Nightly PRN    morphine, 4 mg, Intravenous, Q6H PRN    naloxone, 0.02 mg, Intravenous, PRN    ondansetron, 4 mg, Intravenous, Q8H PRN    oxyCODONE, 10 mg, Oral, Q4H PRN    prochlorperazine, 5 mg, Intravenous, Q6H PRN    sodium chloride 0.9%, 10 mL, Intravenous, PRN    traMADoL, 50 mg, Oral, Q6H PRN     Review of patient's allergies indicates:   Allergen Reactions    Naproxen Rash     Objective:     Vital Signs (Most  Recent):  Temp: 99.3 °F (37.4 °C) (05/13/25 0734)  Pulse: 92 (05/13/25 0734)  Resp: 18 (05/13/25 0734)  BP: 104/69 (05/13/25 0734)  SpO2: (!) 94 % (05/13/25 0734) Vital Signs (24h Range):  Temp:  [97.7 °F (36.5 °C)-99.3 °F (37.4 °C)] 99.3 °F (37.4 °C)  Pulse:  [77-96] 92  Resp:  [16-18] 18  SpO2:  [91 %-95 %] 94 %  BP: (101-149)/(55-82) 104/69     Weight: 80.3 kg (177 lb 0.5 oz)  Body mass index is 31.36 kg/m².    Intake/Output - Last 3 Shifts         05/11 0700 05/12 0659 05/12 0700 05/13 0659 05/13 0700 05/14 0659    P.O. 360 480     Total Intake(mL/kg) 360 (4.5) 480 (6)     Net +360 +480            Unmeasured Urine Occurrence 2 x 2 x              Physical Exam  Vitals and nursing note reviewed.   Constitutional:       General: She is not in acute distress.     Appearance: She is not diaphoretic.      Comments: Room air   HENT:      Head: Normocephalic and atraumatic.      Mouth/Throat:      Mouth: Mucous membranes are moist.      Pharynx: Oropharynx is clear.   Eyes:      Extraocular Movements: Extraocular movements intact.      Conjunctiva/sclera: Conjunctivae normal.   Cardiovascular:      Rate and Rhythm: Normal rate.   Pulmonary:      Effort: Pulmonary effort is normal. No respiratory distress.   Abdominal:      General: There is no distension.      Palpations: Abdomen is soft.      Tenderness: There is no guarding or rebound.      Comments: Incision is clean, dry, and intact without drainage, erythema, or increased warmth. TTP in R abdomen, not peritonitic  Periumbilical ecchymosis    Musculoskeletal:         General: No deformity.   Skin:     General: Skin is warm and dry.   Neurological:      Mental Status: She is alert and oriented to person, place, and time.          Significant Labs:  I have reviewed all pertinent lab results within the past 24 hours.    Significant Diagnostics:  I have reviewed all pertinent imaging results/findings within the past 24 hours.  Assessment/Plan:     * Acute  postoperative pain of abdomen  Patient is a 65 year old female with multiple medical problems who had XI cholecystectomy on 5/7 OSH and was admitted post op after being discharge with SVT requiring cardioversion. She now has persistent pain, elevated T bili/liver studies and with questionable CBD dilation.     No obvious issues post-operatively on CT scan   MRCP and HIDA negative for any obstruction or bile leak  No evidence of post-operative complication   No surgical intervention  Okay for diet from surgical perspective  Given continued elevation of LFTs and t bili, would recommend hepatology consult  Remainder of care per primary team  Please contact general surgery with any questions, concerns, or clinical status changes. We will sign off at this time.          Aissatou Padgett MD  General Surgery  [facility] Surg

## 2025-05-13 NOTE — PROGRESS NOTES
[facility] Medicine  Progress Note    Patient Name: Wilfredo Vazquez  MRN: [id number]  Patient Class: IP- Inpatient   Admission Date: 5/11/2025  Length of Stay: 2 days  Attending Physician: Ashu Sheikh MD  Primary Care Provider: Marcio Calderon MD        Subjective     Principal Problem:Acute postoperative pain of abdomen        HPI:  64 yo F with PMHx HTN, HLD, and HFpEF who presents to the ED as transfer from [facility] inpatient for abdominal pain and elevated LFTs after recent cholecystectomy 5/7. Pt. Presented to [facility] ED May 10 with palpitations and chest pain.  In the emergency department she had SVT with heart rate in the 200s.  She was cardioverted to a sinus rhythm in ED. CTA of the chest and CT abdomen and pelvis did not show acute pathology.  AST and ALT were elevated on presentation.  She was admitted to the ICU for the episode SVT and elevated troponin.  With borderline BP, rate-controlling agents were held.  She was seen by Cardiology with recommendations of adding beta blockade. On May 11 she developed severe abdominal pain with tense abdomen and high-pitched bowel sounds with rebound and guarding. On exam, she had abdominal tenderness and distention along with guarding and rebound.  CTA of the abdomen and pelvis did not show evidence of acute GI bleed or obvious abnormalities of the abdominal vasculature. Transfer center contacted [facility] at [facility] who recommended MRCP.  With the severe, persistent abdominal pain/abdominal distention despite narcotic medications, Hospital Medicine at [facility] spoke with General Surgery at [facility], recommendation was for transfer to [facility] for General Surgery and potentially [facility] evaluations.      Pt. Reports to me abdominal pain has improved significantly. She was given one PO oxycodone in ED and has not required further medications since presentation.    Overview/Hospital Course:  No  notes on file    Interval History:   No events overnight.  Patient with continued right-sided abdominal pain and nausea.  Tolerating diet advancement.  LFTs still elevated, and hepatology consulted.      Review of Systems   All other systems reviewed and are negative.    Objective:     Vital Signs (Most Recent):  Temp: 99.6 °F (37.6 °C) (05/13/25 1153)  Pulse: 77 (05/13/25 1153)  Resp: 18 (05/13/25 1153)  BP: 123/76 (05/13/25 1153)  SpO2: (!) 94 % (05/13/25 1153) Vital Signs (24h Range):  Temp:  [97.7 °F (36.5 °C)-99.6 °F (37.6 °C)] 99.6 °F (37.6 °C)  Pulse:  [77-94] 77  Resp:  [16-18] 18  SpO2:  [91 %-94 %] 94 %  BP: (102-149)/(55-78) 123/76     Weight: 80.3 kg (177 lb 0.5 oz)  Body mass index is 31.36 kg/m².    Intake/Output Summary (Last 24 hours) at 5/13/2025 1318  Last data filed at 5/13/2025 0618  Gross per 24 hour   Intake 480 ml   Output --   Net 480 ml         Physical Exam  Vitals reviewed.   Constitutional:       General: She is not in acute distress.     Appearance: She is well-developed.   HENT:      Head: Normocephalic and atraumatic.   Eyes:      Extraocular Movements: Extraocular movements intact.      Pupils: Pupils are equal, round, and reactive to light.   Neck:      Vascular: No JVD.      Trachea: No tracheal deviation.   Cardiovascular:      Rate and Rhythm: Normal rate and regular rhythm.      Heart sounds: No murmur heard.     No friction rub. No gallop.   Pulmonary:      Effort: No respiratory distress.      Breath sounds: Normal breath sounds. No wheezing or rales.   Abdominal:      General: Bowel sounds are normal. There is no distension.      Palpations: Abdomen is soft. There is no mass.      Tenderness: There is abdominal tenderness.      Comments: RUQ tenderness, negative Pinedo's sign   Musculoskeletal:         General: No deformity.      Cervical back: Neck supple.   Lymphadenopathy:      Cervical: No cervical adenopathy.   Skin:     General: Skin is warm and dry.      Findings: No  rash.   Neurological:      Mental Status: She is alert and oriented to person, place, and time.               Significant Labs: All pertinent labs within the past 24 hours have been reviewed.  CBC:   Recent Labs   Lab 05/12/25  0515 05/13/25  0553   WBC 7.53 7.86   HGB 11.2* 11.6*   HCT 34.0* 36.2*    271     CMP:   Recent Labs   Lab 05/12/25  0515 05/13/25  0553   * 137   K 4.0 4.3    102   CO2 27 26   GLU 96 84   BUN 11 11   CREATININE 0.7 0.7   CALCIUM 9.0 9.3   PROT 6.2 6.6   ALBUMIN 2.7* 2.7*   BILITOT 1.4* 1.7*   ALKPHOS 274* 365*   * 197*   * 311*   ANIONGAP 6* 9       Significant Imaging: I have reviewed all pertinent imaging results/findings within the past 24 hours.      Assessment & Plan  Acute postoperative pain of abdomen  Transaminitis  Hyperbilirubinemia  Presenting as outside hospital transfer with RUQ pain, elevated LFTs after recent cholecystectomy 5/7  - CT abd notes Mild fat stranding and fluid within the gallbladder fossa and borderline dilatation of the CBD without intrahepatic biliary dilatation, presumed within normal limits for recent post cholecystectomy  - MRCP unrevealing   - HIDA scan unrevealing  - General surgery consulted  -- No evidence of post-operative complication   -- No surgical intervention  -- Signed off  - Advanced endoscopy consulted  -- MRCP and HIDA negative for any obstruction or bile leak. Signed off.  - Hepatology consulted  - Trend LFTs  - Pain control    Hypertension  - Continue lisinopril    SVT (supraventricular tachycardia)  - Patient with narrow complex tachycardia at rate of 216 bpm on presentation to [place] ED 5/10. She underwent electrical cardioversion to sinus rhythms   - Continue cardiac monitoring  - Toprol-XL 50 mg started per OSH cardiology recommendations.   - Cards follow up at discharge    VTE Risk Mitigation (From admission, onward)           Ordered     IP VTE HIGH RISK PATIENT  Once         05/11/25 2021      Place sequential compression device  Until discontinued         05/11/25 2021                    Discharge Planning   NETTIE: 5/15/2025     Code Status: Full Code   Medical Readiness for Discharge Date:   Discharge Plan A: Home with family                        Ashu Sheikh MD  Department of Hospital Medicine   [facility] Surg

## 2025-05-13 NOTE — ASSESSMENT & PLAN NOTE
Patient is a 65 year old female with multiple medical problems who had XI cholecystectomy on 5/7 OSH and was admitted post op after being discharge with SVT requiring cardioversion. She now has persistent pain, elevated T bili/liver studies and with questionable CBD dilation.     No obvious issues post-operatively on CT scan   MRCP and HIDA negative for any obstruction or bile leak  No evidence of post-operative complication   No surgical intervention  Okay for diet from surgical perspective  Given continued elevation of LFTs and t bili, would recommend hepatology consult  Remainder of care per primary team  Please contact general surgery with any questions, concerns, or clinical status changes. We will sign off at this time.

## 2025-05-14 LAB
ABSOLUTE EOSINOPHIL (OHS): 0.13 K/UL
ABSOLUTE MONOCYTE (OHS): 0.87 K/UL (ref 0.3–1)
ABSOLUTE NEUTROPHIL COUNT (OHS): 3.83 K/UL (ref 1.8–7.7)
ALBUMIN SERPL BCP-MCNC: 2.5 G/DL (ref 3.5–5.2)
ALP SERPL-CCNC: 312 UNIT/L (ref 40–150)
ALT SERPL W/O P-5'-P-CCNC: 208 UNIT/L (ref 10–44)
ANION GAP (OHS): 9 MMOL/L (ref 8–16)
AST SERPL-CCNC: 64 UNIT/L (ref 11–45)
BASOPHILS # BLD AUTO: 0.02 K/UL
BASOPHILS NFR BLD AUTO: 0.3 %
BILIRUB SERPL-MCNC: 0.4 MG/DL (ref 0.1–1)
BUN SERPL-MCNC: 20 MG/DL (ref 8–23)
CALCIUM SERPL-MCNC: 8.8 MG/DL (ref 8.7–10.5)
CHLORIDE SERPL-SCNC: 106 MMOL/L (ref 95–110)
CO2 SERPL-SCNC: 23 MMOL/L (ref 23–29)
CREAT SERPL-MCNC: 0.9 MG/DL (ref 0.5–1.4)
ERYTHROCYTE [DISTWIDTH] IN BLOOD BY AUTOMATED COUNT: 14.9 % (ref 11.5–14.5)
GFR SERPLBLD CREATININE-BSD FMLA CKD-EPI: >60 ML/MIN/1.73/M2
GLUCOSE SERPL-MCNC: 113 MG/DL (ref 70–110)
HCT VFR BLD AUTO: 35.3 % (ref 37–48.5)
HGB BLD-MCNC: 11.2 GM/DL (ref 12–16)
IMM GRANULOCYTES # BLD AUTO: 0.02 K/UL (ref 0–0.04)
IMM GRANULOCYTES NFR BLD AUTO: 0.3 % (ref 0–0.5)
LYMPHOCYTES # BLD AUTO: 2 K/UL (ref 1–4.8)
MAGNESIUM SERPL-MCNC: 1.9 MG/DL (ref 1.6–2.6)
MCH RBC QN AUTO: 28.5 PG (ref 27–31)
MCHC RBC AUTO-ENTMCNC: 31.7 G/DL (ref 32–36)
MCV RBC AUTO: 90 FL (ref 82–98)
NUCLEATED RBC (/100WBC) (OHS): 0 /100 WBC
PHOSPHATE SERPL-MCNC: 3.8 MG/DL (ref 2.7–4.5)
PLATELET # BLD AUTO: 241 K/UL (ref 150–450)
PMV BLD AUTO: 9.7 FL (ref 9.2–12.9)
POTASSIUM SERPL-SCNC: 4 MMOL/L (ref 3.5–5.1)
PROT SERPL-MCNC: 6.3 GM/DL (ref 6–8.4)
RBC # BLD AUTO: 3.93 M/UL (ref 4–5.4)
RELATIVE EOSINOPHIL (OHS): 1.9 %
RELATIVE LYMPHOCYTE (OHS): 29.1 % (ref 18–48)
RELATIVE MONOCYTE (OHS): 12.7 % (ref 4–15)
RELATIVE NEUTROPHIL (OHS): 55.7 % (ref 38–73)
SODIUM SERPL-SCNC: 138 MMOL/L (ref 136–145)
WBC # BLD AUTO: 6.87 K/UL (ref 3.9–12.7)

## 2025-05-14 PROCEDURE — 25000003 PHARM REV CODE 250: Mod: HCNC | Performed by: STUDENT IN AN ORGANIZED HEALTH CARE EDUCATION/TRAINING PROGRAM

## 2025-05-14 PROCEDURE — 84100 ASSAY OF PHOSPHORUS: CPT | Mod: HCNC | Performed by: STUDENT IN AN ORGANIZED HEALTH CARE EDUCATION/TRAINING PROGRAM

## 2025-05-14 PROCEDURE — 11000001 HC ACUTE MED/SURG PRIVATE ROOM: Mod: HCNC

## 2025-05-14 PROCEDURE — 36415 COLL VENOUS BLD VENIPUNCTURE: CPT | Mod: HCNC | Performed by: STUDENT IN AN ORGANIZED HEALTH CARE EDUCATION/TRAINING PROGRAM

## 2025-05-14 PROCEDURE — 63600175 PHARM REV CODE 636 W HCPCS: Mod: HCNC | Performed by: STUDENT IN AN ORGANIZED HEALTH CARE EDUCATION/TRAINING PROGRAM

## 2025-05-14 PROCEDURE — 83735 ASSAY OF MAGNESIUM: CPT | Mod: HCNC | Performed by: STUDENT IN AN ORGANIZED HEALTH CARE EDUCATION/TRAINING PROGRAM

## 2025-05-14 PROCEDURE — 80053 COMPREHEN METABOLIC PANEL: CPT | Mod: HCNC | Performed by: STUDENT IN AN ORGANIZED HEALTH CARE EDUCATION/TRAINING PROGRAM

## 2025-05-14 PROCEDURE — 25000003 PHARM REV CODE 250: Mod: HCNC | Performed by: HOSPITALIST

## 2025-05-14 PROCEDURE — 63600175 PHARM REV CODE 636 W HCPCS: Mod: HCNC | Performed by: HOSPITALIST

## 2025-05-14 PROCEDURE — 85025 COMPLETE CBC W/AUTO DIFF WBC: CPT | Mod: HCNC | Performed by: STUDENT IN AN ORGANIZED HEALTH CARE EDUCATION/TRAINING PROGRAM

## 2025-05-14 RX ORDER — MORPHINE SULFATE 2 MG/ML
2 INJECTION, SOLUTION INTRAMUSCULAR; INTRAVENOUS EVERY 6 HOURS PRN
Status: DISCONTINUED | OUTPATIENT
Start: 2025-05-14 | End: 2025-05-14

## 2025-05-14 RX ORDER — MORPHINE SULFATE 15 MG/1
15 TABLET ORAL EVERY 4 HOURS PRN
Refills: 0 | Status: DISCONTINUED | OUTPATIENT
Start: 2025-05-14 | End: 2025-05-16

## 2025-05-14 RX ORDER — MORPHINE SULFATE 4 MG/ML
4 INJECTION, SOLUTION INTRAMUSCULAR; INTRAVENOUS EVERY 4 HOURS PRN
Status: DISCONTINUED | OUTPATIENT
Start: 2025-05-14 | End: 2025-05-15

## 2025-05-14 RX ADMIN — Medication 6 MG: at 08:05

## 2025-05-14 RX ADMIN — METHOCARBAMOL 500 MG: 500 TABLET ORAL at 08:05

## 2025-05-14 RX ADMIN — METHOCARBAMOL 500 MG: 500 TABLET ORAL at 09:05

## 2025-05-14 RX ADMIN — MORPHINE SULFATE 4 MG: 4 INJECTION INTRAVENOUS at 08:05

## 2025-05-14 RX ADMIN — QUETIAPINE FUMARATE 300 MG: 300 TABLET ORAL at 08:05

## 2025-05-14 RX ADMIN — METOPROLOL SUCCINATE 50 MG: 50 TABLET, EXTENDED RELEASE ORAL at 09:05

## 2025-05-14 RX ADMIN — MORPHINE SULFATE 4 MG: 4 INJECTION INTRAVENOUS at 04:05

## 2025-05-14 RX ADMIN — ONDANSETRON 4 MG: 2 INJECTION INTRAMUSCULAR; INTRAVENOUS at 08:05

## 2025-05-14 RX ADMIN — ONDANSETRON 4 MG: 2 INJECTION INTRAMUSCULAR; INTRAVENOUS at 10:05

## 2025-05-14 RX ADMIN — OXYCODONE HYDROCHLORIDE 10 MG: 10 TABLET ORAL at 10:05

## 2025-05-14 RX ADMIN — MORPHINE SULFATE 4 MG: 4 INJECTION INTRAVENOUS at 12:05

## 2025-05-14 RX ADMIN — PANTOPRAZOLE SODIUM 40 MG: 40 TABLET, DELAYED RELEASE ORAL at 09:05

## 2025-05-14 RX ADMIN — MORPHINE SULFATE 2 MG: 2 INJECTION, SOLUTION INTRAMUSCULAR; INTRAVENOUS at 12:05

## 2025-05-14 RX ADMIN — MORPHINE SULFATE 4 MG: 4 INJECTION INTRAVENOUS at 06:05

## 2025-05-14 NOTE — SUBJECTIVE & OBJECTIVE
Interval History:   No events overnight.  Patient with continued right-sided abdominal pain.  Able to tolerate diet and having bowel movements.  Liver Doppler ordered.  LFTs improving.        Review of Systems   All other systems reviewed and are negative.    Objective:     Vital Signs (Most Recent):  Temp: 98.4 °F (36.9 °C) (05/14/25 1131)  Pulse: 94 (05/14/25 1131)  Resp: 17 (05/14/25 1215)  BP: 109/71 (05/14/25 1131)  SpO2: 96 % (05/14/25 1131) Vital Signs (24h Range):  Temp:  [97.7 °F (36.5 °C)-98.8 °F (37.1 °C)] 98.4 °F (36.9 °C)  Pulse:  [79-95] 94  Resp:  [17-20] 17  SpO2:  [95 %-98 %] 96 %  BP: (105-118)/(56-74) 109/71     Weight: 80.3 kg (177 lb 0.5 oz)  Body mass index is 31.36 kg/m².    Intake/Output Summary (Last 24 hours) at 5/14/2025 1224  Last data filed at 5/14/2025 0623  Gross per 24 hour   Intake 480 ml   Output --   Net 480 ml         Physical Exam  Vitals reviewed.   Constitutional:       General: She is not in acute distress.     Appearance: She is well-developed.   HENT:      Head: Normocephalic and atraumatic.   Eyes:      Extraocular Movements: Extraocular movements intact.   Neck:      Vascular: No JVD.      Trachea: No tracheal deviation.   Cardiovascular:      Rate and Rhythm: Normal rate and regular rhythm.      Heart sounds: No murmur heard.     No friction rub. No gallop.   Pulmonary:      Effort: No respiratory distress.      Breath sounds: Normal breath sounds. No wheezing or rales.   Abdominal:      General: Bowel sounds are normal. There is no distension.      Palpations: Abdomen is soft. There is no mass.      Tenderness: There is abdominal tenderness. There is no guarding.      Comments: RUQ tenderness, negative Pinedo's sign   Musculoskeletal:         General: No deformity.      Cervical back: Neck supple.   Lymphadenopathy:      Cervical: No cervical adenopathy.   Skin:     General: Skin is warm and dry.      Findings: No rash.   Neurological:      Mental Status: She is alert  and oriented to person, place, and time.               Significant Labs: All pertinent labs within the past 24 hours have been reviewed.  CBC:   Recent Labs   Lab 05/13/25  0553 05/14/25  0346   WBC 7.86 6.87   HGB 11.6* 11.2*   HCT 36.2* 35.3*    241     CMP:   Recent Labs   Lab 05/13/25  0553 05/14/25  0346    138   K 4.3 4.0    106   CO2 26 23   GLU 84 113*   BUN 11 20   CREATININE 0.7 0.9   CALCIUM 9.3 8.8   PROT 6.6 6.3   ALBUMIN 2.7* 2.5*   BILITOT 1.7* 0.4   ALKPHOS 365* 312*   * 64*   * 208*   ANIONGAP 9 9       Significant Imaging: I have reviewed all pertinent imaging results/findings within the past 24 hours.

## 2025-05-14 NOTE — ASSESSMENT & PLAN NOTE
Presenting as outside hospital transfer with RUQ pain, elevated LFTs after recent cholecystectomy 5/7  - CT abd notes Mild fat stranding and fluid within the gallbladder fossa and borderline dilatation of the CBD without intrahepatic biliary dilatation, presumed within normal limits for recent post cholecystectomy  - MRCP unrevealing   - HIDA scan unrevealing  - General surgery consulted  -- No evidence of post-operative complication   -- No surgical intervention  -- Signed off  - Advanced endoscopy consulted  -- MRCP and HIDA negative for any obstruction or bile leak. Signed off.  - Hepatology consulted  -- Possibly could be due to post surgical changes or ischemia injury from pt's SVT episode. Less likely acute infection. No major CBD or pancreatic dilation so blockage less likely.   - Trend LFTs  - Pain control  - Liver doppler ordered  - Plan to transfer back to [facility] for continued management given final consultant recommendations

## 2025-05-14 NOTE — HOSPITAL COURSE
General surgery and advanced endoscopy consulted.  MRCP and HIDA negative for any obstruction or bile leak.  No intervention per General surgery on advanced endoscopy.  Hepatology consulted and patients LFTs trended.  Patient with continued refractory pain requiring IV pain medications.  Ultrasound liver Doppler ordered.    5/15 PMHx HTN, HLD, and HFpEF who presents to the ED as transfer from [facility] inpatient for abdominal pain and elevated LFTs after recent cholecystectomy 5/7.  Elevated transaminases started on 5/10. No acute signs of blockage. s/p Gen surgery and [facility] eval -No obvious issues post-operatively on CT scan. MRCP and HIDA negative for any obstruction or bile leak. No evidence of post-operative complication. No surgical intervention. Due to acute elevation of transaminases and ALP and TB, possibly could be due to post surgical changes or ischemia injury from SVT episode. Less likely acute infection. No major CBD or pancreatic dilation so blockage less likely. Liver Doppler - Patent portal and hepatic vasculature.  No evidence for portal vein thrombus as clinically questioned. LFT trended down.  Discharge to [facility] for further management with hepatology f/u . reports 10/10 abdominal pain. required IV morphine x 2 doses today. continue oral morphine 15mg q4h prn. reports no relief with po morphine - requests IV morphine.   5/16 LFTs trended down. Lipase WNL. c/o 10/10 abdominal pain. IV morphine held overnight and received LR 500ml bolus for SBP 90s. Pain management consulted.  difficulty swallowing pills,  transitioned  to oxycodone 5mg q 4h prn PO liquid. continue Robaxin 500mg TID.  Abdominal X ray - Prominent aerated bowel throughout the abdomen. Overall nonspecific pattern. No definite severe distension or air-fluid levels to suggest high-grade obstruction. Recommend continued follow-up. No obvious free intraperitoneal air. started simethicone   5/17 did not require IV opiates  overnight. pain controlled on liquid oxycodone.  Discharge home today.

## 2025-05-14 NOTE — ASSESSMENT & PLAN NOTE
- Patient with narrow complex tachycardia at rate of 216 bpm on presentation to [place] ED 5/10. She underwent electrical cardioversion to sinus rhythms   - Continue cardiac monitoring  - Toprol-XL 50 mg started per OSH cardiology recommendations.   - Cards follow up at discharge

## 2025-05-14 NOTE — PLAN OF CARE
[facility] Med Surg  Discharge Reassessment    Primary Care Provider: Marcio Calderon MD    Expected Discharge Date: 5/15/2025    Reassessment (most recent)       Discharge Reassessment - 05/14/25 1326          Discharge Reassessment    Assessment Type Discharge Planning Reassessment     Did the patient's condition or plan change since previous assessment? Yes     Discharge Plan discussed with: Patient     Communicated NETTIE with patient/caregiver Yes     Discharge Plan A Home with family (P)      Discharge Plan B Home (P)      DME Needed Upon Discharge  none (P)      Transition of Care Barriers None (P)      Why the patient remains in the hospital Requires continued medical care (P)         Post-Acute Status    Discharge Delays None known at this time (P)                    Discharge Plan A and Plan B have been determined by review of patient's clinical status, future medical and therapeutic needs, and coverage/benefits for post-acute care in coordination with multidisciplinary team members.     SW spoke to pt at bedside to discuss post-acute discharge plan. Pt current d/c plan is home with no needs. Pt has been referred to outpatient case management. Pt is not medically ready.    CORTEZ José, LMSW  [phone]  [facility]  Case Management

## 2025-05-14 NOTE — NURSING
US stated they have about 5 people (from ER) that needs to be done first. Unfortunately may not be done until tonight (even if this is a STAT) order.

## 2025-05-14 NOTE — PROGRESS NOTES
[facility] Medicine  Progress Note    Patient Name: Wilfredo Vazquez  MRN: [id number]  Patient Class: IP- Inpatient   Admission Date: 5/11/2025  Length of Stay: 3 days  Attending Physician: Ashu Sheikh MD  Primary Care Provider: Marcio Calderon MD        Subjective     Principal Problem:Acute postoperative pain of abdomen        HPI:  64 yo F with PMHx HTN, HLD, and HFpEF who presents to the ED as transfer from [facility] inpatient for abdominal pain and elevated LFTs after recent cholecystectomy 5/7. Pt. Presented to [facility] ED May 10 with palpitations and chest pain.  In the emergency department she had SVT with heart rate in the 200s.  She was cardioverted to a sinus rhythm in ED. CTA of the chest and CT abdomen and pelvis did not show acute pathology.  AST and ALT were elevated on presentation.  She was admitted to the ICU for the episode SVT and elevated troponin.  With borderline BP, rate-controlling agents were held.  She was seen by Cardiology with recommendations of adding beta blockade. On May 11 she developed severe abdominal pain with tense abdomen and high-pitched bowel sounds with rebound and guarding. On exam, she had abdominal tenderness and distention along with guarding and rebound.  CTA of the abdomen and pelvis did not show evidence of acute GI bleed or obvious abnormalities of the abdominal vasculature. Transfer center contacted [facility] at [facility] who recommended MRCP.  With the severe, persistent abdominal pain/abdominal distention despite narcotic medications, Hospital Medicine at [facility] spoke with General Surgery at [facility], recommendation was for transfer to [facility] for General Surgery and potentially [facility] evaluations.      Pt. Reports to me abdominal pain has improved significantly. She was given one PO oxycodone in ED and has not required further medications since presentation.    Overview/Hospital Course:  No  notes on file    Interval History:   No events overnight.  Patient with continued right-sided abdominal pain.  Able to tolerate diet and having bowel movements.  Liver Doppler ordered.  LFTs improving.        Review of Systems   All other systems reviewed and are negative.    Objective:     Vital Signs (Most Recent):  Temp: 98.4 °F (36.9 °C) (05/14/25 1131)  Pulse: 94 (05/14/25 1131)  Resp: 17 (05/14/25 1215)  BP: 109/71 (05/14/25 1131)  SpO2: 96 % (05/14/25 1131) Vital Signs (24h Range):  Temp:  [97.7 °F (36.5 °C)-98.8 °F (37.1 °C)] 98.4 °F (36.9 °C)  Pulse:  [79-95] 94  Resp:  [17-20] 17  SpO2:  [95 %-98 %] 96 %  BP: (105-118)/(56-74) 109/71     Weight: 80.3 kg (177 lb 0.5 oz)  Body mass index is 31.36 kg/m².    Intake/Output Summary (Last 24 hours) at 5/14/2025 1224  Last data filed at 5/14/2025 0623  Gross per 24 hour   Intake 480 ml   Output --   Net 480 ml         Physical Exam  Vitals reviewed.   Constitutional:       General: She is not in acute distress.     Appearance: She is well-developed.   HENT:      Head: Normocephalic and atraumatic.   Eyes:      Extraocular Movements: Extraocular movements intact.   Neck:      Vascular: No JVD.      Trachea: No tracheal deviation.   Cardiovascular:      Rate and Rhythm: Normal rate and regular rhythm.      Heart sounds: No murmur heard.     No friction rub. No gallop.   Pulmonary:      Effort: No respiratory distress.      Breath sounds: Normal breath sounds. No wheezing or rales.   Abdominal:      General: Bowel sounds are normal. There is no distension.      Palpations: Abdomen is soft. There is no mass.      Tenderness: There is abdominal tenderness. There is no guarding.      Comments: RUQ tenderness, negative Pinedo's sign   Musculoskeletal:         General: No deformity.      Cervical back: Neck supple.   Lymphadenopathy:      Cervical: No cervical adenopathy.   Skin:     General: Skin is warm and dry.      Findings: No rash.   Neurological:      Mental  Status: She is alert and oriented to person, place, and time.               Significant Labs: All pertinent labs within the past 24 hours have been reviewed.  CBC:   Recent Labs   Lab 05/13/25  0553 05/14/25  0346   WBC 7.86 6.87   HGB 11.6* 11.2*   HCT 36.2* 35.3*    241     CMP:   Recent Labs   Lab 05/13/25  0553 05/14/25  0346    138   K 4.3 4.0    106   CO2 26 23   GLU 84 113*   BUN 11 20   CREATININE 0.7 0.9   CALCIUM 9.3 8.8   PROT 6.6 6.3   ALBUMIN 2.7* 2.5*   BILITOT 1.7* 0.4   ALKPHOS 365* 312*   * 64*   * 208*   ANIONGAP 9 9       Significant Imaging: I have reviewed all pertinent imaging results/findings within the past 24 hours.      Assessment & Plan  Acute postoperative pain of abdomen  Transaminitis  Hyperbilirubinemia  Presenting as outside hospital transfer with RUQ pain, elevated LFTs after recent cholecystectomy 5/7  - CT abd notes Mild fat stranding and fluid within the gallbladder fossa and borderline dilatation of the CBD without intrahepatic biliary dilatation, presumed within normal limits for recent post cholecystectomy  - MRCP unrevealing   - HIDA scan unrevealing  - General surgery consulted  -- No evidence of post-operative complication   -- No surgical intervention  -- Signed off  - Advanced endoscopy consulted  -- MRCP and HIDA negative for any obstruction or bile leak. Signed off.  - Hepatology consulted  -- Possibly could be due to post surgical changes or ischemia injury from pt's SVT episode. Less likely acute infection. No major CBD or pancreatic dilation so blockage less likely.   - Trend LFTs  - Pain control  - Liver doppler ordered  - Plan to transfer back to [facility] for continued management given final consultant recommendations    Hypertension  - Continue lisinopril    SVT (supraventricular tachycardia)  - Patient with narrow complex tachycardia at rate of 216 bpm on presentation to [place] ED 5/10. She underwent electrical  cardioversion to sinus rhythms   - Continue cardiac monitoring  - Toprol-XL 50 mg started per OSH cardiology recommendations.   - Cards follow up at discharge    VTE Risk Mitigation (From admission, onward)           Ordered     IP VTE HIGH RISK PATIENT  Once         05/11/25 2021     Place sequential compression device  Until discontinued         05/11/25 2021                    Discharge Planning   NETTIE: 5/15/2025     Code Status: Full Code   Medical Readiness for Discharge Date:   Discharge Plan A: Home with family                        Ashu Sheikh MD  Department of Hospital Medicine   [facility]

## 2025-05-14 NOTE — TREATMENT PLAN
Hepatology Treatment Plan    Wilfredo Vazquez is a 65 y.o. female admitted to hospital 5/11/2025 (Hospital Day: 4) due to Acute postoperative pain of abdomen.     Objective  Temp:  [97.7 °F (36.5 °C)-98.8 °F (37.1 °C)] 98.4 °F (36.9 °C) (05/14 1131)  Pulse:  [79-95] 94 (05/14 1131)  BP: (105-118)/(56-74) 109/71 (05/14 1131)  Resp:  [17-20] 17 (05/14 1215)  SpO2:  [95 %-98 %] 96 % (05/14 1131)      Laboratory    Lab Results   Component Value Date    WBC 6.87 05/14/2025    HGB 11.2 (L) 05/14/2025    HCT 35.3 (L) 05/14/2025    MCV 90 05/14/2025     05/14/2025       Lab Results   Component Value Date     05/14/2025    K 4.0 05/14/2025     05/14/2025    CO2 23 05/14/2025    BUN 20 05/14/2025    CREATININE 0.9 05/14/2025    CALCIUM 8.8 05/14/2025       Lab Results   Component Value Date    ALBUMIN 2.5 (L) 05/14/2025     (H) 05/14/2025    AST 64 (H) 05/14/2025    ALKPHOS 312 (H) 05/14/2025    BILITOT 0.4 05/14/2025       Lab Results   Component Value Date    INR 0.9 05/22/2024    INR 1.0 06/12/2022    INR 0.9 04/21/2022       MELD 3.0: 8 at 5/22/2024  8:06 AM  MELD-Na: 6 at 5/22/2024  8:06 AM  Calculated from:  Serum Creatinine: 0.9 mg/dL (Using min of 1 mg/dL) at 5/22/2024  8:06 AM  Serum Sodium: 139 mmol/L (Using max of 137 mmol/L) at 5/22/2024  8:06 AM  Total Bilirubin: 0.4 mg/dL (Using min of 1 mg/dL) at 5/22/2024  8:06 AM  Serum Albumin: 3.3 g/dL at 5/22/2024  8:06 AM  INR(ratio): 0.9 (Using min of 1) at 5/22/2024  8:06 AM  Age at listing (hypothetical): 64 years  Sex: Female at 5/22/2024  8:06 AM      Assessment  65y F w/ PMHx of HTN, HLD, and HFpEF, COPD who presents as a transfer from OSH due to elevated transaminases. Pt had cholecystectomy on 5/7. Elevated transaminases started on 5/10. No acute signs of blockage. Due to acute elevation of transaminases and ALP and TB, possibly could be due to post surgical changes or ischemia injury from pt's SVT episode. Less likely acute  infection. No major CBD or pancreatic dilation so blockage less likely.     Recommendations:  -Labs show improvement in transaminases, ALP, TB  -Could either have been post surgical reaction or ischemia from cardiac event  -Daily CMP while inpt  -Hepatology will sign off, no need for follow up w/ hepatology clinic    Plan discussed w/ Dr. Mitchell Gonzalez MD  PGY 5  GI

## 2025-05-15 LAB
ABSOLUTE EOSINOPHIL (OHS): 0.13 K/UL
ABSOLUTE MONOCYTE (OHS): 0.93 K/UL (ref 0.3–1)
ABSOLUTE NEUTROPHIL COUNT (OHS): 4.1 K/UL (ref 1.8–7.7)
ALBUMIN SERPL BCP-MCNC: 2.7 G/DL (ref 3.5–5.2)
ALP SERPL-CCNC: 271 UNIT/L (ref 40–150)
ALT SERPL W/O P-5'-P-CCNC: 146 UNIT/L (ref 10–44)
ANION GAP (OHS): 7 MMOL/L (ref 8–16)
AST SERPL-CCNC: 30 UNIT/L (ref 11–45)
BASOPHILS # BLD AUTO: 0.02 K/UL
BASOPHILS NFR BLD AUTO: 0.3 %
BILIRUB SERPL-MCNC: 0.4 MG/DL (ref 0.1–1)
BUN SERPL-MCNC: 19 MG/DL (ref 8–23)
CALCIUM SERPL-MCNC: 8.3 MG/DL (ref 8.7–10.5)
CHLORIDE SERPL-SCNC: 105 MMOL/L (ref 95–110)
CO2 SERPL-SCNC: 27 MMOL/L (ref 23–29)
CREAT SERPL-MCNC: 0.7 MG/DL (ref 0.5–1.4)
ERYTHROCYTE [DISTWIDTH] IN BLOOD BY AUTOMATED COUNT: 15.1 % (ref 11.5–14.5)
GFR SERPLBLD CREATININE-BSD FMLA CKD-EPI: >60 ML/MIN/1.73/M2
GLUCOSE SERPL-MCNC: 95 MG/DL (ref 70–110)
HCT VFR BLD AUTO: 35.3 % (ref 37–48.5)
HGB BLD-MCNC: 11.4 GM/DL (ref 12–16)
IMM GRANULOCYTES # BLD AUTO: 0.03 K/UL (ref 0–0.04)
IMM GRANULOCYTES NFR BLD AUTO: 0.4 % (ref 0–0.5)
LYMPHOCYTES # BLD AUTO: 2.01 K/UL (ref 1–4.8)
MAGNESIUM SERPL-MCNC: 1.9 MG/DL (ref 1.6–2.6)
MCH RBC QN AUTO: 28.9 PG (ref 27–31)
MCHC RBC AUTO-ENTMCNC: 32.3 G/DL (ref 32–36)
MCV RBC AUTO: 90 FL (ref 82–98)
NUCLEATED RBC (/100WBC) (OHS): 0 /100 WBC
PHOSPHATE SERPL-MCNC: 3.3 MG/DL (ref 2.7–4.5)
PLATELET # BLD AUTO: 242 K/UL (ref 150–450)
PMV BLD AUTO: 10.2 FL (ref 9.2–12.9)
POTASSIUM SERPL-SCNC: 4.8 MMOL/L (ref 3.5–5.1)
PROT SERPL-MCNC: 6.4 GM/DL (ref 6–8.4)
RBC # BLD AUTO: 3.94 M/UL (ref 4–5.4)
RELATIVE EOSINOPHIL (OHS): 1.8 %
RELATIVE LYMPHOCYTE (OHS): 27.8 % (ref 18–48)
RELATIVE MONOCYTE (OHS): 12.9 % (ref 4–15)
RELATIVE NEUTROPHIL (OHS): 56.8 % (ref 38–73)
SODIUM SERPL-SCNC: 139 MMOL/L (ref 136–145)
WBC # BLD AUTO: 7.22 K/UL (ref 3.9–12.7)

## 2025-05-15 PROCEDURE — 63600175 PHARM REV CODE 636 W HCPCS: Mod: HCNC | Performed by: HOSPITALIST

## 2025-05-15 PROCEDURE — 83735 ASSAY OF MAGNESIUM: CPT | Mod: HCNC | Performed by: STUDENT IN AN ORGANIZED HEALTH CARE EDUCATION/TRAINING PROGRAM

## 2025-05-15 PROCEDURE — 11000001 HC ACUTE MED/SURG PRIVATE ROOM: Mod: HCNC

## 2025-05-15 PROCEDURE — 85025 COMPLETE CBC W/AUTO DIFF WBC: CPT | Mod: HCNC | Performed by: STUDENT IN AN ORGANIZED HEALTH CARE EDUCATION/TRAINING PROGRAM

## 2025-05-15 PROCEDURE — 25000003 PHARM REV CODE 250: Mod: HCNC | Performed by: STUDENT IN AN ORGANIZED HEALTH CARE EDUCATION/TRAINING PROGRAM

## 2025-05-15 PROCEDURE — 25000003 PHARM REV CODE 250: Mod: HCNC | Performed by: HOSPITALIST

## 2025-05-15 PROCEDURE — 36415 COLL VENOUS BLD VENIPUNCTURE: CPT | Mod: HCNC | Performed by: STUDENT IN AN ORGANIZED HEALTH CARE EDUCATION/TRAINING PROGRAM

## 2025-05-15 PROCEDURE — 63600175 PHARM REV CODE 636 W HCPCS: Mod: HCNC | Performed by: STUDENT IN AN ORGANIZED HEALTH CARE EDUCATION/TRAINING PROGRAM

## 2025-05-15 PROCEDURE — 80053 COMPREHEN METABOLIC PANEL: CPT | Mod: HCNC | Performed by: STUDENT IN AN ORGANIZED HEALTH CARE EDUCATION/TRAINING PROGRAM

## 2025-05-15 PROCEDURE — 84100 ASSAY OF PHOSPHORUS: CPT | Mod: HCNC | Performed by: STUDENT IN AN ORGANIZED HEALTH CARE EDUCATION/TRAINING PROGRAM

## 2025-05-15 RX ORDER — MORPHINE SULFATE 4 MG/ML
4 INJECTION, SOLUTION INTRAMUSCULAR; INTRAVENOUS EVERY 6 HOURS PRN
Status: DISCONTINUED | OUTPATIENT
Start: 2025-05-15 | End: 2025-05-16

## 2025-05-15 RX ORDER — MORPHINE SULFATE 15 MG/1
15 TABLET ORAL EVERY 4 HOURS PRN
Status: CANCELLED
Start: 2025-05-15

## 2025-05-15 RX ORDER — MORPHINE SULFATE 4 MG/ML
4 INJECTION, SOLUTION INTRAMUSCULAR; INTRAVENOUS ONCE
Status: COMPLETED | OUTPATIENT
Start: 2025-05-15 | End: 2025-05-15

## 2025-05-15 RX ADMIN — ONDANSETRON 4 MG: 2 INJECTION INTRAMUSCULAR; INTRAVENOUS at 08:05

## 2025-05-15 RX ADMIN — PANTOPRAZOLE SODIUM 40 MG: 40 TABLET, DELAYED RELEASE ORAL at 08:05

## 2025-05-15 RX ADMIN — METHOCARBAMOL 500 MG: 500 TABLET ORAL at 08:05

## 2025-05-15 RX ADMIN — MORPHINE SULFATE 4 MG: 4 INJECTION INTRAVENOUS at 08:05

## 2025-05-15 RX ADMIN — QUETIAPINE FUMARATE 300 MG: 300 TABLET ORAL at 08:05

## 2025-05-15 RX ADMIN — LISINOPRIL 20 MG: 20 TABLET ORAL at 08:05

## 2025-05-15 RX ADMIN — METHOCARBAMOL 500 MG: 500 TABLET ORAL at 02:05

## 2025-05-15 RX ADMIN — MORPHINE SULFATE 15 MG: 15 TABLET ORAL at 05:05

## 2025-05-15 RX ADMIN — METOPROLOL SUCCINATE 50 MG: 50 TABLET, EXTENDED RELEASE ORAL at 08:05

## 2025-05-15 RX ADMIN — MORPHINE SULFATE 4 MG: 4 INJECTION INTRAVENOUS at 01:05

## 2025-05-15 NOTE — ASSESSMENT & PLAN NOTE
- Patient with narrow complex tachycardia at rate of 216 bpm on presentation to [place] ED 5/10. She underwent electrical cardioversion to sinus rhythms   - Continue cardiac monitoring  - Toprol-XL 50 mg started per OSH cardiology recommendations.   - Cards follow up at discharge

## 2025-05-15 NOTE — PLAN OF CARE
05/15/25 1332   Medicare Message   Important Message from Medicare regarding Discharge Appeal Rights Given to patient/caregiver;Explained to patient/caregiver;Signed/date by patient/caregiver   Date IMM was signed 05/15/25   Time IMM was signed 1320       CORTEZ José, LMSW  [phone]  [facility]  Case Management

## 2025-05-15 NOTE — PLAN OF CARE
Pain medication provided around the clock as requested. Pain remains to be between 9-10, no relief w/ oral pain medication. Pain medication regimen adjusted by primary attending as oral pain medication had made pt very nauseated when she tried taking earlier. US Liver w/ doppler was done at end of day shift and pt was taken down to get scan done to rule out PVT.  Otherwise, continue w/ pain management.   Problem: Pain Acute  Goal: Optimal Pain Control and Function  Outcome: Progressing  Intervention: Develop Pain Management Plan  Flowsheets (Taken 5/14/2025 1900)  Pain Management Interventions:   around-the-clock dosing utilized   care clustered   medication offered   pain management plan reviewed with patient/caregiver   pillow support provided   quiet environment facilitated   relaxation techniques promoted   warm blanket provided  Intervention: Prevent or Manage Pain  Flowsheets (Taken 5/14/2025 1900)  Sleep/Rest Enhancement:   awakenings minimized   noise level reduced   regular sleep/rest pattern promoted   relaxation techniques promoted  Sensory Stimulation Regulation:   quiet environment promoted   care clustered   television on  Bowel Elimination Promotion:   ambulation promoted   commode/bedpan at bedside   privacy promoted  Complementary Therapy: other (see comments)  Medication Review/Management:   medications reviewed   high-risk medications identified  Intervention: Optimize Psychosocial Wellbeing  Flowsheets (Taken 5/14/2025 1900)  Spiritual Activities Assistance:   other (see comments)   hope instilled  Supportive Measures:   active listening utilized   goal-setting facilitated   positive reinforcement provided   relaxation techniques promoted   self-care encouraged   self-reflection promoted   self-responsibility promoted   verbalization of feelings encouraged  Diversional Activities:   smartphone   television     Problem: Nausea and Vomiting  Goal: Nausea and Vomiting Relief  Outcome:  Progressing  Intervention: Prevent and Manage Nausea and Vomiting  Flowsheets (Taken 5/14/2025 1900)  Nausea/Vomiting Interventions:   nausea triggers minimized   sips of clear liquids given   stimuli minimized  Fluid/Electrolyte Management: oral rehydration therapy initiated  Environmental Support:   calm environment promoted   rest periods encouraged   environmental consistency promoted  Oral Care:   oral rinse provided   teeth brushed

## 2025-05-15 NOTE — DISCHARGE SUMMARY
[facility] Surg  Hospital Medicine  Discharge Summary      Patient Name: Wilfredo Vazquez  MRN: [id number]  JENNY: [id number]  Patient Class: IP- Inpatient  Admission Date: 5/11/2025  Hospital Length of Stay: 6 days  Discharge Date and Time: 05/17/2025 6:50 AM  Attending Physician: Mayito Stout MD   Discharging Provider: Mayito Stout MD  Primary Care Provider: Marcio Calderon MD  Hospital Medicine Team: [facility] Mayito Stout MD  Primary Care Team: [facility]    HPI:   64 yo F with PMHx HTN, HLD, and HFpEF who presents to the ED as transfer from [facility] inpatient for abdominal pain and elevated LFTs after recent cholecystectomy 5/7. Pt. Presented to [facility] ED May 10 with palpitations and chest pain.  In the emergency department she had SVT with heart rate in the 200s.  She was cardioverted to a sinus rhythm in ED. CTA of the chest and CT abdomen and pelvis did not show acute pathology.  AST and ALT were elevated on presentation.  She was admitted to the ICU for the episode SVT and elevated troponin.  With borderline BP, rate-controlling agents were held.  She was seen by Cardiology with recommendations of adding beta blockade. On May 11 she developed severe abdominal pain with tense abdomen and high-pitched bowel sounds with rebound and guarding. On exam, she had abdominal tenderness and distention along with guarding and rebound.  CTA of the abdomen and pelvis did not show evidence of acute GI bleed or obvious abnormalities of the abdominal vasculature. Transfer center contacted [facility] at [facility] who recommended MRCP.  With the severe, persistent abdominal pain/abdominal distention despite narcotic medications, Hospital Medicine at [facility] spoke with General Surgery at [facility], recommendation was for transfer to [facility] for General Surgery and potentially [facility] evaluations.      Pt. Reports to me abdominal pain has improved  significantly. She was given one PO oxycodone in ED and has not required further medications since presentation.    * No surgery found *      Hospital Course:   General surgery and advanced endoscopy consulted.  MRCP and HIDA negative for any obstruction or bile leak.  No intervention per General surgery on advanced endoscopy.  Hepatology consulted and patients LFTs trended.  Patient with continued refractory pain requiring IV pain medications.  Ultrasound liver Doppler ordered.    5/15 PMHx HTN, HLD, and HFpEF who presents to the ED as transfer from [facility] inpatient for abdominal pain and elevated LFTs after recent cholecystectomy 5/7.  Elevated transaminases started on 5/10. No acute signs of blockage. s/p Gen surgery and [facility] eval -No obvious issues post-operatively on CT scan. MRCP and HIDA negative for any obstruction or bile leak. No evidence of post-operative complication. No surgical intervention. Due to acute elevation of transaminases and ALP and TB, possibly could be due to post surgical changes or ischemia injury from SVT episode. Less likely acute infection. No major CBD or pancreatic dilation so blockage less likely. Liver Doppler - Patent portal and hepatic vasculature.  No evidence for portal vein thrombus as clinically questioned. LFT trended down.  Discharge to [facility] for further management with hepatology f/u . reports 10/10 abdominal pain. required IV morphine x 2 doses today. continue oral morphine 15mg q4h prn. reports no relief with po morphine - requests IV morphine.   5/16 LFTs trended down. Lipase WNL. c/o 10/10 abdominal pain. IV morphine held overnight and received LR 500ml bolus for SBP 90s. Pain management consulted.  difficulty swallowing pills,  transitioned  to oxycodone 5mg q 4h prn PO liquid. continue Robaxin 500mg TID.  Abdominal X ray - Prominent aerated bowel throughout the abdomen. Overall nonspecific pattern. No definite severe distension or air-fluid levels  to suggest high-grade obstruction. Recommend continued follow-up. No obvious free intraperitoneal air. started simethicone   5/17 did not require IV opiates overnight. pain controlled on liquid oxycodone.  Discharge home today.      Goals of Care Treatment Preferences:  Code Status: Full Code      SDOH Screening:  The patient was screened for utility difficulties, food insecurity, transport difficulties, housing insecurity, and interpersonal safety and there were no concerns identified this admission.     Consults:   Consults (From admission, onward)          Status Ordering Provider     Inpatient consult to Pain Management  Once        Provider:  (Not yet assigned)    Acknowledged WENDI RODRIGUEZ     Inpatient consult to Hepatology  Once        Provider:  (Not yet assigned)    Completed ZOILA JOY     Inpatient consult to [facility] ([facility])  Once        Provider:  (Not yet assigned)    Completed ZOILA JOY     Inpatient consult to General surgery  Once        Provider:  (Not yet assigned)    Completed ELVIA SYED            Assessment & Plan  Acute postoperative pain of abdomen  Transaminitis  Hyperbilirubinemia  Presenting as outside hospital transfer with RUQ pain, elevated LFTs after recent cholecystectomy 5/7  - CT abd notes Mild fat stranding and fluid within the gallbladder fossa and borderline dilatation of the CBD without intrahepatic biliary dilatation, presumed within normal limits for recent post cholecystectomy  - MRCP unrevealing   - HIDA scan unrevealing  - General surgery consulted  -- No evidence of post-operative complication   -- No surgical intervention  -- Signed off  - Advanced endoscopy consulted  -- MRCP and HIDA negative for any obstruction or bile leak. Signed off.  - Hepatology consulted  -- Possibly could be due to post surgical changes or ischemia injury from pt's SVT episode. Less likely acute infection. No major CBD or pancreatic dilation so blockage  less likely.   - Trend LFTs  - Pain control  - Liver doppler ordered  - Plan to transfer back to [facility] for continued management given final consultant recommendations    Hypertension  - Continue lisinopril    SVT (supraventricular tachycardia)  - Patient with narrow complex tachycardia at rate of 216 bpm on presentation to [facility] ED 5/10. She underwent electrical cardioversion to sinus rhythms   - Continue cardiac monitoring  - Toprol-XL 50 mg started per OSH cardiology recommendations.   - Cards follow up at discharge    Pain management  5/16 LFTs trended down. Lipase WNL. c/o 10/10 abdominal pain. IV morphine held overnight and received LR 500ml bolus for SBP 90s. Pain management consulted.  5/17 did not require IV opiates overnight. pain controlled on liquid oxycodone.  Discharge home today.        Final Active Diagnoses:    Diagnosis Date Noted POA    PRINCIPAL PROBLEM:  Acute postoperative pain of abdomen [G89.18, R10.9] 01/24/2018 Yes    Hyperbilirubinemia [E80.6] 05/12/2025 Yes    Transaminitis [R74.01] 05/11/2025 Yes    SVT (supraventricular tachycardia) [I47.10] 05/10/2025 Yes    Pain management [R52] 02/09/2021 Yes    Hypertension [I10] 07/13/2018 Yes      Problems Resolved During this Admission:       Discharged Condition: fair    Disposition: Home or Self Carehome    Follow Up:    Patient Instructions:      Ambulatory referral/consult to Outpatient Case Management   Referral Priority: Routine Referral Type: Consultation   Referral Reason: Specialty Services Required   Number of Visits Requested: 1     Ambulatory referral/consult to Cardiology   Standing Status: Future   Referral Priority: Routine Referral Type: Consultation   Referral Reason: Specialty Services Required   Requested Specialty: Cardiology   Number of Visits Requested: 1     Ambulatory referral/consult to Hepatology   Standing Status: Future   Referral Priority: Routine Referral Type: Consultation   Referral Reason: Specialty  "Services Required   Number of Visits Requested: 1       Significant Diagnostic Studies: Labs: BMP:   Recent Labs   Lab 05/16/25  0455 05/17/25 0452    88    137   K 4.1 4.4    103   CO2 25 29   BUN 22 15   CREATININE 0.7 0.7   CALCIUM 8.5* 9.1   MG 1.8 2.0   , CMP   Recent Labs   Lab 05/16/25  0455 05/17/25  0452    137   K 4.1 4.4    103   CO2 25 29    88   BUN 22 15   CREATININE 0.7 0.7   CALCIUM 8.5* 9.1   PROT 5.8* 6.4   ALBUMIN 2.4* 2.6*   BILITOT 0.3 0.4   ALKPHOS 262* 228*   AST 75* 33   * 97*   ANIONGAP 6* 5*   , CBC   Recent Labs   Lab 05/16/25 0455 05/17/25 0452   WBC 4.84 6.03   HGB 10.0* 10.6*   HCT 31.1* 33.3*    255   , INR   Lab Results   Component Value Date    INR 0.9 05/22/2024    INR 1.0 06/12/2022    INR 0.9 04/21/2022   , Lipid Panel   Lab Results   Component Value Date    CHOL 156 04/08/2025    HDL 57 04/08/2025    LDLCALC 84.4 04/08/2025    TRIG 73 04/08/2025    CHOLHDL 36.5 04/08/2025   , Troponin   Recent Labs   Lab 05/10/25  1743 05/10/25  2328 05/11/25  0457   TROPONINI 0.151* 0.280* 0.164*   , A1C: No results for input(s): "HGBA1C" in the last 4320 hours., and All labs within the past 24 hours have been reviewed  Microbiology: Blood Culture   Lab Results   Component Value Date    LABBLOO No growth after 5 days. 02/15/2025   , Sputum Culture No results found for: "GSRESP", "RESPIRATORYC", and Urine Culture    Lab Results   Component Value Date    LABURIN  05/02/2022     Multiple organisms isolated. None in predominance.  Repeat if    LABURIN clinically necessary. 05/02/2022       X-Ray Abdomen AP 1 View  Narrative: EXAMINATION:  XR ABDOMEN AP 1 VIEW    CLINICAL HISTORY:  abd pain;    TECHNIQUE:  Single AP View of the abdomen was performed.    COMPARISON:  CTA 05/11/2025.    FINDINGS:  Prominent aerated bowel throughout the abdomen.  Overall nonspecific pattern.  No definite severe distension or air-fluid levels to suggest high-grade " obstruction.  Recommend continued follow-up.  No obvious free intraperitoneal air.  Impression: As above.    Electronically signed by: Bebeto Cooper  Date:    05/16/2025  Time:    10:23       Pending Diagnostic Studies:       Procedure Component Value Units Date/Time    HCV Virus Hold Specimen [[id number]] Collected: 05/11/25 1508    Order Status: Sent Lab Status: In process Updated: 05/11/25 1518    Specimen: Blood            Medications:  Reconciled Home Medications:      Medication List        START taking these medications      methocarbamoL 500 MG Tab  Commonly known as: Robaxin  Take 1 tablet (500 mg total) by mouth 3 (three) times daily. for 10 days     metoprolol succinate 50 MG 24 hr tablet  Commonly known as: TOPROL-XL  Take 1 tablet (50 mg total) by mouth once daily.     oxyCODONE 5 mg/5 mL Soln  Commonly known as: ROXICODONE  Take 5 mLs (5 mg total) by mouth every 4 (four) hours as needed.     polyethylene glycol 17 gram Pwpk  Commonly known as: MIRALAX  Take 17 g by mouth once daily. for 10 days     simethicone 80 MG chewable tablet  Commonly known as: MYLICON  Take 1 tablet (80 mg total) by mouth 3 (three) times daily as needed for Flatulence.            CONTINUE taking these medications      albuterol 90 mcg/actuation inhaler  Commonly known as: PROVENTIL/VENTOLIN HFA  inhale TWO puffs into THE lungs EVERY 4 HOURS AS NEEDED     ADALBERTO AEROSPHERE 160-9-4.8 mcg/actuation Hfaa  Generic drug: budesonide-glycopyr-formoterol  Two inhalations b.i.d.     cyproheptadine 4 mg tablet  Commonly known as: PERIACTIN  1 po q hs to increase weight     lisinopriL 20 MG tablet  Commonly known as: PRINIVIL,ZESTRIL  Take 1 tablet (20 mg total) by mouth once daily.     meclizine 25 mg tablet  Commonly known as: ANTIVERT  Take 1 tablet (25 mg total) by mouth 3 (three) times daily as needed for Dizziness.     pantoprazole 40 MG tablet  Commonly known as: PROTONIX  Take 1 tablet (40 mg total) by mouth once daily.      QUEtiapine 300 MG Tab  Commonly known as: SEROQUEL  Take 300 mg by mouth every evening.            STOP taking these medications      atorvastatin 40 MG tablet  Commonly known as: LIPITOR     HYDROcodone-acetaminophen 7.5-325 mg per tablet  Commonly known as: NORCO     traMADoL 50 mg tablet  Commonly known as: ULTRAM              Indwelling Lines/Drains at time of discharge:   Lines/Drains/Airways       None                45 minutes of time spent on discharge, including examining the patient, providing discharge instructions, arranging   follow up and documentation        Mayiot Stout MD  Attending Staff Physician  [facility]  pager- [phone] [phone] - [phone]

## 2025-05-15 NOTE — NURSING
Patient refuses to take po pain meds she said only IV meds. Tonight she has slept well and as of right now only received 1 dose of IV morphine

## 2025-05-15 NOTE — PROGRESS NOTES
[facility] Medicine  Progress Note    Patient Name: Wilfredo Vazquez  MRN: [id number]  Patient Class: IP- Inpatient   Admission Date: 5/11/2025  Length of Stay: 4 days  Attending Physician: Mayito Stout MD  Primary Care Provider: Marcio Calderon MD        Subjective     Principal Problem:Acute postoperative pain of abdomen        HPI:  66 yo F with PMHx HTN, HLD, and HFpEF who presents to the ED as transfer from [facility] inpatient for abdominal pain and elevated LFTs after recent cholecystectomy 5/7. Pt. Presented to [facility] ED May 10 with palpitations and chest pain.  In the emergency department she had SVT with heart rate in the 200s.  She was cardioverted to a sinus rhythm in ED. CTA of the chest and CT abdomen and pelvis did not show acute pathology.  AST and ALT were elevated on presentation.  She was admitted to the ICU for the episode SVT and elevated troponin.  With borderline BP, rate-controlling agents were held.  She was seen by Cardiology with recommendations of adding beta blockade. On May 11 she developed severe abdominal pain with tense abdomen and high-pitched bowel sounds with rebound and guarding. On exam, she had abdominal tenderness and distention along with guarding and rebound.  CTA of the abdomen and pelvis did not show evidence of acute GI bleed or obvious abnormalities of the abdominal vasculature. Transfer center contacted AES at [facility] who recommended MRCP.  With the severe, persistent abdominal pain/abdominal distention despite narcotic medications, Hospital Medicine at [facility] spoke with General Surgery at [facility], recommendation was for transfer to [facility] for General Surgery and potentially AES evaluations.      Pt. Reports to me abdominal pain has improved significantly. She was given one PO oxycodone in ED and has not required further medications since presentation.    Overview/Hospital  Course:  General surgery and advanced endoscopy consulted.  MRCP and HIDA negative for any obstruction or bile leak.  No intervention per General surgery on advanced endoscopy.  Hepatology consulted and patients LFTs trended.  Patient with continued refractory pain requiring IV pain medications.  Ultrasound liver Doppler ordered.    5/15 PMHx HTN, HLD, and HFpEF who presents to the ED as transfer from [facility] inpatient for abdominal pain and elevated LFTs after recent cholecystectomy 5/7.  Elevated transaminases started on 5/10. No acute signs of blockage. s/p Gen surgery and AES eval -No obvious issues post-operatively on CT scan. MRCP and HIDA negative for any obstruction or bile leak. No evidence of post-operative complication. No surgical intervention. Due to acute elevation of transaminases and ALP and TB, possibly could be due to post surgical changes or ischemia injury from SVT episode. Less likely acute infection. No major CBD or pancreatic dilation so blockage less likely. Liver Doppler - Patent portal and hepatic vasculature.  No evidence for portal vein thrombus as clinically questioned. LFT trended down.  Discharge to [facility] for further management with hepatology f/u . reports 10/10 abdominal pain. required IV morphine x 2 doses today. continue oral morphine 15mg q4h prn. reports no relief with po morphine - requests IV morphine.           Review of Systems:   Pain scale:8/10   Constitutional:  fever,  chills, headache, vision loss, hearing loss, weight loss, Generalized weakness, falls, loss of smell, loss of taste, poor appetite,  sore throat  Respiratory: cough, shortness of breath.   Cardiovascular: chest pain, dizziness, palpitations, orthopnea, swelling of feet, syncope  Gastrointestinal: nausea, vomiting, abdominal pain, diarrhea, black stool,  blood in stool, change in bowel habits, constipation  Genitourinary: hematuria, dysuria, urgency, frequency  Integument/Breast: rash,   "pruritis  Hematologic/Lymphatic: easy bruising, lymphadenopathy  Musculoskeletal: arthralgias , myalgias, back pain, neck pain, knee pain  Neurological: confusion, seizures, tremors, slurred speech  Behavioral/Psych:  depression, anxiety, auditory or visual hallucinations     OBJECTIVE:     Physical Exam:  Body mass index is 31.36 kg/m².    Constitutional: Appears well-developed and well-nourished. obesity   Head: Normocephalic and atraumatic.   Neck: Normal range of motion. Neck supple.   Cardiovascular: Normal heart rate.  Regular heart rhythm.  Pulmonary/Chest: Effort normal.   Abdominal: No distension.  Right sided abdominal tenderness  Musculoskeletal: Normal range of motion. No edema.   Neurological: Alert and oriented to person, place, and time.   Skin: Skin is warm and dry.   Psychiatric: Normal mood and affect. Behavior is normal.                  Vital Signs  Temp: 98.2 °F (36.8 °C) (05/15/25 1619)  Pulse: 64 (05/15/25 1619)  Resp: 18 (05/15/25 1736)  BP: 105/69 (05/15/25 1619)  SpO2: 96 % (05/15/25 1619)     24 Hour VS Range    Temp:  [97.5 °F (36.4 °C)-99 °F (37.2 °C)]   Pulse:  [64-88]   Resp:  [16-18]   BP: ()/(60-78)   SpO2:  [94 %-96 %]     Intake/Output Summary (Last 24 hours) at 5/15/2025 1842  Last data filed at 5/15/2025 0622  Gross per 24 hour   Intake 360 ml   Output --   Net 360 ml         I/O This Shift:  No intake/output data recorded.    Wt Readings from Last 3 Encounters:   05/14/25 80.3 kg (177 lb 0.5 oz)   05/10/25 80.4 kg (177 lb 4 oz)   05/07/25 80.4 kg (177 lb 5.8 oz)       I have personally reviewed the vitals and recorded Intake/Output     Laboratory/Diagnostic Data:    CBC/Anemia Labs: Coags:    Recent Labs   Lab 05/13/25  0553 05/14/25  0346 05/15/25  0643   WBC 7.86 6.87 7.22   HGB 11.6* 11.2* 11.4*   HCT 36.2* 35.3* 35.3*    241 242   MCV 89 90 90   RDW 14.8* 14.9* 15.1*    No results for input(s): "PT", "INR", "APTT" in the last 168 hours.     Chemistries: ABG: " "  Recent Labs   Lab 05/13/25  0553 05/14/25  0346 05/15/25  0643    138 139   K 4.3 4.0 4.8    106 105   CO2 26 23 27   BUN 11 20 19   CREATININE 0.7 0.9 0.7   CALCIUM 9.3 8.8 8.3*   PROT 6.6 6.3 6.4   BILITOT 1.7* 0.4 0.4   ALKPHOS 365* 312* 271*   * 208* 146*   * 64* 30   MG 1.8 1.9 1.9   PHOS 3.3 3.8 3.3    No results for input(s): "PH", "PCO2", "PO2", "HCO3", "POCSATURATED", "BE" in the last 168 hours.     POCT Glucose: HbA1c:    No results for input(s): "POCTGLUCOSE" in the last 168 hours. Hemoglobin A1C   Date Value Ref Range Status   11/14/2023 5.3 4.0 - 5.6 % Final     Comment:     ADA Screening Guidelines:  5.7-6.4%  Consistent with prediabetes  >or=6.5%  Consistent with diabetes    High levels of fetal hemoglobin interfere with the HbA1C  assay. Heterozygous hemoglobin variants (HbS, HgC, etc)do  not significantly interfere with this assay.   However, presence of multiple variants may affect accuracy.          Cardiac Enzymes: Ejection Fractions:    No results for input(s): "CPK", "CPKMB", "MB", "TROPONINI" in the last 72 hours. EF   Date Value Ref Range Status   04/08/2025 45 % Final   04/28/2022 55 % Final          No results for input(s): "COLORU", "APPEARANCEUA", "PHUR", "SPECGRAV", "PROTEINUA", "GLUCUA", "KETONESU", "BILIRUBINUA", "OCCULTUA", "NITRITE", "UROBILINOGEN", "LEUKOCYTESUR", "RBCUA", "WBCUA", "BACTERIA", "SQUAMEPITHEL", "HYALINECASTS" in the last 48 hours.    Invalid input(s): "WRIGHTSUR"    Lactic Acid Level (mmol/L)   Date Value   05/11/2025 0.9     Lactate (Lactic Acid) (mmol/L)   Date Value   07/01/2020 1.1   01/03/2018 1.6     BNP (pg/mL)   Date Value   05/10/2025 23   04/27/2025 <10   04/08/2025 40   02/15/2025 36   08/12/2024 <10   11/30/2023 16   09/21/2022 18   08/10/2022 25     CRP   Date Value   06/09/2022 53.8 mg/L (H)   07/01/2020 <0.50 mg/dL     Sed Rate (mm/Hr)   Date Value   11/14/2023 25 (H)   07/17/2018 41 (H)     D-Dimer (mg/L FEU)   Date Value "   02/15/2025 0.31   08/12/2024 1.06 (H)   05/03/2022 0.97 (H)   04/27/2022 0.78 (H)   07/01/2020 0.60 (H)   03/24/2018 0.41     Ferritin (ng/mL)   Date Value   07/01/2020 28     LD (U/L)   Date Value   07/01/2020 180     Troponin I (ng/mL)   Date Value   02/16/2025 0.049 (H)   02/15/2025 0.048 (H)   02/15/2025 0.079 (H)   08/12/2024 <0.006   11/30/2023 <0.006   11/14/2023 <0.006   09/21/2022 0.011   08/10/2022 <0.006     Troponin-I (ng/mL)   Date Value   05/11/2025 0.164 (H)   05/10/2025 0.280 (H)   05/10/2025 0.151 (H)   05/10/2025 0.028 (H)   04/08/2025 0.037 (H)   04/08/2025 0.039 (H)   04/08/2025 0.042 (H)   04/08/2025 0.058 (H)     CPK (U/L)   Date Value   04/27/2025 45   08/12/2024 37   07/01/2020 116   03/24/2018 74   03/24/2018 86   12/09/2017 140     CK (U/L)   Date Value   02/24/2024 36   06/12/2022 48     No results found for this or any previous visit.  SARS-CoV2 (COVID-19) Qualitative PCR (no units)   Date Value   04/23/2022 Not Detected   02/06/2021 Not Detected     SARS-CoV-2 RNA, Amplification, Qual (no units)   Date Value   07/01/2020 Negative     POC Rapid COVID (no units)   Date Value   02/15/2025 Negative   08/12/2024 Negative   08/12/2024 Negative   02/22/2024 Negative   02/12/2024 Negative   11/30/2023 Negative   02/06/2023 Negative   06/09/2022 Negative   05/05/2022 Negative   01/04/2022 Positive (A)       Microbiology labs for the last week  Microbiology Results (last 7 days)       ** No results found for the last 168 hours. **            Reviewed and noted in plan where applicable- Please see chart for full lab data.    Lines/Drains:       Peripheral IV - Single Lumen 05/14/25 1542 20 G Distal;Posterior;Right Forearm (Active)   Site Assessment Clean;Dry;Intact;No redness;No swelling 05/14/25 2000   Line Securement Device Secured with sutureless device 05/14/25 2000   Extremity Assessment Distal to IV No abnormal discoloration;No redness;No swelling;No warmth 05/14/25 2000   Line Status Blood  return noted 05/14/25 2000   Dressing Status Clean;Dry;Intact 05/14/25 2000   Dressing Intervention Integrity maintained 05/14/25 2000   Dressing Change Due 05/18/25 05/14/25 1542   Site Change Due 05/18/25 05/14/25 2000   Number of days: 0       Imaging      Results for orders placed during the hospital encounter of 04/08/25    Echo    Interpretation Summary    Left Ventricle: The left ventricle is normal in size. Ventricular mass is normal. Normal wall thickness. There is concentric remodeling. Mild global hypokinesis present. There is mildly reduced systolic function. Ejection fraction is approximately 45%. Grade I diastolic dysfunction. Elevated left ventricular filling pressure. Tissue Doppler velocity is reduced.    Right Ventricle: The right ventricle is normal in size. Systolic function is normal.    Mitral Valve: There is mild to moderate regurgitation with a centrally directed jet.    Pulmonary Artery: There is mild pulmonary hypertension. The estimated pulmonary artery systolic pressure is 44 mmHg.    IVC/SVC: Intermediate venous pressure at 8 mmHg.      US Liver with Doppler (XPD)  Narrative: EXAMINATION:  US LIVER WITH DOPPLER (XPD)    CLINICAL HISTORY:  rule out PVT;    TECHNIQUE:  Ultrasound of the liver (including pancreas, liver, gallbladder, common bile duct, spleen, IVC) with color and spectral evaluation was performed.    COMPARISON:  CTA abdomen pelvis 05/11/2025.    FINDINGS:  Liver: Upper normal size, measuring 17.4 cm. Homogeneous echotexture. The hepatorenal index is 1.1.  No focal hepatic lesions.    Gallbladder: The gallbladder is surgically absent.    Biliary system: The common duct measures 9 mm, likely within normal limits status post cholecystectomy.  No intrahepatic ductal dilatation.    Spleen: Normal in size measuring 8.1 x 2.5 cm, with a homogeneous echotexture.    Pancreas: The visualized portions of pancreas appear normal.  Pancreatic duct measures 3 mm.    Inferior vena cava:  Normal in appearance.    Miscellaneous: No ascites.    Main hepatic artery: Patent.    Main portal vein: Patent with proper directional flow.  9 mm caliber.    Left portal vein:  Patent with proper directional flow.    Right portal vein:  Patent with proper directional flow.    Splenic vein: Patent with proper directional flow.    SMV:  Patent with proper directional flow.    IVC: Patent    Left, middle, and right hepatic veins: Patent.    Umbilical vein: Not patent.    Collaterals: None.  Impression: Patent portal and hepatic vasculature.  No evidence for portal vein thrombus as clinically questioned.    Cholecystectomy.    Electronically signed by resident: Sergio Akbar  Date:    05/14/2025  Time:    18:46    Electronically signed by: Hi Greene MD  Date:    05/14/2025  Time:    19:30      Labs, Imaging, EKG and Diagnostic results from 5/15/2025 were reviewed.    Medications:  Medication list was reviewed and changes noted under Assessment/Plan.  Medications Ordered Prior to Encounter[1]  Scheduled Medications:  Current Facility-Administered Medications   Medication Dose Route Frequency    lisinopriL  20 mg Oral Daily    methocarbamoL  500 mg Oral TID    metoprolol succinate  50 mg Oral Daily    pantoprazole  40 mg Oral Daily    QUEtiapine  300 mg Oral QHS     PRN:   Current Facility-Administered Medications:     acetaminophen, 650 mg, Oral, Q4H PRN    dextrose 50%, 12.5 g, Intravenous, PRN    dextrose 50%, 25 g, Intravenous, PRN    glucagon (human recombinant), 1 mg, Intramuscular, PRN    glucose, 16 g, Oral, PRN    glucose, 24 g, Oral, PRN    melatonin, 6 mg, Oral, Nightly PRN    morphine, 4 mg, Intravenous, Q6H PRN    morphine, 15 mg, Oral, Q4H PRN    naloxone, 0.02 mg, Intravenous, PRN    ondansetron, 4 mg, Intravenous, Q8H PRN    prochlorperazine, 5 mg, Intravenous, Q6H PRN    sodium chloride 0.9%, 10 mL, Intravenous, PRN  Infusions:   Estimated Creatinine Clearance: 82.6 mL/min (based on SCr of 0.7  mg/dL).               Assessment & Plan  Acute postoperative pain of abdomen  Transaminitis  Hyperbilirubinemia  Presenting as outside hospital transfer with RUQ pain, elevated LFTs after recent cholecystectomy 5/7  - CT abd notes Mild fat stranding and fluid within the gallbladder fossa and borderline dilatation of the CBD without intrahepatic biliary dilatation, presumed within normal limits for recent post cholecystectomy  - MRCP unrevealing   - HIDA scan unrevealing  - General surgery consulted  -- No evidence of post-operative complication   -- No surgical intervention  -- Signed off  - Advanced endoscopy consulted  -- MRCP and HIDA negative for any obstruction or bile leak. Signed off.  - Hepatology consulted  -- Possibly could be due to post surgical changes or ischemia injury from pt's SVT episode. Less likely acute infection. No major CBD or pancreatic dilation so blockage less likely.   - Trend LFTs  - Pain control  - Liver doppler ordered  - Plan to transfer back to [facility] for continued management given final consultant recommendations  5/15 PMHx HTN, HLD, and HFpEF who presents to the ED as transfer from [facility] inpatient for abdominal pain and elevated LFTs after recent cholecystectomy 5/7.  Elevated transaminases started on 5/10. No acute signs of blockage. s/p Gen surgery and AES eval -No obvious issues post-operatively on CT scan. MRCP and HIDA negative for any obstruction or bile leak. No evidence of post-operative complication. No surgical intervention. Due to acute elevation of transaminases and ALP and TB, possibly could be due to post surgical changes or ischemia injury from SVT episode. Less likely acute infection. No major CBD or pancreatic dilation so blockage less likely. Liver Doppler - Patent portal and hepatic vasculature.  No evidence for portal vein thrombus as clinically questioned. LFT trended down.  Discharge to [facility] for further management with hepatology  f/u     Presenting as outside hospital transfer with RUQ pain, elevated LFTs after recent cholecystectomy 5/7  - CT abd notes Mild fat stranding and fluid within the gallbladder fossa and borderline dilatation of the CBD without intrahepatic biliary dilatation, presumed within normal limits for recent post cholecystectomy  - MRCP unrevealing   - HIDA scan unrevealing  - General surgery consulted  -- No evidence of post-operative complication   -- No surgical intervention  -- Signed off  - Advanced endoscopy consulted  -- MRCP and HIDA negative for any obstruction or bile leak. Signed off.  - Hepatology consulted  -- Possibly could be due to post surgical changes or ischemia injury from pt's SVT episode. Less likely acute infection. No major CBD or pancreatic dilation so blockage less likely.   - Trend LFTs  - Pain control  - Liver doppler ordered  - Plan to transfer back to [facility] for continued management given final consultant recommendations        Presenting as outside hospital transfer with RUQ pain, elevated LFTs after recent cholecystectomy 5/7  - CT abd notes Mild fat stranding and fluid within the gallbladder fossa and borderline dilatation of the CBD without intrahepatic biliary dilatation, presumed within normal limits for recent post cholecystectomy  - MRCP unrevealing   - HIDA scan unrevealing  - General surgery consulted  -- No evidence of post-operative complication   -- No surgical intervention  -- Signed off  - Advanced endoscopy consulted  -- MRCP and HIDA negative for any obstruction or bile leak. Signed off.  - Hepatology consulted  -- Possibly could be due to post surgical changes or ischemia injury from pt's SVT episode. Less likely acute infection. No major CBD or pancreatic dilation so blockage less likely.   - Trend LFTs  - Pain control  - Liver doppler ordered  - Plan to transfer back to [facility] for continued management given final consultant recommendations    5/15 PMHx HTN,  HLD, and HFpEF who presents to the ED as transfer from [facility] inpatient for abdominal pain and elevated LFTs after recent cholecystectomy 5/7.  Elevated transaminases started on 5/10. No acute signs of blockage. s/p Gen surgery and AES eval -No obvious issues post-operatively on CT scan. MRCP and HIDA negative for any obstruction or bile leak. No evidence of post-operative complication. No surgical intervention. Due to acute elevation of transaminases and ALP and TB, possibly could be due to post surgical changes or ischemia injury from SVT episode. Less likely acute infection. No major CBD or pancreatic dilation so blockage less likely. Liver Doppler - Patent portal and hepatic vasculature.  No evidence for portal vein thrombus as clinically questioned. LFT trended down.   required IV morphine x 2 doses today. continue oral morphine 15mg q4h prnDischarge to [facility] for further management with hepatology f/u     Hypertension  - Continue lisinopril    SVT (supraventricular tachycardia)  - Patient with narrow complex tachycardia at rate of 216 bpm on presentation to [facility] ED 5/10. She underwent electrical cardioversion to sinus rhythms   - Continue cardiac monitoring  - Toprol-XL 50 mg started per OSH cardiology recommendations.   - Cards follow up at discharge      VTE Risk Mitigation (From admission, onward)           Ordered     IP VTE HIGH RISK PATIENT  Once         05/11/25 2021     Place sequential compression device  Until discontinued         05/11/25 2021                    Discharge Planning   NETTIE: 5/17/2025     Code Status: Full Code   Medical Readiness for Discharge Date:   Discharge Plan A: Home with family   Discharge Delays: None known at this time                    Mayito Stout MD  Department of Hospital Medicine   [facility] - Med Surg         [1]   Current Facility-Administered Medications on File Prior to Encounter   Medication Dose Route Frequency Provider Last Rate Last  Admin    lactated ringers infusion   Intravenous Continuous Sim Quinteros MD 0 mL/hr at 12/14/21 0846 New Bag at 06/07/22 1151    lactated ringers infusion   Intravenous Continuous Sim Quinteros MD        sodium chloride 0.9% flush 10 mL  10 mL Intravenous PRN Sim Quinteros MD        sodium chloride 0.9% flush 10 mL  10 mL Intravenous PRN Sim Quinteros MD        sodium chloride 0.9% flush 10 mL  10 mL Intravenous PRN Sim Quinteros MD         Current Outpatient Medications on File Prior to Encounter   Medication Sig Dispense Refill    albuterol (PROVENTIL/VENTOLIN HFA) 90 mcg/actuation inhaler inhale TWO puffs into THE lungs EVERY 4 HOURS AS NEEDED 25.5 g 3    atorvastatin (LIPITOR) 40 MG tablet Take 1 tablet (40 mg total) by mouth once daily. 90 tablet 0    BREZTRI AEROSPHERE 160-9-4.8 mcg/actuation HFAA Two inhalations b.i.d. 10.7 g 5    cyproheptadine (PERIACTIN) 4 mg tablet 1 po q hs to increase weight 30 tablet 5    HYDROcodone-acetaminophen (NORCO) 7.5-325 mg per tablet Take 1 tablet by mouth every 6 (six) hours as needed for Pain. 20 tablet 0    lisinopriL (PRINIVIL,ZESTRIL) 20 MG tablet Take 1 tablet (20 mg total) by mouth once daily. 100 tablet 3    meclizine (ANTIVERT) 25 mg tablet Take 1 tablet (25 mg total) by mouth 3 (three) times daily as needed for Dizziness. 60 tablet 0    pantoprazole (PROTONIX) 40 MG tablet Take 1 tablet (40 mg total) by mouth once daily. 90 tablet 0    QUEtiapine (SEROQUEL) 300 MG Tab Take 300 mg by mouth every evening.      traMADoL (ULTRAM) 50 mg tablet Take 1 tablet (50 mg total) by mouth every 4 (four) hours as needed for Pain. 28 each 0

## 2025-05-15 NOTE — ASSESSMENT & PLAN NOTE
Presenting as outside hospital transfer with RUQ pain, elevated LFTs after recent cholecystectomy 5/7  - CT abd notes Mild fat stranding and fluid within the gallbladder fossa and borderline dilatation of the CBD without intrahepatic biliary dilatation, presumed within normal limits for recent post cholecystectomy  - MRCP unrevealing   - HIDA scan unrevealing  - General surgery consulted  -- No evidence of post-operative complication   -- No surgical intervention  -- Signed off  - Advanced endoscopy consulted  -- MRCP and HIDA negative for any obstruction or bile leak. Signed off.  - Hepatology consulted  -- Possibly could be due to post surgical changes or ischemia injury from pt's SVT episode. Less likely acute infection. No major CBD or pancreatic dilation so blockage less likely.   - Trend LFTs  - Pain control  - Liver doppler ordered  - Plan to transfer back to [facility] for continued management given final consultant recommendations  5/15 PMHx HTN, HLD, and HFpEF who presents to the ED as transfer from [facility] inpatient for abdominal pain and elevated LFTs after recent cholecystectomy 5/7.  Elevated transaminases started on 5/10. No acute signs of blockage. s/p Gen surgery and AES eval -No obvious issues post-operatively on CT scan. MRCP and HIDA negative for any obstruction or bile leak. No evidence of post-operative complication. No surgical intervention. Due to acute elevation of transaminases and ALP and TB, possibly could be due to post surgical changes or ischemia injury from SVT episode. Less likely acute infection. No major CBD or pancreatic dilation so blockage less likely. Liver Doppler - Patent portal and hepatic vasculature.  No evidence for portal vein thrombus as clinically questioned. LFT trended down.  Discharge to [facility] for further management with hepatology f/u     Presenting as outside hospital transfer with RUQ pain, elevated LFTs after recent cholecystectomy 5/7  -  CT abd notes Mild fat stranding and fluid within the gallbladder fossa and borderline dilatation of the CBD without intrahepatic biliary dilatation, presumed within normal limits for recent post cholecystectomy  - MRCP unrevealing   - HIDA scan unrevealing  - General surgery consulted  -- No evidence of post-operative complication   -- No surgical intervention  -- Signed off  - Advanced endoscopy consulted  -- MRCP and HIDA negative for any obstruction or bile leak. Signed off.  - Hepatology consulted  -- Possibly could be due to post surgical changes or ischemia injury from pt's SVT episode. Less likely acute infection. No major CBD or pancreatic dilation so blockage less likely.   - Trend LFTs  - Pain control  - Liver doppler ordered  - Plan to transfer back to [facility] for continued management given final consultant recommendations        Presenting as outside hospital transfer with RUQ pain, elevated LFTs after recent cholecystectomy 5/7  - CT abd notes Mild fat stranding and fluid within the gallbladder fossa and borderline dilatation of the CBD without intrahepatic biliary dilatation, presumed within normal limits for recent post cholecystectomy  - MRCP unrevealing   - HIDA scan unrevealing  - General surgery consulted  -- No evidence of post-operative complication   -- No surgical intervention  -- Signed off  - Advanced endoscopy consulted  -- MRCP and HIDA negative for any obstruction or bile leak. Signed off.  - Hepatology consulted  -- Possibly could be due to post surgical changes or ischemia injury from pt's SVT episode. Less likely acute infection. No major CBD or pancreatic dilation so blockage less likely.   - Trend LFTs  - Pain control  - Liver doppler ordered  - Plan to transfer back to [facility] for continued management given final consultant recommendations    5/15 PMHx HTN, HLD, and HFpEF who presents to the ED as transfer from [facility] inpatient for abdominal pain and elevated  LFTs after recent cholecystectomy 5/7.  Elevated transaminases started on 5/10. No acute signs of blockage. s/p Gen surgery and AES eval -No obvious issues post-operatively on CT scan. MRCP and HIDA negative for any obstruction or bile leak. No evidence of post-operative complication. No surgical intervention. Due to acute elevation of transaminases and ALP and TB, possibly could be due to post surgical changes or ischemia injury from SVT episode. Less likely acute infection. No major CBD or pancreatic dilation so blockage less likely. Liver Doppler - Patent portal and hepatic vasculature.  No evidence for portal vein thrombus as clinically questioned. LFT trended down.   required IV morphine x 2 doses today. continue oral morphine 15mg q4h prnDischarge to [facility] for further management with hepatology f/u

## 2025-05-15 NOTE — ASSESSMENT & PLAN NOTE
- Patient with narrow complex tachycardia at rate of 216 bpm on presentation to [place] ED 5/10. She underwent electrical cardioversion to sinus rhythms   - Continue cardiac monitoring  - Toprol-XL 50 mg started per OSH cardiology recommendations.   - Cards follow up at discharge

## 2025-05-15 NOTE — ASSESSMENT & PLAN NOTE
Presenting as outside hospital transfer with RUQ pain, elevated LFTs after recent cholecystectomy 5/7  - CT abd notes Mild fat stranding and fluid within the gallbladder fossa and borderline dilatation of the CBD without intrahepatic biliary dilatation, presumed within normal limits for recent post cholecystectomy  - MRCP unrevealing   - HIDA scan unrevealing  - General surgery consulted  -- No evidence of post-operative complication   -- No surgical intervention  -- Signed off  - Advanced endoscopy consulted  -- MRCP and HIDA negative for any obstruction or bile leak. Signed off.  - Hepatology consulted  -- Possibly could be due to post surgical changes or ischemia injury from pt's SVT episode. Less likely acute infection. No major CBD or pancreatic dilation so blockage less likely.   - Trend LFTs  - Pain control  - Liver doppler ordered  - Plan to transfer back to [facility] for continued management given final consultant recommendations  5/15 PMHx HTN, HLD, and HFpEF who presents to the ED as transfer from [facility] inpatient for abdominal pain and elevated LFTs after recent cholecystectomy 5/7.  Elevated transaminases started on 5/10. No acute signs of blockage. s/p Gen surgery and AES eval -No obvious issues post-operatively on CT scan. MRCP and HIDA negative for any obstruction or bile leak. No evidence of post-operative complication. No surgical intervention. Due to acute elevation of transaminases and ALP and TB, possibly could be due to post surgical changes or ischemia injury from SVT episode. Less likely acute infection. No major CBD or pancreatic dilation so blockage less likely. Liver Doppler - Patent portal and hepatic vasculature.  No evidence for portal vein thrombus as clinically questioned. LFT trended down.  Discharge to [facility] for further management with hepatology f/u     Presenting as outside hospital transfer with RUQ pain, elevated LFTs after recent cholecystectomy 5/7  -  CT abd notes Mild fat stranding and fluid within the gallbladder fossa and borderline dilatation of the CBD without intrahepatic biliary dilatation, presumed within normal limits for recent post cholecystectomy  - MRCP unrevealing   - HIDA scan unrevealing  - General surgery consulted  -- No evidence of post-operative complication   -- No surgical intervention  -- Signed off  - Advanced endoscopy consulted  -- MRCP and HIDA negative for any obstruction or bile leak. Signed off.  - Hepatology consulted  -- Possibly could be due to post surgical changes or ischemia injury from pt's SVT episode. Less likely acute infection. No major CBD or pancreatic dilation so blockage less likely.   - Trend LFTs  - Pain control  - Liver doppler ordered  - Plan to transfer back to [facility] for continued management given final consultant recommendations        Presenting as outside hospital transfer with RUQ pain, elevated LFTs after recent cholecystectomy 5/7  - CT abd notes Mild fat stranding and fluid within the gallbladder fossa and borderline dilatation of the CBD without intrahepatic biliary dilatation, presumed within normal limits for recent post cholecystectomy  - MRCP unrevealing   - HIDA scan unrevealing  - General surgery consulted  -- No evidence of post-operative complication   -- No surgical intervention  -- Signed off  - Advanced endoscopy consulted  -- MRCP and HIDA negative for any obstruction or bile leak. Signed off.  - Hepatology consulted  -- Possibly could be due to post surgical changes or ischemia injury from pt's SVT episode. Less likely acute infection. No major CBD or pancreatic dilation so blockage less likely.   - Trend LFTs  - Pain control  - Liver doppler ordered  - Plan to transfer back to [facility] for continued management given final consultant recommendations    5/15 PMHx HTN, HLD, and HFpEF who presents to the ED as transfer from [facility] inpatient for abdominal pain and elevated  LFTs after recent cholecystectomy 5/7.  Elevated transaminases started on 5/10. No acute signs of blockage. s/p Gen surgery and AES eval -No obvious issues post-operatively on CT scan. MRCP and HIDA negative for any obstruction or bile leak. No evidence of post-operative complication. No surgical intervention. Due to acute elevation of transaminases and ALP and TB, possibly could be due to post surgical changes or ischemia injury from SVT episode. Less likely acute infection. No major CBD or pancreatic dilation so blockage less likely. Liver Doppler - Patent portal and hepatic vasculature.  No evidence for portal vein thrombus as clinically questioned. LFT trended down.   required IV morphine x 2 doses today. continue oral morphine 15mg q4h prnDischarge to [facility] for further management with hepatology f/u

## 2025-05-15 NOTE — ASSESSMENT & PLAN NOTE
Presenting as outside hospital transfer with RUQ pain, elevated LFTs after recent cholecystectomy 5/7  - CT abd notes Mild fat stranding and fluid within the gallbladder fossa and borderline dilatation of the CBD without intrahepatic biliary dilatation, presumed within normal limits for recent post cholecystectomy  - MRCP unrevealing   - HIDA scan unrevealing  - General surgery consulted  -- No evidence of post-operative complication   -- No surgical intervention  -- Signed off  - Advanced endoscopy consulted  -- MRCP and HIDA negative for any obstruction or bile leak. Signed off.  - Hepatology consulted  -- Possibly could be due to post surgical changes or ischemia injury from pt's SVT episode. Less likely acute infection. No major CBD or pancreatic dilation so blockage less likely.   - Trend LFTs  - Pain control  - Liver doppler ordered  - Plan to transfer back to [facility] for continued management given final consultant recommendations  5/15 PMHx HTN, HLD, and HFpEF who presents to the ED as transfer from [facility] inpatient for abdominal pain and elevated LFTs after recent cholecystectomy 5/7.  Elevated transaminases started on 5/10. No acute signs of blockage. s/p Gen surgery and AES eval -No obvious issues post-operatively on CT scan. MRCP and HIDA negative for any obstruction or bile leak. No evidence of post-operative complication. No surgical intervention. Due to acute elevation of transaminases and ALP and TB, possibly could be due to post surgical changes or ischemia injury from SVT episode. Less likely acute infection. No major CBD or pancreatic dilation so blockage less likely. Liver Doppler - Patent portal and hepatic vasculature.  No evidence for portal vein thrombus as clinically questioned. LFT trended down.  Discharge to [facility] for further management with hepatology f/u     Presenting as outside hospital transfer with RUQ pain, elevated LFTs after recent cholecystectomy 5/7  -  CT abd notes Mild fat stranding and fluid within the gallbladder fossa and borderline dilatation of the CBD without intrahepatic biliary dilatation, presumed within normal limits for recent post cholecystectomy  - MRCP unrevealing   - HIDA scan unrevealing  - General surgery consulted  -- No evidence of post-operative complication   -- No surgical intervention  -- Signed off  - Advanced endoscopy consulted  -- MRCP and HIDA negative for any obstruction or bile leak. Signed off.  - Hepatology consulted  -- Possibly could be due to post surgical changes or ischemia injury from pt's SVT episode. Less likely acute infection. No major CBD or pancreatic dilation so blockage less likely.   - Trend LFTs  - Pain control  - Liver doppler ordered  - Plan to transfer back to [facility] for continued management given final consultant recommendations        Presenting as outside hospital transfer with RUQ pain, elevated LFTs after recent cholecystectomy 5/7  - CT abd notes Mild fat stranding and fluid within the gallbladder fossa and borderline dilatation of the CBD without intrahepatic biliary dilatation, presumed within normal limits for recent post cholecystectomy  - MRCP unrevealing   - HIDA scan unrevealing  - General surgery consulted  -- No evidence of post-operative complication   -- No surgical intervention  -- Signed off  - Advanced endoscopy consulted  -- MRCP and HIDA negative for any obstruction or bile leak. Signed off.  - Hepatology consulted  -- Possibly could be due to post surgical changes or ischemia injury from pt's SVT episode. Less likely acute infection. No major CBD or pancreatic dilation so blockage less likely.   - Trend LFTs  - Pain control  - Liver doppler ordered  - Plan to transfer back to [facility] for continued management given final consultant recommendations    5/15 PMHx HTN, HLD, and HFpEF who presents to the ED as transfer from [facility] inpatient for abdominal pain and elevated  LFTs after recent cholecystectomy 5/7.  Elevated transaminases started on 5/10. No acute signs of blockage. s/p Gen surgery and AES eval -No obvious issues post-operatively on CT scan. MRCP and HIDA negative for any obstruction or bile leak. No evidence of post-operative complication. No surgical intervention. Due to acute elevation of transaminases and ALP and TB, possibly could be due to post surgical changes or ischemia injury from SVT episode. Less likely acute infection. No major CBD or pancreatic dilation so blockage less likely. Liver Doppler - Patent portal and hepatic vasculature.  No evidence for portal vein thrombus as clinically questioned. LFT trended down.   required IV morphine x 2 doses today. continue oral morphine 15mg q4h prnDischarge to [facility] for further management with hepatology f/u

## 2025-05-15 NOTE — PLAN OF CARE
Problem: Adult Inpatient Plan of Care  Goal: Plan of Care Review  Outcome: Not Progressing  Goal: Patient-Specific Goal (Individualized)  Outcome: Not Progressing  Goal: Absence of Hospital-Acquired Illness or Injury  Outcome: Not Progressing  Goal: Optimal Comfort and Wellbeing  Outcome: Not Progressing  Goal: Readiness for Transition of Care  Outcome: Not Progressing     Problem: Wound  Goal: Optimal Coping  Outcome: Not Progressing  Goal: Optimal Functional Ability  Outcome: Not Progressing  Goal: Absence of Infection Signs and Symptoms  Outcome: Not Progressing  Goal: Improved Oral Intake  Outcome: Not Progressing  Goal: Optimal Pain Control and Function  Outcome: Not Progressing  Goal: Skin Health and Integrity  Outcome: Not Progressing  Goal: Optimal Wound Healing  Outcome: Not Progressing     Problem: Pain Acute  Goal: Optimal Pain Control and Function  Outcome: Not Progressing     Problem: Nausea and Vomiting  Goal: Nausea and Vomiting Relief  Outcome: Not Progressing

## 2025-05-16 ENCOUNTER — TELEPHONE (OUTPATIENT)
Dept: CARDIOLOGY | Facility: CLINIC | Age: 65
End: 2025-05-16
Payer: MEDICARE

## 2025-05-16 ENCOUNTER — ANESTHESIA (OUTPATIENT)
Dept: MEDSURG UNIT | Facility: HOSPITAL | Age: 65
DRG: 948 | End: 2025-05-16
Payer: MEDICARE

## 2025-05-16 ENCOUNTER — ANESTHESIA EVENT (OUTPATIENT)
Dept: MEDSURG UNIT | Facility: HOSPITAL | Age: 65
DRG: 948 | End: 2025-05-16
Payer: MEDICARE

## 2025-05-16 PROBLEM — R52 PAIN MANAGEMENT: Status: ACTIVE | Noted: 2021-02-09

## 2025-05-16 LAB
ABSOLUTE EOSINOPHIL (OHS): 0.12 K/UL
ABSOLUTE MONOCYTE (OHS): 0.63 K/UL (ref 0.3–1)
ABSOLUTE NEUTROPHIL COUNT (OHS): 2.48 K/UL (ref 1.8–7.7)
ALBUMIN SERPL BCP-MCNC: 2.4 G/DL (ref 3.5–5.2)
ALP SERPL-CCNC: 262 UNIT/L (ref 40–150)
ALT SERPL W/O P-5'-P-CCNC: 126 UNIT/L (ref 10–44)
ANION GAP (OHS): 6 MMOL/L (ref 8–16)
AST SERPL-CCNC: 75 UNIT/L (ref 11–45)
BASOPHILS # BLD AUTO: 0.02 K/UL
BASOPHILS NFR BLD AUTO: 0.4 %
BILIRUB SERPL-MCNC: 0.3 MG/DL (ref 0.1–1)
BUN SERPL-MCNC: 22 MG/DL (ref 8–23)
CALCIUM SERPL-MCNC: 8.5 MG/DL (ref 8.7–10.5)
CHLORIDE SERPL-SCNC: 106 MMOL/L (ref 95–110)
CO2 SERPL-SCNC: 25 MMOL/L (ref 23–29)
CREAT SERPL-MCNC: 0.7 MG/DL (ref 0.5–1.4)
ERYTHROCYTE [DISTWIDTH] IN BLOOD BY AUTOMATED COUNT: 15.1 % (ref 11.5–14.5)
GFR SERPLBLD CREATININE-BSD FMLA CKD-EPI: >60 ML/MIN/1.73/M2
GLUCOSE SERPL-MCNC: 104 MG/DL (ref 70–110)
HCT VFR BLD AUTO: 31.1 % (ref 37–48.5)
HGB BLD-MCNC: 10 GM/DL (ref 12–16)
IMM GRANULOCYTES # BLD AUTO: 0.01 K/UL (ref 0–0.04)
IMM GRANULOCYTES NFR BLD AUTO: 0.2 % (ref 0–0.5)
LIPASE SERPL-CCNC: 23 U/L (ref 4–60)
LYMPHOCYTES # BLD AUTO: 1.58 K/UL (ref 1–4.8)
MAGNESIUM SERPL-MCNC: 1.8 MG/DL (ref 1.6–2.6)
MCH RBC QN AUTO: 29.3 PG (ref 27–31)
MCHC RBC AUTO-ENTMCNC: 32.2 G/DL (ref 32–36)
MCV RBC AUTO: 91 FL (ref 82–98)
NUCLEATED RBC (/100WBC) (OHS): 0 /100 WBC
PHOSPHATE SERPL-MCNC: 3.5 MG/DL (ref 2.7–4.5)
PLATELET # BLD AUTO: 223 K/UL (ref 150–450)
PMV BLD AUTO: 10 FL (ref 9.2–12.9)
POTASSIUM SERPL-SCNC: 4.1 MMOL/L (ref 3.5–5.1)
PROT SERPL-MCNC: 5.8 GM/DL (ref 6–8.4)
RBC # BLD AUTO: 3.41 M/UL (ref 4–5.4)
RELATIVE EOSINOPHIL (OHS): 2.5 %
RELATIVE LYMPHOCYTE (OHS): 32.6 % (ref 18–48)
RELATIVE MONOCYTE (OHS): 13 % (ref 4–15)
RELATIVE NEUTROPHIL (OHS): 51.3 % (ref 38–73)
SODIUM SERPL-SCNC: 137 MMOL/L (ref 136–145)
WBC # BLD AUTO: 4.84 K/UL (ref 3.9–12.7)

## 2025-05-16 PROCEDURE — 83735 ASSAY OF MAGNESIUM: CPT | Mod: HCNC | Performed by: STUDENT IN AN ORGANIZED HEALTH CARE EDUCATION/TRAINING PROGRAM

## 2025-05-16 PROCEDURE — 63600175 PHARM REV CODE 636 W HCPCS: Mod: HCNC | Performed by: HOSPITALIST

## 2025-05-16 PROCEDURE — 99232 SBSQ HOSP IP/OBS MODERATE 35: CPT | Mod: HCNC,,, | Performed by: STUDENT IN AN ORGANIZED HEALTH CARE EDUCATION/TRAINING PROGRAM

## 2025-05-16 PROCEDURE — 83690 ASSAY OF LIPASE: CPT | Mod: HCNC | Performed by: HOSPITALIST

## 2025-05-16 PROCEDURE — 63600175 PHARM REV CODE 636 W HCPCS: Mod: HCNC | Performed by: INTERNAL MEDICINE

## 2025-05-16 PROCEDURE — 25000242 PHARM REV CODE 250 ALT 637 W/ HCPCS: Mod: HCNC

## 2025-05-16 PROCEDURE — 25000003 PHARM REV CODE 250: Mod: HCNC | Performed by: STUDENT IN AN ORGANIZED HEALTH CARE EDUCATION/TRAINING PROGRAM

## 2025-05-16 PROCEDURE — 84100 ASSAY OF PHOSPHORUS: CPT | Mod: HCNC | Performed by: STUDENT IN AN ORGANIZED HEALTH CARE EDUCATION/TRAINING PROGRAM

## 2025-05-16 PROCEDURE — 36415 COLL VENOUS BLD VENIPUNCTURE: CPT | Mod: HCNC | Performed by: STUDENT IN AN ORGANIZED HEALTH CARE EDUCATION/TRAINING PROGRAM

## 2025-05-16 PROCEDURE — 25000003 PHARM REV CODE 250: Mod: HCNC | Performed by: HOSPITALIST

## 2025-05-16 PROCEDURE — 85025 COMPLETE CBC W/AUTO DIFF WBC: CPT | Mod: HCNC | Performed by: STUDENT IN AN ORGANIZED HEALTH CARE EDUCATION/TRAINING PROGRAM

## 2025-05-16 PROCEDURE — 80053 COMPREHEN METABOLIC PANEL: CPT | Mod: HCNC | Performed by: STUDENT IN AN ORGANIZED HEALTH CARE EDUCATION/TRAINING PROGRAM

## 2025-05-16 PROCEDURE — 11000001 HC ACUTE MED/SURG PRIVATE ROOM: Mod: HCNC

## 2025-05-16 RX ORDER — SIMETHICONE 80 MG
1 TABLET,CHEWABLE ORAL 3 TIMES DAILY PRN
Status: DISCONTINUED | OUTPATIENT
Start: 2025-05-16 | End: 2025-05-17 | Stop reason: HOSPADM

## 2025-05-16 RX ORDER — OXYCODONE HCL 5 MG/5 ML
5 SOLUTION, ORAL ORAL EVERY 4 HOURS PRN
Refills: 0 | Status: DISCONTINUED | OUTPATIENT
Start: 2025-05-16 | End: 2025-05-17 | Stop reason: HOSPADM

## 2025-05-16 RX ORDER — MORPHINE SULFATE 4 MG/ML
4 INJECTION, SOLUTION INTRAMUSCULAR; INTRAVENOUS EVERY 6 HOURS PRN
Status: DISCONTINUED | OUTPATIENT
Start: 2025-05-16 | End: 2025-05-16

## 2025-05-16 RX ORDER — MORPHINE SULFATE 15 MG/1
15 TABLET ORAL EVERY 4 HOURS PRN
Refills: 0 | Status: DISCONTINUED | OUTPATIENT
Start: 2025-05-16 | End: 2025-05-16

## 2025-05-16 RX ADMIN — ONDANSETRON 4 MG: 2 INJECTION INTRAMUSCULAR; INTRAVENOUS at 03:05

## 2025-05-16 RX ADMIN — LISINOPRIL 20 MG: 20 TABLET ORAL at 08:05

## 2025-05-16 RX ADMIN — ONDANSETRON 4 MG: 2 INJECTION INTRAMUSCULAR; INTRAVENOUS at 08:05

## 2025-05-16 RX ADMIN — Medication 6 MG: at 09:05

## 2025-05-16 RX ADMIN — MORPHINE SULFATE 4 MG: 4 INJECTION INTRAVENOUS at 08:05

## 2025-05-16 RX ADMIN — METOPROLOL SUCCINATE 50 MG: 50 TABLET, EXTENDED RELEASE ORAL at 08:05

## 2025-05-16 RX ADMIN — OXYCODONE HYDROCHLORIDE 5 MG: 5 SOLUTION ORAL at 09:05

## 2025-05-16 RX ADMIN — PROCHLORPERAZINE EDISYLATE 5 MG: 5 INJECTION INTRAMUSCULAR; INTRAVENOUS at 09:05

## 2025-05-16 RX ADMIN — METHOCARBAMOL 500 MG: 500 TABLET ORAL at 08:05

## 2025-05-16 RX ADMIN — PANTOPRAZOLE SODIUM 40 MG: 40 TABLET, DELAYED RELEASE ORAL at 08:05

## 2025-05-16 RX ADMIN — SODIUM CHLORIDE, POTASSIUM CHLORIDE, SODIUM LACTATE AND CALCIUM CHLORIDE 500 ML: 600; 310; 30; 20 INJECTION, SOLUTION INTRAVENOUS at 03:05

## 2025-05-16 RX ADMIN — QUETIAPINE FUMARATE 300 MG: 300 TABLET ORAL at 09:05

## 2025-05-16 RX ADMIN — OXYCODONE HYDROCHLORIDE 5 MG: 5 SOLUTION ORAL at 03:05

## 2025-05-16 RX ADMIN — METHOCARBAMOL 500 MG: 500 TABLET ORAL at 09:05

## 2025-05-16 RX ADMIN — METHOCARBAMOL 500 MG: 500 TABLET ORAL at 03:05

## 2025-05-16 NOTE — ASSESSMENT & PLAN NOTE
- Patient with narrow complex tachycardia at rate of 216 bpm on presentation to [place] ED 5/10. She underwent electrical cardioversion to sinus rhythms   - Continue cardiac monitoring  - Toprol-XL 50 mg started per OSH cardiology recommendations.   - Cards follow up at discharge

## 2025-05-16 NOTE — PLAN OF CARE
Pt continues to complain of a 10/10 pain throughout day especially after each meal. Pt attempted to try oral pain medication this afternoon at end of day shift but did not find any relief. Attending added morphine IV back for pt since this seems to be the only medication that brings some relief to pt's pain.   Problem: Pain Acute  Goal: Optimal Pain Control and Function  Outcome: Progressing  Intervention: Develop Pain Management Plan  Flowsheets (Taken 5/15/2025 1900)  Pain Management Interventions:   around-the-clock dosing utilized   medication offered   pain management plan reviewed with patient/caregiver   pillow support provided   relaxation techniques promoted   quiet environment facilitated   warm blanket provided  Intervention: Prevent or Manage Pain  Flowsheets (Taken 5/15/2025 1900)  Sleep/Rest Enhancement:   awakenings minimized   consistent schedule promoted   family presence promoted   noise level reduced   regular sleep/rest pattern promoted   relaxation techniques promoted  Sensory Stimulation Regulation:   care clustered   quiet environment promoted   television on  Bowel Elimination Promotion:   adequate fluid intake promoted   ambulation promoted  Medication Review/Management:   medications reviewed   high-risk medications identified  Intervention: Optimize Psychosocial Wellbeing  Flowsheets (Taken 5/15/2025 1900)  Spiritual Activities Assistance: prayer provided  Supportive Measures:   active listening utilized   goal-setting facilitated   positive reinforcement provided   relaxation techniques promoted   self-care encouraged   self-reflection promoted   self-responsibility promoted   verbalization of feelings encouraged  Diversional Activities:   smartphone   television     Problem: Nausea and Vomiting  Goal: Nausea and Vomiting Relief  Outcome: Progressing  Intervention: Prevent and Manage Nausea and Vomiting  Flowsheets (Taken 5/15/2025 1900)  Nausea/Vomiting Interventions:   nausea triggers  minimized   sips of clear liquids given  Fluid/Electrolyte Management:   fluids provided   oral rehydration therapy initiated  Environmental Support:   calm environment promoted   distractions minimized   personal routine supported   rest periods encouraged  Oral Care:   oral rinse provided   teeth brushed

## 2025-05-16 NOTE — ASSESSMENT & PLAN NOTE
Presenting as outside hospital transfer with RUQ pain, elevated LFTs after recent cholecystectomy 5/7  - CT abd notes Mild fat stranding and fluid within the gallbladder fossa and borderline dilatation of the CBD without intrahepatic biliary dilatation, presumed within normal limits for recent post cholecystectomy  - MRCP unrevealing   - HIDA scan unrevealing  - General surgery consulted  -- No evidence of post-operative complication   -- No surgical intervention  -- Signed off  - Advanced endoscopy consulted  -- MRCP and HIDA negative for any obstruction or bile leak. Signed off.  - Hepatology consulted  -- Possibly could be due to post surgical changes or ischemia injury from pt's SVT episode. Less likely acute infection. No major CBD or pancreatic dilation so blockage less likely.   - Trend LFTs  - Pain control  - Liver doppler ordered  - Plan to transfer back to [facility] for continued management given final consultant recommendations  5/15 PMHx HTN, HLD, and HFpEF who presents to the ED as transfer from [facility] inpatient for abdominal pain and elevated LFTs after recent cholecystectomy 5/7.  Elevated transaminases started on 5/10. No acute signs of blockage. s/p Gen surgery and AES eval -No obvious issues post-operatively on CT scan. MRCP and HIDA negative for any obstruction or bile leak. No evidence of post-operative complication. No surgical intervention. Due to acute elevation of transaminases and ALP and TB, possibly could be due to post surgical changes or ischemia injury from SVT episode. Less likely acute infection. No major CBD or pancreatic dilation so blockage less likely. Liver Doppler - Patent portal and hepatic vasculature.  No evidence for portal vein thrombus as clinically questioned. LFT trended down.  Discharge to [facility] for further management with hepatology f/u     Presenting as outside hospital transfer with RUQ pain, elevated LFTs after recent cholecystectomy 5/7  -  CT abd notes Mild fat stranding and fluid within the gallbladder fossa and borderline dilatation of the CBD without intrahepatic biliary dilatation, presumed within normal limits for recent post cholecystectomy  - MRCP unrevealing   - HIDA scan unrevealing  - General surgery consulted  -- No evidence of post-operative complication   -- No surgical intervention  -- Signed off  - Advanced endoscopy consulted  -- MRCP and HIDA negative for any obstruction or bile leak. Signed off.  - Hepatology consulted  -- Possibly could be due to post surgical changes or ischemia injury from pt's SVT episode. Less likely acute infection. No major CBD or pancreatic dilation so blockage less likely.   - Trend LFTs  - Pain control  - Liver doppler ordered  - Plan to transfer back to [facility] for continued management given final consultant recommendations        Presenting as outside hospital transfer with RUQ pain, elevated LFTs after recent cholecystectomy 5/7  - CT abd notes Mild fat stranding and fluid within the gallbladder fossa and borderline dilatation of the CBD without intrahepatic biliary dilatation, presumed within normal limits for recent post cholecystectomy  - MRCP unrevealing   - HIDA scan unrevealing  - General surgery consulted  -- No evidence of post-operative complication   -- No surgical intervention  -- Signed off  - Advanced endoscopy consulted  -- MRCP and HIDA negative for any obstruction or bile leak. Signed off.  - Hepatology consulted  -- Possibly could be due to post surgical changes or ischemia injury from pt's SVT episode. Less likely acute infection. No major CBD or pancreatic dilation so blockage less likely.   - Trend LFTs  - Pain control  - Liver doppler ordered  - Plan to transfer back to [facility] for continued management given final consultant recommendations    5/15 PMHx HTN, HLD, and HFpEF who presents to the ED as transfer from [facility] inpatient for abdominal pain and elevated  LFTs after recent cholecystectomy 5/7.  Elevated transaminases started on 5/10. No acute signs of blockage. s/p Gen surgery and AES eval -No obvious issues post-operatively on CT scan. MRCP and HIDA negative for any obstruction or bile leak. No evidence of post-operative complication. No surgical intervention. Due to acute elevation of transaminases and ALP and TB, possibly could be due to post surgical changes or ischemia injury from SVT episode. Less likely acute infection. No major CBD or pancreatic dilation so blockage less likely. Liver Doppler - Patent portal and hepatic vasculature.  No evidence for portal vein thrombus as clinically questioned. LFT trended down.   required IV morphine x 2 doses today. continue oral morphine 15mg q4h prnDischarge to [facility] for further management with hepatology f/u   5/16 LFTs trended down. Lipase WNL. c/o 10/10 abdominal pain. IV morphine held overnight and received LR 500ml bolus for SBP 90s. Pain management consulted.  difficulty swallowing pills,  transitioned  to oxycodone 5mg q 4h prn PO liquid. continue Robaxin 500mg TID.  Abdominal X ray - Prominent aerated bowel throughout the abdomen. Overall nonspecific pattern. No definite severe distension or air-fluid levels to suggest high-grade obstruction. Recommend continued follow-up. No obvious free intraperitoneal air.

## 2025-05-16 NOTE — ASSESSMENT & PLAN NOTE
Presenting as outside hospital transfer with RUQ pain, elevated LFTs after recent cholecystectomy 5/7  - CT abd notes Mild fat stranding and fluid within the gallbladder fossa and borderline dilatation of the CBD without intrahepatic biliary dilatation, presumed within normal limits for recent post cholecystectomy  - MRCP unrevealing   - HIDA scan unrevealing  - General surgery consulted  -- No evidence of post-operative complication   -- No surgical intervention  -- Signed off  - Advanced endoscopy consulted  -- MRCP and HIDA negative for any obstruction or bile leak. Signed off.  - Hepatology consulted  -- Possibly could be due to post surgical changes or ischemia injury from pt's SVT episode. Less likely acute infection. No major CBD or pancreatic dilation so blockage less likely.   - Trend LFTs  - Pain control  - Liver doppler ordered  - Plan to transfer back to [facility] for continued management given final consultant recommendations  5/15 PMHx HTN, HLD, and HFpEF who presents to the ED as transfer from [facility] inpatient for abdominal pain and elevated LFTs after recent cholecystectomy 5/7.  Elevated transaminases started on 5/10. No acute signs of blockage. s/p Gen surgery and AES eval -No obvious issues post-operatively on CT scan. MRCP and HIDA negative for any obstruction or bile leak. No evidence of post-operative complication. No surgical intervention. Due to acute elevation of transaminases and ALP and TB, possibly could be due to post surgical changes or ischemia injury from SVT episode. Less likely acute infection. No major CBD or pancreatic dilation so blockage less likely. Liver Doppler - Patent portal and hepatic vasculature.  No evidence for portal vein thrombus as clinically questioned. LFT trended down.  Discharge to [facility] for further management with hepatology f/u     Presenting as outside hospital transfer with RUQ pain, elevated LFTs after recent cholecystectomy 5/7  -  CT abd notes Mild fat stranding and fluid within the gallbladder fossa and borderline dilatation of the CBD without intrahepatic biliary dilatation, presumed within normal limits for recent post cholecystectomy  - MRCP unrevealing   - HIDA scan unrevealing  - General surgery consulted  -- No evidence of post-operative complication   -- No surgical intervention  -- Signed off  - Advanced endoscopy consulted  -- MRCP and HIDA negative for any obstruction or bile leak. Signed off.  - Hepatology consulted  -- Possibly could be due to post surgical changes or ischemia injury from pt's SVT episode. Less likely acute infection. No major CBD or pancreatic dilation so blockage less likely.   - Trend LFTs  - Pain control  - Liver doppler ordered  - Plan to transfer back to [facility] for continued management given final consultant recommendations        Presenting as outside hospital transfer with RUQ pain, elevated LFTs after recent cholecystectomy 5/7  - CT abd notes Mild fat stranding and fluid within the gallbladder fossa and borderline dilatation of the CBD without intrahepatic biliary dilatation, presumed within normal limits for recent post cholecystectomy  - MRCP unrevealing   - HIDA scan unrevealing  - General surgery consulted  -- No evidence of post-operative complication   -- No surgical intervention  -- Signed off  - Advanced endoscopy consulted  -- MRCP and HIDA negative for any obstruction or bile leak. Signed off.  - Hepatology consulted  -- Possibly could be due to post surgical changes or ischemia injury from pt's SVT episode. Less likely acute infection. No major CBD or pancreatic dilation so blockage less likely.   - Trend LFTs  - Pain control  - Liver doppler ordered  - Plan to transfer back to [facility] for continued management given final consultant recommendations    5/15 PMHx HTN, HLD, and HFpEF who presents to the ED as transfer from [facility] inpatient for abdominal pain and elevated  LFTs after recent cholecystectomy 5/7.  Elevated transaminases started on 5/10. No acute signs of blockage. s/p Gen surgery and AES eval -No obvious issues post-operatively on CT scan. MRCP and HIDA negative for any obstruction or bile leak. No evidence of post-operative complication. No surgical intervention. Due to acute elevation of transaminases and ALP and TB, possibly could be due to post surgical changes or ischemia injury from SVT episode. Less likely acute infection. No major CBD or pancreatic dilation so blockage less likely. Liver Doppler - Patent portal and hepatic vasculature.  No evidence for portal vein thrombus as clinically questioned. LFT trended down.   required IV morphine x 2 doses today. continue oral morphine 15mg q4h prnDischarge to [facility] for further management with hepatology f/u   5/16 LFTs trended down. Lipase WNL. c/o 10/10 abdominal pain. IV morphine held overnight and received LR 500ml bolus for SBP 90s. Pain management consulted.  difficulty swallowing pills,  transitioned  to oxycodone 5mg q 4h prn PO liquid. continue Robaxin 500mg TID.  Abdominal X ray - Prominent aerated bowel throughout the abdomen. Overall nonspecific pattern. No definite severe distension or air-fluid levels to suggest high-grade obstruction. Recommend continued follow-up. No obvious free intraperitoneal air.

## 2025-05-16 NOTE — PROGRESS NOTES
[facility] Medicine  Progress Note    Patient Name: Wilfredo Vazquez  MRN: [id number]  Patient Class: IP- Inpatient   Admission Date: 5/11/2025  Length of Stay: 5 days  Attending Physician: Mayito Stout MD  Primary Care Provider: Marcio Calderon MD        Subjective     Principal Problem:Acute postoperative pain of abdomen        HPI:  66 yo F with PMHx HTN, HLD, and HFpEF who presents to the ED as transfer from [facility] inpatient for abdominal pain and elevated LFTs after recent cholecystectomy 5/7. Pt. Presented to [facility] ED May 10 with palpitations and chest pain.  In the emergency department she had SVT with heart rate in the 200s.  She was cardioverted to a sinus rhythm in ED. CTA of the chest and CT abdomen and pelvis did not show acute pathology.  AST and ALT were elevated on presentation.  She was admitted to the ICU for the episode SVT and elevated troponin.  With borderline BP, rate-controlling agents were held.  She was seen by Cardiology with recommendations of adding beta blockade. On May 11 she developed severe abdominal pain with tense abdomen and high-pitched bowel sounds with rebound and guarding. On exam, she had abdominal tenderness and distention along with guarding and rebound.  CTA of the abdomen and pelvis did not show evidence of acute GI bleed or obvious abnormalities of the abdominal vasculature. Transfer center contacted AES at [facility] who recommended MRCP.  With the severe, persistent abdominal pain/abdominal distention despite narcotic medications, Hospital Medicine at [facility] spoke with General Surgery at [facility], recommendation was for transfer to [facility] for General Surgery and potentially AES evaluations.      Pt. Reports to me abdominal pain has improved significantly. She was given one PO oxycodone in ED and has not required further medications since presentation.    Overview/Hospital  Course:  General surgery and advanced endoscopy consulted.  MRCP and HIDA negative for any obstruction or bile leak.  No intervention per General surgery on advanced endoscopy.  Hepatology consulted and patients LFTs trended.  Patient with continued refractory pain requiring IV pain medications.  Ultrasound liver Doppler ordered.    5/15 PMHx HTN, HLD, and HFpEF who presents to the ED as transfer from [facility] inpatient for abdominal pain and elevated LFTs after recent cholecystectomy 5/7.  Elevated transaminases started on 5/10. No acute signs of blockage. s/p Gen surgery and AES eval -No obvious issues post-operatively on CT scan. MRCP and HIDA negative for any obstruction or bile leak. No evidence of post-operative complication. No surgical intervention. Due to acute elevation of transaminases and ALP and TB, possibly could be due to post surgical changes or ischemia injury from SVT episode. Less likely acute infection. No major CBD or pancreatic dilation so blockage less likely. Liver Doppler - Patent portal and hepatic vasculature.  No evidence for portal vein thrombus as clinically questioned. LFT trended down.  Discharge to [facility] for further management with hepatology f/u . reports 10/10 abdominal pain. required IV morphine x 2 doses today. continue oral morphine 15mg q4h prn. reports no relief with po morphine - requests IV morphine.   5/16 LFTs trended down. Lipase WNL. c/o 10/10 abdominal pain. IV morphine held overnight and received LR 500ml bolus for SBP 90s. Pain management consulted.  difficulty swallowing pills,  transitioned  to oxycodone 5mg q 4h prn PO liquid. continue Robaxin 500mg TID.  Abdominal X ray - Prominent aerated bowel throughout the abdomen. Overall nonspecific pattern. No definite severe distension or air-fluid levels to suggest high-grade obstruction. Recommend continued follow-up. No obvious free intraperitoneal air. started simethicone           Review of Systems:    Pain scale:8/10   Constitutional:  fever,  chills, headache, vision loss, hearing loss, weight loss, Generalized weakness, falls, loss of smell, loss of taste, poor appetite,  sore throat  Respiratory: cough, shortness of breath.   Cardiovascular: chest pain, dizziness, palpitations, orthopnea, swelling of feet, syncope  Gastrointestinal: nausea, vomiting, abdominal pain, diarrhea, black stool,  blood in stool, change in bowel habits, constipation  Genitourinary: hematuria, dysuria, urgency, frequency  Integument/Breast: rash,  pruritis  Hematologic/Lymphatic: easy bruising, lymphadenopathy  Musculoskeletal: arthralgias , myalgias, back pain, neck pain, knee pain  Neurological: confusion, seizures, tremors, slurred speech  Behavioral/Psych:  depression, anxiety, auditory or visual hallucinations     OBJECTIVE:     Physical Exam:  Body mass index is 31.36 kg/m².    Constitutional: Appears well-developed and well-nourished. obesity   Head: Normocephalic and atraumatic.   Neck: Normal range of motion. Neck supple.   Cardiovascular: Normal heart rate.  Regular heart rhythm.  Pulmonary/Chest: Effort normal.   Abdominal: No distension.  Right sided abdominal tenderness  Musculoskeletal: Normal range of motion. No edema.   Neurological: Alert and oriented to person, place, and time.   Skin: Skin is warm and dry.   Psychiatric: Normal mood and affect. Behavior is normal.                  Vital Signs  Temp: 98.4 °F (36.9 °C) (05/16/25 1108)  Pulse: 81 (05/16/25 1108)  Resp: 14 (05/16/25 1501)  BP: 118/75 (05/16/25 1108)  SpO2: 100 % (05/16/25 1108)     24 Hour VS Range    Temp:  [97.6 °F (36.4 °C)-98.4 °F (36.9 °C)]   Pulse:  [64-82]   Resp:  [14-18]   BP: ()/(59-79)   SpO2:  [96 %-100 %]     Intake/Output Summary (Last 24 hours) at 5/16/2025 1510  Last data filed at 5/16/2025 0630  Gross per 24 hour   Intake 738.01 ml   Output --   Net 738.01 ml         I/O This Shift:  No intake/output data recorded.    Wt Readings  "from Last 3 Encounters:   05/14/25 80.3 kg (177 lb 0.5 oz)   05/10/25 80.4 kg (177 lb 4 oz)   05/07/25 80.4 kg (177 lb 5.8 oz)       I have personally reviewed the vitals and recorded Intake/Output     Laboratory/Diagnostic Data:    CBC/Anemia Labs: Coags:    Recent Labs   Lab 05/14/25 0346 05/15/25  0643 05/16/25  0455   WBC 6.87 7.22 4.84   HGB 11.2* 11.4* 10.0*   HCT 35.3* 35.3* 31.1*    242 223   MCV 90 90 91   RDW 14.9* 15.1* 15.1*    No results for input(s): "PT", "INR", "APTT" in the last 168 hours.     Chemistries: ABG:   Recent Labs   Lab 05/14/25  0346 05/15/25  0643 05/16/25  0455    139 137   K 4.0 4.8 4.1    105 106   CO2 23 27 25   BUN 20 19 22   CREATININE 0.9 0.7 0.7   CALCIUM 8.8 8.3* 8.5*   PROT 6.3 6.4 5.8*   BILITOT 0.4 0.4 0.3   ALKPHOS 312* 271* 262*   * 146* 126*   AST 64* 30 75*   MG 1.9 1.9 1.8   PHOS 3.8 3.3 3.5    No results for input(s): "PH", "PCO2", "PO2", "HCO3", "POCSATURATED", "BE" in the last 168 hours.     POCT Glucose: HbA1c:    No results for input(s): "POCTGLUCOSE" in the last 168 hours. Hemoglobin A1C   Date Value Ref Range Status   11/14/2023 5.3 4.0 - 5.6 % Final     Comment:     ADA Screening Guidelines:  5.7-6.4%  Consistent with prediabetes  >or=6.5%  Consistent with diabetes    High levels of fetal hemoglobin interfere with the HbA1C  assay. Heterozygous hemoglobin variants (HbS, HgC, etc)do  not significantly interfere with this assay.   However, presence of multiple variants may affect accuracy.          Cardiac Enzymes: Ejection Fractions:    No results for input(s): "CPK", "CPKMB", "MB", "TROPONINI" in the last 72 hours. EF   Date Value Ref Range Status   04/08/2025 45 % Final   04/28/2022 55 % Final          No results for input(s): "COLORU", "APPEARANCEUA", "PHUR", "SPECGRAV", "PROTEINUA", "GLUCUA", "KETONESU", "BILIRUBINUA", "OCCULTUA", "NITRITE", "UROBILINOGEN", "LEUKOCYTESUR", "RBCUA", "WBCUA", "BACTERIA", "SQUAMEPITHEL", "HYALINECASTS" " "in the last 48 hours.    Invalid input(s): "WRIGHTSUR"    Lactic Acid Level (mmol/L)   Date Value   05/11/2025 0.9     Lactate (Lactic Acid) (mmol/L)   Date Value   07/01/2020 1.1   01/03/2018 1.6     BNP (pg/mL)   Date Value   05/10/2025 23   04/27/2025 <10   04/08/2025 40   02/15/2025 36   08/12/2024 <10   11/30/2023 16   09/21/2022 18   08/10/2022 25     CRP   Date Value   06/09/2022 53.8 mg/L (H)   07/01/2020 <0.50 mg/dL     Sed Rate (mm/Hr)   Date Value   11/14/2023 25 (H)   07/17/2018 41 (H)     D-Dimer (mg/L FEU)   Date Value   02/15/2025 0.31   08/12/2024 1.06 (H)   05/03/2022 0.97 (H)   04/27/2022 0.78 (H)   07/01/2020 0.60 (H)   03/24/2018 0.41     Ferritin (ng/mL)   Date Value   07/01/2020 28     LD (U/L)   Date Value   07/01/2020 180     Troponin I (ng/mL)   Date Value   02/16/2025 0.049 (H)   02/15/2025 0.048 (H)   02/15/2025 0.079 (H)   08/12/2024 <0.006   11/30/2023 <0.006   11/14/2023 <0.006   09/21/2022 0.011   08/10/2022 <0.006     Troponin-I (ng/mL)   Date Value   05/11/2025 0.164 (H)   05/10/2025 0.280 (H)   05/10/2025 0.151 (H)   05/10/2025 0.028 (H)   04/08/2025 0.037 (H)   04/08/2025 0.039 (H)   04/08/2025 0.042 (H)   04/08/2025 0.058 (H)     CPK (U/L)   Date Value   04/27/2025 45   08/12/2024 37   07/01/2020 116   03/24/2018 74   03/24/2018 86   12/09/2017 140     CK (U/L)   Date Value   02/24/2024 36   06/12/2022 48     No results found for this or any previous visit.  SARS-CoV2 (COVID-19) Qualitative PCR (no units)   Date Value   04/23/2022 Not Detected   02/06/2021 Not Detected     SARS-CoV-2 RNA, Amplification, Qual (no units)   Date Value   07/01/2020 Negative     POC Rapid COVID (no units)   Date Value   02/15/2025 Negative   08/12/2024 Negative   08/12/2024 Negative   02/22/2024 Negative   02/12/2024 Negative   11/30/2023 Negative   02/06/2023 Negative   06/09/2022 Negative   05/05/2022 Negative   01/04/2022 Positive (A)       Microbiology labs for the last week  Microbiology Results " (last 7 days)       ** No results found for the last 168 hours. **            Reviewed and noted in plan where applicable- Please see chart for full lab data.    Lines/Drains:       Peripheral IV - Single Lumen 05/14/25 1542 20 G Distal;Posterior;Right Forearm (Active)   Site Assessment Clean;Dry;Intact;No redness;No swelling 05/14/25 2000   Line Securement Device Secured with sutureless device 05/14/25 2000   Extremity Assessment Distal to IV No abnormal discoloration;No redness;No swelling;No warmth 05/14/25 2000   Line Status Blood return noted 05/14/25 2000   Dressing Status Clean;Dry;Intact 05/14/25 2000   Dressing Intervention Integrity maintained 05/14/25 2000   Dressing Change Due 05/18/25 05/14/25 1542   Site Change Due 05/18/25 05/14/25 2000   Number of days: 0       Imaging      Results for orders placed during the hospital encounter of 04/08/25    Echo    Interpretation Summary    Left Ventricle: The left ventricle is normal in size. Ventricular mass is normal. Normal wall thickness. There is concentric remodeling. Mild global hypokinesis present. There is mildly reduced systolic function. Ejection fraction is approximately 45%. Grade I diastolic dysfunction. Elevated left ventricular filling pressure. Tissue Doppler velocity is reduced.    Right Ventricle: The right ventricle is normal in size. Systolic function is normal.    Mitral Valve: There is mild to moderate regurgitation with a centrally directed jet.    Pulmonary Artery: There is mild pulmonary hypertension. The estimated pulmonary artery systolic pressure is 44 mmHg.    IVC/SVC: Intermediate venous pressure at 8 mmHg.      X-Ray Abdomen AP 1 View  Narrative: EXAMINATION:  XR ABDOMEN AP 1 VIEW    CLINICAL HISTORY:  abd pain;    TECHNIQUE:  Single AP View of the abdomen was performed.    COMPARISON:  CTA 05/11/2025.    FINDINGS:  Prominent aerated bowel throughout the abdomen.  Overall nonspecific pattern.  No definite severe distension or  air-fluid levels to suggest high-grade obstruction.  Recommend continued follow-up.  No obvious free intraperitoneal air.  Impression: As above.    Electronically signed by: Bebeto Cooper  Date:    05/16/2025  Time:    10:23      Labs, Imaging, EKG and Diagnostic results from 5/16/2025 were reviewed.    Medications:  Medication list was reviewed and changes noted under Assessment/Plan.  Medications Ordered Prior to Encounter[1]  Scheduled Medications:  Current Facility-Administered Medications   Medication Dose Route Frequency    lisinopriL  20 mg Oral Daily    methocarbamoL  500 mg Oral TID    metoprolol succinate  50 mg Oral Daily    pantoprazole  40 mg Oral Daily    QUEtiapine  300 mg Oral QHS     PRN:   Current Facility-Administered Medications:     acetaminophen, 650 mg, Oral, Q4H PRN    dextrose 50%, 12.5 g, Intravenous, PRN    dextrose 50%, 25 g, Intravenous, PRN    glucagon (human recombinant), 1 mg, Intramuscular, PRN    glucose, 16 g, Oral, PRN    glucose, 24 g, Oral, PRN    melatonin, 6 mg, Oral, Nightly PRN    naloxone, 0.02 mg, Intravenous, PRN    ondansetron, 4 mg, Intravenous, Q8H PRN    oxyCODONE, 5 mg, Oral, Q4H PRN    prochlorperazine, 5 mg, Intravenous, Q6H PRN    simethicone, 1 tablet, Oral, TID PRN    sodium chloride 0.9%, 10 mL, Intravenous, PRN  Infusions:   Estimated Creatinine Clearance: 82.6 mL/min (based on SCr of 0.7 mg/dL).               Assessment & Plan  Acute postoperative pain of abdomen  Transaminitis  Hyperbilirubinemia  Presenting as outside hospital transfer with RUQ pain, elevated LFTs after recent cholecystectomy 5/7  - CT abd notes Mild fat stranding and fluid within the gallbladder fossa and borderline dilatation of the CBD without intrahepatic biliary dilatation, presumed within normal limits for recent post cholecystectomy  - MRCP unrevealing   - HIDA scan unrevealing  - General surgery consulted  -- No evidence of post-operative complication   -- No surgical  intervention  -- Signed off  - Advanced endoscopy consulted  -- MRCP and HIDA negative for any obstruction or bile leak. Signed off.  - Hepatology consulted  -- Possibly could be due to post surgical changes or ischemia injury from pt's SVT episode. Less likely acute infection. No major CBD or pancreatic dilation so blockage less likely.   - Trend LFTs  - Pain control  - Liver doppler ordered  - Plan to transfer back to [facility] for continued management given final consultant recommendations  5/15 PMHx HTN, HLD, and HFpEF who presents to the ED as transfer from [facility] inpatient for abdominal pain and elevated LFTs after recent cholecystectomy 5/7.  Elevated transaminases started on 5/10. No acute signs of blockage. s/p Gen surgery and AES eval -No obvious issues post-operatively on CT scan. MRCP and HIDA negative for any obstruction or bile leak. No evidence of post-operative complication. No surgical intervention. Due to acute elevation of transaminases and ALP and TB, possibly could be due to post surgical changes or ischemia injury from SVT episode. Less likely acute infection. No major CBD or pancreatic dilation so blockage less likely. Liver Doppler - Patent portal and hepatic vasculature.  No evidence for portal vein thrombus as clinically questioned. LFT trended down.  Discharge to [facility] for further management with hepatology f/u     Presenting as outside hospital transfer with RUQ pain, elevated LFTs after recent cholecystectomy 5/7  - CT abd notes Mild fat stranding and fluid within the gallbladder fossa and borderline dilatation of the CBD without intrahepatic biliary dilatation, presumed within normal limits for recent post cholecystectomy  - MRCP unrevealing   - HIDA scan unrevealing  - General surgery consulted  -- No evidence of post-operative complication   -- No surgical intervention  -- Signed off  - Advanced endoscopy consulted  -- MRCP and HIDA negative for any obstruction  or bile leak. Signed off.  - Hepatology consulted  -- Possibly could be due to post surgical changes or ischemia injury from pt's SVT episode. Less likely acute infection. No major CBD or pancreatic dilation so blockage less likely.   - Trend LFTs  - Pain control  - Liver doppler ordered  - Plan to transfer back to [facility] for continued management given final consultant recommendations        Presenting as outside hospital transfer with RUQ pain, elevated LFTs after recent cholecystectomy 5/7  - CT abd notes Mild fat stranding and fluid within the gallbladder fossa and borderline dilatation of the CBD without intrahepatic biliary dilatation, presumed within normal limits for recent post cholecystectomy  - MRCP unrevealing   - HIDA scan unrevealing  - General surgery consulted  -- No evidence of post-operative complication   -- No surgical intervention  -- Signed off  - Advanced endoscopy consulted  -- MRCP and HIDA negative for any obstruction or bile leak. Signed off.  - Hepatology consulted  -- Possibly could be due to post surgical changes or ischemia injury from pt's SVT episode. Less likely acute infection. No major CBD or pancreatic dilation so blockage less likely.   - Trend LFTs  - Pain control  - Liver doppler ordered  - Plan to transfer back to [facility] for continued management given final consultant recommendations    5/15 PMHx HTN, HLD, and HFpEF who presents to the ED as transfer from [facility] inpatient for abdominal pain and elevated LFTs after recent cholecystectomy 5/7.  Elevated transaminases started on 5/10. No acute signs of blockage. s/p Gen surgery and AES eval -No obvious issues post-operatively on CT scan. MRCP and HIDA negative for any obstruction or bile leak. No evidence of post-operative complication. No surgical intervention. Due to acute elevation of transaminases and ALP and TB, possibly could be due to post surgical changes or ischemia injury from SVT episode. Less  likely acute infection. No major CBD or pancreatic dilation so blockage less likely. Liver Doppler - Patent portal and hepatic vasculature.  No evidence for portal vein thrombus as clinically questioned. LFT trended down.   required IV morphine x 2 doses today. continue oral morphine 15mg q4h prnDischarge to [facility] for further management with hepatology f/u   5/16 LFTs trended down. Lipase WNL. c/o 10/10 abdominal pain. IV morphine held overnight and received LR 500ml bolus for SBP 90s. Pain management consulted.  difficulty swallowing pills,  transitioned  to oxycodone 5mg q 4h prn PO liquid. continue Robaxin 500mg TID.  Abdominal X ray - Prominent aerated bowel throughout the abdomen. Overall nonspecific pattern. No definite severe distension or air-fluid levels to suggest high-grade obstruction. Recommend continued follow-up. No obvious free intraperitoneal air.     Hypertension  - Continue lisinopril    SVT (supraventricular tachycardia)  - Patient with narrow complex tachycardia at rate of 216 bpm on presentation to [facility] ED 5/10. She underwent electrical cardioversion to sinus rhythms   - Continue cardiac monitoring  - Toprol-XL 50 mg started per OSH cardiology recommendations.   - Cards follow up at discharge    Pain management  5/16 LFTs trended down. Lipase WNL. c/o 10/10 abdominal pain. IV morphine held overnight and received LR 500ml bolus for SBP 90s. Pain management consulted.       VTE Risk Mitigation (From admission, onward)           Ordered     IP VTE HIGH RISK PATIENT  Once         05/11/25 2021     Place sequential compression device  Until discontinued         05/11/25 2021                    Discharge Planning   NETTIE: 5/17/2025     Code Status: Full Code   Medical Readiness for Discharge Date:   Discharge Plan A: Home with family   Discharge Delays: None known at this time                    Mayito Stout MD  Department of Hospital Medicine   [facility] - Med Surg         [1]    Current Facility-Administered Medications on File Prior to Encounter   Medication Dose Route Frequency Provider Last Rate Last Admin    lactated ringers infusion   Intravenous Continuous Sim Quinteros MD 0 mL/hr at 12/14/21 0846 New Bag at 06/07/22 1151    lactated ringers infusion   Intravenous Continuous Sim Quinteros MD        sodium chloride 0.9% flush 10 mL  10 mL Intravenous PRN Sim Quinteros MD        sodium chloride 0.9% flush 10 mL  10 mL Intravenous PRN Sim Quinteros MD        sodium chloride 0.9% flush 10 mL  10 mL Intravenous PRN Sim Quinteros MD         Current Outpatient Medications on File Prior to Encounter   Medication Sig Dispense Refill    albuterol (PROVENTIL/VENTOLIN HFA) 90 mcg/actuation inhaler inhale TWO puffs into THE lungs EVERY 4 HOURS AS NEEDED 25.5 g 3    atorvastatin (LIPITOR) 40 MG tablet Take 1 tablet (40 mg total) by mouth once daily. 90 tablet 0    BREZTRI AEROSPHERE 160-9-4.8 mcg/actuation HFAA Two inhalations b.i.d. 10.7 g 5    cyproheptadine (PERIACTIN) 4 mg tablet 1 po q hs to increase weight 30 tablet 5    HYDROcodone-acetaminophen (NORCO) 7.5-325 mg per tablet Take 1 tablet by mouth every 6 (six) hours as needed for Pain. 20 tablet 0    lisinopriL (PRINIVIL,ZESTRIL) 20 MG tablet Take 1 tablet (20 mg total) by mouth once daily. 100 tablet 3    meclizine (ANTIVERT) 25 mg tablet Take 1 tablet (25 mg total) by mouth 3 (three) times daily as needed for Dizziness. 60 tablet 0    pantoprazole (PROTONIX) 40 MG tablet Take 1 tablet (40 mg total) by mouth once daily. 90 tablet 0    QUEtiapine (SEROQUEL) 300 MG Tab Take 300 mg by mouth every evening.      traMADoL (ULTRAM) 50 mg tablet Take 1 tablet (50 mg total) by mouth every 4 (four) hours as needed for Pain. 28 each 0

## 2025-05-16 NOTE — NURSING
Patient was asleep I had to shake her and call her name to awaken her when she seen it was me she started c/o and moaning in pain 10 out of 10. I explained to her it was not safe to give her pain meds when her bp is low she said ok and went back to sleep

## 2025-05-16 NOTE — NURSING
Patient c/o pain at her IV site and wanted me to remove it. I removed it b/c she said it had her wrist hurting. Then she asked for pain and nausea meds that I had already given her, and it was not time for another dose. I explained this to her. She was c/o 10 out of 10 pain but was asleep when I came in to the point I had to shake her to awaken her. She then agreed that she would take the po pain meds I told her I had to check her VS because her BP had been low. 90's over 50's and she was very drowsy. I message the MD on call he said hold off on the pain meds and give a 500 ml LR bolus. She continues to c/o 10 out of 10 pain but she is always asleep when I go to her room. Bolus is infusing now

## 2025-05-16 NOTE — ASSESSMENT & PLAN NOTE
5/16 LFTs trended down. Lipase WNL. c/o 10/10 abdominal pain. IV morphine held overnight and received LR 500ml bolus for SBP 90s. Pain management consulted.

## 2025-05-16 NOTE — ANESTHESIA POST-OP PAIN MANAGEMENT
Consult  Anesthesiology Acute Pain Service      SUBJECTIVE:     Chief Complaint/Reason for Consult: Request anesthesia assistance with continuous pain management due to high complexity/refractory pain.      History of Present Illness:  Patient is a 65 y.o. female with a PMH of HTN, HLD, and HFpEF who presents to the ED as transfer from [facility] inpatient for abdominal pain and elevated LFTs after recent cholecystectomy 5/7. Pt transferred to [facility] for workup, now  s/p Gen surgery and AES eval -No obvious issues post-operatively on CT scan. MRCP and HIDA negative for any obstruction or bile leak. No evidence of post-operative complication. No surgical intervention.     Anesthesiology is consulted for uncontrolled pain on current inpatient regimen. Of note patient endorses pain is concentrated in RUQ with radiation to the R mid back. Rebound tenderness in this location present. Per HM attending, attempted to transition patient from IV morphine to PO oxy, this was not tolerated. Patient requested to put back on IV medication. Upon exam this AM patient also notes difficulty swallowing pills.      The current inpatient regimen is:  Robaxin 500mg TID   Morphine 15mg PO q4h prn   Morphine 4mg q4h prn       Patient has utilized IV morphine prn twice in 24 hours, and has utilized the PO morphine prn twice in 24 hours         Problem List[1]    Review of patient's allergies indicates:   Allergen Reactions    Naproxen Rash       Outpatient Medications:   Medications Ordered Prior to Encounter[2]     Current Inpatient Medications:   lisinopriL  20 mg Oral Daily    methocarbamoL  500 mg Oral TID    metoprolol succinate  50 mg Oral Daily    pantoprazole  40 mg Oral Daily    QUEtiapine  300 mg Oral QHS       PRN:    Current Facility-Administered Medications:     acetaminophen, 650 mg, Oral, Q4H PRN    dextrose 50%, 12.5 g, Intravenous, PRN    dextrose 50%, 25 g, Intravenous, PRN    glucagon (human recombinant), 1 mg,  Intramuscular, PRN    glucose, 16 g, Oral, PRN    glucose, 24 g, Oral, PRN    melatonin, 6 mg, Oral, Nightly PRN    naloxone, 0.02 mg, Intravenous, PRN    ondansetron, 4 mg, Intravenous, Q8H PRN    oxyCODONE, 5 mg, Oral, Q4H PRN    prochlorperazine, 5 mg, Intravenous, Q6H PRN    sodium chloride 0.9%, 10 mL, Intravenous, PRN    Past Surgical History:   Procedure Laterality Date    APPENDECTOMY      ARTHROPLASTY, KNEE, TOTAL - Left Left 05/27/2024    BLOCK, SPINAL NERVE ROOT, LUMBAR, SELECTIVE Bilateral 12/28/2023    L3, L4, L5    COLONOSCOPY N/A 05/22/2019    Procedure: COLONOSCOPY;  Surgeon: Sim Quinteros MD;  Location: [facility];  Service: Endoscopy;  Laterality: N/A;    COLONOSCOPY N/A 06/20/2022    Procedure: COLONOSCOPY WITH POLYPECTOMY AND CLIPPING;  Surgeon: Jarrod Franco MD;  Location: [facility];  Service: Endoscopy;  Laterality: N/A;    COLONOSCOPY N/A 03/19/2024    Procedure: COLONOSCOPY;  Surgeon: Sim Quinteros MD;  Location: [facility];  Service: Endoscopy;  Laterality: N/A;    COLONOSCOPY W/ POLYPECTOMY      EGD, WITH CLOSED BIOPSY  02/17/2025    ESOPHAGEAL DILATION N/A 05/13/2019    Procedure: DILATION, ESOPHAGUS;  Surgeon: Sim Quinteros MD;  Location: [facility];  Service: Endoscopy;  Laterality: N/A;    ESOPHAGEAL DILATION N/A 02/09/2021    Procedure: DILATION, ESOPHAGUS;  Surgeon: Sim Quinteros MD;  Location: [facility];  Service: Endoscopy;  Laterality: N/A;    ESOPHAGEAL DILATION N/A 12/14/2021    Procedure: DILATION, ESOPHAGUS;  Surgeon: Sim Quinteros MD;  Location: [facility];  Service: Endoscopy;  Laterality: N/A;    ESOPHAGEAL DILATION N/A 05/17/2022    Procedure: DILATION, ESOPHAGUS;  Surgeon: Sim Quinteros MD;  Location: [facility];  Service: Endoscopy;  Laterality: N/A;    ESOPHAGEAL DILATION N/A 12/26/2023    Procedure: DILATION, ESOPHAGUS;  Surgeon: Sim Quinteros MD;  Location: [facility];  Service: Endoscopy;  Laterality: N/A;    ESOPHAGEAL DILATION N/A 07/25/2024     Procedure: DILATION, ESOPHAGUS;  Surgeon: Sim Quinteros MD;  Location: [facility] ENDO;  Service: Endoscopy;  Laterality: N/A;    ESOPHAGOGASTRODUODENOSCOPY N/A 05/13/2019    Procedure: EGD (ESOPHAGOGASTRODUODENOSCOPY);  Surgeon: Sim Quinteros MD;  Location: [facility] ENDO;  Service: Endoscopy;  Laterality: N/A;    ESOPHAGOGASTRODUODENOSCOPY N/A 02/09/2021    Procedure: EGD (ESOPHAGOGASTRODUODENOSCOPY);  Surgeon: Sim Quinteros MD;  Location: [facility] ENDO;  Service: Endoscopy;  Laterality: N/A;    ESOPHAGOGASTRODUODENOSCOPY N/A 12/14/2021    Procedure: EGD (ESOPHAGOGASTRODUODENOSCOPY);  Surgeon: Sim Quinteros MD;  Location: [facility] ENDO;  Service: Endoscopy;  Laterality: N/A;    ESOPHAGOGASTRODUODENOSCOPY N/A 05/17/2022    Procedure: EGD (ESOPHAGOGASTRODUODENOSCOPY);  Surgeon: Sim Quinteros MD;  Location: [facility] ENDO;  Service: Endoscopy;  Laterality: N/A;    ESOPHAGOGASTRODUODENOSCOPY N/A 12/26/2023    Procedure: EGD (ESOPHAGOGASTRODUODENOSCOPY);  Surgeon: Sim Quinteros MD;  Location: [facility] ENDO;  Service: Endoscopy;  Laterality: N/A;    ESOPHAGOGASTRODUODENOSCOPY N/A 07/25/2024    Procedure: EGD (ESOPHAGOGASTRODUODENOSCOPY);  Surgeon: Sim Quinteros MD;  Location: [facility] ENDO;  Service: Endoscopy;  Laterality: N/A;    ESOPHAGOGASTRODUODENOSCOPY N/A 02/17/2025    Procedure: EGD (ESOPHAGOGASTRODUODENOSCOPY);  Surgeon: Efrem Garcia MD;  Location: [facility] ENDO;  Service: Endoscopy;  Laterality: N/A;    HYSTERECTOMY      INJECTION OF ANESTHETIC AGENT AROUND MEDIAL BRANCH NERVES INNERVATING LUMBAR FACET JOINT Bilateral 12/14/2023    Procedure: Block-nerve-medial branch-lumbar----L3,L4,L5;  Surgeon: Iván Velasquez Jr., MD;  Location: [facility] PAIN MGMT;  Service: Pain Management;  Laterality: Bilateral;    INJECTION OF ANESTHETIC AGENT AROUND MEDIAL BRANCH NERVES INNERVATING LUMBAR FACET JOINT Bilateral 12/28/2023    Procedure: Block-nerve-medial branch-lumbar L3,L4,L5;  Surgeon: Iván Velasquez Jr., MD;   Location: [facility] [place][note 2] PAIN MGMT;  Service: Pain Management;  Laterality: Bilateral;    INJECTION OF JOINT Left 05/01/2025    Procedure: Injection, Joint, Shoulder, Hip, Or Knee---LEFT INTRAARTICULAR HIP INJECTION UNDER FLUORO;  Surgeon: Iván Velasquez Jr., MD;  Location: [facility] [place][note 2] PAIN MGMT;  Service: Pain Management;  Laterality: Left;  CONSENT DAY OF PROCEDURE    LAPAROSCOPIC NISSEN FUNDOPLICATION      RADIOFREQUENCY ABLATION, NERVE, SPINAL, LUMBOSACRAL Right 01/25/2024    Procedure: RADIOFREQUENCY ABLATION------L3,L4,L5;  Surgeon: Iván Velasquez Jr., MD;  Location: [facility] [place][note 2] PAIN MGMT;  Service: Pain Management;  Laterality: Right;    REPAIR OF EXTENSOR TENDON Left 06/07/2022    Procedure: REPAIR, TENDON, EXTENSOR  ;  Surgeon: Dakota Anaya Jr., MD;  Location: [facility] [place] OR;  Service: Plastics;  Laterality: Left;  THUMB    ROBOT-ASSISTED CHOLECYSTECTOMY USING DA SADA XI N/A 5/7/2025    Procedure: XI ROBOTIC CHOLECYSTECTOMY;  Surgeon: Bebeto Cohen MD;  Location: [facility] [place]note 2] OR;  Service: General;  Laterality: N/A;    TOTAL KNEE ARTHROPLASTY Left 05/27/2024    Procedure: ARTHROPLASTY, KNEE, TOTAL;  Surgeon: Nigel Enriquez MD;  Location: [facility] [place]note 2[facility] OR;  Service: Orthopedics;  Laterality: Left;  olivia ortho, hip bumped, left TKA    UPPER GASTROINTESTINAL ENDOSCOPY N/A 01/24/2018       Social History[3]    Review of Systems:  As above. Additionally:     Constitutional: Negative for fever, diaphoresis, activity change, appetite change and fatigue.   HENT: Negative for congestion, postnasal drip, rhinorrhea, sinus pressure, sore throat, trouble swallowing and voice change.    Eyes: Negative for photophobia, pain and visual disturbance.   Respiratory: Negative for cough, shortness of breath and wheezing.    Cardiovascular: Negative for chest pain, palpitations and leg swelling.   Gastrointestinal: Negative for nausea, vomiting,  diarrhea, constipation and abdominal distention. Positive for abdominal pain   Endocrine:  Negative for polydipsia, polyphagia and polyuria.   Genitourinary: Negative for dysuria, frequency, hematuria and difficulty urinating.   Musculoskeletal: Negative for back pain, arthralgias, neck pain and neck stiffness.   Skin: Negative for color change, pallor, rash and wound.   Neurological: Negative for dizziness, seizures, syncope, weakness and headaches.   Hematological: Negative for adenopathy. Does not bruise/bleed easily.   Psychiatric/Behavioral: Negative for sleep disturbance and dysphoric mood. The patient is not nervous/anxious.      OBJECTIVE:     Current Vital Signs  Temp: 36.9 °C (98.4 °F) (05/16/25 1108)  Pulse: 81 (05/16/25 1108)  Resp: 18 (05/16/25 1108)  BP: 118/75 (05/16/25 1108)  SpO2: 100 % (05/16/25 1108)    Vital Signs Range (Last 24H):  Temp:  [36.4 °C (97.6 °F)-36.9 °C (98.4 °F)]   Pulse:  [64-82]   Resp:  [14-18]   BP: ()/(59-79)   SpO2:  [96 %-100 %]     I & O (Last 24H):  Intake/Output Summary (Last 24 hours) at 5/16/2025 1217  Last data filed at 5/16/2025 0630  Gross per 24 hour   Intake 738.01 ml   Output --   Net 738.01 ml       Physical Exam:  Constitutional: Patient is oriented to person, place, and time. Patient appears well-developed and well-nourished. No distress.   HENT: Head: Normocephalic and atraumatic. Right Ear: External ear normal. Left Ear: External ear normal. Nose: Nose normal.   Mouth/Throat: Oropharynx is clear and moist. No oropharyngeal exudate.   Eyes: Conjunctivae and EOM are normal. Pupils are equal, round, and reactive to light. Right eye exhibits no discharge. Left eye exhibits no discharge. No scleral icterus.   Neck: Normal range of motion. Neck supple. No JVD present. No tracheal deviation present. No thyromegaly present.   Cardiovascular: Normal rate, regular rhythm, normal heart sounds and intact distal pulses.  Exam reveals no gallop and no friction rub.  No murmur heard.  Pulmonary/Chest: Effort normal and breath sounds normal. No respiratory  distress. Patient has no wheezes. Patient has no rales.   Abdominal: Soft. Bowel sounds are normal. Patient exhibits tenderness to RUQ with rebound tenderness and radiation to mid back.   Musculoskeletal: Normal range of motion. Patient exhibits no edema or tenderness.   Lymphadenopathy:   Patient has no cervical adenopathy.   Neurological: Patient is alert and oriented to person, place, and time. Patient has normal reflexes. Patient exhibits normal muscle tone. Coordination normal.   Skin: Skin is warm and dry. No rash noted. Patient is not diaphoretic. No erythema. No pallor.   Psychiatric: Patient has a normal mood and affect. Behavior is normal. Judgment and thought content normal.     Laboratory:  CBC:   Recent Labs     05/15/25  0643 05/16/25  0455   WBC 7.22 4.84   RBC 3.94* 3.41*   HGB 11.4* 10.0*   HCT 35.3* 31.1*    223   MCV 90 91   MCH 28.9 29.3   MCHC 32.3 32.2       CMP:   Recent Labs     05/15/25  0643 05/16/25  0455    137   K 4.8 4.1   CO2 27 25    106   BUN 19 22   CREATININE 0.7 0.7   GLU 95 104   MG 1.9 1.8   PHOS 3.3 3.5   CALCIUM 8.3* 8.5*   ALBUMIN 2.7* 2.4*   PROT 6.4 5.8*   ALKPHOS 271* 262*   * 126*   AST 30 75*   BILITOT 0.4 0.3       ASSESSMENT/PLAN:     Active Hospital Problems    Diagnosis  POA    *Acute postoperative pain of abdomen [G89.18, R10.9]  Yes    Hyperbilirubinemia [E80.6]  Yes    Transaminitis [R74.01]  Yes    SVT (supraventricular tachycardia) [I47.10]  Yes    Pain management [R52]  Yes    Hypertension [I10]  Yes      Resolved Hospital Problems   No resolved problems to display.         Plan:    Given patient minimal use of prns in last 24 hours and difficulty swallowing pills, plan to transition to oxy PO liquid at this time. Discussed this plan with patient, she agreed.     Continue multimodal pain regimen with the following adjustments   Robaxin 500mg TID   Oxy 5mg PO solution q4h prn    Thank you for including us in the care of this patient.  Any further questions please contact APS team. Care of this patient discussed with attending, Dr. Spencer.     Radha Navarro MD, PGY3/CA-2  Anesthesiology Department   [facility]                 [1]   Patient Active Problem List  Diagnosis    Acute postoperative pain of abdomen    Acute bilateral low back pain without sciatica    Hypertension    Change in bowel habits    Bronchospasm    Obesity (BMI 35.0-39.9 without comorbidity)    Dysphagia    Blood in the stool    COPD (chronic obstructive pulmonary disease)    History of esophageal dilatation    History of gastroesophageal reflux (GERD)    History of colonic polyps    Pain management    Sinusitis    Left knee pain    Cigarette nicotine dependence without complication    Personal history of nicotine dependence    Bipolar affective disorder, remission status unspecified    Acute on chronic respiratory failure with hypoxia and hypercapnia    Anemia    Hyperglycemia    Elevated lipase    Shortness of breath    Lumbosacral strain    Cephalalgia    Esophageal stenosis    Sleep apnea    MARISSA (obstructive sleep apnea)    Muscle cramps    Aortic atherosclerosis    Contact dermatitis    Primary osteoarthritis of left knee    Compression fracture of L1 vertebra with routine healing    Osteonecrosis of left knee region    Degenerative tear of left medial meniscus    Lumbar radiculopathy    Lumbar spondylosis    DDD (degenerative disc disease), lumbar    Bronchitis    Foreign body (FB) in soft tissue    Otalgia of right ear    Age-related cataract of both eyes    Hiatal hernia    Benign essential hypertension    Chronic headache disorder    Gout    Nicotine dependence    Prediabetes    NSTEMI (non-ST elevated myocardial infarction)    Elevated LFTs    Heart failure with mildly reduced ejection fraction (HFmrEF)    SVT (supraventricular tachycardia)    Precordial pain    Transaminitis    Type 2 myocardial infarction without ST elevation    Hyperbilirubinemia    [2]   Current Facility-Administered Medications on File Prior to Encounter   Medication Dose Route Frequency Provider Last Rate Last Admin    lactated ringers infusion   Intravenous Continuous Sim Quinteros MD 0 mL/hr at 12/14/21 0846 New Bag at 06/07/22 1151    lactated ringers infusion   Intravenous Continuous Sim Quinteros MD        sodium chloride 0.9% flush 10 mL  10 mL Intravenous PRN Sim Quinteros MD        sodium chloride 0.9% flush 10 mL  10 mL Intravenous PRN Sim Quinteros MD        sodium chloride 0.9% flush 10 mL  10 mL Intravenous PRN Sim Quinteros MD         Current Outpatient Medications on File Prior to Encounter   Medication Sig Dispense Refill    albuterol (PROVENTIL/VENTOLIN HFA) 90 mcg/actuation inhaler inhale TWO puffs into THE lungs EVERY 4 HOURS AS NEEDED 25.5 g 3    atorvastatin (LIPITOR) 40 MG tablet Take 1 tablet (40 mg total) by mouth once daily. 90 tablet 0    BREZTRI AEROSPHERE 160-9-4.8 mcg/actuation HFAA Two inhalations b.i.d. 10.7 g 5    cyproheptadine (PERIACTIN) 4 mg tablet 1 po q hs to increase weight 30 tablet 5    HYDROcodone-acetaminophen (NORCO) 7.5-325 mg per tablet Take 1 tablet by mouth every 6 (six) hours as needed for Pain. 20 tablet 0    lisinopriL (PRINIVIL,ZESTRIL) 20 MG tablet Take 1 tablet (20 mg total) by mouth once daily. 100 tablet 3    meclizine (ANTIVERT) 25 mg tablet Take 1 tablet (25 mg total) by mouth 3 (three) times daily as needed for Dizziness. 60 tablet 0    pantoprazole (PROTONIX) 40 MG tablet Take 1 tablet (40 mg total) by mouth once daily. 90 tablet 0    QUEtiapine (SEROQUEL) 300 MG Tab Take 300 mg by mouth every evening.      traMADoL (ULTRAM) 50 mg tablet Take 1 tablet (50 mg total) by mouth every 4 (four) hours as needed for Pain. 28 each 0   [3]   Social History  Socioeconomic History    Marital status: Single   Tobacco Use    Smoking status: Former     Current packs/day: 0.25     Types: Cigarettes    Smokeless tobacco: Never    Substance and Sexual Activity    Alcohol use: Yes     Comment: occasionally    Drug use: No    Sexual activity: Yes     Partners: Female     Comment: rare     Social Drivers of Health     Financial Resource Strain: Low Risk  (5/12/2025)    Overall Financial Resource Strain (CARDIA)     Difficulty of Paying Living Expenses: Not hard at all   Food Insecurity: No Food Insecurity (5/12/2025)    Hunger Vital Sign     Worried About Running Out of Food in the Last Year: Never true     Ran Out of Food in the Last Year: Never true   Transportation Needs: No Transportation Needs (5/12/2025)    PRAPARE - Transportation     Lack of Transportation (Medical): No     Lack of Transportation (Non-Medical): No   Physical Activity: Sufficiently Active (5/12/2025)    Exercise Vital Sign     Days of Exercise per Week: 7 days     Minutes of Exercise per Session: 30 min   Stress: No Stress Concern Present (5/12/2025)    [employer] [place] - Occupational Stress Questionnaire     Feeling of Stress : Not at all   Housing Stability: Low Risk  (5/12/2025)    Housing Stability Vital Sign     Unable to Pay for Housing in the Last Year: No     Number of Times Moved in the Last Year: 1     Homeless in the Last Year: No

## 2025-05-16 NOTE — ASSESSMENT & PLAN NOTE
Presenting as outside hospital transfer with RUQ pain, elevated LFTs after recent cholecystectomy 5/7  - CT abd notes Mild fat stranding and fluid within the gallbladder fossa and borderline dilatation of the CBD without intrahepatic biliary dilatation, presumed within normal limits for recent post cholecystectomy  - MRCP unrevealing   - HIDA scan unrevealing  - General surgery consulted  -- No evidence of post-operative complication   -- No surgical intervention  -- Signed off  - Advanced endoscopy consulted  -- MRCP and HIDA negative for any obstruction or bile leak. Signed off.  - Hepatology consulted  -- Possibly could be due to post surgical changes or ischemia injury from pt's SVT episode. Less likely acute infection. No major CBD or pancreatic dilation so blockage less likely.   - Trend LFTs  - Pain control  - Liver doppler ordered  - Plan to transfer back to [facility] for continued management given final consultant recommendations  5/15 PMHx HTN, HLD, and HFpEF who presents to the ED as transfer from [facility] inpatient for abdominal pain and elevated LFTs after recent cholecystectomy 5/7.  Elevated transaminases started on 5/10. No acute signs of blockage. s/p Gen surgery and AES eval -No obvious issues post-operatively on CT scan. MRCP and HIDA negative for any obstruction or bile leak. No evidence of post-operative complication. No surgical intervention. Due to acute elevation of transaminases and ALP and TB, possibly could be due to post surgical changes or ischemia injury from SVT episode. Less likely acute infection. No major CBD or pancreatic dilation so blockage less likely. Liver Doppler - Patent portal and hepatic vasculature.  No evidence for portal vein thrombus as clinically questioned. LFT trended down.  Discharge to [facility] for further management with hepatology f/u     Presenting as outside hospital transfer with RUQ pain, elevated LFTs after recent cholecystectomy 5/7  -  CT abd notes Mild fat stranding and fluid within the gallbladder fossa and borderline dilatation of the CBD without intrahepatic biliary dilatation, presumed within normal limits for recent post cholecystectomy  - MRCP unrevealing   - HIDA scan unrevealing  - General surgery consulted  -- No evidence of post-operative complication   -- No surgical intervention  -- Signed off  - Advanced endoscopy consulted  -- MRCP and HIDA negative for any obstruction or bile leak. Signed off.  - Hepatology consulted  -- Possibly could be due to post surgical changes or ischemia injury from pt's SVT episode. Less likely acute infection. No major CBD or pancreatic dilation so blockage less likely.   - Trend LFTs  - Pain control  - Liver doppler ordered  - Plan to transfer back to [facility] for continued management given final consultant recommendations        Presenting as outside hospital transfer with RUQ pain, elevated LFTs after recent cholecystectomy 5/7  - CT abd notes Mild fat stranding and fluid within the gallbladder fossa and borderline dilatation of the CBD without intrahepatic biliary dilatation, presumed within normal limits for recent post cholecystectomy  - MRCP unrevealing   - HIDA scan unrevealing  - General surgery consulted  -- No evidence of post-operative complication   -- No surgical intervention  -- Signed off  - Advanced endoscopy consulted  -- MRCP and HIDA negative for any obstruction or bile leak. Signed off.  - Hepatology consulted  -- Possibly could be due to post surgical changes or ischemia injury from pt's SVT episode. Less likely acute infection. No major CBD or pancreatic dilation so blockage less likely.   - Trend LFTs  - Pain control  - Liver doppler ordered  - Plan to transfer back to [facility] for continued management given final consultant recommendations    5/15 PMHx HTN, HLD, and HFpEF who presents to the ED as transfer from [facility] inpatient for abdominal pain and elevated  LFTs after recent cholecystectomy 5/7.  Elevated transaminases started on 5/10. No acute signs of blockage. s/p Gen surgery and AES eval -No obvious issues post-operatively on CT scan. MRCP and HIDA negative for any obstruction or bile leak. No evidence of post-operative complication. No surgical intervention. Due to acute elevation of transaminases and ALP and TB, possibly could be due to post surgical changes or ischemia injury from SVT episode. Less likely acute infection. No major CBD or pancreatic dilation so blockage less likely. Liver Doppler - Patent portal and hepatic vasculature.  No evidence for portal vein thrombus as clinically questioned. LFT trended down.   required IV morphine x 2 doses today. continue oral morphine 15mg q4h prnDischarge to [facility] for further management with hepatology f/u   5/16 LFTs trended down. Lipase WNL. c/o 10/10 abdominal pain. IV morphine held overnight and received LR 500ml bolus for SBP 90s. Pain management consulted.  difficulty swallowing pills,  transitioned  to oxycodone 5mg q 4h prn PO liquid. continue Robaxin 500mg TID.  Abdominal X ray - Prominent aerated bowel throughout the abdomen. Overall nonspecific pattern. No definite severe distension or air-fluid levels to suggest high-grade obstruction. Recommend continued follow-up. No obvious free intraperitoneal air.

## 2025-05-17 VITALS
SYSTOLIC BLOOD PRESSURE: 126 MMHG | OXYGEN SATURATION: 96 % | WEIGHT: 177 LBS | DIASTOLIC BLOOD PRESSURE: 79 MMHG | TEMPERATURE: 98 F | RESPIRATION RATE: 18 BRPM | HEART RATE: 76 BPM | BODY MASS INDEX: 31.36 KG/M2

## 2025-05-17 LAB
ABSOLUTE EOSINOPHIL (OHS): 0.18 K/UL
ABSOLUTE MONOCYTE (OHS): 0.72 K/UL (ref 0.3–1)
ABSOLUTE NEUTROPHIL COUNT (OHS): 3.09 K/UL (ref 1.8–7.7)
ALBUMIN SERPL BCP-MCNC: 2.6 G/DL (ref 3.5–5.2)
ALP SERPL-CCNC: 228 UNIT/L (ref 40–150)
ALT SERPL W/O P-5'-P-CCNC: 97 UNIT/L (ref 10–44)
ANION GAP (OHS): 5 MMOL/L (ref 8–16)
AST SERPL-CCNC: 33 UNIT/L (ref 11–45)
BASOPHILS # BLD AUTO: 0.02 K/UL
BASOPHILS NFR BLD AUTO: 0.3 %
BILIRUB SERPL-MCNC: 0.4 MG/DL (ref 0.1–1)
BUN SERPL-MCNC: 15 MG/DL (ref 8–23)
CALCIUM SERPL-MCNC: 9.1 MG/DL (ref 8.7–10.5)
CHLORIDE SERPL-SCNC: 103 MMOL/L (ref 95–110)
CO2 SERPL-SCNC: 29 MMOL/L (ref 23–29)
CREAT SERPL-MCNC: 0.7 MG/DL (ref 0.5–1.4)
ERYTHROCYTE [DISTWIDTH] IN BLOOD BY AUTOMATED COUNT: 15 % (ref 11.5–14.5)
GFR SERPLBLD CREATININE-BSD FMLA CKD-EPI: >60 ML/MIN/1.73/M2
GLUCOSE SERPL-MCNC: 88 MG/DL (ref 70–110)
HCT VFR BLD AUTO: 33.3 % (ref 37–48.5)
HGB BLD-MCNC: 10.6 GM/DL (ref 12–16)
IMM GRANULOCYTES # BLD AUTO: 0.01 K/UL (ref 0–0.04)
IMM GRANULOCYTES NFR BLD AUTO: 0.2 % (ref 0–0.5)
LYMPHOCYTES # BLD AUTO: 2.01 K/UL (ref 1–4.8)
MAGNESIUM SERPL-MCNC: 2 MG/DL (ref 1.6–2.6)
MCH RBC QN AUTO: 28.6 PG (ref 27–31)
MCHC RBC AUTO-ENTMCNC: 31.8 G/DL (ref 32–36)
MCV RBC AUTO: 90 FL (ref 82–98)
NUCLEATED RBC (/100WBC) (OHS): 0 /100 WBC
PHOSPHATE SERPL-MCNC: 3.7 MG/DL (ref 2.7–4.5)
PLATELET # BLD AUTO: 255 K/UL (ref 150–450)
PMV BLD AUTO: 9.8 FL (ref 9.2–12.9)
POTASSIUM SERPL-SCNC: 4.4 MMOL/L (ref 3.5–5.1)
PROT SERPL-MCNC: 6.4 GM/DL (ref 6–8.4)
RBC # BLD AUTO: 3.7 M/UL (ref 4–5.4)
RELATIVE EOSINOPHIL (OHS): 3 %
RELATIVE LYMPHOCYTE (OHS): 33.3 % (ref 18–48)
RELATIVE MONOCYTE (OHS): 11.9 % (ref 4–15)
RELATIVE NEUTROPHIL (OHS): 51.3 % (ref 38–73)
SODIUM SERPL-SCNC: 137 MMOL/L (ref 136–145)
WBC # BLD AUTO: 6.03 K/UL (ref 3.9–12.7)

## 2025-05-17 PROCEDURE — 85025 COMPLETE CBC W/AUTO DIFF WBC: CPT | Mod: HCNC | Performed by: STUDENT IN AN ORGANIZED HEALTH CARE EDUCATION/TRAINING PROGRAM

## 2025-05-17 PROCEDURE — 25000003 PHARM REV CODE 250: Mod: HCNC | Performed by: HOSPITALIST

## 2025-05-17 PROCEDURE — 36415 COLL VENOUS BLD VENIPUNCTURE: CPT | Mod: HCNC | Performed by: STUDENT IN AN ORGANIZED HEALTH CARE EDUCATION/TRAINING PROGRAM

## 2025-05-17 PROCEDURE — 25000242 PHARM REV CODE 250 ALT 637 W/ HCPCS: Mod: HCNC

## 2025-05-17 PROCEDURE — 83735 ASSAY OF MAGNESIUM: CPT | Mod: HCNC | Performed by: STUDENT IN AN ORGANIZED HEALTH CARE EDUCATION/TRAINING PROGRAM

## 2025-05-17 PROCEDURE — 84100 ASSAY OF PHOSPHORUS: CPT | Mod: HCNC | Performed by: STUDENT IN AN ORGANIZED HEALTH CARE EDUCATION/TRAINING PROGRAM

## 2025-05-17 PROCEDURE — 80053 COMPREHEN METABOLIC PANEL: CPT | Mod: HCNC | Performed by: STUDENT IN AN ORGANIZED HEALTH CARE EDUCATION/TRAINING PROGRAM

## 2025-05-17 PROCEDURE — 25000003 PHARM REV CODE 250: Mod: HCNC | Performed by: STUDENT IN AN ORGANIZED HEALTH CARE EDUCATION/TRAINING PROGRAM

## 2025-05-17 PROCEDURE — 63600175 PHARM REV CODE 636 W HCPCS: Mod: HCNC | Performed by: HOSPITALIST

## 2025-05-17 RX ORDER — POLYETHYLENE GLYCOL 3350 17 G/17G
17 POWDER, FOR SOLUTION ORAL DAILY
Qty: 238 G | Refills: 0 | Status: SHIPPED | OUTPATIENT
Start: 2025-05-17 | End: 2025-05-31

## 2025-05-17 RX ORDER — METHOCARBAMOL 500 MG/1
500 TABLET, FILM COATED ORAL 3 TIMES DAILY
Qty: 30 TABLET | Refills: 0 | Status: SHIPPED | OUTPATIENT
Start: 2025-05-17 | End: 2025-05-27

## 2025-05-17 RX ORDER — METOPROLOL SUCCINATE 50 MG/1
50 TABLET, EXTENDED RELEASE ORAL DAILY
Qty: 90 TABLET | Refills: 3 | Status: SHIPPED | OUTPATIENT
Start: 2025-05-17 | End: 2026-05-17

## 2025-05-17 RX ORDER — SIMETHICONE 80 MG
80 TABLET,CHEWABLE ORAL 3 TIMES DAILY PRN
Qty: 30 TABLET | Refills: 0 | Status: SHIPPED | OUTPATIENT
Start: 2025-05-17 | End: 2025-05-27

## 2025-05-17 RX ORDER — OXYCODONE HCL 5 MG/5 ML
5 SOLUTION, ORAL ORAL EVERY 4 HOURS PRN
Qty: 210 ML | Refills: 0 | Status: SHIPPED | OUTPATIENT
Start: 2025-05-17 | End: 2025-05-24

## 2025-05-17 RX ADMIN — PANTOPRAZOLE SODIUM 40 MG: 40 TABLET, DELAYED RELEASE ORAL at 09:05

## 2025-05-17 RX ADMIN — OXYCODONE HYDROCHLORIDE 5 MG: 5 SOLUTION ORAL at 09:05

## 2025-05-17 RX ADMIN — METOPROLOL SUCCINATE 50 MG: 50 TABLET, EXTENDED RELEASE ORAL at 09:05

## 2025-05-17 RX ADMIN — OXYCODONE HYDROCHLORIDE 5 MG: 5 SOLUTION ORAL at 03:05

## 2025-05-17 RX ADMIN — ONDANSETRON 4 MG: 2 INJECTION INTRAMUSCULAR; INTRAVENOUS at 09:05

## 2025-05-17 RX ADMIN — PROCHLORPERAZINE EDISYLATE 5 MG: 5 INJECTION INTRAMUSCULAR; INTRAVENOUS at 03:05

## 2025-05-17 RX ADMIN — OXYCODONE HYDROCHLORIDE 5 MG: 5 SOLUTION ORAL at 05:05

## 2025-05-17 RX ADMIN — METHOCARBAMOL 500 MG: 500 TABLET ORAL at 09:05

## 2025-05-17 NOTE — PLAN OF CARE
[facility] - Med Surg  Discharge Final Note    Primary Care Provider: Marcio Calderon MD    Expected Discharge Date: 5/17/2025    Final Discharge Note (most recent)       Final Note - 05/17/25 1001          Final Note    Assessment Type Final Discharge Note     Anticipated Discharge Disposition Home or Self Care     What phone number can be called within the next 1-3 days to see how you are doing after discharge? [phone]     Hospital Resources/Appts/Education Provided Provided education on problems/symptoms using teachback        Post-Acute Status    Post-Acute Authorization Other     Other Status No Post-Acute Service Needs     Discharge Delays None known at this time                     Important Message from Medicare  Important Message from Medicare regarding Discharge Appeal Rights: Given to patient/caregiver, Explained to patient/caregiver, Signed/date by patient/caregiver     Date IMM was signed: 05/15/25  Time IMM was signed: 1320    Contact Info       Marcio Calderon MD   Specialty: Family Medicine   Relationship: PCP - General    [address] [place] [address]   Phone: [phone]       Next Steps: Follow up          Discharge Plan A and Plan B have been determined by review of patient's clinical status, future medical and therapeutic needs, and coverage/benefits for post-acute care in coordination with multidisciplinary team members.   Future Appointments   Date Time Provider Department Center   5/19/2025  1:00 PM Irena Hooper MD [facility]   5/20/2025  2:45 PM Bebeto Cohen MD [facility]   5/21/2025 12:20 PM Marcio Calderon MD [facility]   7/30/2025  9:30 AM Pam Guaman FNP-C [facility]        Cm spoke with patient at bedside, reviewed discharge POC and is agreeable to discharge. Denies any other Cm needs at his time. Bedside/virtual nurse to review discharge paperwork and instructions. Family to  assist with transportation home.     Jocelyn Villanueva RN  Case Management  [phone]

## 2025-05-17 NOTE — PLAN OF CARE
Hospital follow-up appointment was scheduled by [facility].  Patient follow-up appointment was scheduled for 5/21/25 at 12:20 pm at [facility].      Diomedes FAUSTIN, RSW  Case Management

## 2025-05-17 NOTE — ASSESSMENT & PLAN NOTE
Presenting as outside hospital transfer with RUQ pain, elevated LFTs after recent cholecystectomy 5/7  - CT abd notes Mild fat stranding and fluid within the gallbladder fossa and borderline dilatation of the CBD without intrahepatic biliary dilatation, presumed within normal limits for recent post cholecystectomy  - MRCP unrevealing   - HIDA scan unrevealing  - General surgery consulted  -- No evidence of post-operative complication   -- No surgical intervention  -- Signed off  - Advanced endoscopy consulted  -- MRCP and HIDA negative for any obstruction or bile leak. Signed off.  - Hepatology consulted  -- Possibly could be due to post surgical changes or ischemia injury from pt's SVT episode. Less likely acute infection. No major CBD or pancreatic dilation so blockage less likely.   - Trend LFTs  - Pain control  - Liver doppler ordered  - Plan to transfer back to [facility] for continued management given final consultant recommendations  5/15 PMHx HTN, HLD, and HFpEF who presents to the ED as transfer from [facility] inpatient for abdominal pain and elevated LFTs after recent cholecystectomy 5/7.  Elevated transaminases started on 5/10. No acute signs of blockage. s/p Gen surgery and AES eval -No obvious issues post-operatively on CT scan. MRCP and HIDA negative for any obstruction or bile leak. No evidence of post-operative complication. No surgical intervention. Due to acute elevation of transaminases and ALP and TB, possibly could be due to post surgical changes or ischemia injury from SVT episode. Less likely acute infection. No major CBD or pancreatic dilation so blockage less likely. Liver Doppler - Patent portal and hepatic vasculature.  No evidence for portal vein thrombus as clinically questioned. LFT trended down.  Discharge to [facility] for further management with hepatology f/u     Presenting as outside hospital transfer with RUQ pain, elevated LFTs after recent cholecystectomy 5/7  -  CT abd notes Mild fat stranding and fluid within the gallbladder fossa and borderline dilatation of the CBD without intrahepatic biliary dilatation, presumed within normal limits for recent post cholecystectomy  - MRCP unrevealing   - HIDA scan unrevealing  - General surgery consulted  -- No evidence of post-operative complication   -- No surgical intervention  -- Signed off  - Advanced endoscopy consulted  -- MRCP and HIDA negative for any obstruction or bile leak. Signed off.  - Hepatology consulted  -- Possibly could be due to post surgical changes or ischemia injury from pt's SVT episode. Less likely acute infection. No major CBD or pancreatic dilation so blockage less likely.   - Trend LFTs  - Pain control  - Liver doppler ordered  - Plan to transfer back to [facility] for continued management given final consultant recommendations        Presenting as outside hospital transfer with RUQ pain, elevated LFTs after recent cholecystectomy 5/7  - CT abd notes Mild fat stranding and fluid within the gallbladder fossa and borderline dilatation of the CBD without intrahepatic biliary dilatation, presumed within normal limits for recent post cholecystectomy  - MRCP unrevealing   - HIDA scan unrevealing  - General surgery consulted  -- No evidence of post-operative complication   -- No surgical intervention  -- Signed off  - Advanced endoscopy consulted  -- MRCP and HIDA negative for any obstruction or bile leak. Signed off.  - Hepatology consulted  -- Possibly could be due to post surgical changes or ischemia injury from pt's SVT episode. Less likely acute infection. No major CBD or pancreatic dilation so blockage less likely.   - Trend LFTs  - Pain control  - Liver doppler ordered  - Plan to transfer back to [facility] for continued management given final consultant recommendations    5/15 PMHx HTN, HLD, and HFpEF who presents to the ED as transfer from [facility] inpatient for abdominal pain and elevated  LFTs after recent cholecystectomy 5/7.  Elevated transaminases started on 5/10. No acute signs of blockage. s/p Gen surgery and AES eval -No obvious issues post-operatively on CT scan. MRCP and HIDA negative for any obstruction or bile leak. No evidence of post-operative complication. No surgical intervention. Due to acute elevation of transaminases and ALP and TB, possibly could be due to post surgical changes or ischemia injury from SVT episode. Less likely acute infection. No major CBD or pancreatic dilation so blockage less likely. Liver Doppler - Patent portal and hepatic vasculature.  No evidence for portal vein thrombus as clinically questioned. LFT trended down.   required IV morphine x 2 doses today. continue oral morphine 15mg q4h prnDischarge to [facility] for further management with hepatology f/u   5/16 LFTs trended down. Lipase WNL. c/o 10/10 abdominal pain. IV morphine held overnight and received LR 500ml bolus for SBP 90s. Pain management consulted.  difficulty swallowing pills,  transitioned  to oxycodone 5mg q 4h prn PO liquid. continue Robaxin 500mg TID.  Abdominal X ray - Prominent aerated bowel throughout the abdomen. Overall nonspecific pattern. No definite severe distension or air-fluid levels to suggest high-grade obstruction. Recommend continued follow-up. No obvious free intraperitoneal air.

## 2025-05-17 NOTE — DISCHARGE INSTRUCTIONS
Our goal at [facility] is to always give you outstanding care and exceptional service. You may receive a survey from [employer] by mail, text or e-mail in the next 24-48 hours asking about the care you received with us. The survey should only take 5-10 minutes to complete and is very important to us.     Your feedback provides us with a way to recognize our staff who work tirelessly to provide the best care! Also, your responses help us learn how to improve when your experience was below our aspiration of excellence. We are always looking for ways to improve your stay. We WILL use your feedback to continue making improvements to help us provide the highest quality care. We keep your personal information and feedback confidential. We appreciate your time completing this survey and can't wait to hear from you!!!    We look forward to your continued care with us! Thanks so much for choosing [facility] for your healthcare needs!

## 2025-05-17 NOTE — ASSESSMENT & PLAN NOTE
5/16 LFTs trended down. Lipase WNL. c/o 10/10 abdominal pain. IV morphine held overnight and received LR 500ml bolus for SBP 90s. Pain management consulted.  5/17 did not require IV opiates overnight. pain controlled on liquid oxycodone.  Discharge home today.

## 2025-05-17 NOTE — ASSESSMENT & PLAN NOTE
Presenting as outside hospital transfer with RUQ pain, elevated LFTs after recent cholecystectomy 5/7  - CT abd notes Mild fat stranding and fluid within the gallbladder fossa and borderline dilatation of the CBD without intrahepatic biliary dilatation, presumed within normal limits for recent post cholecystectomy  - MRCP unrevealing   - HIDA scan unrevealing  - General surgery consulted  -- No evidence of post-operative complication   -- No surgical intervention  -- Signed off  - Advanced endoscopy consulted  -- MRCP and HIDA negative for any obstruction or bile leak. Signed off.  - Hepatology consulted  -- Possibly could be due to post surgical changes or ischemia injury from pt's SVT episode. Less likely acute infection. No major CBD or pancreatic dilation so blockage less likely.   - Trend LFTs  - Pain control  - Liver doppler ordered  - Plan to transfer back to [facility] for continued management given final consultant recommendations  5/15 PMHx HTN, HLD, and HFpEF who presents to the ED as transfer from [facility] inpatient for abdominal pain and elevated LFTs after recent cholecystectomy 5/7.  Elevated transaminases started on 5/10. No acute signs of blockage. s/p Gen surgery and AES eval -No obvious issues post-operatively on CT scan. MRCP and HIDA negative for any obstruction or bile leak. No evidence of post-operative complication. No surgical intervention. Due to acute elevation of transaminases and ALP and TB, possibly could be due to post surgical changes or ischemia injury from SVT episode. Less likely acute infection. No major CBD or pancreatic dilation so blockage less likely. Liver Doppler - Patent portal and hepatic vasculature.  No evidence for portal vein thrombus as clinically questioned. LFT trended down.  Discharge to [facility] for further management with hepatology f/u     Presenting as outside hospital transfer with RUQ pain, elevated LFTs after recent cholecystectomy 5/7  -  CT abd notes Mild fat stranding and fluid within the gallbladder fossa and borderline dilatation of the CBD without intrahepatic biliary dilatation, presumed within normal limits for recent post cholecystectomy  - MRCP unrevealing   - HIDA scan unrevealing  - General surgery consulted  -- No evidence of post-operative complication   -- No surgical intervention  -- Signed off  - Advanced endoscopy consulted  -- MRCP and HIDA negative for any obstruction or bile leak. Signed off.  - Hepatology consulted  -- Possibly could be due to post surgical changes or ischemia injury from pt's SVT episode. Less likely acute infection. No major CBD or pancreatic dilation so blockage less likely.   - Trend LFTs  - Pain control  - Liver doppler ordered  - Plan to transfer back to [facility] for continued management given final consultant recommendations        Presenting as outside hospital transfer with RUQ pain, elevated LFTs after recent cholecystectomy 5/7  - CT abd notes Mild fat stranding and fluid within the gallbladder fossa and borderline dilatation of the CBD without intrahepatic biliary dilatation, presumed within normal limits for recent post cholecystectomy  - MRCP unrevealing   - HIDA scan unrevealing  - General surgery consulted  -- No evidence of post-operative complication   -- No surgical intervention  -- Signed off  - Advanced endoscopy consulted  -- MRCP and HIDA negative for any obstruction or bile leak. Signed off.  - Hepatology consulted  -- Possibly could be due to post surgical changes or ischemia injury from pt's SVT episode. Less likely acute infection. No major CBD or pancreatic dilation so blockage less likely.   - Trend LFTs  - Pain control  - Liver doppler ordered  - Plan to transfer back to [facility] for continued management given final consultant recommendations    5/15 PMHx HTN, HLD, and HFpEF who presents to the ED as transfer from [facility] inpatient for abdominal pain and elevated  LFTs after recent cholecystectomy 5/7.  Elevated transaminases started on 5/10. No acute signs of blockage. s/p Gen surgery and AES eval -No obvious issues post-operatively on CT scan. MRCP and HIDA negative for any obstruction or bile leak. No evidence of post-operative complication. No surgical intervention. Due to acute elevation of transaminases and ALP and TB, possibly could be due to post surgical changes or ischemia injury from SVT episode. Less likely acute infection. No major CBD or pancreatic dilation so blockage less likely. Liver Doppler - Patent portal and hepatic vasculature.  No evidence for portal vein thrombus as clinically questioned. LFT trended down.   required IV morphine x 2 doses today. continue oral morphine 15mg q4h prnDischarge to [facility] for further management with hepatology f/u   5/16 LFTs trended down. Lipase WNL. c/o 10/10 abdominal pain. IV morphine held overnight and received LR 500ml bolus for SBP 90s. Pain management consulted.  difficulty swallowing pills,  transitioned  to oxycodone 5mg q 4h prn PO liquid. continue Robaxin 500mg TID.  Abdominal X ray - Prominent aerated bowel throughout the abdomen. Overall nonspecific pattern. No definite severe distension or air-fluid levels to suggest high-grade obstruction. Recommend continued follow-up. No obvious free intraperitoneal air.

## 2025-05-17 NOTE — PLAN OF CARE
Problem: Adult Inpatient Plan of Care  Goal: Plan of Care Review  Outcome: Met  Goal: Patient-Specific Goal (Individualized)  Outcome: Met  Goal: Absence of Hospital-Acquired Illness or Injury  Outcome: Met  Goal: Optimal Comfort and Wellbeing  Outcome: Met  Goal: Readiness for Transition of Care  Outcome: Met     Problem: Wound  Goal: Optimal Coping  Outcome: Met  Goal: Optimal Functional Ability  Outcome: Met  Goal: Absence of Infection Signs and Symptoms  Outcome: Met  Goal: Improved Oral Intake  Outcome: Met  Goal: Optimal Pain Control and Function  Outcome: Met  Goal: Skin Health and Integrity  Outcome: Met  Goal: Optimal Wound Healing  Outcome: Met     Problem: Pain Acute  Goal: Optimal Pain Control and Function  Outcome: Met     Problem: Nausea and Vomiting  Goal: Nausea and Vomiting Relief  Outcome: Met

## 2025-05-17 NOTE — ANESTHESIA POST-OP PAIN MANAGEMENT
Consult  Anesthesiology Acute Pain Service      SUBJECTIVE:     Chief Complaint/Reason for Consult: Request anesthesia assistance with continuous pain management due to high complexity/refractory pain.      History of Present Illness:  Patient is a 65 y.o. female with a PMH of HTN, HLD, and HFpEF who presents to the ED as transfer from [facility] inpatient for abdominal pain and elevated LFTs after recent cholecystectomy 5/7. [facility], now  s/p Gen surgery and AES eval -No obvious issues post-operatively on CT scan. MRCP and HIDA negative for any obstruction or bile leak. No evidence of post-operative complication. No surgical intervention.     Anesthesiology is consulted for uncontrolled pain on current inpatient regimen. Of note patient endorses pain is concentrated in RUQ with radiation to the R mid back. Rebound tenderness in this location present. Per HM attending, attempted to transition patient from IV morphine to PO oxy, this was not tolerated. Patient requested to put back on IV medication. Upon exam this AM patient also notes difficulty swallowing pills.      The current inpatient regimen is:  Robaxin 500mg TID   Oxycodone 5mg oral solution q4 prn      Patient has utilized IV morphine prn twice in 24 hours, and has utilized the PO morphine prn twice in 24 hours         Problem List[1]    Review of patient's allergies indicates:   Allergen Reactions    Naproxen Rash       Outpatient Medications:   Medications Ordered Prior to Encounter[2]     Current Inpatient Medications:   lisinopriL  20 mg Oral Daily    methocarbamoL  500 mg Oral TID    metoprolol succinate  50 mg Oral Daily    pantoprazole  40 mg Oral Daily    QUEtiapine  300 mg Oral QHS       PRN:    Current Facility-Administered Medications:     acetaminophen, 650 mg, Oral, Q4H PRN    dextrose 50%, 12.5 g, Intravenous, PRN    dextrose 50%, 25 g, Intravenous, PRN    glucagon (human recombinant), 1 mg, Intramuscular,  PRN    glucose, 16 g, Oral, PRN    glucose, 24 g, Oral, PRN    melatonin, 6 mg, Oral, Nightly PRN    naloxone, 0.02 mg, Intravenous, PRN    ondansetron, 4 mg, Intravenous, Q8H PRN    oxyCODONE, 5 mg, Oral, Q4H PRN    prochlorperazine, 5 mg, Intravenous, Q6H PRN    simethicone, 1 tablet, Oral, TID PRN    sodium chloride 0.9%, 10 mL, Intravenous, PRN    Past Surgical History:   Procedure Laterality Date    APPENDECTOMY      ARTHROPLASTY, KNEE, TOTAL - Left Left 05/27/2024    BLOCK, SPINAL NERVE ROOT, LUMBAR, SELECTIVE Bilateral 12/28/2023    L3, L4, L5    COLONOSCOPY N/A 05/22/2019    Procedure: COLONOSCOPY;  Surgeon: Sim Quinteros MD;  Location: [facility];  Service: Endoscopy;  Laterality: N/A;    COLONOSCOPY N/A 06/20/2022    Procedure: COLONOSCOPY WITH POLYPECTOMY AND CLIPPING;  Surgeon: Jarrod Franco MD;  Location: [facility];  Service: Endoscopy;  Laterality: N/A;    COLONOSCOPY N/A 03/19/2024    Procedure: COLONOSCOPY;  Surgeon: Sim Quinteros MD;  Location: [facility];  Service: Endoscopy;  Laterality: N/A;    COLONOSCOPY W/ POLYPECTOMY      EGD, WITH CLOSED BIOPSY  02/17/2025    ESOPHAGEAL DILATION N/A 05/13/2019    Procedure: DILATION, ESOPHAGUS;  Surgeon: Sim Quinteros MD;  Location: [facility];  Service: Endoscopy;  Laterality: N/A;    ESOPHAGEAL DILATION N/A 02/09/2021    Procedure: DILATION, ESOPHAGUS;  Surgeon: Sim Quinteros MD;  Location: [facility];  Service: Endoscopy;  Laterality: N/A;    ESOPHAGEAL DILATION N/A 12/14/2021    Procedure: DILATION, ESOPHAGUS;  Surgeon: Sim Quinteros MD;  Location: [facility];  Service: Endoscopy;  Laterality: N/A;    ESOPHAGEAL DILATION N/A 05/17/2022    Procedure: DILATION, ESOPHAGUS;  Surgeon: Sim Quinteros MD;  Location: [facility];  Service: Endoscopy;  Laterality: N/A;    ESOPHAGEAL DILATION N/A 12/26/2023    Procedure: DILATION, ESOPHAGUS;  Surgeon: Sim Quinteros MD;  Location: [facility];  Service: Endoscopy;  Laterality: N/A;    ESOPHAGEAL DILATION  N/A 07/25/2024    Procedure: DILATION, ESOPHAGUS;  Surgeon: Sim Quinteros MD;  Location: [facility] ENDO;  Service: Endoscopy;  Laterality: N/A;    ESOPHAGOGASTRODUODENOSCOPY N/A 05/13/2019    Procedure: EGD (ESOPHAGOGASTRODUODENOSCOPY);  Surgeon: Sim Quinteros MD;  Location: [facility] ENDO;  Service: Endoscopy;  Laterality: N/A;    ESOPHAGOGASTRODUODENOSCOPY N/A 02/09/2021    Procedure: EGD (ESOPHAGOGASTRODUODENOSCOPY);  Surgeon: Sim Quinteros MD;  Location: [facility] ENDO;  Service: Endoscopy;  Laterality: N/A;    ESOPHAGOGASTRODUODENOSCOPY N/A 12/14/2021    Procedure: EGD (ESOPHAGOGASTRODUODENOSCOPY);  Surgeon: Sim Quinteros MD;  Location: [facility] ENDO;  Service: Endoscopy;  Laterality: N/A;    ESOPHAGOGASTRODUODENOSCOPY N/A 05/17/2022    Procedure: EGD (ESOPHAGOGASTRODUODENOSCOPY);  Surgeon: Sim Quinteros MD;  Location: [facility] ENDO;  Service: Endoscopy;  Laterality: N/A;    ESOPHAGOGASTRODUODENOSCOPY N/A 12/26/2023    Procedure: EGD (ESOPHAGOGASTRODUODENOSCOPY);  Surgeon: Sim Quinteros MD;  Location: [facility] ENDO;  Service: Endoscopy;  Laterality: N/A;    ESOPHAGOGASTRODUODENOSCOPY N/A 07/25/2024    Procedure: EGD (ESOPHAGOGASTRODUODENOSCOPY);  Surgeon: Sim Quinteros MD;  Location: [facility] ENDO;  Service: Endoscopy;  Laterality: N/A;    ESOPHAGOGASTRODUODENOSCOPY N/A 02/17/2025    Procedure: EGD (ESOPHAGOGASTRODUODENOSCOPY);  Surgeon: Efrem Garcia MD;  Location: [facility] ENDO;  Service: Endoscopy;  Laterality: N/A;    HYSTERECTOMY      INJECTION OF ANESTHETIC AGENT AROUND MEDIAL BRANCH NERVES INNERVATING LUMBAR FACET JOINT Bilateral 12/14/2023    Procedure: Block-nerve-medial branch-lumbar----L3,L4,L5;  Surgeon: Iván Velasquez Jr., MD;  Location: [facility] PAIN MGMT;  Service: Pain Management;  Laterality: Bilateral;    INJECTION OF ANESTHETIC AGENT AROUND MEDIAL BRANCH NERVES INNERVATING LUMBAR FACET JOINT Bilateral 12/28/2023    Procedure: Block-nerve-medial branch-lumbar L3,L4,L5;  Surgeon: Iván Velasquez  YANNICK Moreno MD;  Location: [facility] PAIN MGMT;  Service: Pain Management;  Laterality: Bilateral;    INJECTION OF JOINT Left 05/01/2025    Procedure: Injection, Joint, Shoulder, Hip, Or Knee---LEFT INTRAARTICULAR HIP INJECTION UNDER FLUORO;  Surgeon: Iván Velasquez Jr., MD;  Location: [facility] PAIN MGMT;  Service: Pain Management;  Laterality: Left;  CONSENT DAY OF PROCEDURE    LAPAROSCOPIC NISSEN FUNDOPLICATION      RADIOFREQUENCY ABLATION, NERVE, SPINAL, LUMBOSACRAL Right 01/25/2024    Procedure: RADIOFREQUENCY ABLATION------L3,L4,L5;  Surgeon: Iván Velasquez Jr., MD;  Location: [facility] PAIN MGMT;  Service: Pain Management;  Laterality: Right;    REPAIR OF EXTENSOR TENDON Left 06/07/2022    Procedure: REPAIR, TENDON, EXTENSOR  ;  Surgeon: Dakota Anaya Jr., MD;  Location: [facility] OR;  Service: Plastics;  Laterality: Left;  THUMB    ROBOT-ASSISTED CHOLECYSTECTOMY USING DA SADA XI N/A 5/7/2025    Procedure: XI ROBOTIC CHOLECYSTECTOMY;  Surgeon: Bebeto Cohen MD;  Location: [facility] OR;  Service: General;  Laterality: N/A;    TOTAL KNEE ARTHROPLASTY Left 05/27/2024    Procedure: ARTHROPLASTY, KNEE, TOTAL;  Surgeon: Nigel Enriquez MD;  Location: [facility] OR;  Service: Orthopedics;  Laterality: Left;  olivia ortho, hip bumped, left TKA    UPPER GASTROINTESTINAL ENDOSCOPY N/A 01/24/2018       Social History[3]    Review of Systems:  As above. Additionally:     Constitutional: Negative for fever, diaphoresis, activity change, appetite change and fatigue.   HENT: Negative for congestion, postnasal drip, rhinorrhea, sinus pressure, sore throat, trouble swallowing and voice change.    Eyes: Negative for photophobia, pain and visual disturbance.   Respiratory: Negative for cough, shortness of breath and wheezing.    Cardiovascular: Negative for chest pain, palpitations and leg swelling.   Gastrointestinal: Negative for nausea, vomiting,  diarrhea, constipation and abdominal distention. Positive for abdominal pain    Endocrine: Negative for polydipsia, polyphagia and polyuria.   Genitourinary: Negative for dysuria, frequency, hematuria and difficulty urinating.   Musculoskeletal: Negative for back pain, arthralgias, neck pain and neck stiffness.   Skin: Negative for color change, pallor, rash and wound.   Neurological: Negative for dizziness, seizures, syncope, weakness and headaches.   Hematological: Negative for adenopathy. Does not bruise/bleed easily.   Psychiatric/Behavioral: Negative for sleep disturbance and dysphoric mood. The patient is not nervous/anxious.      OBJECTIVE:     Current Vital Signs  Temp: 36.3 °C (97.4 °F) (05/17/25 0514)  Pulse: 69 (05/17/25 0514)  Resp: 18 (05/17/25 0528)  BP: 104/62 (05/17/25 0514)  SpO2: (!) 93 % (05/17/25 0514)    Vital Signs Range (Last 24H):  Temp:  [36.3 °C (97.4 °F)-37.3 °C (99.1 °F)]   Pulse:  [69-82]   Resp:  [14-20]   BP: (102-125)/(62-79)   SpO2:  [93 %-100 %]     I & O (Last 24H):  Intake/Output Summary (Last 24 hours) at 5/17/2025 0653  Last data filed at 5/16/2025 2000  Gross per 24 hour   Intake 240 ml   Output --   Net 240 ml       Physical Exam:  Constitutional: Patient is oriented to person, place, and time. Patient appears well-developed and well-nourished. No distress.   HENT: Head: Normocephalic and atraumatic. Right Ear: External ear normal. Left Ear: External ear normal. Nose: Nose normal.   Mouth/Throat: Oropharynx is clear and moist. No oropharyngeal exudate.   Eyes: Conjunctivae and EOM are normal. Pupils are equal, round, and reactive to light. Right eye exhibits no discharge. Left eye exhibits no discharge. No scleral icterus.   Neck: Normal range of motion. Neck supple. No JVD present. No tracheal deviation present. No thyromegaly present.   Cardiovascular: Normal rate, regular rhythm, normal heart sounds and intact distal pulses.  Exam reveals no gallop and no friction rub.  No murmur heard.  Pulmonary/Chest: Effort normal and breath sounds normal. No  respiratory distress. Patient has no wheezes. Patient has no rales.   Abdominal: Soft. Bowel sounds are normal. Patient exhibits tenderness to RUQ with rebound tenderness and radiation to mid back.   Musculoskeletal: Normal range of motion. Patient exhibits no edema or tenderness.   Lymphadenopathy:   Patient has no cervical adenopathy.   Neurological: Patient is alert and oriented to person, place, and time. Patient has normal reflexes. Patient exhibits normal muscle tone. Coordination normal.   Skin: Skin is warm and dry. No rash noted. Patient is not diaphoretic. No erythema. No pallor.   Psychiatric: Patient has a normal mood and affect. Behavior is normal. Judgment and thought content normal.     Laboratory:  CBC:   Recent Labs     05/16/25 0455 05/17/25 0452   WBC 4.84 6.03   RBC 3.41* 3.70*   HGB 10.0* 10.6*   HCT 31.1* 33.3*    255   MCV 91 90   MCH 29.3 28.6   MCHC 32.2 31.8*       CMP:   Recent Labs     05/16/25 0455 05/17/25 0452    137   K 4.1 4.4   CO2 25 29    103   BUN 22 15   CREATININE 0.7 0.7    88   MG 1.8 2.0   PHOS 3.5 3.7   CALCIUM 8.5* 9.1   ALBUMIN 2.4* 2.6*   PROT 5.8* 6.4   ALKPHOS 262* 228*   * 97*   AST 75* 33   BILITOT 0.3 0.4       ASSESSMENT/PLAN:     Active Hospital Problems    Diagnosis  POA    *Acute postoperative pain of abdomen [G89.18, R10.9]  Yes    Hyperbilirubinemia [E80.6]  Yes    Transaminitis [R74.01]  Yes    SVT (supraventricular tachycardia) [I47.10]  Yes    Pain management [R52]  Yes    Hypertension [I10]  Yes      Resolved Hospital Problems   No resolved problems to display.         Plan:    Given patient minimal use of prns in last 24 hours and difficulty swallowing pills, plan to transition to oxy PO liquid at this time. Reports still having significant pain this morning. She had not been taking her oral Oxy q4. Encouraged patient to ask for it every 4 hours to see if we can get better control, she agreed.    Continue multimodal  pain regimen with the following adjustments   Robaxin 500mg TID   Oxy 5mg PO solution q4h prn    Thank you for including us in the care of this patient. Any further questions please contact APS team.                  [1]   Patient Active Problem List  Diagnosis    Acute postoperative pain of abdomen    Acute bilateral low back pain without sciatica    Hypertension    Change in bowel habits    Bronchospasm    Obesity (BMI 35.0-39.9 without comorbidity)    Dysphagia    Blood in the stool    COPD (chronic obstructive pulmonary disease)    History of esophageal dilatation    History of gastroesophageal reflux (GERD)    History of colonic polyps    Pain management    Sinusitis    Left knee pain    Cigarette nicotine dependence without complication    Personal history of nicotine dependence    Bipolar affective disorder, remission status unspecified    Acute on chronic respiratory failure with hypoxia and hypercapnia    Anemia    Hyperglycemia    Elevated lipase    Shortness of breath    Lumbosacral strain    Cephalalgia    Esophageal stenosis    Sleep apnea    MARISSA (obstructive sleep apnea)    Muscle cramps    Aortic atherosclerosis    Contact dermatitis    Primary osteoarthritis of left knee    Compression fracture of L1 vertebra with routine healing    Osteonecrosis of left knee region    Degenerative tear of left medial meniscus    Lumbar radiculopathy    Lumbar spondylosis    DDD (degenerative disc disease), lumbar    Bronchitis    Foreign body (FB) in soft tissue    Otalgia of right ear    Age-related cataract of both eyes    Hiatal hernia    Benign essential hypertension    Chronic headache disorder    Gout    Nicotine dependence    Prediabetes    NSTEMI (non-ST elevated myocardial infarction)    Elevated LFTs    Heart failure with mildly reduced ejection fraction (HFmrEF)    SVT (supraventricular tachycardia)    Precordial pain    Transaminitis    Type 2 myocardial infarction without ST elevation     Hyperbilirubinemia   [2]   Current Facility-Administered Medications on File Prior to Encounter   Medication Dose Route Frequency Provider Last Rate Last Admin    lactated ringers infusion   Intravenous Continuous Sim Quinteros MD 0 mL/hr at 12/14/21 0846 New Bag at 06/07/22 1151    lactated ringers infusion   Intravenous Continuous Sim Quinteros MD        sodium chloride 0.9% flush 10 mL  10 mL Intravenous PRN Sim Quinteros MD        sodium chloride 0.9% flush 10 mL  10 mL Intravenous PRN Sim Quinteros MD        sodium chloride 0.9% flush 10 mL  10 mL Intravenous PRN Sim Quinteros MD         Current Outpatient Medications on File Prior to Encounter   Medication Sig Dispense Refill    albuterol (PROVENTIL/VENTOLIN HFA) 90 mcg/actuation inhaler inhale TWO puffs into THE lungs EVERY 4 HOURS AS NEEDED 25.5 g 3    atorvastatin (LIPITOR) 40 MG tablet Take 1 tablet (40 mg total) by mouth once daily. 90 tablet 0    BREZTRI AEROSPHERE 160-9-4.8 mcg/actuation HFAA Two inhalations b.i.d. 10.7 g 5    cyproheptadine (PERIACTIN) 4 mg tablet 1 po q hs to increase weight 30 tablet 5    HYDROcodone-acetaminophen (NORCO) 7.5-325 mg per tablet Take 1 tablet by mouth every 6 (six) hours as needed for Pain. 20 tablet 0    lisinopriL (PRINIVIL,ZESTRIL) 20 MG tablet Take 1 tablet (20 mg total) by mouth once daily. 100 tablet 3    meclizine (ANTIVERT) 25 mg tablet Take 1 tablet (25 mg total) by mouth 3 (three) times daily as needed for Dizziness. 60 tablet 0    pantoprazole (PROTONIX) 40 MG tablet Take 1 tablet (40 mg total) by mouth once daily. 90 tablet 0    QUEtiapine (SEROQUEL) 300 MG Tab Take 300 mg by mouth every evening.      traMADoL (ULTRAM) 50 mg tablet Take 1 tablet (50 mg total) by mouth every 4 (four) hours as needed for Pain. 28 each 0   [3]   Social History  Socioeconomic History    Marital status: Single   Tobacco Use    Smoking status: Former     Current packs/day: 0.25     Types: Cigarettes    Smokeless  tobacco: Never   Substance and Sexual Activity    Alcohol use: Yes     Comment: occasionally    Drug use: No    Sexual activity: Yes     Partners: Female     Comment: rare     Social Drivers of Health     Financial Resource Strain: Low Risk  (5/12/2025)    Overall Financial Resource Strain (CARDIA)     Difficulty of Paying Living Expenses: Not hard at all   Food Insecurity: No Food Insecurity (5/12/2025)    Hunger Vital Sign     Worried About Running Out of Food in the Last Year: Never true     Ran Out of Food in the Last Year: Never true   Transportation Needs: No Transportation Needs (5/12/2025)    PRAPARE - Transportation     Lack of Transportation (Medical): No     Lack of Transportation (Non-Medical): No   Physical Activity: Sufficiently Active (5/12/2025)    Exercise Vital Sign     Days of Exercise per Week: 7 days     Minutes of Exercise per Session: 30 min   Stress: No Stress Concern Present (5/12/2025)    [employer] - Occupational Stress Questionnaire     Feeling of Stress : Not at all   Housing Stability: Low Risk  (5/12/2025)    Housing Stability Vital Sign     Unable to Pay for Housing in the Last Year: No     Number of Times Moved in the Last Year: 1     Homeless in the Last Year: No

## 2025-05-17 NOTE — PROGRESS NOTES
[facility] Medicine  Progress Note    Patient Name: Wilfredo Vazquez  MRN: [id number]  Patient Class: IP- Inpatient   Admission Date: 5/11/2025  Length of Stay: 6 days  Attending Physician: Mayito Stout MD  Primary Care Provider: Marcio Calderon MD        Subjective     Principal Problem:Acute postoperative pain of abdomen        HPI:  64 yo F with PMHx HTN, HLD, and HFpEF who presents to the ED as transfer from [facility] inpatient for abdominal pain and elevated LFTs after recent cholecystectomy 5/7. Pt. Presented to [facility] ED May 10 with palpitations and chest pain.  In the emergency department she had SVT with heart rate in the 200s.  She was cardioverted to a sinus rhythm in ED. CTA of the chest and CT abdomen and pelvis did not show acute pathology.  AST and ALT were elevated on presentation.  She was admitted to the ICU for the episode SVT and elevated troponin.  With borderline BP, rate-controlling agents were held.  She was seen by Cardiology with recommendations of adding beta blockade. On May 11 she developed severe abdominal pain with tense abdomen and high-pitched bowel sounds with rebound and guarding. On exam, she had abdominal tenderness and distention along with guarding and rebound.  CTA of the abdomen and pelvis did not show evidence of acute GI bleed or obvious abnormalities of the abdominal vasculature. Transfer center contacted AES at [facility] who recommended MRCP.  With the severe, persistent abdominal pain/abdominal distention despite narcotic medications, Hospital Medicine at [facility] spoke with General Surgery at [facility], recommendation was for transfer to [facility] for General Surgery and potentially AES evaluations.      Pt. Reports to me abdominal pain has improved significantly. She was given one PO oxycodone in ED and has not required further medications since presentation.    Overview/Hospital  Course:  General surgery and advanced endoscopy consulted.  MRCP and HIDA negative for any obstruction or bile leak.  No intervention per General surgery on advanced endoscopy.  Hepatology consulted and patients LFTs trended.  Patient with continued refractory pain requiring IV pain medications.  Ultrasound liver Doppler ordered.    5/15 PMHx HTN, HLD, and HFpEF who presents to the ED as transfer from [facility] inpatient for abdominal pain and elevated LFTs after recent cholecystectomy 5/7.  Elevated transaminases started on 5/10. No acute signs of blockage. s/p Gen surgery and AES eval -No obvious issues post-operatively on CT scan. MRCP and HIDA negative for any obstruction or bile leak. No evidence of post-operative complication. No surgical intervention. Due to acute elevation of transaminases and ALP and TB, possibly could be due to post surgical changes or ischemia injury from SVT episode. Less likely acute infection. No major CBD or pancreatic dilation so blockage less likely. Liver Doppler - Patent portal and hepatic vasculature.  No evidence for portal vein thrombus as clinically questioned. LFT trended down.  Discharge to [facility] for further management with hepatology f/u . reports 10/10 abdominal pain. required IV morphine x 2 doses today. continue oral morphine 15mg q4h prn. reports no relief with po morphine - requests IV morphine.   5/16 LFTs trended down. Lipase WNL. c/o 10/10 abdominal pain. IV morphine held overnight and received LR 500ml bolus for SBP 90s. Pain management consulted.  difficulty swallowing pills,  transitioned  to oxycodone 5mg q 4h prn PO liquid. continue Robaxin 500mg TID.  Abdominal X ray - Prominent aerated bowel throughout the abdomen. Overall nonspecific pattern. No definite severe distension or air-fluid levels to suggest high-grade obstruction. Recommend continued follow-up. No obvious free intraperitoneal air. started simethicone   5/17 did not require IV  opiates overnight. pain controlled on liquid oxycodone.  Discharge home today.           Review of Systems:   Pain scale:8/10   Constitutional:  fever,  chills, headache, vision loss, hearing loss, weight loss, Generalized weakness, falls, loss of smell, loss of taste, poor appetite,  sore throat  Respiratory: cough, shortness of breath.   Cardiovascular: chest pain, dizziness, palpitations, orthopnea, swelling of feet, syncope  Gastrointestinal: nausea, vomiting, abdominal pain, diarrhea, black stool,  blood in stool, change in bowel habits, constipation  Genitourinary: hematuria, dysuria, urgency, frequency  Integument/Breast: rash,  pruritis  Hematologic/Lymphatic: easy bruising, lymphadenopathy  Musculoskeletal: arthralgias , myalgias, back pain, neck pain, knee pain  Neurological: confusion, seizures, tremors, slurred speech  Behavioral/Psych:  depression, anxiety, auditory or visual hallucinations     OBJECTIVE:     Physical Exam:  Body mass index is 31.36 kg/m².    Constitutional: Appears well-developed and well-nourished. obesity   Head: Normocephalic and atraumatic.   Neck: Normal range of motion. Neck supple.   Cardiovascular: Normal heart rate.  Regular heart rhythm.  Pulmonary/Chest: Effort normal.   Abdominal: No distension.  Right sided abdominal tenderness  Musculoskeletal: Normal range of motion. No edema.   Neurological: Alert and oriented to person, place, and time.   Skin: Skin is warm and dry.   Psychiatric: Normal mood and affect. Behavior is normal.                  Vital Signs  Temp: 97.4 °F (36.3 °C) (05/17/25 0514)  Pulse: 69 (05/17/25 0514)  Resp: 18 (05/17/25 0528)  BP: 104/62 (05/17/25 0514)  SpO2: (!) 93 % (05/17/25 0514)     24 Hour VS Range    Temp:  [97.4 °F (36.3 °C)-99.1 °F (37.3 °C)]   Pulse:  [69-82]   Resp:  [14-20]   BP: (102-125)/(62-79)   SpO2:  [93 %-100 %]     Intake/Output Summary (Last 24 hours) at 5/17/2025 0656  Last data filed at 5/16/2025 2000  Gross per 24 hour  "  Intake 240 ml   Output --   Net 240 ml         I/O This Shift:  I/O this shift:  In: 240 [P.O.:240]  Out: -     Wt Readings from Last 3 Encounters:   05/14/25 80.3 kg (177 lb 0.5 oz)   05/10/25 80.4 kg (177 lb 4 oz)   05/07/25 80.4 kg (177 lb 5.8 oz)       I have personally reviewed the vitals and recorded Intake/Output     Laboratory/Diagnostic Data:    CBC/Anemia Labs: Coags:    Recent Labs   Lab 05/15/25  0643 05/16/25  0455 05/17/25  0452   WBC 7.22 4.84 6.03   HGB 11.4* 10.0* 10.6*   HCT 35.3* 31.1* 33.3*    223 255   MCV 90 91 90   RDW 15.1* 15.1* 15.0*    No results for input(s): "PT", "INR", "APTT" in the last 168 hours.     Chemistries: ABG:   Recent Labs   Lab 05/15/25  0643 05/16/25  0455 05/17/25  0452    137 137   K 4.8 4.1 4.4    106 103   CO2 27 25 29   BUN 19 22 15   CREATININE 0.7 0.7 0.7   CALCIUM 8.3* 8.5* 9.1   PROT 6.4 5.8* 6.4   BILITOT 0.4 0.3 0.4   ALKPHOS 271* 262* 228*   * 126* 97*   AST 30 75* 33   MG 1.9 1.8 2.0   PHOS 3.3 3.5 3.7    No results for input(s): "PH", "PCO2", "PO2", "HCO3", "POCSATURATED", "BE" in the last 168 hours.     POCT Glucose: HbA1c:    No results for input(s): "POCTGLUCOSE" in the last 168 hours. Hemoglobin A1C   Date Value Ref Range Status   11/14/2023 5.3 4.0 - 5.6 % Final     Comment:     ADA Screening Guidelines:  5.7-6.4%  Consistent with prediabetes  >or=6.5%  Consistent with diabetes    High levels of fetal hemoglobin interfere with the HbA1C  assay. Heterozygous hemoglobin variants (HbS, HgC, etc)do  not significantly interfere with this assay.   However, presence of multiple variants may affect accuracy.          Cardiac Enzymes: Ejection Fractions:    No results for input(s): "CPK", "CPKMB", "MB", "TROPONINI" in the last 72 hours. EF   Date Value Ref Range Status   04/08/2025 45 % Final   04/28/2022 55 % Final          No results for input(s): "COLORU", "APPEARANCEUA", "PHUR", "SPECGRAV", "PROTEINUA", "GLUCUA", "KETONESU", " ""BILIRUBINUA", "OCCULTUA", "NITRITE", "UROBILINOGEN", "LEUKOCYTESUR", "RBCUA", "WBCUA", "BACTERIA", "SQUAMEPITHEL", "HYALINECASTS" in the last 48 hours.    Invalid input(s): "WRIGHTSUR"    Lactic Acid Level (mmol/L)   Date Value   05/11/2025 0.9     Lactate (Lactic Acid) (mmol/L)   Date Value   07/01/2020 1.1   01/03/2018 1.6     BNP (pg/mL)   Date Value   05/10/2025 23   04/27/2025 <10   04/08/2025 40   02/15/2025 36   08/12/2024 <10   11/30/2023 16   09/21/2022 18   08/10/2022 25     CRP   Date Value   06/09/2022 53.8 mg/L (H)   07/01/2020 <0.50 mg/dL     Sed Rate (mm/Hr)   Date Value   11/14/2023 25 (H)   07/17/2018 41 (H)     D-Dimer (mg/L FEU)   Date Value   02/15/2025 0.31   08/12/2024 1.06 (H)   05/03/2022 0.97 (H)   04/27/2022 0.78 (H)   07/01/2020 0.60 (H)   03/24/2018 0.41     Ferritin (ng/mL)   Date Value   07/01/2020 28     LD (U/L)   Date Value   07/01/2020 180     Troponin I (ng/mL)   Date Value   02/16/2025 0.049 (H)   02/15/2025 0.048 (H)   02/15/2025 0.079 (H)   08/12/2024 <0.006   11/30/2023 <0.006   11/14/2023 <0.006   09/21/2022 0.011   08/10/2022 <0.006     Troponin-I (ng/mL)   Date Value   05/11/2025 0.164 (H)   05/10/2025 0.280 (H)   05/10/2025 0.151 (H)   05/10/2025 0.028 (H)   04/08/2025 0.037 (H)   04/08/2025 0.039 (H)   04/08/2025 0.042 (H)   04/08/2025 0.058 (H)     CPK (U/L)   Date Value   04/27/2025 45   08/12/2024 37   07/01/2020 116   03/24/2018 74   03/24/2018 86   12/09/2017 140     CK (U/L)   Date Value   02/24/2024 36   06/12/2022 48     No results found for this or any previous visit.  SARS-CoV2 (COVID-19) Qualitative PCR (no units)   Date Value   04/23/2022 Not Detected   02/06/2021 Not Detected     SARS-CoV-2 RNA, Amplification, Qual (no units)   Date Value   07/01/2020 Negative     POC Rapid COVID (no units)   Date Value   02/15/2025 Negative   08/12/2024 Negative   08/12/2024 Negative   02/22/2024 Negative   02/12/2024 Negative   11/30/2023 Negative   02/06/2023 Negative "   06/09/2022 Negative   05/05/2022 Negative   01/04/2022 Positive (A)       Microbiology labs for the last week  Microbiology Results (last 7 days)       ** No results found for the last 168 hours. **            Reviewed and noted in plan where applicable- Please see chart for full lab data.    Lines/Drains:       Peripheral IV - Single Lumen 05/14/25 1542 20 G Distal;Posterior;Right Forearm (Active)   Site Assessment Clean;Dry;Intact;No redness;No swelling 05/14/25 2000   Line Securement Device Secured with sutureless device 05/14/25 2000   Extremity Assessment Distal to IV No abnormal discoloration;No redness;No swelling;No warmth 05/14/25 2000   Line Status Blood return noted 05/14/25 2000   Dressing Status Clean;Dry;Intact 05/14/25 2000   Dressing Intervention Integrity maintained 05/14/25 2000   Dressing Change Due 05/18/25 05/14/25 1542   Site Change Due 05/18/25 05/14/25 2000   Number of days: 0       Imaging      Results for orders placed during the hospital encounter of 04/08/25    Echo    Interpretation Summary    Left Ventricle: The left ventricle is normal in size. Ventricular mass is normal. Normal wall thickness. There is concentric remodeling. Mild global hypokinesis present. There is mildly reduced systolic function. Ejection fraction is approximately 45%. Grade I diastolic dysfunction. Elevated left ventricular filling pressure. Tissue Doppler velocity is reduced.    Right Ventricle: The right ventricle is normal in size. Systolic function is normal.    Mitral Valve: There is mild to moderate regurgitation with a centrally directed jet.    Pulmonary Artery: There is mild pulmonary hypertension. The estimated pulmonary artery systolic pressure is 44 mmHg.    IVC/SVC: Intermediate venous pressure at 8 mmHg.      X-Ray Abdomen AP 1 View  Narrative: EXAMINATION:  XR ABDOMEN AP 1 VIEW    CLINICAL HISTORY:  abd pain;    TECHNIQUE:  Single AP View of the abdomen was performed.    COMPARISON:  CTA  05/11/2025.    FINDINGS:  Prominent aerated bowel throughout the abdomen.  Overall nonspecific pattern.  No definite severe distension or air-fluid levels to suggest high-grade obstruction.  Recommend continued follow-up.  No obvious free intraperitoneal air.  Impression: As above.    Electronically signed by: Bebeto Cooper  Date:    05/16/2025  Time:    10:23      Labs, Imaging, EKG and Diagnostic results from 5/17/2025 were reviewed.    Medications:  Medication list was reviewed and changes noted under Assessment/Plan.  Medications Ordered Prior to Encounter[1]  Scheduled Medications:  Current Facility-Administered Medications   Medication Dose Route Frequency    lisinopriL  20 mg Oral Daily    methocarbamoL  500 mg Oral TID    metoprolol succinate  50 mg Oral Daily    pantoprazole  40 mg Oral Daily    QUEtiapine  300 mg Oral QHS     PRN:   Current Facility-Administered Medications:     acetaminophen, 650 mg, Oral, Q4H PRN    dextrose 50%, 12.5 g, Intravenous, PRN    dextrose 50%, 25 g, Intravenous, PRN    glucagon (human recombinant), 1 mg, Intramuscular, PRN    glucose, 16 g, Oral, PRN    glucose, 24 g, Oral, PRN    melatonin, 6 mg, Oral, Nightly PRN    naloxone, 0.02 mg, Intravenous, PRN    ondansetron, 4 mg, Intravenous, Q8H PRN    oxyCODONE, 5 mg, Oral, Q4H PRN    prochlorperazine, 5 mg, Intravenous, Q6H PRN    simethicone, 1 tablet, Oral, TID PRN    sodium chloride 0.9%, 10 mL, Intravenous, PRN  Infusions:   Estimated Creatinine Clearance: 82.6 mL/min (based on SCr of 0.7 mg/dL).               Assessment & Plan  Acute postoperative pain of abdomen  Transaminitis  Hyperbilirubinemia  Presenting as outside hospital transfer with RUQ pain, elevated LFTs after recent cholecystectomy 5/7  - CT abd notes Mild fat stranding and fluid within the gallbladder fossa and borderline dilatation of the CBD without intrahepatic biliary dilatation, presumed within normal limits for recent post cholecystectomy  - MRCP  unrevealing   - HIDA scan unrevealing  - General surgery consulted  -- No evidence of post-operative complication   -- No surgical intervention  -- Signed off  - Advanced endoscopy consulted  -- MRCP and HIDA negative for any obstruction or bile leak. Signed off.  - Hepatology consulted  -- Possibly could be due to post surgical changes or ischemia injury from pt's SVT episode. Less likely acute infection. No major CBD or pancreatic dilation so blockage less likely.   - Trend LFTs  - Pain control  - Liver doppler ordered  - Plan to transfer back to [facility] for continued management given final consultant recommendations  5/15 PMHx HTN, HLD, and HFpEF who presents to the ED as transfer from [facility] inpatient for abdominal pain and elevated LFTs after recent cholecystectomy 5/7.  Elevated transaminases started on 5/10. No acute signs of blockage. s/p Gen surgery and AES eval -No obvious issues post-operatively on CT scan. MRCP and HIDA negative for any obstruction or bile leak. No evidence of post-operative complication. No surgical intervention. Due to acute elevation of transaminases and ALP and TB, possibly could be due to post surgical changes or ischemia injury from SVT episode. Less likely acute infection. No major CBD or pancreatic dilation so blockage less likely. Liver Doppler - Patent portal and hepatic vasculature.  No evidence for portal vein thrombus as clinically questioned. LFT trended down.  Discharge to [facility] for further management with hepatology f/u     Presenting as outside hospital transfer with RUQ pain, elevated LFTs after recent cholecystectomy 5/7  - CT abd notes Mild fat stranding and fluid within the gallbladder fossa and borderline dilatation of the CBD without intrahepatic biliary dilatation, presumed within normal limits for recent post cholecystectomy  - MRCP unrevealing   - HIDA scan unrevealing  - General surgery consulted  -- No evidence of post-operative  complication   -- No surgical intervention  -- Signed off  - Advanced endoscopy consulted  -- MRCP and HIDA negative for any obstruction or bile leak. Signed off.  - Hepatology consulted  -- Possibly could be due to post surgical changes or ischemia injury from pt's SVT episode. Less likely acute infection. No major CBD or pancreatic dilation so blockage less likely.   - Trend LFTs  - Pain control  - Liver doppler ordered  - Plan to transfer back to [facility] for continued management given final consultant recommendations        Presenting as outside hospital transfer with RUQ pain, elevated LFTs after recent cholecystectomy 5/7  - CT abd notes Mild fat stranding and fluid within the gallbladder fossa and borderline dilatation of the CBD without intrahepatic biliary dilatation, presumed within normal limits for recent post cholecystectomy  - MRCP unrevealing   - HIDA scan unrevealing  - General surgery consulted  -- No evidence of post-operative complication   -- No surgical intervention  -- Signed off  - Advanced endoscopy consulted  -- MRCP and HIDA negative for any obstruction or bile leak. Signed off.  - Hepatology consulted  -- Possibly could be due to post surgical changes or ischemia injury from pt's SVT episode. Less likely acute infection. No major CBD or pancreatic dilation so blockage less likely.   - Trend LFTs  - Pain control  - Liver doppler ordered  - Plan to transfer back to [facility] for continued management given final consultant recommendations    5/15 PMHx HTN, HLD, and HFpEF who presents to the ED as transfer from [facility] inpatient for abdominal pain and elevated LFTs after recent cholecystectomy 5/7.  Elevated transaminases started on 5/10. No acute signs of blockage. s/p Gen surgery and AES eval -No obvious issues post-operatively on CT scan. MRCP and HIDA negative for any obstruction or bile leak. No evidence of post-operative complication. No surgical intervention. Due to  acute elevation of transaminases and ALP and TB, possibly could be due to post surgical changes or ischemia injury from SVT episode. Less likely acute infection. No major CBD or pancreatic dilation so blockage less likely. Liver Doppler - Patent portal and hepatic vasculature.  No evidence for portal vein thrombus as clinically questioned. LFT trended down.   required IV morphine x 2 doses today. continue oral morphine 15mg q4h prnDischarge to [facility] for further management with hepatology f/u   5/16 LFTs trended down. Lipase WNL. c/o 10/10 abdominal pain. IV morphine held overnight and received LR 500ml bolus for SBP 90s. Pain management consulted.  difficulty swallowing pills,  transitioned  to oxycodone 5mg q 4h prn PO liquid. continue Robaxin 500mg TID.  Abdominal X ray - Prominent aerated bowel throughout the abdomen. Overall nonspecific pattern. No definite severe distension or air-fluid levels to suggest high-grade obstruction. Recommend continued follow-up. No obvious free intraperitoneal air.     Hypertension  - Continue lisinopril    SVT (supraventricular tachycardia)  - Patient with narrow complex tachycardia at rate of 216 bpm on presentation to [place] ED 5/10. She underwent electrical cardioversion to sinus rhythms   - Continue cardiac monitoring  - Toprol-XL 50 mg started per OSH cardiology recommendations.   - Cards follow up at discharge    Pain management  5/16 LFTs trended down. Lipase WNL. c/o 10/10 abdominal pain. IV morphine held overnight and received LR 500ml bolus for SBP 90s. Pain management consulted.  5/17 did not require IV opiates overnight. pain controlled on liquid oxycodone.  Discharge home today.        VTE Risk Mitigation (From admission, onward)           Ordered     IP VTE HIGH RISK PATIENT  Once         05/11/25 2021     Place sequential compression device  Until discontinued         05/11/25 2021                    Discharge Planning   NETTIE: 5/17/2025     Code Status:  Full Code   Medical Readiness for Discharge Date:   Discharge Plan A: Home with family   Discharge Delays: None known at this time                    Mayito Stout MD  Department of Hospital Medicine   [facility]         [1]   Current Facility-Administered Medications on File Prior to Encounter   Medication Dose Route Frequency Provider Last Rate Last Admin    lactated ringers infusion   Intravenous Continuous Sim Quinteros MD 0 mL/hr at 12/14/21 0846 New Bag at 06/07/22 1151    lactated ringers infusion   Intravenous Continuous Sim Quinteros MD        sodium chloride 0.9% flush 10 mL  10 mL Intravenous PRN Sim Quinteros MD        sodium chloride 0.9% flush 10 mL  10 mL Intravenous PRN Sim Quinteros MD        sodium chloride 0.9% flush 10 mL  10 mL Intravenous PRN Sim Quinteros MD         Current Outpatient Medications on File Prior to Encounter   Medication Sig Dispense Refill    albuterol (PROVENTIL/VENTOLIN HFA) 90 mcg/actuation inhaler inhale TWO puffs into THE lungs EVERY 4 HOURS AS NEEDED 25.5 g 3    atorvastatin (LIPITOR) 40 MG tablet Take 1 tablet (40 mg total) by mouth once daily. 90 tablet 0    BREZTRI AEROSPHERE 160-9-4.8 mcg/actuation HFAA Two inhalations b.i.d. 10.7 g 5    cyproheptadine (PERIACTIN) 4 mg tablet 1 po q hs to increase weight 30 tablet 5    HYDROcodone-acetaminophen (NORCO) 7.5-325 mg per tablet Take 1 tablet by mouth every 6 (six) hours as needed for Pain. 20 tablet 0    lisinopriL (PRINIVIL,ZESTRIL) 20 MG tablet Take 1 tablet (20 mg total) by mouth once daily. 100 tablet 3    meclizine (ANTIVERT) 25 mg tablet Take 1 tablet (25 mg total) by mouth 3 (three) times daily as needed for Dizziness. 60 tablet 0    pantoprazole (PROTONIX) 40 MG tablet Take 1 tablet (40 mg total) by mouth once daily. 90 tablet 0    QUEtiapine (SEROQUEL) 300 MG Tab Take 300 mg by mouth every evening.      traMADoL (ULTRAM) 50 mg tablet Take 1 tablet (50 mg total) by mouth every 4 (four)  hours as needed for Pain. 28 each 0

## 2025-05-17 NOTE — ASSESSMENT & PLAN NOTE
- Patient with narrow complex tachycardia at rate of 216 bpm on presentation to [facility] ED 5/10. She underwent electrical cardioversion to sinus rhythms   - Continue cardiac monitoring  - Toprol-XL 50 mg started per OSH cardiology recommendations.   - Cards follow up at discharge

## 2025-05-17 NOTE — ASSESSMENT & PLAN NOTE
Presenting as outside hospital transfer with RUQ pain, elevated LFTs after recent cholecystectomy 5/7  - CT abd notes Mild fat stranding and fluid within the gallbladder fossa and borderline dilatation of the CBD without intrahepatic biliary dilatation, presumed within normal limits for recent post cholecystectomy  - MRCP unrevealing   - HIDA scan unrevealing  - General surgery consulted  -- No evidence of post-operative complication   -- No surgical intervention  -- Signed off  - Advanced endoscopy consulted  -- MRCP and HIDA negative for any obstruction or bile leak. Signed off.  - Hepatology consulted  -- Possibly could be due to post surgical changes or ischemia injury from pt's SVT episode. Less likely acute infection. No major CBD or pancreatic dilation so blockage less likely.   - Trend LFTs  - Pain control  - Liver doppler ordered  - Plan to transfer back to [facility] for continued management given final consultant recommendations  5/15 PMHx HTN, HLD, and HFpEF who presents to the ED as transfer from [facility] inpatient for abdominal pain and elevated LFTs after recent cholecystectomy 5/7.  Elevated transaminases started on 5/10. No acute signs of blockage. s/p Gen surgery and AES eval -No obvious issues post-operatively on CT scan. MRCP and HIDA negative for any obstruction or bile leak. No evidence of post-operative complication. No surgical intervention. Due to acute elevation of transaminases and ALP and TB, possibly could be due to post surgical changes or ischemia injury from SVT episode. Less likely acute infection. No major CBD or pancreatic dilation so blockage less likely. Liver Doppler - Patent portal and hepatic vasculature.  No evidence for portal vein thrombus as clinically questioned. LFT trended down.  Discharge to [facility] for further management with hepatology f/u     Presenting as outside hospital transfer with RUQ pain, elevated LFTs after recent cholecystectomy 5/7  -  CT abd notes Mild fat stranding and fluid within the gallbladder fossa and borderline dilatation of the CBD without intrahepatic biliary dilatation, presumed within normal limits for recent post cholecystectomy  - MRCP unrevealing   - HIDA scan unrevealing  - General surgery consulted  -- No evidence of post-operative complication   -- No surgical intervention  -- Signed off  - Advanced endoscopy consulted  -- MRCP and HIDA negative for any obstruction or bile leak. Signed off.  - Hepatology consulted  -- Possibly could be due to post surgical changes or ischemia injury from pt's SVT episode. Less likely acute infection. No major CBD or pancreatic dilation so blockage less likely.   - Trend LFTs  - Pain control  - Liver doppler ordered  - Plan to transfer back to [facility] for continued management given final consultant recommendations        Presenting as outside hospital transfer with RUQ pain, elevated LFTs after recent cholecystectomy 5/7  - CT abd notes Mild fat stranding and fluid within the gallbladder fossa and borderline dilatation of the CBD without intrahepatic biliary dilatation, presumed within normal limits for recent post cholecystectomy  - MRCP unrevealing   - HIDA scan unrevealing  - General surgery consulted  -- No evidence of post-operative complication   -- No surgical intervention  -- Signed off  - Advanced endoscopy consulted  -- MRCP and HIDA negative for any obstruction or bile leak. Signed off.  - Hepatology consulted  -- Possibly could be due to post surgical changes or ischemia injury from pt's SVT episode. Less likely acute infection. No major CBD or pancreatic dilation so blockage less likely.   - Trend LFTs  - Pain control  - Liver doppler ordered  - Plan to transfer back to [facility] for continued management given final consultant recommendations    5/15 PMHx HTN, HLD, and HFpEF who presents to the ED as transfer from [facility] inpatient for abdominal pain and elevated  LFTs after recent cholecystectomy 5/7.  Elevated transaminases started on 5/10. No acute signs of blockage. s/p Gen surgery and AES eval -No obvious issues post-operatively on CT scan. MRCP and HIDA negative for any obstruction or bile leak. No evidence of post-operative complication. No surgical intervention. Due to acute elevation of transaminases and ALP and TB, possibly could be due to post surgical changes or ischemia injury from SVT episode. Less likely acute infection. No major CBD or pancreatic dilation so blockage less likely. Liver Doppler - Patent portal and hepatic vasculature.  No evidence for portal vein thrombus as clinically questioned. LFT trended down.   required IV morphine x 2 doses today. continue oral morphine 15mg q4h prnDischarge to [facility] for further management with hepatology f/u   5/16 LFTs trended down. Lipase WNL. c/o 10/10 abdominal pain. IV morphine held overnight and received LR 500ml bolus for SBP 90s. Pain management consulted.  difficulty swallowing pills,  transitioned  to oxycodone 5mg q 4h prn PO liquid. continue Robaxin 500mg TID.  Abdominal X ray - Prominent aerated bowel throughout the abdomen. Overall nonspecific pattern. No definite severe distension or air-fluid levels to suggest high-grade obstruction. Recommend continued follow-up. No obvious free intraperitoneal air.

## 2025-05-19 ENCOUNTER — PATIENT OUTREACH (OUTPATIENT)
Dept: ADMINISTRATIVE | Facility: CLINIC | Age: 65
End: 2025-05-19
Payer: MEDICARE

## 2025-05-19 ENCOUNTER — TELEPHONE (OUTPATIENT)
Dept: CARDIOLOGY | Facility: CLINIC | Age: 65
End: 2025-05-19

## 2025-05-19 ENCOUNTER — OFFICE VISIT (OUTPATIENT)
Dept: CARDIOLOGY | Facility: CLINIC | Age: 65
End: 2025-05-19
Payer: MEDICARE

## 2025-05-19 VITALS
WEIGHT: 176.38 LBS | HEART RATE: 75 BPM | DIASTOLIC BLOOD PRESSURE: 85 MMHG | SYSTOLIC BLOOD PRESSURE: 127 MMHG | HEIGHT: 63 IN | OXYGEN SATURATION: 98 % | BODY MASS INDEX: 31.25 KG/M2

## 2025-05-19 DIAGNOSIS — J43.8 OTHER EMPHYSEMA: ICD-10-CM

## 2025-05-19 DIAGNOSIS — I47.10 SVT (SUPRAVENTRICULAR TACHYCARDIA): ICD-10-CM

## 2025-05-19 DIAGNOSIS — I22.2 SUBSEQUENT NON-ST ELEVATION (NSTEMI) MYOCARDIAL INFARCTION: ICD-10-CM

## 2025-05-19 DIAGNOSIS — R07.89 ATYPICAL CHEST PAIN: ICD-10-CM

## 2025-05-19 DIAGNOSIS — R06.02 SOB (SHORTNESS OF BREATH): ICD-10-CM

## 2025-05-19 DIAGNOSIS — R06.02 SOB (SHORTNESS OF BREATH): Primary | ICD-10-CM

## 2025-05-19 DIAGNOSIS — R79.89 ELEVATED LFTS: ICD-10-CM

## 2025-05-19 DIAGNOSIS — I50.22 CHRONIC SYSTOLIC HEART FAILURE: Primary | ICD-10-CM

## 2025-05-19 PROCEDURE — 93000 ELECTROCARDIOGRAM COMPLETE: CPT | Mod: HCNC,S$GLB,, | Performed by: INTERNAL MEDICINE

## 2025-05-19 PROCEDURE — 99999 PR PBB SHADOW E&M-EST. PATIENT-LVL V: CPT | Mod: PBBFAC,HCNC,GC, | Performed by: STUDENT IN AN ORGANIZED HEALTH CARE EDUCATION/TRAINING PROGRAM

## 2025-05-19 RX ORDER — ATORVASTATIN CALCIUM 20 MG/1
20 TABLET, FILM COATED ORAL DAILY
Qty: 90 TABLET | Refills: 3 | Status: SHIPPED | OUTPATIENT
Start: 2025-05-19 | End: 2025-05-19

## 2025-05-19 NOTE — PROGRESS NOTES
I have reviewed the patient's chart and the fellow's clinic note, as well as discussed the case with the fellow. I agree with the assessment and plan.      Jeromy Templeton MD  [facility] - [facility]

## 2025-05-19 NOTE — PROGRESS NOTES
C3 nurse attempted to contact Wilfredo Vazquez  for a TCC post hospital discharge follow up call. No answer. LVM requesting a callback at [phone].    The patient does not have a scheduled HOSFU appointment. Message sent to PCP's staff to assist with HOSFU appointment scheduling.

## 2025-05-19 NOTE — PROGRESS NOTES
Clinic Note  5/19/2025      Subjective:       Patient ID:  Wilfredo is a 65 y.o. female being seen for an established visit.    Chief Complaint: Chest Pain and Shortness of Breath    HPI  Patient is a 65 y.o female with h.o htn, hld, emphysema who presents to the clinic for hospital follow up.  Patient had an elective Laparoscopic cholecystectomy on 4/28/25. Post op course was c/b severe abdominal pain that prompted her to go to the ED. She was hospitalized for a through workup in light of elevated LFTs. Workup including MRCP were unremarkable for any acute processes.   Hospital course was c/b SVT with ventricular rate of 200s and ischemic ST changes in the anterolateral leads. Subsequently had synchronized cardioversion with return to NSR and later placed on Metoprolol XL 50 mg qd. No CP even during the episode. Trop remained flat.  Last TTE available is on 4/8/25 which revealed LVEF 45% with GIDD. No regional WMA noted. Mild to moderate MR with PASP 44 mmHg and IVC 8 mmHg.   Since discharge home she has cont to have the R sided sharp cp that radiates to back. It is only reproduced if she touches the area. She also reports having worsening sob especially when she is walking. At baseline given her copd she experiences SOB but reports for the past several weeks it has been getting worse.  Sleeps on 2 pillows ( two for comfort). Denies orthopnea, PND, LE edema.  She has not been having any palpitations since discharge home but her daughter does mention that it has been approximately 6 months that she complains of palpitations that last about a minute.  Her abdomin continues to hurt and she is having an appt with her general surgeon for the pain. Otherwise has been regular Bms, and denies nausea, diarrhea, melena or hematochezia. Also denies fever, chills, malaise.    She has a 47 pack year smoking history and has quit 3 months ago. Denies alcohol use or illicit drug use.    Her last ldl was 84.4 on 5/15/25 with TC of  156 while on lipitor 40 mg. Lipitor has been held since hospitalization given elevated LFTs.    Had a treadmill SPECT in 2010 which was negative for ischemia.   Review of Systems   Constitutional:  Negative for chills, fever and weight loss.   HENT: Negative.     Eyes:  Negative for blurred vision.   Respiratory:  Positive for shortness of breath. Negative for cough.    Cardiovascular:  Positive for palpitations. Negative for orthopnea, claudication, leg swelling and PND.        R sided sharp chest pain that is reproducible with palpation   Gastrointestinal:  Positive for abdominal pain. Negative for blood in stool, constipation, diarrhea, melena, nausea and vomiting.   Musculoskeletal:  Negative for myalgias.   Skin: Negative.    Neurological:  Negative for dizziness, loss of consciousness and weakness.       Past Medical History:   Diagnosis Date    Abdominal pain 2018    Back pain     Benign paroxysmal vertigo, bilateral     Emphysema lung     GERD (gastroesophageal reflux disease) 2018    HLD (hyperlipidemia)     Hypertension     Shingles        Family History   Family history unknown: Yes        reports that she has quit smoking. Her smoking use included cigarettes. She has never used smokeless tobacco. She reports current alcohol use. She reports that she does not use drugs.    Medication List with Changes/Refills   New Medications    EMPAGLIFLOZIN (JARDIANCE) 10 MG TABLET    Take 1 tablet (10 mg total) by mouth once daily.   Current Medications    ALBUTEROL (PROVENTIL/VENTOLIN HFA) 90 MCG/ACTUATION INHALER    inhale TWO puffs into THE lungs EVERY 4 HOURS AS NEEDED    AYAHI AEROSPHERE 160-9-4.8 MCG/ACTUATION HFAA    Two inhalations b.i.d.    CYPROHEPTADINE (PERIACTIN) 4 MG TABLET    1 po q hs to increase weight    LISINOPRIL (PRINIVIL,ZESTRIL) 20 MG TABLET    Take 1 tablet (20 mg total) by mouth once daily.    MECLIZINE (ANTIVERT) 25 MG TABLET    Take 1 tablet (25 mg total) by mouth 3 (three) times daily as  "needed for Dizziness.    METHOCARBAMOL (ROBAXIN) 500 MG TAB    Take 1 tablet (500 mg total) by mouth 3 (three) times daily. for 10 days    METOPROLOL SUCCINATE (TOPROL-XL) 50 MG 24 HR TABLET    Take 1 tablet (50 mg total) by mouth once daily.    OXYCODONE (ROXICODONE) 5 MG/5 ML SOLN    Take 5 mLs (5 mg total) by mouth every 4 (four) hours as needed.    PANTOPRAZOLE (PROTONIX) 40 MG TABLET    Take 1 tablet (40 mg total) by mouth once daily.    POLYETHYLENE GLYCOL (GAVILAX) 17 GRAM/DOSE POWDER    Use cap to measure out (17 g) mix with a liquid and take by mouth once daily for 10 days.    QUETIAPINE (SEROQUEL) 300 MG TAB    Take 300 mg by mouth every evening.    SIMETHICONE (MYLICON) 80 MG CHEWABLE TABLET    Take 1 tablet (80 mg total) by mouth 3 (three) times daily as needed for Flatulence.     Review of patient's allergies indicates:   Allergen Reactions    Naproxen Rash       Problem List[1]        Objective:      /85 (BP Location: Left arm, Patient Position: Sitting)   Pulse 75   Ht 5' 3" (1.6 m)   Wt 80 kg (176 lb 5.9 oz)   LMP  (LMP Unknown)   SpO2 98%   BMI 31.24 kg/m²   Estimated body mass index is 31.24 kg/m² as calculated from the following:    Height as of this encounter: 5' 3" (1.6 m).    Weight as of this encounter: 80 kg (176 lb 5.9 oz).  Physical Exam  Constitutional:       General: She is not in acute distress.     Appearance: She is obese. She is not diaphoretic.   HENT:      Head: Normocephalic and atraumatic.   Eyes:      General: No scleral icterus.     Conjunctiva/sclera: Conjunctivae normal.   Neck:      Vascular: No carotid bruit or JVD.      Trachea: No tracheal deviation.      Comments: JVP at the level of the clavicle.  Cardiovascular:      Rate and Rhythm: Normal rate and regular rhythm.      Chest Wall: PMI is not displaced. No thrill.      Pulses:           Carotid pulses are 2+ on the right side and 2+ on the left side.       Radial pulses are 2+ on the right side and 2+ on the " left side.        Dorsalis pedis pulses are 2+ on the right side and 2+ on the left side.        Posterior tibial pulses are 2+ on the right side and 2+ on the left side.      Heart sounds: S1 normal and S2 normal. No murmur heard.     No systolic murmur is present.      No diastolic murmur is present.      No friction rub. No gallop. No S3 or S4 sounds.   Pulmonary:      Effort: Pulmonary effort is normal. No respiratory distress.      Breath sounds: Decreased breath sounds present. No wheezing, rhonchi or rales.   Chest:      Chest wall: No tenderness.   Abdominal:      General: Bowel sounds are normal. There is no distension.      Palpations: Abdomen is soft. There is no mass.      Tenderness: There is no abdominal tenderness. There is no rebound.   Musculoskeletal:         General: No swelling. Normal range of motion.      Cervical back: Normal range of motion and neck supple.      Right lower leg: No edema.      Left lower leg: No edema.      Comments: Anterior R chest Tender to palpation   Lymphadenopathy:      Cervical: No cervical adenopathy.   Skin:     General: Skin is warm and dry.      Capillary Refill: Capillary refill takes less than 2 seconds.   Neurological:      Mental Status: She is alert.   Psychiatric:         Mood and Affect: Mood and affect normal.           Assessment and Plan:         Wilfredo was seen today for chest pain and shortness of breath.    Diagnoses and all orders for this visit:    Chronic systolic heart failure  HFmrEF (EF ~45%)  Currently euvolemic on examination  Will r.o ischemia with cardiac PET  Ordering Jardiance 10 mg qd and continuing home losartan 20 mg ( cannot be uptitrated or changed to entresto given hypotension) and Metoprolol xl 50 mg   Repeat TTE in 3 months from initial     Non cardiac chest pain  Reproducible R sided chest discomfort  Not cardiac in etiology    Dyspnea on Exertion (HALLMAN)  Worsening from her bl HALLMAN with COPD  EF 45% from 55% previously  Review of  her ECG while she was in SVT reveals ischemic anterolateral ST changes.  Will order Cardiac PET to evaluate for obstructive CAD ( she wont be able to finish protocol on treadmill 2/2 baseline SOB)    SVT (supraventricular tachycardia)  Recent hospitalization c/b SVT with ventricular rate of 200s  S/p synchronized cardioversion  Has been on Toprol 50 mg since  Currently in NSR  No palpitations since home however given she reports having palpitations for the past 6 months, I do not think this event is soley in setting of her acute pain post cholecystectomy.    Plan to cont Toprol  Will order 30 days event monitor to assess burden  Given the high vent rate associated with her svt and depending on freq will rec outpatient EP for eval for poss ablation ( once ischemia is r.o)      HTN  Cont home Losartan 20 mg qd    HLD  Aortic atherosclerosis  Ldl 83 while on lipitor 40 mg qd  Statin being held in light of elevated LFTs- will resume once stable      Follow up in 3 months    Other Orders Placed This Visit:  Orders Placed This Encounter   Procedures    Cardiac PET Scan Stress    Cardiac event monitor    Echo         Wanda Espino             [1]   Patient Active Problem List  Diagnosis    Acute postoperative pain of abdomen    Acute bilateral low back pain without sciatica    Hypertension    Change in bowel habits    Bronchospasm    Obesity (BMI 35.0-39.9 without comorbidity)    Dysphagia    Blood in the stool    COPD (chronic obstructive pulmonary disease)    History of esophageal dilatation    History of gastroesophageal reflux (GERD)    History of colonic polyps    Pain management    Sinusitis    Left knee pain    Cigarette nicotine dependence without complication    Personal history of nicotine dependence    Bipolar affective disorder, remission status unspecified    Acute on chronic respiratory failure with hypoxia and hypercapnia    Anemia    Hyperglycemia    Elevated lipase    Shortness of breath    Lumbosacral  strain    Cephalalgia    Esophageal stenosis    Sleep apnea    MARISSA (obstructive sleep apnea)    Muscle cramps    Aortic atherosclerosis    Contact dermatitis    Primary osteoarthritis of left knee    Compression fracture of L1 vertebra with routine healing    Osteonecrosis of left knee region    Degenerative tear of left medial meniscus    Lumbar radiculopathy    Lumbar spondylosis    DDD (degenerative disc disease), lumbar    Bronchitis    Foreign body (FB) in soft tissue    Otalgia of right ear    Age-related cataract of both eyes    Hiatal hernia    Benign essential hypertension    Chronic headache disorder    Gout    Nicotine dependence    Prediabetes    NSTEMI (non-ST elevated myocardial infarction)    Elevated LFTs    Heart failure with mildly reduced ejection fraction (HFmrEF)    SVT (supraventricular tachycardia)    Precordial pain    Transaminitis    Type 2 myocardial infarction without ST elevation    Hyperbilirubinemia

## 2025-05-20 ENCOUNTER — OFFICE VISIT (OUTPATIENT)
Dept: SURGERY | Facility: CLINIC | Age: 65
End: 2025-05-20
Payer: MEDICARE

## 2025-05-20 VITALS
DIASTOLIC BLOOD PRESSURE: 82 MMHG | WEIGHT: 174.19 LBS | SYSTOLIC BLOOD PRESSURE: 126 MMHG | HEART RATE: 121 BPM | BODY MASS INDEX: 30.85 KG/M2

## 2025-05-20 DIAGNOSIS — Z98.890 POST-OPERATIVE STATE: Primary | ICD-10-CM

## 2025-05-20 LAB
OHS QRS DURATION: 86 MS
OHS QTC CALCULATION: 417 MS

## 2025-05-20 PROCEDURE — 99999 PR PBB SHADOW E&M-EST. PATIENT-LVL III: CPT | Mod: PBBFAC,,, | Performed by: SURGERY

## 2025-05-20 RX ORDER — SYRING-NEEDL,DISP,INSUL,0.3 ML 29 G X1/2"
296 SYRINGE, EMPTY DISPOSABLE MISCELLANEOUS ONCE
Qty: 1 EACH | Refills: 1 | Status: SHIPPED | OUTPATIENT
Start: 2025-05-20 | End: 2025-05-21

## 2025-05-21 ENCOUNTER — OFFICE VISIT (OUTPATIENT)
Dept: PRIMARY CARE CLINIC | Facility: CLINIC | Age: 65
End: 2025-05-21
Payer: MEDICARE

## 2025-05-21 VITALS
BODY MASS INDEX: 30.45 KG/M2 | WEIGHT: 171.88 LBS | RESPIRATION RATE: 18 BRPM | HEART RATE: 70 BPM | HEIGHT: 63 IN | OXYGEN SATURATION: 98 % | SYSTOLIC BLOOD PRESSURE: 116 MMHG | DIASTOLIC BLOOD PRESSURE: 70 MMHG

## 2025-05-21 DIAGNOSIS — Z90.49 HISTORY OF CHOLECYSTECTOMY: ICD-10-CM

## 2025-05-21 DIAGNOSIS — M79.652 LEFT THIGH PAIN: ICD-10-CM

## 2025-05-21 DIAGNOSIS — K59.01 SLOW TRANSIT CONSTIPATION: ICD-10-CM

## 2025-05-21 DIAGNOSIS — G47.33 OSA (OBSTRUCTIVE SLEEP APNEA): ICD-10-CM

## 2025-05-21 DIAGNOSIS — Z87.19 HISTORY OF GASTROESOPHAGEAL REFLUX (GERD): ICD-10-CM

## 2025-05-21 DIAGNOSIS — R73.9 HYPERGLYCEMIA: ICD-10-CM

## 2025-05-21 DIAGNOSIS — J44.9 CHRONIC OBSTRUCTIVE PULMONARY DISEASE, UNSPECIFIED COPD TYPE: ICD-10-CM

## 2025-05-21 DIAGNOSIS — J98.01 BRONCHOSPASM: ICD-10-CM

## 2025-05-21 DIAGNOSIS — R79.89 ELEVATED LIVER FUNCTION TESTS: Primary | ICD-10-CM

## 2025-05-21 DIAGNOSIS — S32.010D COMPRESSION FRACTURE OF L1 VERTEBRA WITH ROUTINE HEALING: ICD-10-CM

## 2025-05-21 PROCEDURE — 99999 PR PBB SHADOW E&M-EST. PATIENT-LVL III: CPT | Mod: PBBFAC,HCNC,, | Performed by: FAMILY MEDICINE

## 2025-05-21 RX ORDER — TRAMADOL HYDROCHLORIDE 50 MG/1
50 TABLET, FILM COATED ORAL EVERY 6 HOURS
Qty: 30 TABLET | Refills: 0 | Status: SHIPPED | OUTPATIENT
Start: 2025-05-21

## 2025-05-21 RX ORDER — HYDROXYZINE PAMOATE 25 MG/1
25 CAPSULE ORAL EVERY 6 HOURS PRN
Qty: 30 CAPSULE | Refills: 5 | Status: SHIPPED | OUTPATIENT
Start: 2025-05-21

## 2025-05-21 NOTE — PROGRESS NOTES
Subjective:       Patient ID: Wilfredo Vazquez is a 65 y.o. female.    Chief Complaint: Hospital Follow Up and Medication Refill    HPI 65-year-old black female in for hospital follow-up needs refill of tramadol and something for nausea  Patient states appetite better since having a bowel movement and on Periactin--patient thinks that the abdominal pain she had in the upper quadrant was probably more from constipation and lack of bowel movements--was seen in the emergency room lab BNP was normal glucose 117 AST was 317  troponin was elevated lipase was negative patient was transferred to [facility] had chest x-ray CTA of the chest CT scan of the abdomen EKGs showing some subendocardial injury--at discharge liver enzymes had improved AST was normal ALT was still slightly elevated 97 alk-phos slightly elevated 228 --pt saw Dr Pride -told patient felt abdominal pain was secondary to lack of bowel movements.  Takes MiraLax--take citrate of magnesia Dulcolax suppository  Has appoint with Cardiology to have a cardiac PET scan stress/cardiac ventricular monitor and echocardiogram 5- --just saw the heart doctor         office visit 04/16/[id number]-wihx-old black female in for hospital follow-up---patient was seen emergency room 4 8 2025---having chest pain in the right anterior chest between the clavicle breast--pain radiated through the back---had CBCs CMP BNP lipids all basically normal repeat CBCs showed hematocrit 35.8--troponin was increase had ultrasound of the abdomen showing gallstones with sludge dilated pancreatic duct--had MRI which was negative---had MRI of the hip which showed bilateral avascular necrosis with advanced degenerative changes of the left hip--CT scan of the head was normal  EKG was normal echocardiogram ejection fraction 40% basically normal patient did not have stress test or angiogram needs to see Cardiology  Given medication for cholesterol/dizziness/something for  stomach---patient did have upper GI in February showing some irritation of the stomach  Hypertension blood pressure 124/76 lisinopril 20 mg  COPD albuterol and Breztri  GERD on Protonix  History left knee replacement  History insomnia on Seroquel--sees psychiatrist in Spanish-  Problems to address  1-chest pain  2 gallstone  3 EGD showing some gastritis with area that appears to have bled patient on Protonix  4 MRI of the hip showing bilaterally avascular necrosis with advanced degenerative changes needs follow up with orthopedist for possible injection into the left hip--  needs refill of tramadol and Periactin                ROS:  No significant change except for history of present illness  Skin: no psoriasis, eczema, skin cancer-  HEENT: no headache,  No ocular pain, blurred vision, diplopia, epistaxis, hoarseness change in voice, thyroid trouble  Lung: No pneumonia, +asthma, no Tb, +_wheezing,+ SOB, no smoking + COPD --doing better--needs inhaler albuterol and breztri   Heart: No chest pain, ankle edema, palpitations, MI, patti murmur, +hypertension,no  hyperlipidemia no stent bypass arrhythmia blood pressure 126/78  Abdomen: no nausea,no  Vomiting,no diarrhea, no  constipation, ulcers, hepatitis, gallbladder disease, melena, hematochezia, hematemesis +dysphagia history of fundoplication for GERD history of esophageal dilatation x2Dr Beary doing better--swallowing much better  : no UTI, renal disease, stones  GYN hyst   MS: no fractures, O/A, lupus, rheumatoid, gout--saw orthopedist had MRI of the hips showing avascular necrosis of the hips bilaterally degenerative changes of the left hip needs to follow-up with orthopedist for possible hip injections  Neuro: No dizziness, LOC, seizures   No diabetes, no anemia, no anxiety, no depression   Single--2 children one  --disable secondary to a stomach--lives with daughter and grandson    Objective:   Physical Exam:    General: Well nourished, well developed,  no acute distress +obesity  Skin: No lesions  HEENT: Eyes PERRLA, EOM intact, bilateral cataract nose cleear D/C  throat +1/4 erythematous ears TMs clear   NECK: Supple, no bruits, No JVD, no nodes  Lungs: Clear, no rales, rhonchi, wheezing coarse BS   Heart: Regular rate and rhythm, no murmur s, gallops, or rubs  Abdomen:  No abdominal pain guarding rebound or tenderness organomegaly  MS--no significant change pain left knee palpation--crepitus with flexion extension of the left knee--tenderness with ambulation--able squat FCI down hard to arise--told in the past may need a knee replacement--tenderness lumbar spine L1-S1 anterior flexion 10° extension 10° lateral flexion rotation 10°--bilateral hip pain left greater than right  Neuro: Alert, CN intact, oriented X 3  Extremities: No cyanosis, clubbing, or edema         Assessment:       1. Elevated liver function tests    2. Hyperglycemia    3. History of cholecystectomy    4. Slow transit constipation    5. Bronchospasm    6. Compression fracture of L1 vertebra with routine healing    7. Chronic obstructive pulmonary disease, unspecified COPD type    8. History of gastroesophageal reflux (GERD)    9. MARISSA (obstructive sleep apnea)    10. Left thigh pain                      Plan:           Main Reason for Visit--  Cholecystectomy---05/07/2025---went to emergency room [id number] right upper quadrant pain--elevated liver function tests---increase troponin--numerous tests done patient was found probably be constipated doing well now that taking MiraLax and laxative of choice as needed using Dulcolax suppository magnesium citrate  Due to elevated troponin hypertension hyperlipidemia old MI supraventricular tachycardia CHF patient had appoint with Cardiology 6 days ago--scheduled for cardiac PET scan-stress/cardiac ventricular monitor/echocardiogram  Chronic pain syndrome needs refill of Ultram right side pain  Pain in the left anterior thigh did get some relief with  epidural steroid injection recently  Decreased appetite improved with witha Periactin--and with bowel movements  Other medical issues  COPD --chroi=elo bronchitie --albuterol and Breztri   Tobacco abuse half pack per day ---patient states no longer smoking  Low back pain--and neck pain-- Bulging disc T12-S1 and some facet arthropathy L4-5 L5-S1 history of a compression fracture of L1 --3 days ago developed significant pain getting out of bed specially in the lower back will give Ultram ibuprofen--Moist heat/theragesic/range of motion exercise--needs to see neurosurgeon--patient has had epidural steroid injections in the past  Hypertension blood pressure 124/76--on lisinopril 20 mg  Insomnia on Seroquel should get from psychiatrist  History dysphagia--had Nissen fundoplication--history of GERD--history of esophageal stenosis with dilatation occas  difficulty swallowing water has to eat small amounts of food--history esophageal dilitation --OK now  on protonix q hs   Lab CBCs CMP lipd T4 TSH 6 weeks ---then see me in 3 months

## 2025-05-26 ENCOUNTER — HOSPITAL ENCOUNTER (OUTPATIENT)
Dept: CARDIOLOGY | Facility: HOSPITAL | Age: 65
Discharge: HOME | End: 2025-05-26
Attending: STUDENT IN AN ORGANIZED HEALTH CARE EDUCATION/TRAINING PROGRAM
Payer: MEDICARE

## 2025-05-26 VITALS
WEIGHT: 171 LBS | HEART RATE: 61 BPM | HEIGHT: 63 IN | DIASTOLIC BLOOD PRESSURE: 90 MMHG | BODY MASS INDEX: 30.3 KG/M2 | SYSTOLIC BLOOD PRESSURE: 148 MMHG

## 2025-05-26 DIAGNOSIS — R07.89 ATYPICAL CHEST PAIN: ICD-10-CM

## 2025-05-26 LAB
CFR FLOW - ANTERIOR: 2.47
CFR FLOW - INFERIOR: 3.11
CFR FLOW - LATERAL: 2.4
CFR FLOW - MAX: 3.64
CFR FLOW - MIN: 2.06
CFR FLOW - SEPTAL: 2.8
CFR FLOW - WHOLE HEART: 2.7
CV PHARM DOSE: 0.4 MG
CV STRESS BASE HR: 61 BPM
DIASTOLIC BLOOD PRESSURE: 90 MMHG
EJECTION FRACTION- HIGH: 65 %
END DIASTOLIC INDEX-HIGH: 153 ML/M2
END DIASTOLIC INDEX-LOW: 93 ML/M2
END SYSTOLIC INDEX-HIGH: 71 ML/M2
END SYSTOLIC INDEX-LOW: 31 ML/M2
NUC REST DIASTOLIC VOLUME INDEX: 103
NUC REST EJECTION FRACTION: 57
NUC REST SYSTOLIC VOLUME INDEX: 44
NUC STRESS DIASTOLIC VOLUME INDEX: 99
NUC STRESS EJECTION FRACTION: 65 %
NUC STRESS SYSTOLIC VOLUME INDEX: 35
OHS CV CPX 85 PERCENT MAX PREDICTED HEART RATE MALE: 133
OHS CV CPX MAX PREDICTED HEART RATE: 157
OHS CV CPX PATIENT IS FEMALE: 1
OHS CV CPX PATIENT IS MALE: 0
OHS CV CPX PEAK DIASTOLIC BLOOD PRESSURE: 63 MMHG
OHS CV CPX PEAK HEAR RATE: 75 BPM
OHS CV CPX PEAK RATE PRESSURE PRODUCT: 8175
OHS CV CPX PEAK SYSTOLIC BLOOD PRESSURE: 109 MMHG
OHS CV CPX PERCENT MAX PREDICTED HEART RATE ACHIEVED: 50
OHS CV CPX RATE PRESSURE PRODUCT PRESENTING: 9028
OHS CV INITIAL DOSE: 23.9 MCG/KG/MIN
OHS CV MODERATELY REDUCED FLOW CAPACITY: 0 %
OHS CV NO ISCHEMIA MILDLY REDUCED FLOW CAPACTY: 0 %
OHS CV NO ISCHEMIA MINIMALLY REDUCED FLOW CAPACITY: 23 %
OHS CV NORMAL FLOW CAPACITY COMPARABLE TO HEALTHY YOUNG VOLUNTEERS: 77 %
OHS CV PEAK DOSE: 23.9 MCG/KG/MIN
OHS CV PET ID: 8930
OHS CV SEVERELY REDUCED FLOW CAPACITY LARGEST SINGLE CONTINUOUS REGION: 0 %
OHS CV SEVERELY REDUCED FLOW CAPACITY: 0 %
OHS CV TOTAL EXAM DLP: 309.51 MGY-CM
REST FLOW - ANTERIOR: 0.93 CC/MIN/G
REST FLOW - INFERIOR: 0.79 CC/MIN/G
REST FLOW - LATERAL: 1.15 CC/MIN/G
REST FLOW - MAX: 1.39 CC/MIN/G
REST FLOW - MIN: 0.54 CC/MIN/G
REST FLOW - SEPTAL: 0.74 CC/MIN/G
REST FLOW - WHOLE HEART: 0.9 CC/MIN/G
RETIRED EF AND QEF - SEE NOTES: 53 %
STRESS FLOW - ANTERIOR: 2.29 CC/MIN/G
STRESS FLOW - INFERIOR: 2.42 CC/MIN/G
STRESS FLOW - LATERAL: 2.72 CC/MIN/G
STRESS FLOW - MAX: 3.16 CC/MIN/G
STRESS FLOW - MIN: 1.53 CC/MIN/G
STRESS FLOW - SEPTAL: 2.06 CC/MIN/G
STRESS FLOW - WHOLE HEART: 2.37 CC/MIN/G
SYSTOLIC BLOOD PRESSURE: 148 MMHG

## 2025-05-26 PROCEDURE — 78431 MYOCRD IMG PET RST&STRS CT: CPT | Mod: 26,,, | Performed by: INTERNAL MEDICINE

## 2025-05-26 PROCEDURE — A9555 RB82 RUBIDIUM: HCPCS | Performed by: STUDENT IN AN ORGANIZED HEALTH CARE EDUCATION/TRAINING PROGRAM

## 2025-05-26 PROCEDURE — 63600175 PHARM REV CODE 636 W HCPCS: Performed by: STUDENT IN AN ORGANIZED HEALTH CARE EDUCATION/TRAINING PROGRAM

## 2025-05-26 PROCEDURE — 93016 CV STRESS TEST SUPVJ ONLY: CPT | Mod: ,,, | Performed by: INTERNAL MEDICINE

## 2025-05-26 PROCEDURE — 93018 CV STRESS TEST I&R ONLY: CPT | Mod: ,,, | Performed by: INTERNAL MEDICINE

## 2025-05-26 PROCEDURE — 78431 MYOCRD IMG PET RST&STRS CT: CPT

## 2025-05-26 PROCEDURE — 78434 AQMBF PET REST & RX STRESS: CPT | Mod: 26,,, | Performed by: INTERNAL MEDICINE

## 2025-05-26 RX ORDER — AMINOPHYLLINE 25 MG/ML
75 INJECTION, SOLUTION INTRAVENOUS ONCE
Status: COMPLETED | OUTPATIENT
Start: 2025-05-26 | End: 2025-05-26

## 2025-05-26 RX ORDER — REGADENOSON 0.08 MG/ML
0.4 INJECTION, SOLUTION INTRAVENOUS
Status: COMPLETED | OUTPATIENT
Start: 2025-05-26 | End: 2025-05-26

## 2025-05-26 RX ADMIN — RUBIDIUM CHLORIDE RB-82 23.9 MILLICURIE: 150 INJECTION, SOLUTION INTRAVENOUS at 01:05

## 2025-05-26 RX ADMIN — AMINOPHYLLINE 75 MG: 25 INJECTION, SOLUTION INTRAVENOUS at 01:05

## 2025-05-26 RX ADMIN — REGADENOSON 0.4 MG: 0.08 INJECTION, SOLUTION INTRAVENOUS at 01:05

## 2025-05-28 ENCOUNTER — PATIENT OUTREACH (OUTPATIENT)
Facility: OTHER | Age: 65
End: 2025-05-28
Payer: MEDICARE

## 2025-05-30 ENCOUNTER — TELEPHONE (OUTPATIENT)
Dept: CARDIOLOGY | Facility: CLINIC | Age: 65
End: 2025-05-30
Payer: MEDICARE

## 2025-05-30 ENCOUNTER — RESULTS FOLLOW-UP (OUTPATIENT)
Dept: CARDIOLOGY | Facility: CLINIC | Age: 65
End: 2025-05-30

## 2025-05-30 NOTE — PROGRESS NOTES
Wilfredo Vazquez is a 65 y.o. female patient.   Patient is status post robotic cholecystectomy.    Complain of abdominal pain and decreased bowel movements since surgery.    Explain to the patient to try magnesium citrate and constipation.    Patient states it is still mid and upper right abdominal pain.    Patient has had a negative workup as far as postoperative so far without any findings suspicious for a postoperative complication  No diagnosis found.  Past Medical History:   Diagnosis Date    Abdominal pain 2018    Back pain     Benign paroxysmal vertigo, bilateral     Emphysema lung     GERD (gastroesophageal reflux disease) 2018    HLD (hyperlipidemia)     Hypertension     Shingles      No past surgical history pertinent negatives on file.  Scheduled Meds:  Continuous Infusions:  PRN Meds:    Review of patient's allergies indicates:   Allergen Reactions    Naproxen Rash     There are no hospital problems to display for this patient.    Blood pressure 126/82, pulse (!) 121, weight 79 kg (174 lb 2.6 oz).    Subjective:   Diet: Adequate intake.  Patient reports no nausea.    Activity level: Returning to normal.    Pain control: Well controlled.    Objective:  Vital signs (most recent): Blood pressure 126/82, pulse (!) 121, weight 79 kg (174 lb 2.6 oz).  General appearance: Comfortable.    Lungs:  Normal effort.    Heart: Normal rate.    Abdomen: Abdomen is soft.    Bowel sounds:  Bowel sounds are normal.    Tenderness: There is no abdominal tenderness tenderness.    Wound:  Clean.    Extremities: There is normal range of motion.    Neurological: The patient is alert.    Assessment & Plan  65-year-old female status post robotic cholecystectomy   Return to clinic p.r.n.   Encourage patient to use laxatives and enemas as needed if she is still this is mobile bowel movement.      Bebeto Cohen MD  5/30/2025

## 2025-05-30 NOTE — TELEPHONE ENCOUNTER
Called patient regarding the normal result of her recent cardiac PET. She did not answer, however a voice msg was left. She was encouraged to call back with any questions or concerns.     Wanda Espino MD  Cardiovascular medicine; PGY6  [facility]  [address] [place] [address]

## 2025-06-02 ENCOUNTER — PATIENT OUTREACH (OUTPATIENT)
Dept: ADMINISTRATIVE | Facility: OTHER | Age: 65
End: 2025-06-02
Payer: MEDICARE

## 2025-06-16 ENCOUNTER — PATIENT OUTREACH (OUTPATIENT)
Dept: ADMINISTRATIVE | Facility: OTHER | Age: 65
End: 2025-06-16
Payer: MEDICARE

## 2025-06-16 NOTE — PROGRESS NOTES
CHW - Outreach Attempt    Sylvie Renee,  Health Worker left a voicemail message for 1st attempt to contact patient regarding: [facility]   Community Health Worker to attempt to contact patient on: June 18, 2025.

## 2025-06-17 ENCOUNTER — TELEPHONE (OUTPATIENT)
Dept: PRIMARY CARE CLINIC | Facility: CLINIC | Age: 65
End: 2025-06-17
Payer: MEDICARE

## 2025-06-17 NOTE — TELEPHONE ENCOUNTER
Copied from CRM #[id number]. Topic: General Inquiry - Patient Advice  >> Jun 17, 2025 10:54 AM [place] wrote:  Caller is requesting an earlier appointment then we can schedule.  Caller is requesting a message be sent to the provider.    If this is for urgent care symptoms, did you offer other providers at this location, providers at other locations, or [facility]? (yes, no, n/a):      If this is for the patients physical, did you offer to schedule next available and put on wait list, or to see NP or PA for their physical?  (yes, no, n/a):  N/A    When is the next available appointment with their provider:  06/24/2025    Reason for the appointment:  F/U ER    Patient preference of timeframe to be scheduled:  As soon as possible     Would the patient like a call back, or a response through their [facility] portal?:   Call

## 2025-06-17 NOTE — TELEPHONE ENCOUNTER
Called and spoke with patient. An appointment has been scheduled for Monday at 3pm  Pt verbalized understanding.

## 2025-06-19 ENCOUNTER — RESULTS FOLLOW-UP (OUTPATIENT)
Dept: PRIMARY CARE CLINIC | Facility: CLINIC | Age: 65
End: 2025-06-19
Payer: MEDICARE

## 2025-06-23 ENCOUNTER — OFFICE VISIT (OUTPATIENT)
Dept: PRIMARY CARE CLINIC | Facility: CLINIC | Age: 65
End: 2025-06-23
Payer: MEDICARE

## 2025-06-23 VITALS
HEIGHT: 62 IN | DIASTOLIC BLOOD PRESSURE: 74 MMHG | BODY MASS INDEX: 33.63 KG/M2 | TEMPERATURE: 98 F | RESPIRATION RATE: 18 BRPM | SYSTOLIC BLOOD PRESSURE: 126 MMHG | WEIGHT: 182.75 LBS | OXYGEN SATURATION: 99 % | HEART RATE: 81 BPM

## 2025-06-23 DIAGNOSIS — I21.A1 TYPE 2 MYOCARDIAL INFARCTION WITHOUT ST ELEVATION: ICD-10-CM

## 2025-06-23 DIAGNOSIS — I47.10 SVT (SUPRAVENTRICULAR TACHYCARDIA): ICD-10-CM

## 2025-06-23 DIAGNOSIS — M79.652 LEFT THIGH PAIN: ICD-10-CM

## 2025-06-23 DIAGNOSIS — I21.4 NSTEMI (NON-ST ELEVATED MYOCARDIAL INFARCTION): ICD-10-CM

## 2025-06-23 DIAGNOSIS — F17.213 CIGARETTE NICOTINE DEPENDENCE WITH WITHDRAWAL: ICD-10-CM

## 2025-06-23 DIAGNOSIS — E28.310 SYMPTOMATIC PREMATURE MENOPAUSE: ICD-10-CM

## 2025-06-23 DIAGNOSIS — L29.9 PRURITUS, UNSPECIFIED: ICD-10-CM

## 2025-06-23 DIAGNOSIS — K59.01 SLOW TRANSIT CONSTIPATION: ICD-10-CM

## 2025-06-23 DIAGNOSIS — Z98.890 HISTORY OF ESOPHAGEAL DILATATION: ICD-10-CM

## 2025-06-23 DIAGNOSIS — M47.816 LUMBAR SPONDYLOSIS: ICD-10-CM

## 2025-06-23 DIAGNOSIS — M87.052 AVASCULAR NECROSIS OF BONE OF HIP, LEFT: Primary | ICD-10-CM

## 2025-06-23 DIAGNOSIS — Z87.19 HISTORY OF GASTROESOPHAGEAL REFLUX (GERD): ICD-10-CM

## 2025-06-23 DIAGNOSIS — M10.00 IDIOPATHIC GOUT, UNSPECIFIED CHRONICITY, UNSPECIFIED SITE: ICD-10-CM

## 2025-06-23 DIAGNOSIS — R73.03 PREDIABETES: ICD-10-CM

## 2025-06-23 DIAGNOSIS — I50.22 HEART FAILURE WITH MILDLY REDUCED EJECTION FRACTION (HFMREF): ICD-10-CM

## 2025-06-23 DIAGNOSIS — R52 PAIN MANAGEMENT: ICD-10-CM

## 2025-06-23 DIAGNOSIS — G47.33 OBSTRUCTIVE SLEEP APNEA SYNDROME: ICD-10-CM

## 2025-06-23 DIAGNOSIS — D64.89 ANEMIA DUE TO OTHER CAUSE, NOT CLASSIFIED: ICD-10-CM

## 2025-06-23 DIAGNOSIS — Z78.0 POST-MENOPAUSE: ICD-10-CM

## 2025-06-23 PROBLEM — Z86.0100 HISTORY OF COLONIC POLYPS: Status: RESOLVED | Noted: 2020-12-21 | Resolved: 2025-06-23

## 2025-06-23 PROBLEM — M87.051 AVASCULAR NECROSIS OF BONE OF HIP, RIGHT: Status: ACTIVE | Noted: 2023-08-07

## 2025-06-23 PROCEDURE — 99999 PR PBB SHADOW E&M-EST. PATIENT-LVL IV: CPT | Mod: PBBFAC,,, | Performed by: FAMILY MEDICINE

## 2025-06-23 RX ORDER — DICLOFENAC SODIUM 50 MG/1
50 TABLET, DELAYED RELEASE ORAL 2 TIMES DAILY
Qty: 60 TABLET | Refills: 5 | Status: SHIPPED | OUTPATIENT
Start: 2025-06-23

## 2025-06-23 RX ORDER — METHOCARBAMOL 500 MG/1
TABLET, FILM COATED ORAL
Qty: 60 TABLET | Refills: 5 | Status: SHIPPED | OUTPATIENT
Start: 2025-06-23

## 2025-06-23 RX ORDER — TRAMADOL HYDROCHLORIDE 50 MG/1
50 TABLET, FILM COATED ORAL EVERY 6 HOURS
Qty: 30 TABLET | Refills: 0 | Status: SHIPPED | OUTPATIENT
Start: 2025-06-23

## 2025-06-23 RX ORDER — PANTOPRAZOLE SODIUM 40 MG/1
40 TABLET, DELAYED RELEASE ORAL DAILY
Qty: 90 TABLET | Refills: 3 | Status: SHIPPED | OUTPATIENT
Start: 2025-06-23 | End: 2025-09-21

## 2025-06-23 NOTE — PROGRESS NOTES
Subjective:       Patient ID: Wilfredo Vazquez is a 65 y.o. female.    Chief Complaint: Follow-up (ER) and Medication Refill    HPI 66 yo ER follow up ---Friday 6- seen ER [facility] told needed hip replacement according what was in chart. --ptt states kept coming here told had arthritis so went fore second opinion MRI hip 4- showed avascular necrosis of the femoral head bilaterally with marked degeneration and edema of the left hip joint patient states the pain in the left hip joint goes into the left groin area.   In emergency room [facility] said patient needed to have a left hip replacement--patient already had left knee replacement--Veronica Manning and Nigel Enriquez   Patient would like to see about having the hip injected instead of having surgery needs to see orthopedist to discuss  Given tramadol--not on NSAIDs or muscle relaxer.  History of elevated liver function tests May 2025 liver function had returned to normal  Hypertension blood pressure 124/74 lisinopril 20 mg  COPD albuterol and Breztri  GERD on Protonix  History left knee replacement  History insomnia on Seroquel--sees psychiatrist in Italian-  Problems to address  1-chest pain  2 gallstone  3 EGD showing some gastritis with area that appears to have bled patient on Protonix  4 MRI of the hip showing bilaterally avascular necrosis with advanced degenerative changes needs follow up with orthopedist for possible injection into the left hip--or surgery   needs refill of tramadol and Periactin  Patient had taken Naprosyn in the past had some itching and some gastritis              ROS:  No significant change except for history of present illness  Skin: no psoriasis, eczema, skin cancer-  HEENT: no headache,  No ocular pain, blurred vision, diplopia, epistaxis, hoarseness change in voice, thyroid trouble  Lung: No pneumonia, +asthma, no Tb, +_wheezing,+ SOB, no smoking + COPD --doing better--needs inhaler albuterol and breztri   Heart:  No chest pain, ankle edema, palpitations, MI, patti murmur, +hypertension,no  hyperlipidemia no stent bypass arrhythmia blood pressure 126/78  Abdomen: no nausea,no  Vomiting,no diarrhea, no  constipation, ulcers, hepatitis, gallbladder disease, melena, hematochezia, hematemesis +dysphagia history of fundoplication for GERD history of esophageal dilatation x2Dr Beary doing better--swallowing much better  : no UTI, renal disease, stones  GYN hyst   MS: no fractures, O/A, lupus, rheumatoid, gout--saw orthopedist had MRI of the hips showing avascular necrosis of the hips bilaterally degenerative changes of the left hip needs to follow-up with orthopedist for possible hip injections  Neuro: No dizziness, LOC, seizures   No diabetes, no anemia, no anxiety, no depression   Single--2 children one  --disable secondary to a stomach--lives with daughter and grandson    Objective:   Physical Exam:    General: Well nourished, well developed, no acute distress +obesity  Skin: No lesions  HEENT: Eyes PERRLA, EOM intact, bilateral cataract nose cleear D/C  throat +1/4 erythematous ears TMs clear   NECK: Supple, no bruits, No JVD, no nodes  Lungs: Clear, no rales, rhonchi, wheezing coarse BS   Heart: Regular rate and rhythm, no murmur s, gallops, or rubs  Abdomen:  No abdominal pain guarding rebound or tenderness organomegaly  MS--no significant change pain left knee palpation--crepitus with flexion extension of the left knee--tenderness with ambulation--able squat [facility] down hard to arise--told in the past may need a knee replacement--tenderness lumbar spine L1-S1 anterior flexion 10° extension 10° lateral flexion rotation 10°--bilateral hip pain left greater than right--annette left groin area pain with squatting pain with abduction abduction of the hips bilaterally left greater than right  Neuro: Alert, CN intact, oriented X 3  Extremities: No cyanosis, clubbing, or edema         Assessment:       1. Avascular necrosis  of bone of hip, left    2. Left thigh pain    3. Type 2 myocardial infarction without ST elevation    4. SVT (supraventricular tachycardia)    5. Slow transit constipation    6. Obstructive sleep apnea syndrome    7. Prediabetes    8. Pain management    9. NSTEMI (non-ST elevated myocardial infarction)    10. Cigarette nicotine dependence with withdrawal    11. Lumbar spondylosis    12. History of gastroesophageal reflux (GERD)    13. History of esophageal dilatation    14. Heart failure with mildly reduced ejection fraction (HFmrEF)    15. Idiopathic gout, unspecified chronicity, unspecified site    16. Anemia due to other cause, not classified    17. Post-menopause    18. Symptomatic premature menopause    19. Pruritus, unspecified                      Plan:           Main Reason for Visit--  MRI showed avascular necrosis femoral head bilaterally---patient seen by me in the past told needs to see orthopedist for possible injection or surgery---recently went to [facility] told needed surgery on the left hip--patient went to the emergency room due to bilateral hip pain left much greater than right with radiation into the left groin--needs refills of Ultram/diclofenac 50 mg b.i.d. as an NSAID patient was allergic to Naprosyn had itching and gastritis if patient develops issue gastritis should DC diclofenac--Robaxin 500 mg 1 q.h.s. p.r.n. muscle spasm may increase to q.8 hours p.r.n. muscle spasm but may make sleepy--needs to see orthopedist Dr. Nigel Enriquez who did patient's left knee replacement to see about injecting the left hip and see if surgery with the hip replacement is warranted in the future  Decreased appetite improved with witha Periactin--and with bowel movements  Other medical issues  COPD --chroi=elo bronchitie --albuterol and Breztri   Tobacco abuse half pack per day ---patient states no longer smoking  Low back pain--and neck pain-- Bulging disc T12-S1 and some facet arthropathy L4-5 L5-S1 history of a  compression fracture of L1 --3 days ago developed significant pain getting out of bed specially in the lower back will give Ultram ibuprofen--Moist heat/theragesic/range of motion exercise--needs to see neurosurgeon--patient has had epidural steroid injections in the past  Hypertension blood pressure 124/76--on lisinopril 20 mg  Insomnia on Seroquel should get from psychiatrist  History dysphagia--had Nissen fundoplication--history of GERD--history of esophageal stenosis with dilatation occas  difficulty swallowing water has to eat small amounts of food--history esophageal dilitation --OK now  on protonix q hs   Lab CBCs CMP lipd T4 TSH 6 weeks ---then see me in 3 months

## 2025-06-25 ENCOUNTER — PATIENT OUTREACH (OUTPATIENT)
Dept: ADMINISTRATIVE | Facility: OTHER | Age: 65
End: 2025-06-25
Payer: MEDICARE

## 2025-06-25 NOTE — PROGRESS NOTES
CHW - Outreach Attempt    Sylvie Renee Community Health Worker left a voicemail message for 2nd attempt to contact patient regarding: [facility]   Community Health Worker to attempt to contact patient on: July 1, 2025.

## 2025-07-02 ENCOUNTER — PATIENT OUTREACH (OUTPATIENT)
Dept: ADMINISTRATIVE | Facility: OTHER | Age: 65
End: 2025-07-02
Payer: MEDICARE

## 2025-07-08 DIAGNOSIS — M79.652 LEFT THIGH PAIN: ICD-10-CM

## 2025-07-08 RX ORDER — TRAMADOL HYDROCHLORIDE 50 MG/1
50 TABLET, FILM COATED ORAL EVERY 6 HOURS
Qty: 30 TABLET | Refills: 0 | Status: SHIPPED | OUTPATIENT
Start: 2025-07-08

## 2025-07-08 NOTE — TELEPHONE ENCOUNTER
No care due was identified.  [facility] Embedded Care Due Messages. Reference number: [id number].   7/08/2025 10:24:32 AM CDT

## 2025-07-08 NOTE — TELEPHONE ENCOUNTER
Copied from CRM #[id number]. Topic: Medications - Medication Refill  >> Jul 8, 2025 10:13 AM [place] wrote:  Requesting an RX refill or new RX.    Is this a refill or new RX:     RX name and strength (copy/paste from chart):    traMADoL (ULTRAM) 50 mg tablet       Is this a 30 day or 90 day RX:     Pharmacy name and phone # (copy/paste from chart):      [facility] - [place] - [address]  [address]  [place] [address]  Phone: [phone] Fax: [phone]        Who called and call back number:    The doctors have asked that we provide their patients with the following 2 reminders -- prescription refills can take up to 72 hours, and a friendly reminder that in the future you can use your [facility] account to request refills:

## 2025-07-11 ENCOUNTER — CLINICAL SUPPORT (OUTPATIENT)
Dept: CARDIOLOGY | Facility: HOSPITAL | Age: 65
End: 2025-07-11
Attending: STUDENT IN AN ORGANIZED HEALTH CARE EDUCATION/TRAINING PROGRAM
Payer: MEDICARE

## 2025-07-11 DIAGNOSIS — I22.2 SUBSEQUENT NON-ST ELEVATION (NSTEMI) MYOCARDIAL INFARCTION: ICD-10-CM

## 2025-07-11 DIAGNOSIS — I47.10 SVT (SUPRAVENTRICULAR TACHYCARDIA): ICD-10-CM

## 2025-07-11 PROCEDURE — 93271 ECG/MONITORING AND ANALYSIS: CPT | Mod: HCNC

## 2025-07-21 ENCOUNTER — TELEPHONE (OUTPATIENT)
Dept: ORTHOPEDICS | Facility: CLINIC | Age: 65
End: 2025-07-21
Payer: MEDICARE

## 2025-08-07 ENCOUNTER — PATIENT OUTREACH (OUTPATIENT)
Facility: OTHER | Age: 65
End: 2025-08-07
Payer: MEDICARE

## 2025-08-08 ENCOUNTER — OFFICE VISIT (OUTPATIENT)
Dept: GASTROENTEROLOGY | Facility: CLINIC | Age: 65
End: 2025-08-08
Payer: MEDICARE

## 2025-08-08 VITALS
SYSTOLIC BLOOD PRESSURE: 139 MMHG | OXYGEN SATURATION: 98 % | WEIGHT: 183.56 LBS | HEART RATE: 90 BPM | BODY MASS INDEX: 33.57 KG/M2 | DIASTOLIC BLOOD PRESSURE: 91 MMHG

## 2025-08-08 DIAGNOSIS — R10.13 EPIGASTRIC ABDOMINAL PAIN: Primary | ICD-10-CM

## 2025-08-08 DIAGNOSIS — K44.9 HIATAL HERNIA: ICD-10-CM

## 2025-08-08 DIAGNOSIS — R10.30 LOWER ABDOMINAL PAIN: ICD-10-CM

## 2025-08-08 DIAGNOSIS — K21.9 GASTROESOPHAGEAL REFLUX DISEASE, UNSPECIFIED WHETHER ESOPHAGITIS PRESENT: ICD-10-CM

## 2025-08-08 PROCEDURE — 3075F SYST BP GE 130 - 139MM HG: CPT | Mod: CPTII,HCNC,S$GLB, | Performed by: NURSE PRACTITIONER

## 2025-08-08 PROCEDURE — 99215 OFFICE O/P EST HI 40 MIN: CPT | Mod: HCNC,S$GLB,, | Performed by: NURSE PRACTITIONER

## 2025-08-08 PROCEDURE — 3008F BODY MASS INDEX DOCD: CPT | Mod: CPTII,HCNC,S$GLB, | Performed by: NURSE PRACTITIONER

## 2025-08-08 PROCEDURE — 1159F MED LIST DOCD IN RCRD: CPT | Mod: CPTII,HCNC,S$GLB, | Performed by: NURSE PRACTITIONER

## 2025-08-08 PROCEDURE — 1160F RVW MEDS BY RX/DR IN RCRD: CPT | Mod: CPTII,HCNC,S$GLB, | Performed by: NURSE PRACTITIONER

## 2025-08-08 PROCEDURE — 3080F DIAST BP >= 90 MM HG: CPT | Mod: CPTII,HCNC,S$GLB, | Performed by: NURSE PRACTITIONER

## 2025-08-08 PROCEDURE — 99999 PR PBB SHADOW E&M-EST. PATIENT-LVL IV: CPT | Mod: PBBFAC,HCNC,, | Performed by: NURSE PRACTITIONER

## 2025-08-08 PROCEDURE — 4010F ACE/ARB THERAPY RXD/TAKEN: CPT | Mod: CPTII,HCNC,S$GLB, | Performed by: NURSE PRACTITIONER

## 2025-08-08 RX ORDER — SUCRALFATE 1 G/1
1 TABLET ORAL 3 TIMES DAILY
Qty: 90 TABLET | Refills: 2 | Status: SHIPPED | OUTPATIENT
Start: 2025-08-08 | End: 2025-11-06

## 2025-08-08 RX ORDER — FAMOTIDINE 40 MG/1
40 TABLET, FILM COATED ORAL NIGHTLY
Qty: 90 TABLET | Refills: 3 | Status: SHIPPED | OUTPATIENT
Start: 2025-08-08 | End: 2026-08-08

## 2025-08-11 ENCOUNTER — OFFICE VISIT (OUTPATIENT)
Dept: ORTHOPEDICS | Facility: CLINIC | Age: 65
End: 2025-08-11
Payer: MEDICARE

## 2025-08-11 VITALS
WEIGHT: 183.63 LBS | BODY MASS INDEX: 33.79 KG/M2 | HEART RATE: 83 BPM | HEIGHT: 62 IN | DIASTOLIC BLOOD PRESSURE: 86 MMHG | SYSTOLIC BLOOD PRESSURE: 131 MMHG

## 2025-08-11 DIAGNOSIS — M16.12 PRIMARY OSTEOARTHRITIS OF LEFT HIP: Primary | ICD-10-CM

## 2025-08-11 DIAGNOSIS — M79.652 LEFT THIGH PAIN: ICD-10-CM

## 2025-08-11 PROCEDURE — 99999 PR PBB SHADOW E&M-EST. PATIENT-LVL IV: CPT | Mod: PBBFAC,HCNC,,

## 2025-08-11 PROCEDURE — 4010F ACE/ARB THERAPY RXD/TAKEN: CPT | Mod: CPTII,HCNC,S$GLB,

## 2025-08-11 PROCEDURE — 3079F DIAST BP 80-89 MM HG: CPT | Mod: CPTII,HCNC,S$GLB,

## 2025-08-11 PROCEDURE — 1159F MED LIST DOCD IN RCRD: CPT | Mod: CPTII,HCNC,S$GLB,

## 2025-08-11 PROCEDURE — 99213 OFFICE O/P EST LOW 20 MIN: CPT | Mod: HCNC,S$GLB,,

## 2025-08-11 PROCEDURE — 3008F BODY MASS INDEX DOCD: CPT | Mod: CPTII,HCNC,S$GLB,

## 2025-08-11 PROCEDURE — 1160F RVW MEDS BY RX/DR IN RCRD: CPT | Mod: CPTII,HCNC,S$GLB,

## 2025-08-11 PROCEDURE — 3075F SYST BP GE 130 - 139MM HG: CPT | Mod: CPTII,HCNC,S$GLB,

## 2025-08-15 RX ORDER — TRAMADOL HYDROCHLORIDE 50 MG/1
50 TABLET, FILM COATED ORAL EVERY 6 HOURS
Qty: 30 TABLET | Refills: 0 | OUTPATIENT
Start: 2025-08-15

## 2025-08-18 ENCOUNTER — TELEPHONE (OUTPATIENT)
Dept: GASTROENTEROLOGY | Facility: CLINIC | Age: 65
End: 2025-08-18
Payer: MEDICARE

## 2025-08-18 ENCOUNTER — PATIENT OUTREACH (OUTPATIENT)
Facility: OTHER | Age: 65
End: 2025-08-18
Payer: MEDICARE

## 2025-08-19 ENCOUNTER — HOSPITAL ENCOUNTER (OUTPATIENT)
Dept: CARDIOLOGY | Facility: HOSPITAL | Age: 65
Discharge: HOME | End: 2025-08-19
Attending: STUDENT IN AN ORGANIZED HEALTH CARE EDUCATION/TRAINING PROGRAM
Payer: MEDICARE

## 2025-08-19 VITALS
WEIGHT: 175 LBS | DIASTOLIC BLOOD PRESSURE: 72 MMHG | BODY MASS INDEX: 32.2 KG/M2 | HEART RATE: 84 BPM | HEIGHT: 62 IN | SYSTOLIC BLOOD PRESSURE: 120 MMHG

## 2025-08-19 DIAGNOSIS — I50.22 CHRONIC SYSTOLIC HEART FAILURE: ICD-10-CM

## 2025-08-19 LAB
AORTIC SIZE INDEX (SOV): 1.9 CM/M2
AORTIC SIZE INDEX: 1.9 CM/M2
ASCENDING AORTA: 3.4 CM
AV AREA BY CONTINUOUS VTI: 3.3 CM2
AV INDEX (PROSTH): 0.97
AV LVOT MEAN GRADIENT: 3 MMHG
AV LVOT PEAK GRADIENT: 8 MMHG
AV MEAN GRADIENT: 4 MMHG
AV PEAK GRADIENT: 8 MMHG
AV VALVE AREA BY VELOCITY RATIO: 3.7 CM²
AV VALVE AREA: 3.4 CM2
AV VELOCITY RATIO: 1.07
BSA FOR ECHO PROCEDURE: 1.86 M2
CV ECHO LV RWT: 0.33 CM
DOP CALC AO PEAK VEL: 1.4 M/S
DOP CALC AO VTI: 27 CM
DOP CALC LVOT AREA: 3.5 CM2
DOP CALC LVOT DIAMETER: 2.1 CM
DOP CALC LVOT PEAK VEL: 1.5 M/S
DOP CALCLVOT PEAK VEL VTI: 26.2 CM
E WAVE DECELERATION TIME: 232 MS
E/A RATIO: 0.98
E/E' RATIO: 15 M/S
ECHO EF ESTIMATED: 61 %
ECHO LV POSTERIOR WALL: 0.8 CM (ref 0.6–1.1)
EJECTION FRACTION: 63 %
FRACTIONAL SHORTENING: 32.7 % (ref 28–44)
INTERVENTRICULAR SEPTUM: 0.7 CM (ref 0.6–1.1)
IVC DIAMETER: 1.47 CM
LA MAJOR: 5.3 CM
LA MINOR: 5.4 CM
LA WIDTH: 3.9 CM
LEFT ATRIUM SIZE: 3.5 CM
LEFT ATRIUM VOLUME INDEX MOD: 36 ML/M2
LEFT ATRIUM VOLUME INDEX: 34 ML/M2
LEFT ATRIUM VOLUME MOD: 66 ML
LEFT ATRIUM VOLUME: 62 CM3
LEFT INTERNAL DIMENSION IN SYSTOLE: 3.3 CM (ref 2.1–4)
LEFT VENTRICLE DIASTOLIC VOLUME INDEX: 62.98 ML/M2
LEFT VENTRICLE DIASTOLIC VOLUME: 114 ML
LEFT VENTRICLE MASS INDEX: 66.8 G/M2
LEFT VENTRICLE SYSTOLIC VOLUME INDEX: 24.9 ML/M2
LEFT VENTRICLE SYSTOLIC VOLUME: 45 ML
LEFT VENTRICULAR INTERNAL DIMENSION IN DIASTOLE: 4.9 CM (ref 3.5–6)
LEFT VENTRICULAR MASS: 120.8 G
LV LATERAL E/E' RATIO: 13.1 M/S
LV SEPTAL E/E' RATIO: 17.5 M/S
Lab: 2.2 CM/M
Lab: 2.2 CM/M
MV A" WAVE DURATION": 82.78 MS
MV PEAK A VEL: 1.07 M/S
MV PEAK E VEL: 1.05 M/S
OHS CV CPX PATIENT HEIGHT IN: 62
OHS CV RV/LV RATIO: 0.59 CM
OHS LV EJECTION FRACTION SIMPSONS BIPLANE MOD: 58 %
PISA MRMAX VEL: 6.78 M/S
PISA TR MAX VEL: 2.7 M/S
PULM VEIN A" WAVE DURATION": 82.78 MS
PULM VEIN S/D RATIO: 1.21
PULMONIC VEIN PEAK A VELOCITY: 0.3 M/S
PV PEAK D VEL: 0.39 M/S
PV PEAK S VEL: 0.47 M/S
RA MAJOR: 4.31 CM
RA PRESSURE ESTIMATED: 3 MMHG
RA WIDTH: 3.15 CM
RIGHT ATRIAL AREA: 10.3 CM2
RIGHT VENTRICLE DIASTOLIC BASEL DIMENSION: 2.9 CM
RV TB RVSP: 6 MMHG
RV TISSUE DOPPLER FREE WALL SYSTOLIC VELOCITY 1 (APICAL 4 CHAMBER VIEW): 19.44 CM/S
SINUS: 3.5 CM
STJ: 2.8 CM
TDI LATERAL: 0.08 M/S
TDI SEPTAL: 0.06 M/S
TDI: 0.07 M/S
TRICUSPID ANNULAR PLANE SYSTOLIC EXCURSION: 2 CM
TV PEAK GRADIENT: 33 MMHG
TV REST PULMONARY ARTERY PRESSURE: 32 MMHG
Z-SCORE OF LEFT VENTRICULAR DIMENSION IN END DIASTOLE: -0.22
Z-SCORE OF LEFT VENTRICULAR DIMENSION IN END SYSTOLE: 0.51

## 2025-08-19 PROCEDURE — 93306 TTE W/DOPPLER COMPLETE: CPT | Mod: HCNC

## 2025-08-19 PROCEDURE — 93306 TTE W/DOPPLER COMPLETE: CPT | Mod: 26,HCNC,, | Performed by: INTERNAL MEDICINE

## 2025-08-25 ENCOUNTER — PATIENT OUTREACH (OUTPATIENT)
Facility: OTHER | Age: 65
End: 2025-08-25
Payer: MEDICARE

## 2025-08-26 ENCOUNTER — OFFICE VISIT (OUTPATIENT)
Dept: PRIMARY CARE CLINIC | Facility: CLINIC | Age: 65
End: 2025-08-26
Payer: MEDICARE

## 2025-08-26 VITALS
DIASTOLIC BLOOD PRESSURE: 88 MMHG | HEIGHT: 62 IN | HEART RATE: 88 BPM | OXYGEN SATURATION: 98 % | WEIGHT: 186.19 LBS | SYSTOLIC BLOOD PRESSURE: 136 MMHG | RESPIRATION RATE: 18 BRPM | TEMPERATURE: 98 F | BODY MASS INDEX: 34.26 KG/M2

## 2025-08-26 DIAGNOSIS — M79.652 LEFT THIGH PAIN: Primary | ICD-10-CM

## 2025-08-26 DIAGNOSIS — Z87.891 PERSONAL HISTORY OF NICOTINE DEPENDENCE: ICD-10-CM

## 2025-08-26 DIAGNOSIS — M54.16 LUMBAR RADICULOPATHY: ICD-10-CM

## 2025-08-26 DIAGNOSIS — I21.A1 TYPE 2 MYOCARDIAL INFARCTION WITHOUT ST ELEVATION: ICD-10-CM

## 2025-08-26 DIAGNOSIS — I10 HYPERTENSION, UNSPECIFIED TYPE: ICD-10-CM

## 2025-08-26 DIAGNOSIS — G47.33 OSA (OBSTRUCTIVE SLEEP APNEA): ICD-10-CM

## 2025-08-26 DIAGNOSIS — Z87.19 HISTORY OF GASTROESOPHAGEAL REFLUX (GERD): ICD-10-CM

## 2025-08-26 DIAGNOSIS — J44.9 CHRONIC OBSTRUCTIVE PULMONARY DISEASE, UNSPECIFIED COPD TYPE: ICD-10-CM

## 2025-08-26 DIAGNOSIS — I50.22 HEART FAILURE WITH MILDLY REDUCED EJECTION FRACTION (HFMREF): ICD-10-CM

## 2025-08-26 DIAGNOSIS — I47.10 SVT (SUPRAVENTRICULAR TACHYCARDIA): ICD-10-CM

## 2025-08-26 DIAGNOSIS — S32.010D COMPRESSION FRACTURE OF L1 VERTEBRA WITH ROUTINE HEALING: ICD-10-CM

## 2025-08-26 DIAGNOSIS — R73.03 PREDIABETES: ICD-10-CM

## 2025-08-26 DIAGNOSIS — R13.10 DYSPHAGIA, UNSPECIFIED TYPE: ICD-10-CM

## 2025-08-26 DIAGNOSIS — F31.9 BIPOLAR AFFECTIVE DISORDER, REMISSION STATUS UNSPECIFIED: ICD-10-CM

## 2025-08-26 DIAGNOSIS — R73.9 HYPERGLYCEMIA: ICD-10-CM

## 2025-08-26 DIAGNOSIS — Z98.890 HISTORY OF ESOPHAGEAL DILATATION: ICD-10-CM

## 2025-08-26 DIAGNOSIS — D64.9 ANEMIA, UNSPECIFIED TYPE: ICD-10-CM

## 2025-08-26 DIAGNOSIS — E66.9 OBESITY (BMI 35.0-39.9 WITHOUT COMORBIDITY): ICD-10-CM

## 2025-08-26 DIAGNOSIS — J98.01 BRONCHOSPASM: ICD-10-CM

## 2025-08-26 PROCEDURE — 99999 PR PBB SHADOW E&M-EST. PATIENT-LVL III: CPT | Mod: PBBFAC,HCNC,, | Performed by: FAMILY MEDICINE

## 2025-08-26 RX ORDER — TRAMADOL HYDROCHLORIDE 50 MG/1
50 TABLET, FILM COATED ORAL EVERY 6 HOURS PRN
Qty: 30 TABLET | Refills: 0 | Status: SHIPPED | OUTPATIENT
Start: 2025-08-26 | End: 2025-09-05

## 2025-08-26 RX ORDER — TRAMADOL HYDROCHLORIDE 50 MG/1
50 TABLET, FILM COATED ORAL EVERY 6 HOURS
Qty: 30 TABLET | Refills: 0 | Status: CANCELLED | OUTPATIENT
Start: 2025-08-26

## 2025-08-28 ENCOUNTER — TELEPHONE (OUTPATIENT)
Dept: CARDIOLOGY | Facility: CLINIC | Age: 65
End: 2025-08-28
Payer: MEDICARE

## 2025-08-29 ENCOUNTER — TELEPHONE (OUTPATIENT)
Dept: PRIMARY CARE CLINIC | Facility: CLINIC | Age: 65
End: 2025-08-29
Payer: MEDICARE

## 2025-08-29 DIAGNOSIS — M51.360 DEGENERATION OF INTERVERTEBRAL DISC OF LUMBAR REGION WITH DISCOGENIC BACK PAIN: Primary | ICD-10-CM

## 2025-09-01 RX ORDER — TRAMADOL HYDROCHLORIDE 50 MG/1
50 TABLET, FILM COATED ORAL EVERY 6 HOURS PRN
Qty: 30 TABLET | Refills: 0 | Status: SHIPPED | OUTPATIENT
Start: 2025-09-01 | End: 2025-09-11

## (undated) DEVICE — SUT 4/0 18IN ETHILON BL P3

## (undated) DEVICE — SUT ET GRN BR 3-0 CR 36 SH1

## (undated) DEVICE — UNDERGLOVES BIOGEL PI SIZE 8

## (undated) DEVICE — SYR B-D DISP CONTROL 10CC100/C

## (undated) DEVICE — TOURNIQUET SB QC DP 18X4IN

## (undated) DEVICE — DRAPE STERI-DRAPE 1000 17X11IN

## (undated) DEVICE — BANDAGE MATRIX HK LOOP 2IN 5YD

## (undated) DEVICE — SPONGE COTTON TRAY 4X4IN

## (undated) DEVICE — PAD CAST SPECIALIST STRL 4

## (undated) DEVICE — BANDAGE SOFFORM STER 2IN

## (undated) DEVICE — DRESSING XEROFORM FOIL PK 1X8

## (undated) DEVICE — PACK UPPER EXTREMITY BAPTIST

## (undated) DEVICE — PAD UNDERPAD 30X30

## (undated) DEVICE — BNDG COFLEX FOAM LF2 ST 4X5YD

## (undated) DEVICE — SEE L#120831

## (undated) DEVICE — SUT PROLENE 6-0 P-1 18

## (undated) DEVICE — SOL PVP-I SCRUB 7.5% 4OZ

## (undated) DEVICE — NDL HYPO REG 25G X 1 1/2

## (undated) DEVICE — APPLICATOR CHLORAPREP ORN 26ML

## (undated) DEVICE — SOL ALCOHOL ISO 70% WNTGR 16OZ

## (undated) DEVICE — GLOVE BIOGEL SKINSENSE PI 7.5

## (undated) DEVICE — DRAPE C-ARM MINI DISP